# Patient Record
Sex: FEMALE | Race: BLACK OR AFRICAN AMERICAN | NOT HISPANIC OR LATINO | Employment: OTHER | URBAN - METROPOLITAN AREA
[De-identification: names, ages, dates, MRNs, and addresses within clinical notes are randomized per-mention and may not be internally consistent; named-entity substitution may affect disease eponyms.]

---

## 2017-01-16 ENCOUNTER — HOSPITAL ENCOUNTER (OUTPATIENT)
Dept: RADIOLOGY | Facility: HOSPITAL | Age: 62
Discharge: HOME | End: 2017-01-16
Attending: INTERNAL MEDICINE
Payer: COMMERCIAL

## 2017-01-16 DIAGNOSIS — Z12.31 OTHER SCREENING MAMMOGRAM: ICD-10-CM

## 2017-01-16 DIAGNOSIS — Z12.31 VISIT FOR SCREENING MAMMOGRAM: ICD-10-CM

## 2017-01-16 PROCEDURE — 77063 BREAST TOMOSYNTHESIS BI: CPT | Mod: 26,,, | Performed by: RADIOLOGY

## 2017-01-16 PROCEDURE — 77067 SCR MAMMO BI INCL CAD: CPT | Mod: TC

## 2017-01-16 PROCEDURE — 77067 SCR MAMMO BI INCL CAD: CPT | Mod: 26,,, | Performed by: RADIOLOGY

## 2017-01-18 ENCOUNTER — OFFICE VISIT (OUTPATIENT)
Dept: INTERNAL MEDICINE | Facility: CLINIC | Age: 62
End: 2017-01-18
Payer: COMMERCIAL

## 2017-01-18 VITALS
DIASTOLIC BLOOD PRESSURE: 72 MMHG | OXYGEN SATURATION: 96 % | HEART RATE: 87 BPM | SYSTOLIC BLOOD PRESSURE: 132 MMHG | WEIGHT: 193.81 LBS | BODY MASS INDEX: 33.09 KG/M2 | HEIGHT: 64 IN

## 2017-01-18 DIAGNOSIS — J31.0 RHINITIS, UNSPECIFIED TYPE: ICD-10-CM

## 2017-01-18 DIAGNOSIS — H61.20 IMPACTED CERUMEN, UNSPECIFIED LATERALITY: ICD-10-CM

## 2017-01-18 DIAGNOSIS — J44.9 MILD CHRONIC OBSTRUCTIVE PULMONARY DISEASE: ICD-10-CM

## 2017-01-18 DIAGNOSIS — H91.90 HEARING LOSS, UNSPECIFIED HEARING LOSS TYPE, UNSPECIFIED LATERALITY: ICD-10-CM

## 2017-01-18 DIAGNOSIS — E11.9 TYPE 2 DIABETES MELLITUS WITHOUT COMPLICATION, WITHOUT LONG-TERM CURRENT USE OF INSULIN: ICD-10-CM

## 2017-01-18 DIAGNOSIS — M79.673 PAIN OF FOOT, UNSPECIFIED LATERALITY: ICD-10-CM

## 2017-01-18 DIAGNOSIS — Z12.11 COLON CANCER SCREENING: ICD-10-CM

## 2017-01-18 DIAGNOSIS — G47.30 SLEEP APNEA, UNSPECIFIED TYPE: ICD-10-CM

## 2017-01-18 DIAGNOSIS — Z00.00 ANNUAL PHYSICAL EXAM: Primary | ICD-10-CM

## 2017-01-18 DIAGNOSIS — E78.5 HYPERLIPIDEMIA, UNSPECIFIED HYPERLIPIDEMIA TYPE: ICD-10-CM

## 2017-01-18 DIAGNOSIS — J06.9 UPPER RESPIRATORY TRACT INFECTION, UNSPECIFIED TYPE: ICD-10-CM

## 2017-01-18 LAB
FLUAV AG SPEC QL IA: POSITIVE
FLUBV AG SPEC QL IA: NEGATIVE
SPECIMEN SOURCE: ABNORMAL

## 2017-01-18 PROCEDURE — 99999 PR PBB SHADOW E&M-EST. PATIENT-LVL IV: CPT | Mod: PBBFAC,,, | Performed by: INTERNAL MEDICINE

## 2017-01-18 PROCEDURE — 87400 INFLUENZA A/B EACH AG IA: CPT | Mod: 59

## 2017-01-18 PROCEDURE — 99396 PREV VISIT EST AGE 40-64: CPT | Mod: S$GLB,,, | Performed by: INTERNAL MEDICINE

## 2017-01-18 RX ORDER — FLUTICASONE PROPIONATE 50 MCG
1 SPRAY, SUSPENSION (ML) NASAL DAILY
Qty: 16 G | Refills: 6 | Status: SHIPPED | OUTPATIENT
Start: 2017-01-18 | End: 2019-09-11 | Stop reason: SDUPTHER

## 2017-01-18 RX ORDER — ALBUTEROL SULFATE 90 UG/1
2 AEROSOL, METERED RESPIRATORY (INHALATION) EVERY 6 HOURS PRN
Qty: 3 INHALER | Refills: 3 | Status: SHIPPED | OUTPATIENT
Start: 2017-01-18 | End: 2018-05-01 | Stop reason: SDUPTHER

## 2017-01-18 RX ORDER — CODEINE PHOSPHATE AND GUAIFENESIN 10; 100 MG/5ML; MG/5ML
5 SOLUTION ORAL NIGHTLY PRN
Qty: 180 ML | Refills: 0 | Status: SHIPPED | OUTPATIENT
Start: 2017-01-18 | End: 2017-01-28

## 2017-01-18 RX ORDER — ONDANSETRON 4 MG/1
4 TABLET, ORALLY DISINTEGRATING ORAL EVERY 8 HOURS PRN
Qty: 10 TABLET | Refills: 0 | Status: SHIPPED | OUTPATIENT
Start: 2017-01-18 | End: 2018-05-01

## 2017-01-18 RX ORDER — PRAVASTATIN SODIUM 20 MG/1
20 TABLET ORAL DAILY
Qty: 90 TABLET | Refills: 3 | Status: SHIPPED | OUTPATIENT
Start: 2017-01-18 | End: 2018-05-01 | Stop reason: SDUPTHER

## 2017-01-18 RX ORDER — ASPIRIN 81 MG/1
81 TABLET ORAL DAILY
Refills: 0
Start: 2017-01-18 | End: 2022-10-25

## 2017-01-18 RX ORDER — METFORMIN HYDROCHLORIDE 500 MG/1
500 TABLET, EXTENDED RELEASE ORAL
Qty: 90 TABLET | Refills: 3 | Status: SHIPPED | OUTPATIENT
Start: 2017-01-18 | End: 2018-05-01 | Stop reason: SDUPTHER

## 2017-01-18 NOTE — PROGRESS NOTES
"Subjective:       Patient ID: Lizabeth Saha is a 61 y.o. female.    Chief Complaint: Annual Exam   this is a 61-year-old who presents today for checkup.  Patient reports she retired since last visit and had been off her medications because she didn't have insurance and reports that she joined a gym and started to work harder on exercise and dietary measures.  Her sugars have been fairly well controlled overall. She has also been off her cholesterol medications.  Patient reports that she has been doing fairly well but recently she developed GI illness nausea vomiting diarrhea which seems to have resolved she then developed upper respiratory symptoms been going on since the beginning of the week.  No high fevers she has postnasal drip and cough sinus pressure and sneezing she does tend to get ALLERGY seasonally  patient has trouble hearing    HPI  Review of Systems   Constitutional: Positive for fatigue. Negative for fever.   HENT: Positive for congestion, postnasal drip and sinus pressure.    Respiratory: Positive for cough. Negative for shortness of breath and wheezing.    Cardiovascular: Negative for chest pain and palpitations.   Gastrointestinal: Negative for abdominal pain and constipation.        Recent gi illness    Musculoskeletal:        Arthritis knee at times  Foot discomfort on occasion   Would like podiatry appt    Neurological: Negative for dizziness.       Objective:     Blood pressure 132/72, pulse 87, height 5' 4" (1.626 m), weight 87.9 kg (193 lb 12.6 oz), SpO2 96 %.    Physical Exam   Constitutional: No distress.   HENT:   Head: Normocephalic.   Mouth/Throat: Oropharynx is clear and moist.   Cerumen  Rhinitis    Eyes: No scleral icterus.   Neck: Neck supple.   Cardiovascular: Normal rate, regular rhythm and normal heart sounds.  Exam reveals no gallop and no friction rub.    No murmur heard.  Pulmonary/Chest: Effort normal and breath sounds normal. No respiratory distress.   Breast : normal " no masses or tenderness    Abdominal: Soft. Bowel sounds are normal. She exhibits no mass. There is no tenderness.   Musculoskeletal: She exhibits no edema.   Arthritis knee    Feet:   Right Foot:   Protective Sensation: 7 sites tested. 7 sites sensed.   Left Foot:   Protective Sensation: 7 sites tested. 7 sites sensed.   Neurological: She is alert.   Skin: No erythema.   Psychiatric: She has a normal mood and affect.   Vitals reviewed.      Assessment:       1. Annual physical exam    2. Hyperlipidemia, unspecified hyperlipidemia type    3. Type 2 diabetes mellitus without complication, without long-term current use of insulin    4. Pain of foot, unspecified laterality    5. Colon cancer screening    6. Impacted cerumen, unspecified laterality    7. Hearing loss, unspecified hearing loss type, unspecified laterality    8. Mild chronic obstructive pulmonary disease    9. Upper respiratory tract infection, unspecified type    10. Rhinitis, unspecified type    11. Sleep apnea, unspecified type        Plan:       Lizabeth Bingham was seen today for annual exam.    Diagnoses and all orders for this visit:    Annual physical exam    Hyperlipidemia, unspecified hyperlipidemia type  -     pravastatin (PRAVACHOL) 20 MG tablet; Take 1 tablet (20 mg total) by mouth once daily.    Type 2 diabetes mellitus without complication, without long-term current use of insulin  Discussed hemoglobin A1c without metformin will resume metformin  -     Ambulatory consult to Optometry annual eye appointment    -     Ambulatory referral to Podiatry  -     Basic metabolic panel; Future  -     CBC auto differential; Future  -     Hemoglobin A1c; Future  -     Hepatic function panel; Future  -     Lipid panel; Future    Pain of foot, unspecified laterality  -     Ambulatory referral to Podiatry    Colon cancer screening  -     Case request GI: COLONOSCOPY  Pt will consider she will call to schedule     Impacted cerumen, unspecified  laterality  Hearing loss, unspecified hearing loss type, unspecified laterality  -     Ambulatory consult to ENT    Mild chronic obstructive pulmonary disease    Upper respiratory tract infection, unspecified type  -     Influenza antigen Nasal Swab    Rhinitis, unspecified type  -     fluticasone (FLONASE) 50 mcg/actuation nasal spray; 1 spray by Each Nare route once daily.    Sleep apnea, unspecified type  -     Ambulatory consult to Sleep Disorders  For follow up     Resume medications   -     metformin (GLUCOPHAGE-XR) 500 MG 24 hr tablet; Take 1 tablet (500 mg total) by mouth daily with breakfast.  -     beclomethasone (QVAR) 40 mcg/actuation Aero; Inhale 2 puffs into the lungs 2 (two) times daily.  -     albuterol 90 mcg/actuation inhaler; Inhale 2 puffs into the lungs every 6 (six) hours as needed for Wheezing.  -     aspirin (ECOTRIN) 81 MG EC tablet; Take 1 tablet (81 mg total) by mouth once daily.    For nausea and cough rx proviedd   Recent gastroenteritis bland diet fluids increased hydration discussed   -     ondansetron (ZOFRAN-ODT) 4 MG TbDL; Take 1 tablet (4 mg total) by mouth every 8 (eight) hours as needed.  -     guaifenesin-codeine 100-10 mg/5 ml (TUSSI-ORGANIDIN NR)  mg/5 mL syrup; Take 5 mLs by mouth nightly as needed for Cough.    Labs and annual mammogram reviewed    She will call if no impromvent or increased concern     Continue regular exercise       Follow up 4 months back on medications

## 2017-01-18 NOTE — MR AVS SNAPSHOT
[facility]  [address] [facility] [address]  Phone: [phone]  Fax: [phone]                  Lizabeth Saha   2017 2:00 PM   Office Visit    Description:  Female : 1955   Provider:  Orquidea Lancaster MD   Department:  [facility]           Reason for Visit     Annual Exam           Diagnoses this Visit        Comments    Annual physical exam    -  Primary     Hyperlipidemia, unspecified hyperlipidemia type         Type 2 diabetes mellitus without complication, without long-term current use of insulin         Pain of foot, unspecified laterality         Colon cancer screening         Impacted cerumen, unspecified laterality         Hearing loss, unspecified hearing loss type, unspecified laterality         Mild chronic obstructive pulmonary disease         Upper respiratory tract infection, unspecified type         Rhinitis, unspecified type         Sleep apnea, unspecified type                To Do List           Future Appointments        Provider Department Dept Phone    2017 2:30 PM MD [facility] - Internal Medicine [phone]      To Schedule:     Please call the Endoscopy Department at [phone] to schedule your appointment.          Goals (5 Years of Data)     None       These Medications        Disp Refills Start End    metformin (GLUCOPHAGE-XR) 500 MG 24 hr tablet 90 tablet 3 2017    Take 1 tablet (500 mg total) by mouth daily with breakfast. - Oral    Pharmacy: [facility] [address] [place] [address] AT [facility] Ph #: [phone]       pravastatin (PRAVACHOL) 20 MG tablet 90 tablet 3 2017     Take 1 tablet (20 mg total) by mouth once daily. - Oral    Pharmacy: [facility] [address] [place] [address] AT [facility] Ph #: [phone]       beclomethasone (QVAR) 40  mcg/actuation Aero 3 each 4 1/18/2017 1/18/2018    Inhale 2 puffs into the lungs 2 (two) times daily. - Inhalation    Pharmacy: [address] #: [phone]       albuterol 90 mcg/actuation inhaler 3 Inhaler 3 1/18/2017 1/18/2018    Inhale 2 puffs into the lungs every 6 (six) hours as needed for Wheezing. - Inhalation    Pharmacy: [address] #: [phone]       aspirin (ECOTRIN) 81 MG EC tablet  0 1/18/2017     Take 1 tablet (81 mg total) by mouth once daily. - Oral    Pharmacy: [address] Ph #: [phone]       ondansetron (ZOFRAN-ODT) 4 MG TbDL 10 tablet 0 1/18/2017     Take 1 tablet (4 mg total) by mouth every 8 (eight) hours as needed. - Oral    Pharmacy: [address] #: [phone]       fluticasone (FLONASE) 50 mcg/actuation nasal spray 16 g 6 1/18/2017     1 spray by Each Nare route once daily. - Each Nare    Pharmacy: [address] #: [phone]       guaifenesin-codeine 100-10 mg/5 ml (TUSSI-ORGANIDIN NR)  mg/5 mL syrup 180 mL 0 1/18/2017 1/28/2017    Take 5 mLs by mouth nightly as needed for Cough. - Oral    Pharmacy: [address] Ph #: [phone]         [facility]     [facility] Nurse Care [facility] - 24/7 Assistance  Registered nurses in the [facility] provide clinical advisement, health education, appointment booking, and other advisory services.  Call for this free service at [phone].             Medications            Message regarding Medications     Verify the changes and/or additions to your medication regime listed below are the same as discussed with your clinician today.  If any of these changes or additions are incorrect, please notify your healthcare provider.        START taking these NEW medications        Refills    ondansetron (ZOFRAN-ODT) 4 MG TbDL 0    Sig: Take 1 tablet (4 mg total) by mouth every 8 (eight) hours as needed.    Class: Normal    Route: Oral    guaifenesin-codeine 100-10 mg/5 ml (TUSSI-ORGANIDIN NR)  mg/5 mL syrup 0    Sig: Take 5 mLs by mouth nightly as needed for Cough.    Class: Normal    Route: Oral      CHANGE how you are taking these medications     Start Taking Instead of    pravastatin (PRAVACHOL) 20 MG tablet pravastatin (PRAVACHOL) 40 MG tablet    Dosage:  Take 1 tablet (20 mg total) by mouth once daily. Dosage:  Take 1 tablet (40 mg total) by mouth once daily.    Reason for Change:  Reorder       STOP taking these medications     meloxicam (MOBIC) 15 MG tablet Take 1 tablet (15 mg total) by mouth daily as needed for Pain.           Verify that the below list of medications is an accurate representation of the medications you are currently taking.  If none reported, the list may be blank. If incorrect, please contact your healthcare provider. Carry this list with you in case of emergency.           Current Medications     albuterol 90 mcg/actuation inhaler Inhale 2 puffs into the lungs every 6 (six) hours as needed for Wheezing.    aspirin (ECOTRIN) 81 MG EC tablet Take 1 tablet (81 mg total) by mouth once daily.    beclomethasone (QVAR) 40 mcg/actuation Aero Inhale 2 puffs into the lungs 2 (two) times daily.    betamethasone valerate 0.1% (VALISONE) 0.1 % Lotn Apply topically 2 (two) times daily.    cetirizine (ZYRTEC) 10 MG tablet Take 1 tablet (10 mg total) by mouth once daily.    fluticasone (FLONASE) 50 mcg/actuation nasal spray 1 spray by Each Nare route once daily.     "guaifenesin-codeine 100-10 mg/5 ml (TUSSI-ORGANIDIN NR)  mg/5 mL syrup Take 5 mLs by mouth nightly as needed for Cough.    metformin (GLUCOPHAGE-XR) 500 MG 24 hr tablet Take 1 tablet (500 mg total) by mouth daily with breakfast.    ondansetron (ZOFRAN-ODT) 4 MG TbDL Take 1 tablet (4 mg total) by mouth every 8 (eight) hours as needed.    pravastatin (PRAVACHOL) 20 MG tablet Take 1 tablet (20 mg total) by mouth once daily.           Clinical Reference Information           Vital Signs - Last Recorded  Most recent update: 1/18/2017  1:57 PM by Kecia Juan MA    BP Pulse Ht Wt SpO2 BMI    132/72 87 5' 4" (1.626 m) 87.9 kg (193 lb 12.6 oz) 96% 33.26 kg/m2      Blood Pressure          Most Recent Value    BP  132/72      Allergies as of 1/18/2017     Phenergan  [Promethazine]      Immunizations Administered on Date of Encounter - 1/18/2017     None      Orders Placed During Today's Visit      Normal Orders This Visit    Ambulatory consult to ENT     Ambulatory consult to Optometry     Ambulatory consult to Sleep Disorders     Ambulatory referral to Podiatry     Case request GI: COLONOSCOPY     Influenza antigen Nasal Swab     Future Labs/Procedures Expected by Expires    Basic metabolic panel  5/18/2017 (Approximate) 7/17/2017    CBC auto differential  5/18/2017 (Approximate) 7/17/2017    Hemoglobin A1c  5/18/2017 (Approximate) 7/17/2017    Hepatic function panel  5/18/2017 (Approximate) 7/17/2017    Lipid panel  5/18/2017 (Approximate) 7/17/2017      "

## 2017-03-02 ENCOUNTER — INITIAL CONSULT (OUTPATIENT)
Dept: OTOLARYNGOLOGY | Facility: CLINIC | Age: 62
End: 2017-03-02
Payer: COMMERCIAL

## 2017-03-02 ENCOUNTER — CLINICAL SUPPORT (OUTPATIENT)
Dept: AUDIOLOGY | Facility: CLINIC | Age: 62
End: 2017-03-02
Payer: COMMERCIAL

## 2017-03-02 VITALS
DIASTOLIC BLOOD PRESSURE: 71 MMHG | TEMPERATURE: 99 F | WEIGHT: 196.44 LBS | HEIGHT: 64 IN | BODY MASS INDEX: 33.54 KG/M2 | SYSTOLIC BLOOD PRESSURE: 131 MMHG | HEART RATE: 96 BPM

## 2017-03-02 DIAGNOSIS — Z87.898 HISTORY OF VERTIGO: ICD-10-CM

## 2017-03-02 DIAGNOSIS — H61.20 IMPACTED CERUMEN, UNSPECIFIED LATERALITY: ICD-10-CM

## 2017-03-02 DIAGNOSIS — H90.3 HEARING LOSS, SENSORINEURAL, HIGH FREQUENCY, BILATERAL: Primary | ICD-10-CM

## 2017-03-02 DIAGNOSIS — H90.3 SENSORY HEARING LOSS, BILATERAL: Primary | ICD-10-CM

## 2017-03-02 PROCEDURE — 92567 TYMPANOMETRY: CPT | Mod: S$GLB,,, | Performed by: AUDIOLOGIST

## 2017-03-02 PROCEDURE — 99999 PR PBB SHADOW E&M-EST. PATIENT-LVL III: CPT | Mod: PBBFAC,,, | Performed by: OTOLARYNGOLOGY

## 2017-03-02 PROCEDURE — 69210 REMOVE IMPACTED EAR WAX UNI: CPT | Mod: S$GLB,,, | Performed by: OTOLARYNGOLOGY

## 2017-03-02 PROCEDURE — 99213 OFFICE O/P EST LOW 20 MIN: CPT | Mod: 25,S$GLB,, | Performed by: OTOLARYNGOLOGY

## 2017-03-02 PROCEDURE — 92557 COMPREHENSIVE HEARING TEST: CPT | Mod: S$GLB,,, | Performed by: AUDIOLOGIST

## 2017-03-02 PROCEDURE — 1160F RVW MEDS BY RX/DR IN RCRD: CPT | Mod: S$GLB,,, | Performed by: OTOLARYNGOLOGY

## 2017-03-02 NOTE — MR AVS SNAPSHOT
[facility] - Otorhinolaryngology  [address]  [facility] [address]  Phone: [phone]  Fax: [phone]                  Lizabeth Saha   3/2/2017 2:15 PM   Initial consult    Description:  Female : 1955   Provider:  Brijesh Ahuja III, MD   Department:  [facility] - Otorhinolaryngology           Diagnoses this Visit        Comments    Hearing loss, sensorineural, high frequency, bilateral    -  Primary     Impacted cerumen, unspecified laterality         History of vertigo     12 years ago           To Do List           Future Appointments        Provider Department Dept Phone    3/8/2017 9:00 AM HEARING AIDS, [facility] BUR[facility] Hearing Aids [phone]    3/28/2017 11:00 AM Monica Alvares MD [facility] [place] - Sleep Clinic [phone]    2017 8:00 AM LAB, APPOINTMENT [facility] [phone]    2017 2:30 PM Orquidea Lancaster MD [facility] - Internal Medicine [phone]      Goals (5 Years of Data)     None      [facility]     [facility] Nurse Care Line -  Assistance  Registered nurses in the [facility] provide clinical advisement, health education, appointment booking, and other advisory services.  Call for this free service at [phone].             Medications           Message regarding Medications     Verify the changes and/or additions to your medication regime listed below are the same as discussed with your clinician today.  If any of these changes or additions are incorrect, please notify your healthcare provider.             Verify that the below list of medications is an accurate representation of the medications you are currently taking.  If none reported, the list may be blank. If incorrect, please contact your healthcare provider. Carry this list with you in case of emergency.           Current Medications     albuterol 90 mcg/actuation inhaler Inhale 2 puffs into the lungs every 6 (six) hours as  needed for Wheezing.    aspirin (ECOTRIN) 81 MG EC tablet Take 1 tablet (81 mg total) by mouth once daily.    beclomethasone (QVAR) 40 mcg/actuation Aero Inhale 2 puffs into the lungs 2 (two) times daily.    betamethasone valerate 0.1% (VALISONE) 0.1 % Lotn Apply topically 2 (two) times daily.    cetirizine (ZYRTEC) 10 MG tablet Take 1 tablet (10 mg total) by mouth once daily.    fluticasone (FLONASE) 50 mcg/actuation nasal spray 1 spray by Each Nare route once daily.    metformin (GLUCOPHAGE-XR) 500 MG 24 hr tablet Take 1 tablet (500 mg total) by mouth daily with breakfast.    ondansetron (ZOFRAN-ODT) 4 MG TbDL Take 1 tablet (4 mg total) by mouth every 8 (eight) hours as needed.    pravastatin (PRAVACHOL) 20 MG tablet Take 1 tablet (20 mg total) by mouth once daily.           Clinical Reference Information           Your Vitals Were     BP                   131/71 (BP Location: Right arm, Patient Position: Sitting, BP Method: Automatic)           Blood Pressure          Most Recent Value    BP  131/71      Allergies as of 3/2/2017     Phenergan  [Promethazine]      Immunizations Administered on Date of Encounter - 3/2/2017     None      Instructions    Cerumen removed from both eacs with curet ( after audiometry)   Audiometry reviwewed; significant bilateral high frequency SNHL; compered to previous study  Pt. is a candidate for hearing amplification for one or both ears   copy of audiogram/JERMAINE Montes's card/Rx to obtain hearing aid( s) provided  Monitor hearing yearly  Ear cleaning yearly/prn         Language Assistance Services     ATTENTION: Language assistance services are available, free of charge. Please call [phone].      ATENCIÓN: Si habla luis mañol, tiene a de la torre disposición servicios gratuitos de asistencia lingüística. Llame al [phone].     JONNY Ý: N?u b?n nói Ti?ng Vi?t, có các d?ch v? h? tr? ngôn ng? mi?n phí dành cho b?n. G?i s? [phone].         [facility] complies  with applicable Federal civil rights laws and does not discriminate on the basis of race, color, national origin, age, disability, or sex.

## 2017-03-02 NOTE — LETTER
March 5, 2017      Orquidea Lancaster MD  [address]  [facility] [address]           [facility] - Otorhinol[facility]ology  [address]  [facility] [address]  Phone: [phone]  Fax: [phone]          Patient: Lizabeth Saha   MR Number: [id number]   YOB: 1955   Date of Visit: 3/2/2017       Dear Dr. Orquidea Lancaster:    Thank you for referring Lizabeth Saha to me for evaluation. Attached you will find relevant portions of my assessment and plan of care.    If you have questions, please do not hesitate to call me. I look forward to following Lizabeth Saha along with you.    Sincerely,    Brijesh Ahuja III, MD    Enclosure  CC:  No Recipients    If you would like to receive this communication electronically, please contact [email or web address] or [phone] to request more information on [facility] access.    For providers and/or their staff who would like to refer a patient to [facility], please contact us through our one-stop-shop provider referral line, [facility], at [phone].    If you feel you have received this communication in error or would no longer like to receive these types of communications, please e-mail [email or web address]

## 2017-03-02 NOTE — PROGRESS NOTES
Subjective:       Patient ID: Lizabeth Saha is a 61 y.o. female.    Chief Complaint: No chief complaint on file.    HPI      Review of Systems        Objective:      Physical Exam    Assessment:       1. Hearing loss, sensorineural, high frequency, bilateral    2. Impacted cerumen, unspecified laterality    3. History of vertigo        Plan:

## 2017-03-02 NOTE — PATIENT INSTRUCTIONS
Cerumen removed from both eacs with curet ( after audiometry)   Audiometry reviwewed; significant bilateral high frequency SNHL; compered to previous study  Pt. is a candidate for hearing amplification for one or both ears   copy of audiogram/JERMAINE Montes's card/Rx to obtain hearing aid( s) provided  Monitor hearing yearly  Ear cleaning yearly/prn

## 2017-03-03 NOTE — PROGRESS NOTES
Subjective:       Patient ID: Lizabeth Saha is a 61 y.o. female.    Chief Complaint: No chief complaint on file.    HPI; Ms. Saha is a 61 year old AAF who is here for an ear cleaning procedure primarily.    She does indicate a history of hearing loss.    She loses her balance once in a while.  She had a significant episode of vertigo 12 years ago.  She has a brother and a sister who both have significant  hearing loss histories.  She denies ringing in her ears.  She does indicate slight left ear pressure.  Patient completed an annual examination performed by Dr. Orquidea Lancaster 1/18/17 which indicated her histories of impacted cerumen affecting both ear canals as well as type 2 diabetes, foot pain, hyperlipidemia, mild COPD, URI symptoms, rhinitis and sleep apnea among others.  PMH: High cholesterol, diabetes, arthritis, hearing loss  Family history: Hearing loss, high blood pressure, high cholesterol, arthritis, kidney disease due to high blood pressure medication  Occupation: Retired  Review of Systems   Ears: Positive for hearing loss and ear pain.  Family history of hearing loss: not sure.    Nose:  Positive for snoring.    Constitutional: Positive for night sweats.    Respiratory:  Positive for recent cough.    Other:  Positive for arthritis. Negative for rash.        The patient has completed an audiometric study performed by the [facility] audiology service.    The study is duplicated below the results reviewed with the patient.  The patient had completed a previous audiogram in 2015.  That study is also duplicated below.    Objective:               Blood pressure 131/71 pulse 96 temperature 98.5 height 5 feet 4 inches weight 196 pounds  Physical Exam   Constitutional: She is oriented to person, place, and time. She appears well-developed and well-nourished.   HENT:   Head: Normocephalic.   Right Ear: Tympanic membrane and external ear normal. No drainage. No foreign bodies. No mastoid  tenderness. Tympanic membrane is not perforated. No decreased hearing is noted.   Left Ear: Tympanic membrane and external ear normal. No drainage. No foreign bodies. No mastoid tenderness. Tympanic membrane is not perforated. No decreased hearing is noted.   Ears:    Nose: Nose normal. No nasal deformity, septal deviation or nasal septal hematoma. No epistaxis. Right sinus exhibits no maxillary sinus tenderness and no frontal sinus tenderness. Left sinus exhibits no maxillary sinus tenderness and no frontal sinus tenderness.   Mouth/Throat: Uvula is midline, oropharynx is clear and moist and mucous membranes are normal. No oral lesions. No trismus in the jaw. No uvula swelling. No oropharyngeal exudate or tonsillar abscesses.   Neck: Neck supple. No tracheal deviation present. No thyromegaly present.   Pulmonary/Chest: Effort normal. No stridor.   Lymphadenopathy:     She has no cervical adenopathy.   Neurological: She is alert and oriented to person, place, and time.   Skin: No rash noted.       Assessment:       1. Hearing loss, sensorineural, high frequency, bilateral    2. Impacted cerumen, unspecified laterality    3. History of vertigo        Plan:     Cerumen removed from both eacs with curet ( after audiometry)   Audiometry reviwewed; significant bilateral high frequency SNHL; compered to previous study  Pt. is a candidate for hearing amplification for one or both ears   copy of audiogram/JERMAINE Montes's card/Rx to obtain hearing aid( s) provided  Monitor hearing yearly  Ear cleaning yearly/prn

## 2017-03-28 ENCOUNTER — OFFICE VISIT (OUTPATIENT)
Dept: SLEEP MEDICINE | Facility: CLINIC | Age: 62
End: 2017-03-28
Payer: COMMERCIAL

## 2017-03-28 VITALS
DIASTOLIC BLOOD PRESSURE: 96 MMHG | WEIGHT: 195.75 LBS | HEART RATE: 85 BPM | BODY MASS INDEX: 33.6 KG/M2 | SYSTOLIC BLOOD PRESSURE: 147 MMHG

## 2017-03-28 DIAGNOSIS — G47.33 OSA (OBSTRUCTIVE SLEEP APNEA): Primary | ICD-10-CM

## 2017-03-28 PROCEDURE — 1160F RVW MEDS BY RX/DR IN RCRD: CPT | Mod: S$GLB,,, | Performed by: PSYCHIATRY & NEUROLOGY

## 2017-03-28 PROCEDURE — 99214 OFFICE O/P EST MOD 30 MIN: CPT | Mod: S$GLB,,, | Performed by: PSYCHIATRY & NEUROLOGY

## 2017-03-28 PROCEDURE — 99999 PR PBB SHADOW E&M-EST. PATIENT-LVL II: CPT | Mod: PBBFAC,,, | Performed by: PSYCHIATRY & NEUROLOGY

## 2017-03-28 NOTE — PROGRESS NOTES
FOLLOW UP SLEEP CLINIC VISIT NOTE:  Cc: GERARDO management    Dr. Perry 2011: This 55-year-old female patient returns to sleep clinic regarding workup and management of obstructive sleep apnea. She initially presented with symptoms of snoring, disrupted sleep and  daytime sleepiness. She is seen in the context of medical comorbidities of allergic rhinitis and obesity.  Her sleep study was completed 3 years ago. She elected to pursue CPAP, but was unable to follow through with the titration. Hence, she has never been set up on CPAP. She reports that  her symptoms have continued getting worse (falling asleep at work), and she would now like to move forward with definitive treatment for GERARDO.  She also reports a short time in bed.    BT: 10 pm  SOL: short  WT: 4:30 am  Naps on shuttle  At work by 6:30 am        INTERVAL HISTORY:    03/27/2015:  The patient has not presented any new complaints since the previous visit. States that her compliance went down. Break through snoring. ESS 7/24. Denied sleepiness. Reports fatigue.  Has not used CPAP in months. Waking up with coughing with phlegm frequently and dry cough during the day.    03/28/2017: Has not been using CPAP much - retired April 2016. Still snoring. Tired in AM. ESS 3/24. Lost 8 lbs.  Did not get APAP since last visit.   Needs a new machine - eligible now.     SLEEP ROUTINE:    Occupation: analyst for [employer]    Bed partner:   left bedroom  Time to bed:  11 PM  Sleep onset latency:  0        Disruptions or awakenings:    4-6    Wakeup time:      4:30 AM  Perceived sleep quality:  3/5  Daytime naps:      0  Weekend sleep routine:      Till 8          SLEEP STUDIES:  PSG 2008: Obstructive sleep apnea, AHI was 9.2 with an oxygen jason of 89%.  CPAP titration on 6/2011: Effective control of respiratory events was achieved at 9 cm of H2O. Weight 201 lbs.        ASSESMENT: Obstructive sleep apnea with symptoms of snoring, disrupted sleep and daytime sleepiness. I  am also concerned about insufficient sleep.  Medical comorbidities affecting/affected by GERARDO include: allergic rhinitis and obesity.  Not compliant with CPAP and it appears to me that the CPAP  pressure is too low for her current requirements  S He may be eligable for a new CPAP machine    PLAN:    APAP 9-15 cm H2O - Dreamstation.    25 min of 40 min  spent in education on cardiovascular  Effects of untreated GERARDO.

## 2017-03-28 NOTE — MR AVS SNAPSHOT
[facility]  [address]  [facility] [address]  Phone: [phone]                  Lizabeth Saha   3/28/2017 11:00 AM   Office Visit    Description:  Female : 1955   Provider:  Monica Alvares MD   Department:  [facility]           Reason for Visit     Sleep Apnea           Diagnoses this Visit        Comments    GERARDO (obstructive sleep apnea)    -  Primary            To Do List           Future Appointments        Provider Department Dept Phone    2017 8:00 AM LAB, APPOINTMENT [facility] [phone]    2017 2:30 PM Orquidea Lancaster MD [facility] [phone]      Goals (5 Years of Data)     None      [facility]     [facility] On [facility] Nurse Care Line -  Assistance  Registered nurses in the [facility] provide clinical advisement, health education, appointment booking, and other advisory services.  Call for this free service at [phone].             Medications           Message regarding Medications     Verify the changes and/or additions to your medication regime listed below are the same as discussed with your clinician today.  If any of these changes or additions are incorrect, please notify your healthcare provider.             Verify that the below list of medications is an accurate representation of the medications you are currently taking.  If none reported, the list may be blank. If incorrect, please contact your healthcare provider. Carry this list with you in case of emergency.           Current Medications     albuterol 90 mcg/actuation inhaler Inhale 2 puffs into the lungs every 6 (six) hours as needed for Wheezing.    aspirin (ECOTRIN) 81 MG EC tablet Take 1 tablet (81 mg total) by mouth once daily.    beclomethasone (QVAR) 40 mcg/actuation Aero Inhale 2 puffs into the lungs 2 (two) times daily.    fluticasone (FLONASE) 50 mcg/actuation nasal spray 1  spray by Each Nare route once daily.    metformin (GLUCOPHAGE-XR) 500 MG 24 hr tablet Take 1 tablet (500 mg total) by mouth daily with breakfast.    ondansetron (ZOFRAN-ODT) 4 MG TbDL Take 1 tablet (4 mg total) by mouth every 8 (eight) hours as needed.    pravastatin (PRAVACHOL) 20 MG tablet Take 1 tablet (20 mg total) by mouth once daily.    betamethasone valerate 0.1% (VALISONE) 0.1 % Lotn Apply topically 2 (two) times daily.    cetirizine (ZYRTEC) 10 MG tablet Take 1 tablet (10 mg total) by mouth once daily.           Clinical Reference Information           Your Vitals Were     BP Pulse Weight BMI       147/96 (BP Location: Left arm, Patient Position: Sitting, BP Method: Automatic) 85 88.8 kg (195 lb 12.3 oz) 33.6 kg/m2       Blood Pressure          Most Recent Value    BP  (!)  147/96      Allergies as of 3/28/2017     Phenergan  [Promethazine]      Immunizations Administered on Date of Encounter - 3/28/2017     None      Orders Placed During Today's Visit      Normal Orders This Visit    CPAP FOR HOME USE       Instructions    DME [facility]:  [phone] - [facility]:  or [phone] (ext [phone])- [facility]           Language Assistance Services     ATTENTION: Language assistance services are available, free of charge. Please call [phone].      ATENCIÓN: Si habla español, tiene a de la torre disposición servicios gratuitos de asistencia lingüística. Llame al [phone].     CHÚ Ý: N?u b?n nói Ti?ng Vi?t, có các d?ch v? h? tr? ngôn ng? mi?n phí dành cho b?n. G?i s? [phone].         [facility] complies with applicable Federal civil rights laws and does not discriminate on the basis of race, color, national origin, age, disability, or sex.

## 2017-05-01 ENCOUNTER — TELEPHONE (OUTPATIENT)
Dept: INTERNAL MEDICINE | Facility: CLINIC | Age: 62
End: 2017-05-01

## 2017-05-01 NOTE — TELEPHONE ENCOUNTER
----- Message from Pricila Ortez MA sent at 5/1/2017 10:46 AM CDT -----  Contact: Dorothy alford/URP-[phone]  Please advise that Dorothy is requesting a Copy of the Pt's A1-C from 2016. It can be faxed to [phone]. Please call. Thanks!

## 2017-06-28 DIAGNOSIS — Z12.11 COLON CANCER SCREENING: ICD-10-CM

## 2017-09-28 ENCOUNTER — PATIENT OUTREACH (OUTPATIENT)
Dept: ADMINISTRATIVE | Facility: HOSPITAL | Age: 62
End: 2017-09-28

## 2018-03-08 ENCOUNTER — TELEPHONE (OUTPATIENT)
Dept: INTERNAL MEDICINE | Facility: CLINIC | Age: 63
End: 2018-03-08

## 2018-03-08 DIAGNOSIS — Z12.31 ENCOUNTER FOR SCREENING MAMMOGRAM FOR BREAST CANCER: Primary | ICD-10-CM

## 2018-03-08 NOTE — TELEPHONE ENCOUNTER
Lm for pt informing that MD put in the order in for mammogram and she can call [phone] to schedule.

## 2018-03-08 NOTE — TELEPHONE ENCOUNTER
----- Message from Luís Bond sent at 3/7/2018  4:15 PM CST -----  Contact: Patient [phone]  She got a letter reminding her to do a mammograms. She need orders.    Thank you

## 2018-03-09 ENCOUNTER — TELEPHONE (OUTPATIENT)
Dept: INTERNAL MEDICINE | Facility: CLINIC | Age: 63
End: 2018-03-09

## 2018-03-09 DIAGNOSIS — Z12.31 ENCOUNTER FOR SCREENING MAMMOGRAM FOR BREAST CANCER: Primary | ICD-10-CM

## 2018-03-09 NOTE — TELEPHONE ENCOUNTER
----- Message from Shorty Munoz sent at 3/9/2018  3:58 PM CST -----  Contact: N/A  I reached out to pt to schedule annual mammogram but pt prefer 3D instead. Please input updated request in to Epic.    Chan Munoz  Access Navigator   [phone]

## 2018-03-20 ENCOUNTER — CLINICAL SUPPORT (OUTPATIENT)
Dept: AUDIOLOGY | Facility: CLINIC | Age: 63
End: 2018-03-20
Payer: COMMERCIAL

## 2018-03-20 ENCOUNTER — OFFICE VISIT (OUTPATIENT)
Dept: OTOLARYNGOLOGY | Facility: CLINIC | Age: 63
End: 2018-03-20
Payer: COMMERCIAL

## 2018-03-20 VITALS — HEART RATE: 77 BPM | TEMPERATURE: 98 F | DIASTOLIC BLOOD PRESSURE: 91 MMHG | SYSTOLIC BLOOD PRESSURE: 168 MMHG

## 2018-03-20 DIAGNOSIS — Z86.69 HISTORY OF HEARING LOSS: ICD-10-CM

## 2018-03-20 DIAGNOSIS — H61.22 IMPACTED CERUMEN OF LEFT EAR: Primary | ICD-10-CM

## 2018-03-20 DIAGNOSIS — Z82.2 FAMILY HISTORY OF HEARING LOSS: ICD-10-CM

## 2018-03-20 DIAGNOSIS — H91.90 PERCEIVED HEARING LOSS: ICD-10-CM

## 2018-03-20 DIAGNOSIS — H90.3 HEARING LOSS, SENSORINEURAL, HIGH FREQUENCY, BILATERAL: ICD-10-CM

## 2018-03-20 DIAGNOSIS — H90.3 SENSORINEURAL HEARING LOSS, BILATERAL: Primary | ICD-10-CM

## 2018-03-20 PROCEDURE — 99999 PR PBB SHADOW E&M-EST. PATIENT-LVL I: CPT | Mod: PBBFAC,,,

## 2018-03-20 PROCEDURE — 92567 TYMPANOMETRY: CPT | Mod: S$GLB,,, | Performed by: AUDIOLOGIST

## 2018-03-20 PROCEDURE — 99999 PR PBB SHADOW E&M-EST. PATIENT-LVL III: CPT | Mod: PBBFAC,,, | Performed by: OTOLARYNGOLOGY

## 2018-03-20 PROCEDURE — 69210 REMOVE IMPACTED EAR WAX UNI: CPT | Mod: S$GLB,,, | Performed by: OTOLARYNGOLOGY

## 2018-03-20 PROCEDURE — 99213 OFFICE O/P EST LOW 20 MIN: CPT | Mod: 25,S$GLB,, | Performed by: OTOLARYNGOLOGY

## 2018-03-20 PROCEDURE — 92557 COMPREHENSIVE HEARING TEST: CPT | Mod: S$GLB,,, | Performed by: AUDIOLOGIST

## 2018-03-20 NOTE — PROGRESS NOTES
Lizabeth Saha was referred for an audiological evaluation today by Dr. Ahuja.    Audiological testing revealed normal sloping to a mild-moderate SNHL, AU.  A speech reception threshold was obtained at 20 dBHL for the right ear and at 25 dBHL for the left ear.  Speech discrimination testing was 84% at 65 dBHL for the right ear and 88% at 65 dBHL for the left ear.      Tympanometry testing revealed Type A tympanograms, AU.      Recommendations:  1. Annual hearing evaluation  2. Hearing protection when in noise   3. Hearing aid consultation following medical clearance

## 2018-03-20 NOTE — PROGRESS NOTES
CC:AS otalgia; hx hearing loss  HPI: Ms. Saha is a bespectacled 62 year old type II diabetic AAF who has been undergoing dental procedures recently. She c/o left ear pain now.   She also expresses concern about her inability to hear conversations on the telephone; she hates having people repeat themselves.  She indicates her sister and brother's histories of left ear > right hearing loss problems at a  late age. Her brother wears a hearing aid.  She completed an audiogram in March 2017 performed during her consultation with me for an ear cleaning procedure which indicated her bilateral 2-8K SNHL and 20 db SRT scores. She had a significant episode of vertigo 12+ years ago.  She denies any significant hx of noise exposure.   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      Past Medical History:   Diagnosis Date    Diabetes mellitus type II     Hyperlipemia     Irritable bowel syndrome     Obesity     Osteopenia     Rhinitis     Sleep apnea     Vertigo    ALL: phenergan  Current Outpatient Prescriptions on File Prior to Visit   Medication Sig Dispense Refill    aspirin (ECOTRIN) 81 MG EC tablet Take 1 tablet (81 mg total) by mouth once daily.  0    fluticasone (FLONASE) 50 mcg/actuation nasal spray 1 spray by Each Nare route once daily. 16 g 6    ondansetron (ZOFRAN-ODT) 4 MG TbDL Take 1 tablet (4 mg total) by mouth every 8 (eight) hours as needed. 10 tablet 0    pravastatin (PRAVACHOL) 20 MG tablet Take 1 tablet (20 mg total) by mouth once daily. 90 tablet 3    albuterol 90 mcg/actuation inhaler Inhale 2 puffs into the lungs every 6 (six) hours as needed for Wheezing. 3 Inhaler 3    beclomethasone (QVAR) 40 mcg/actuation Aero Inhale 2 puffs into the lungs 2 (two) times daily. 3 each 4    betamethasone valerate 0.1% (VALISONE) 0.1 % Lotn Apply topically 2 (two) times daily. 30 mL 2    cetirizine (ZYRTEC) 10 MG tablet Take 1 tablet (10 mg total) by mouth once daily.  0    metformin  "(GLUCOPHAGE-XR) 500 MG 24 hr tablet Take 1 tablet (500 mg total) by mouth daily with breakfast. 90 tablet 3     No current facility-administered medications on file prior to visit.        PE:/97 P 77 T 98.1 Ht 5'4" Wt 195 lbs  Gen.:Alert and oriented female in no acute distress  Both ears are examined under the microscope in the microprocedure room  A small amount of wax removed the right ear canal with a blunt curette.  The right eardrum is intact and clear as visualized.  A larger viable cerumen was extracted from left ear canal.  Left eardrum is intact and clear as visualized  Nasal exam is unremarkable for purulent discharge of polypoid disease of either passage.  Oropharyngeal exam is unremarkable for inflammation infection or ulceration.    She has consented to another audiometric study today which is performed by the [facility] audiology service.  It is compared to the previous study completed in 2017.    DIAGNOSIS:     ICD-10-CM ICD-9-CM    1. Impacted cerumen of left ear H61.22 380.4    2. History of hearing loss Z86.69 V12.49    3. Perceived hearing loss H90.5 389.8     difficulty understanding people on parvez phone   4. Hearing loss, sensorineural, high frequency, bilateral H91.93 389.8    5. Family history of hearing loss Z82.2 V19.2     in later years ( AS)      PLAN: Cerumen impaction extracted from occcluded AS eac with curet; some wax removed from AD ac  Audiometry reviewed,  compared to 2017 study  Pt. Ii a candidate for amplification for one or both ears   copy of audiogram/DONNELL Henriquez's card/Rx to obtain hearing aid(s) provided  Yearly audiometric monitoring/ear cleaning  encouraged         "

## 2018-03-20 NOTE — PATIENT INSTRUCTIONS
Cerumen impaction extracted from occcluded AS eac with curet; some wax removed from AD ac  Audiometry reviewed,  compared to 2017 study  Pt. Ii a candidate for amplification for one or both ears   copy of audiogram/DONNELL Henriquez's card/Rx to obtain hearing aid(s) provided  Yearly audiometric monitoring/ear cleaning  encouraged

## 2018-04-06 ENCOUNTER — TELEPHONE (OUTPATIENT)
Dept: AUDIOLOGY | Facility: CLINIC | Age: 63
End: 2018-04-06

## 2018-04-24 ENCOUNTER — HOSPITAL ENCOUNTER (OUTPATIENT)
Dept: RADIOLOGY | Facility: HOSPITAL | Age: 63
Discharge: HOME | End: 2018-04-24
Attending: INTERNAL MEDICINE
Payer: COMMERCIAL

## 2018-04-24 VITALS — HEIGHT: 64 IN | WEIGHT: 195 LBS | BODY MASS INDEX: 33.29 KG/M2

## 2018-04-24 DIAGNOSIS — Z12.31 ENCOUNTER FOR SCREENING MAMMOGRAM FOR BREAST CANCER: ICD-10-CM

## 2018-04-24 PROCEDURE — 77067 SCR MAMMO BI INCL CAD: CPT | Mod: TC

## 2018-04-24 PROCEDURE — 77067 SCR MAMMO BI INCL CAD: CPT | Mod: 26,,, | Performed by: RADIOLOGY

## 2018-04-24 PROCEDURE — 77063 BREAST TOMOSYNTHESIS BI: CPT | Mod: 26,,, | Performed by: RADIOLOGY

## 2018-05-01 ENCOUNTER — OFFICE VISIT (OUTPATIENT)
Dept: INTERNAL MEDICINE | Facility: CLINIC | Age: 63
End: 2018-05-01
Payer: COMMERCIAL

## 2018-05-01 VITALS
DIASTOLIC BLOOD PRESSURE: 64 MMHG | SYSTOLIC BLOOD PRESSURE: 104 MMHG | HEIGHT: 65 IN | WEIGHT: 191.38 LBS | HEART RATE: 82 BPM | BODY MASS INDEX: 31.89 KG/M2

## 2018-05-01 DIAGNOSIS — J44.9 MILD CHRONIC OBSTRUCTIVE PULMONARY DISEASE: ICD-10-CM

## 2018-05-01 DIAGNOSIS — H91.90 HEARING LOSS, UNSPECIFIED HEARING LOSS TYPE, UNSPECIFIED LATERALITY: ICD-10-CM

## 2018-05-01 DIAGNOSIS — E78.5 HYPERLIPIDEMIA, UNSPECIFIED HYPERLIPIDEMIA TYPE: ICD-10-CM

## 2018-05-01 DIAGNOSIS — E11.8 TYPE 2 DIABETES MELLITUS WITH COMPLICATION, WITHOUT LONG-TERM CURRENT USE OF INSULIN: ICD-10-CM

## 2018-05-01 DIAGNOSIS — Z00.00 ANNUAL PHYSICAL EXAM: Primary | ICD-10-CM

## 2018-05-01 DIAGNOSIS — Z12.11 COLON CANCER SCREENING: ICD-10-CM

## 2018-05-01 DIAGNOSIS — R41.3 MEMORY LOSS: ICD-10-CM

## 2018-05-01 DIAGNOSIS — G44.89 CHRONIC MIXED HEADACHE SYNDROME: ICD-10-CM

## 2018-05-01 PROCEDURE — 99396 PREV VISIT EST AGE 40-64: CPT | Mod: S$GLB,,, | Performed by: INTERNAL MEDICINE

## 2018-05-01 PROCEDURE — 99999 PR PBB SHADOW E&M-EST. PATIENT-LVL III: CPT | Mod: PBBFAC,,, | Performed by: INTERNAL MEDICINE

## 2018-05-01 PROCEDURE — 3046F HEMOGLOBIN A1C LEVEL >9.0%: CPT | Mod: CPTII,S$GLB,, | Performed by: INTERNAL MEDICINE

## 2018-05-01 RX ORDER — METFORMIN HYDROCHLORIDE 500 MG/1
500 TABLET, EXTENDED RELEASE ORAL 2 TIMES DAILY WITH MEALS
Qty: 180 TABLET | Refills: 3 | Status: SHIPPED | OUTPATIENT
Start: 2018-05-01 | End: 2019-07-01 | Stop reason: SDUPTHER

## 2018-05-01 RX ORDER — PRAVASTATIN SODIUM 20 MG/1
20 TABLET ORAL DAILY
Qty: 90 TABLET | Refills: 3 | Status: SHIPPED | OUTPATIENT
Start: 2018-05-01 | End: 2018-09-11

## 2018-05-01 RX ORDER — ALBUTEROL SULFATE 90 UG/1
2 AEROSOL, METERED RESPIRATORY (INHALATION) EVERY 6 HOURS PRN
Qty: 3 INHALER | Refills: 3 | Status: SHIPPED | OUTPATIENT
Start: 2018-05-01 | End: 2018-06-25 | Stop reason: SDUPTHER

## 2018-05-01 NOTE — PROGRESS NOTES
Subjective:       Patient ID: Lizabeth Saha is a 62 y.o. female.    Chief Complaint: Annual Exam   this is a 62-year-old who presents today for physical.  Patient reports that she has been not taking her medications since last visit she reports she retired and discussed lazy and stopped taking her medications on a regular basis she was going to the gym and doing that sort of thing but has stopped that as well she just joined a gym again and plans to start back exercising and she just started back on her metformin a few weeks ago.  Patient reports she's had trouble with hearing loss following with ENT she has trouble on occasion where she feels her memory isn't as good as it was in headaches on occasion so she was concerned would like to do a CAT scan.  She has stopped her cholesterol medicine but reports that she thought it was maybe contributing to her cough but she hasn't had any problems with cough or wheezing and has not been using her inhalers she would be agreeable to a refill of her albuterol to have on hand when she does get wheezing or tightness.     HPI  Review of Systems   Constitutional: Negative for activity change and unexpected weight change.   HENT: Positive for hearing loss. Negative for rhinorrhea and trouble swallowing.    Eyes: Negative for discharge and visual disturbance.   Respiratory: Positive for chest tightness and wheezing.    Cardiovascular: Positive for chest pain and palpitations.   Gastrointestinal: Negative for blood in stool, constipation, diarrhea and vomiting.        Episode bleeding   Things with strainnig  Hemorrhoid non currently    Endocrine: Negative for polydipsia and polyuria.   Genitourinary: Negative for difficulty urinating, dysuria, hematuria and menstrual problem.   Musculoskeletal: Positive for arthralgias. Negative for joint swelling and neck pain.   Neurological: Positive for headaches. Negative for weakness.   Psychiatric/Behavioral: Positive for confusion.  "Negative for dysphoric mood.       Objective:     Blood pressure 104/64, pulse 82, height 5' 5" (1.651 m), weight 86.8 kg (191 lb 5.8 oz), last menstrual period 01/01/1994.    Physical Exam   Constitutional: No distress.   HENT:   Head: Normocephalic.   Mouth/Throat: Oropharynx is clear and moist.   Eyes: No scleral icterus.   Neck: Neck supple.   Cardiovascular: Normal rate, regular rhythm and normal heart sounds.  Exam reveals no gallop and no friction rub.    No murmur heard.  Pulses:       Dorsalis pedis pulses are 1+ on the right side, and 1+ on the left side.        Posterior tibial pulses are 1+ on the right side, and 1+ on the left side.   Pulmonary/Chest: Effort normal and breath sounds normal. No respiratory distress.   Breast : normal no masses or tenderness    Abdominal: Soft. Bowel sounds are normal. She exhibits no mass. There is no tenderness.   Musculoskeletal: She exhibits no edema.   Feet:   Right Foot:   Protective Sensation: 7 sites tested. 7 sites sensed.   Left Foot:   Protective Sensation: 7 sites tested. 7 sites sensed.   Neurological: She is alert.   Skin: No erythema.   Psychiatric: She has a normal mood and affect.   Vitals reviewed.      Assessment:       1. Annual physical exam    2. Hyperlipidemia, unspecified hyperlipidemia type    3. Type 2 diabetes mellitus with complication, without long-term current use of insulin    4. Mild chronic obstructive pulmonary disease    5. Colon cancer screening    6. Chronic mixed headache syndrome    7. Memory loss    8. Hearing loss, unspecified hearing loss type, unspecified laterality        Plan:       Lizabeth Bingham was seen today for annual exam.    Diagnoses and all orders for this visit:    Annual physical exam    Hyperlipidemia, unspecified hyperlipidemia type  Discussed options and alternative agent she would like to resume her pravastatin but will monitor for concerns she wondered if it was contributing to her cough  -     pravastatin " (PRAVACHOL) 20 MG tablet; Take 1 tablet (20 mg total) by mouth once daily.  -     Basic metabolic panel; Future  -     Hemoglobin A1c; Future  -     Hepatic function panel; Future  -     Lipid panel; Future    Type 2 diabetes mellitus with complication, without long-term current use of insulin  Her hemoglobin A1c showed quite trend up off medication she was encouraged to resume her medications resume her exercise dietary measures she may need additional medications for now I will have her increase her metformin to twice daily she will call if her blood sugars remain elevated we will add additional agents she has just joined back plans to resume her exercise   Risk of uncontrolled diabetes reivewed with pt   -     Basic metabolic panel; Future  -     Hemoglobin A1c; Future  -     Hepatic function panel; Future  -     Lipid panel; Future  -     Microalbumin/creatinine urine ratio; Future    Mild chronic obstructive pulmonary disease  Occasional wheezing currently stable refill of albuterol to have on hand    Colon cancer screening would recommend   She declined colonoscopy but agreeable to  Discussed   -     Fecal Immunochemical Test (iFOBT); Future    headache  Memory loss  -     CT Head Without Contrast; Future    Hearing loss, unspecified hearing loss type, unspecified laterality  She continues to follow with ENT for hearing loss is getting hearing aids as well  -     CT Head Without Contrast; Future    Other orders  -     metFORMIN (GLUCOPHAGE-XR) 500 MG 24 hr tablet; Take 1 tablet (500 mg total) by mouth 2 (two) times daily with meals.  -     albuterol 90 mcg/actuation inhaler; Inhale 2 puffs into the lungs every 6 (six) hours as needed for Wheezing.    Discussed diabetic education she declines  At this time hgaic up to 9.8 but pt off of medications       Follow-up 3 months with blood work back on medication but she will call sooner if elevated readings

## 2018-05-01 NOTE — PROGRESS NOTES
Answers for HPI/ROS submitted by the patient on 4/29/2018   activity change: No  unexpected weight change: No  neck pain: No  hearing loss: Yes  rhinorrhea: No  trouble swallowing: No  eye discharge: No  visual disturbance: No  chest tightness: Yes  wheezing: Yes  chest pain: Yes  palpitations: Yes  blood in stool: No  constipation: No  vomiting: No  diarrhea: No  polydipsia: No  polyuria: No  difficulty urinating: No  hematuria: No  menstrual problem: No  dysuria: No  joint swelling: No  arthralgias: Yes  headaches: Yes  weakness: No  confusion: Yes  dysphoric mood: No

## 2018-05-03 ENCOUNTER — HOSPITAL ENCOUNTER (OUTPATIENT)
Dept: RADIOLOGY | Facility: HOSPITAL | Age: 63
Discharge: HOME | End: 2018-05-03
Attending: INTERNAL MEDICINE
Payer: COMMERCIAL

## 2018-05-03 DIAGNOSIS — H91.90 HEARING LOSS, UNSPECIFIED HEARING LOSS TYPE, UNSPECIFIED LATERALITY: ICD-10-CM

## 2018-05-03 DIAGNOSIS — G44.89 CHRONIC MIXED HEADACHE SYNDROME: ICD-10-CM

## 2018-05-03 DIAGNOSIS — R41.3 MEMORY LOSS: ICD-10-CM

## 2018-05-03 PROCEDURE — 70450 CT HEAD/BRAIN W/O DYE: CPT | Mod: TC

## 2018-05-03 PROCEDURE — 70450 CT HEAD/BRAIN W/O DYE: CPT | Mod: 26,,, | Performed by: RADIOLOGY

## 2018-06-20 ENCOUNTER — TELEPHONE (OUTPATIENT)
Dept: INTERNAL MEDICINE | Facility: CLINIC | Age: 63
End: 2018-06-20

## 2018-06-20 NOTE — TELEPHONE ENCOUNTER
Tried contacting pt on both lines no answer, message left informing pt that her 9:00am appt will be canceled due to book out.

## 2018-06-20 NOTE — TELEPHONE ENCOUNTER
----- Message from Rachele Pierre sent at 6/20/2018  2:24 PM CDT -----  Contact: self/[phone]  Type: Returning a call    Who left a message? Valerie    When did the practice call? Today     Comments: Please advise.      Thanks

## 2018-06-25 ENCOUNTER — HOSPITAL ENCOUNTER (OUTPATIENT)
Dept: RADIOLOGY | Facility: HOSPITAL | Age: 63
Discharge: HOME | End: 2018-06-25
Attending: INTERNAL MEDICINE
Payer: COMMERCIAL

## 2018-06-25 ENCOUNTER — TELEPHONE (OUTPATIENT)
Dept: INTERNAL MEDICINE | Facility: CLINIC | Age: 63
End: 2018-06-25

## 2018-06-25 ENCOUNTER — OFFICE VISIT (OUTPATIENT)
Dept: INTERNAL MEDICINE | Facility: CLINIC | Age: 63
End: 2018-06-25
Payer: COMMERCIAL

## 2018-06-25 VITALS
HEART RATE: 87 BPM | HEIGHT: 64 IN | SYSTOLIC BLOOD PRESSURE: 130 MMHG | WEIGHT: 188.5 LBS | DIASTOLIC BLOOD PRESSURE: 74 MMHG | BODY MASS INDEX: 32.18 KG/M2

## 2018-06-25 DIAGNOSIS — R05.9 COUGH: ICD-10-CM

## 2018-06-25 DIAGNOSIS — E11.9 TYPE 2 DIABETES MELLITUS WITHOUT COMPLICATION, WITHOUT LONG-TERM CURRENT USE OF INSULIN: ICD-10-CM

## 2018-06-25 PROCEDURE — 99999 PR PBB SHADOW E&M-EST. PATIENT-LVL IV: CPT | Mod: PBBFAC,,, | Performed by: INTERNAL MEDICINE

## 2018-06-25 PROCEDURE — 3046F HEMOGLOBIN A1C LEVEL >9.0%: CPT | Mod: CPTII,S$GLB,, | Performed by: INTERNAL MEDICINE

## 2018-06-25 PROCEDURE — 71046 X-RAY EXAM CHEST 2 VIEWS: CPT | Mod: TC

## 2018-06-25 PROCEDURE — 71130 X-RAY STRENOCLAVIC JT 3/>VWS: CPT | Mod: 26,,, | Performed by: RADIOLOGY

## 2018-06-25 PROCEDURE — 3008F BODY MASS INDEX DOCD: CPT | Mod: CPTII,S$GLB,, | Performed by: INTERNAL MEDICINE

## 2018-06-25 PROCEDURE — 99214 OFFICE O/P EST MOD 30 MIN: CPT | Mod: S$GLB,,, | Performed by: INTERNAL MEDICINE

## 2018-06-25 PROCEDURE — 73000 X-RAY EXAM OF COLLAR BONE: CPT | Mod: 26,LT,, | Performed by: RADIOLOGY

## 2018-06-25 PROCEDURE — 71046 X-RAY EXAM CHEST 2 VIEWS: CPT | Mod: 26,,, | Performed by: RADIOLOGY

## 2018-06-25 PROCEDURE — 73000 X-RAY EXAM OF COLLAR BONE: CPT | Mod: TC,LT

## 2018-06-25 PROCEDURE — 71130 X-RAY STRENOCLAVIC JT 3/>VWS: CPT | Mod: TC

## 2018-06-25 RX ORDER — ALBUTEROL SULFATE 90 UG/1
2 AEROSOL, METERED RESPIRATORY (INHALATION) EVERY 6 HOURS PRN
Qty: 3 INHALER | Refills: 3 | Status: SHIPPED | OUTPATIENT
Start: 2018-06-25 | End: 2019-09-11 | Stop reason: SDUPTHER

## 2018-06-25 NOTE — PROGRESS NOTES
"Subjective:       Patient ID: Lizabeth Saha is a 62 y.o. female.    Chief Complaint: Mass   This is a 62-year-old who presents today with lumpiness on the left clavicle area she reports she noticed that she had some increased prominence in the area she does not recall a specific injury but recently was having a cough or cold with upper respiratory infection.  She denies fevers or chills and feels the symptoms have improved.  She is not having much discomfort in that area she has a little pain on under the right arm on occasion but no swelling.  She denies chest pain or dyspnea.  She noticed the prominence after coughing a lot but does not recall fall or injury to it.  Since last visit she had stopped her medications and had been taking care of herself she did resume her diabetes medicines and reports she has been working hard on diet and exercise and her blood sugar this morning was in the 140s    HPI  Review of Systems   Constitutional: Negative for fever.   Respiratory: Positive for cough.         Recent uri improved   Cardiovascular: Negative for chest pain.   Neurological: Negative for dizziness.       Objective:     Blood pressure 130/74, pulse 87, height 5' 4" (1.626 m), weight 85.5 kg (188 lb 7.9 oz), last menstrual period 01/01/1994.    Physical Exam   Constitutional: No distress.   HENT:   Head: Normocephalic.   Mouth/Throat: Oropharynx is clear and moist.   Left sternoclavicular prominance  No tenderness to area  Some crepitus rom    Eyes: No scleral icterus.   Neck: Neck supple.   Cardiovascular: Normal rate, regular rhythm and normal heart sounds.  Exam reveals no gallop and no friction rub.    No murmur heard.  Pulmonary/Chest: Effort normal and breath sounds normal. No respiratory distress.   Abdominal: Soft. Bowel sounds are normal. She exhibits no mass. There is no tenderness.   Musculoskeletal: She exhibits no edema.   Neurological: She is alert.   Skin: No erythema.   Psychiatric: She has a " normal mood and affect.   Vitals reviewed.      Assessment:       1. Lump    2. Cough    3. Type 2 diabetes mellitus without complication, without long-term current use of insulin        Plan:       Lizabeth Bingham was seen today for mass.    Diagnoses and all orders for this visit:    Lump  Discussed with patient conservative measures will schedule x-rays ultrasound and review  -     X-Ray Clavicle Left; Future  -     X-Ray Sternoclavicular Joints Min 3 View; Future  -     US Soft Tissue Head Neck Thyroid; Future    Cough  Discussed mucinex inhaler refilled if needed   Recent episode seems to have improved update   -     X-Ray Chest PA And Lateral; Future    Type 2 diabetes mellitus without complication, without long-term current use of insulin  Patient is back on her medications and compliance reinforced she has her follow-up previously scheduled with blood work prior    Other orders  -     albuterol 90 mcg/actuation inhaler; Inhale 2 puffs into the lungs every 6 (six) hours as needed for Wheezing.  Refill as requested    Follow-up as previously scheduled

## 2018-06-27 ENCOUNTER — HOSPITAL ENCOUNTER (OUTPATIENT)
Dept: RADIOLOGY | Facility: HOSPITAL | Age: 63
Discharge: HOME | End: 2018-06-27
Attending: INTERNAL MEDICINE
Payer: COMMERCIAL

## 2018-06-27 PROCEDURE — 76536 US EXAM OF HEAD AND NECK: CPT | Mod: 26,,, | Performed by: RADIOLOGY

## 2018-06-27 PROCEDURE — 76536 US EXAM OF HEAD AND NECK: CPT | Mod: TC

## 2018-09-07 ENCOUNTER — LAB VISIT (OUTPATIENT)
Dept: LAB | Facility: HOSPITAL | Age: 63
End: 2018-09-07
Attending: INTERNAL MEDICINE
Payer: COMMERCIAL

## 2018-09-07 DIAGNOSIS — E78.5 HYPERLIPIDEMIA, UNSPECIFIED HYPERLIPIDEMIA TYPE: ICD-10-CM

## 2018-09-07 DIAGNOSIS — E11.8 TYPE 2 DIABETES MELLITUS WITH COMPLICATION, WITHOUT LONG-TERM CURRENT USE OF INSULIN: ICD-10-CM

## 2018-09-07 DIAGNOSIS — E11.9 TYPE 2 DIABETES MELLITUS WITHOUT COMPLICATION: ICD-10-CM

## 2018-09-07 LAB
ALBUMIN SERPL BCP-MCNC: 4.2 G/DL
ALP SERPL-CCNC: 84 U/L
ALT SERPL W/O P-5'-P-CCNC: 34 U/L
ANION GAP SERPL CALC-SCNC: 9 MMOL/L
AST SERPL-CCNC: 20 U/L
BILIRUB DIRECT SERPL-MCNC: 0.1 MG/DL
BILIRUB SERPL-MCNC: 0.3 MG/DL
BUN SERPL-MCNC: 9 MG/DL
CALCIUM SERPL-MCNC: 10 MG/DL
CHLORIDE SERPL-SCNC: 104 MMOL/L
CHOLEST SERPL-MCNC: 242 MG/DL
CHOLEST/HDLC SERPL: 5.3 {RATIO}
CO2 SERPL-SCNC: 28 MMOL/L
CREAT SERPL-MCNC: 0.9 MG/DL
EST. GFR  (AFRICAN AMERICAN): >60 ML/MIN/1.73 M^2
EST. GFR  (NON AFRICAN AMERICAN): >60 ML/MIN/1.73 M^2
ESTIMATED AVG GLUCOSE: 194 MG/DL
GLUCOSE SERPL-MCNC: 173 MG/DL
HBA1C MFR BLD HPLC: 8.4 %
HDLC SERPL-MCNC: 46 MG/DL
HDLC SERPL: 19 %
LDLC SERPL CALC-MCNC: 161.2 MG/DL
NONHDLC SERPL-MCNC: 196 MG/DL
POTASSIUM SERPL-SCNC: 4.5 MMOL/L
PROT SERPL-MCNC: 7.5 G/DL
SODIUM SERPL-SCNC: 141 MMOL/L
TRIGL SERPL-MCNC: 174 MG/DL

## 2018-09-07 PROCEDURE — 83036 HEMOGLOBIN GLYCOSYLATED A1C: CPT

## 2018-09-07 PROCEDURE — 36415 COLL VENOUS BLD VENIPUNCTURE: CPT

## 2018-09-07 PROCEDURE — 80061 LIPID PANEL: CPT

## 2018-09-07 PROCEDURE — 80076 HEPATIC FUNCTION PANEL: CPT

## 2018-09-07 PROCEDURE — 80048 BASIC METABOLIC PNL TOTAL CA: CPT

## 2018-09-11 ENCOUNTER — OFFICE VISIT (OUTPATIENT)
Dept: INTERNAL MEDICINE | Facility: CLINIC | Age: 63
End: 2018-09-11
Payer: COMMERCIAL

## 2018-09-11 ENCOUNTER — HOSPITAL ENCOUNTER (OUTPATIENT)
Dept: RADIOLOGY | Facility: HOSPITAL | Age: 63
Discharge: HOME | End: 2018-09-11
Attending: INTERNAL MEDICINE
Payer: COMMERCIAL

## 2018-09-11 VITALS
HEART RATE: 85 BPM | DIASTOLIC BLOOD PRESSURE: 72 MMHG | HEIGHT: 64 IN | SYSTOLIC BLOOD PRESSURE: 122 MMHG | WEIGHT: 193.31 LBS | BODY MASS INDEX: 33 KG/M2

## 2018-09-11 DIAGNOSIS — E78.5 HYPERLIPIDEMIA, UNSPECIFIED HYPERLIPIDEMIA TYPE: ICD-10-CM

## 2018-09-11 DIAGNOSIS — M25.539 PAIN IN WRIST, UNSPECIFIED LATERALITY: ICD-10-CM

## 2018-09-11 DIAGNOSIS — M25.531 RIGHT WRIST PAIN: ICD-10-CM

## 2018-09-11 DIAGNOSIS — Z12.11 COLON CANCER SCREENING: ICD-10-CM

## 2018-09-11 DIAGNOSIS — E11.9 TYPE 2 DIABETES MELLITUS WITHOUT COMPLICATION, WITHOUT LONG-TERM CURRENT USE OF INSULIN: Primary | ICD-10-CM

## 2018-09-11 PROCEDURE — 99214 OFFICE O/P EST MOD 30 MIN: CPT | Mod: S$GLB,,, | Performed by: INTERNAL MEDICINE

## 2018-09-11 PROCEDURE — 99999 PR PBB SHADOW E&M-EST. PATIENT-LVL III: CPT | Mod: PBBFAC,,, | Performed by: INTERNAL MEDICINE

## 2018-09-11 PROCEDURE — 73110 X-RAY EXAM OF WRIST: CPT | Mod: TC,RT

## 2018-09-11 PROCEDURE — 3008F BODY MASS INDEX DOCD: CPT | Mod: CPTII,S$GLB,, | Performed by: INTERNAL MEDICINE

## 2018-09-11 PROCEDURE — 3045F PR MOST RECENT HEMOGLOBIN A1C LEVEL 7.0-9.0%: CPT | Mod: CPTII,S$GLB,, | Performed by: INTERNAL MEDICINE

## 2018-09-11 PROCEDURE — 73110 X-RAY EXAM OF WRIST: CPT | Mod: 26,RT,, | Performed by: RADIOLOGY

## 2018-09-11 RX ORDER — ATORVASTATIN CALCIUM 10 MG/1
10 TABLET, FILM COATED ORAL DAILY
Qty: 90 TABLET | Refills: 1 | Status: SHIPPED | OUTPATIENT
Start: 2018-09-11 | End: 2019-02-01 | Stop reason: ALTCHOICE

## 2018-09-12 NOTE — PROGRESS NOTES
"Subjective:       Patient ID: Lizabeth Saha is a 62 y.o. female.    Chief Complaint: Follow-up   This is a 62-year-old who presents today for follow-up.  Patient reports that she has been who trying to work harder on her diet exercising a bit back to taking her metformin regularly her hemoglobin A1c is come down but still above goal.  She has been doing some traveling and when she travels maybe not eating as well as she should.  She had fall where she coughed most of her body weight on her right wrist and has been bothering her since then she denies any bruising and has been taking anti-inflammatory or Tylenol for her discomfort.  She has had no further upper respiratory symptoms or wheezing.  Patient stopped taking her cholesterol medicine pravastatin thought it was contributing to some headaches but she would be agreeable to trying a different agent.  She has not been taking it.    HPI  Review of Systems   Constitutional: Negative for fever.   Respiratory: Negative for cough, shortness of breath and wheezing.    Cardiovascular: Negative for chest pain.   Gastrointestinal: Negative for abdominal pain and constipation.   Musculoskeletal:        Right wrist disocmfort after fall    Neurological: Negative for dizziness.       Objective:     Blood pressure 122/72, pulse 85, height 5' 4" (1.626 m), weight 87.7 kg (193 lb 5.5 oz), last menstrual period 01/01/1994.    Physical Exam   Constitutional: No distress.   HENT:   Head: Normocephalic.   Mouth/Throat: Oropharynx is clear and moist.   Eyes: No scleral icterus.   Neck: Neck supple.   Cardiovascular: Normal rate, regular rhythm and normal heart sounds. Exam reveals no gallop and no friction rub.   No murmur heard.  Pulmonary/Chest: Effort normal and breath sounds normal. No respiratory distress.   Abdominal: Soft. Bowel sounds are normal. She exhibits no mass. There is no tenderness.   Musculoskeletal: She exhibits no edema.   Right wrist some discomfort rom  "   Neurological: She is alert.   Skin: No erythema.   Psychiatric: She has a normal mood and affect.   Vitals reviewed.      Assessment:       1. Type 2 diabetes mellitus without complication, without long-term current use of insulin    2. Hyperlipidemia, unspecified hyperlipidemia type    3. Pain in wrist, unspecified laterality    4. Right wrist pain    5. Colon cancer screening        Plan:       Lizabeth Bingham was seen today for follow-up.    Diagnoses and all orders for this visit:    Pain in wrist, unspecified laterality  -     Ambulatory consult to Orthopedics    Hyperlipidemia, unspecified hyperlipidemia type  -     Basic metabolic panel; Future  -     Hemoglobin A1c; Future  -     Hepatic function panel; Future  -     Lipid panel; Future    Type 2 diabetes mellitus without complication, without long-term current use of insulin  Discussed with patient continue her current regimen discussed with patient metformin to daily will add in Januvia at low-dose and encouraged her to resume exercise dietary measures discussed diabetic education program she declines at this time but will consider if no improvement  Hx of his  -     Microalbumin/creatinine urine ratio; Future  -     Hemoglobin A1c; Future    Right wrist pain  Continue conservative measures consider wrist splint and x-ray discussed ortho if no improvement her symptoms persist  -     X-Ray Wrist Complete 3 views Right; Future    Colon cancer screening  Pt will call to schedule   -     Case request GI: COLONOSCOPY    Other orders  -     SITagliptin (JANUVIA) 25 MG Tab; Take 1 tablet (25 mg total) by mouth once daily.  -    Discussed with patient she can try an alternate agent she is off pravastatin trial of   atorvastatin (LIPITOR) 10 MG tablet; Take 1 tablet (10 mg total) by mouth once daily.  Risks benefits reviewed    Follow-up 3 months with labs

## 2018-09-18 ENCOUNTER — TELEPHONE (OUTPATIENT)
Dept: INTERNAL MEDICINE | Facility: CLINIC | Age: 63
End: 2018-09-18

## 2018-09-18 NOTE — TELEPHONE ENCOUNTER
----- Message from Francie Hunter sent at 9/18/2018 11:11 AM CDT -----  Contact: pt [phone]  Pt would like a call back regarding getting atorvastatin (LIPITOR) 10 MG tablet switched to something else. Please advise.

## 2018-09-18 NOTE — TELEPHONE ENCOUNTER
Spoke with pt was concerned about insert   Risks    She will give it try  Risk benefits reveiwed  If not tolerating will call  Or stop discussed

## 2018-11-29 DIAGNOSIS — E11.9 TYPE 2 DIABETES MELLITUS WITHOUT COMPLICATION, UNSPECIFIED WHETHER LONG TERM INSULIN USE: ICD-10-CM

## 2019-01-31 ENCOUNTER — LAB VISIT (OUTPATIENT)
Dept: LAB | Facility: HOSPITAL | Age: 64
End: 2019-01-31
Attending: INTERNAL MEDICINE
Payer: COMMERCIAL

## 2019-01-31 DIAGNOSIS — E11.9 TYPE 2 DIABETES MELLITUS WITHOUT COMPLICATION, WITHOUT LONG-TERM CURRENT USE OF INSULIN: ICD-10-CM

## 2019-01-31 DIAGNOSIS — E78.5 HYPERLIPIDEMIA, UNSPECIFIED HYPERLIPIDEMIA TYPE: ICD-10-CM

## 2019-01-31 LAB
ALBUMIN SERPL BCP-MCNC: 4.1 G/DL
ALP SERPL-CCNC: 64 U/L
ALT SERPL W/O P-5'-P-CCNC: 39 U/L
ANION GAP SERPL CALC-SCNC: 9 MMOL/L
AST SERPL-CCNC: 22 U/L
BILIRUB DIRECT SERPL-MCNC: 0.2 MG/DL
BILIRUB SERPL-MCNC: 0.6 MG/DL
BUN SERPL-MCNC: 12 MG/DL
CALCIUM SERPL-MCNC: 9.8 MG/DL
CHLORIDE SERPL-SCNC: 105 MMOL/L
CHOLEST SERPL-MCNC: 248 MG/DL
CHOLEST/HDLC SERPL: 5.8 {RATIO}
CO2 SERPL-SCNC: 28 MMOL/L
CREAT SERPL-MCNC: 1.1 MG/DL
EST. GFR  (AFRICAN AMERICAN): >60 ML/MIN/1.73 M^2
EST. GFR  (NON AFRICAN AMERICAN): 54 ML/MIN/1.73 M^2
ESTIMATED AVG GLUCOSE: 160 MG/DL
GLUCOSE SERPL-MCNC: 139 MG/DL
HBA1C MFR BLD HPLC: 7.2 %
HDLC SERPL-MCNC: 43 MG/DL
HDLC SERPL: 17.3 %
LDLC SERPL CALC-MCNC: 172 MG/DL
NONHDLC SERPL-MCNC: 205 MG/DL
POTASSIUM SERPL-SCNC: 4.1 MMOL/L
PROT SERPL-MCNC: 7.2 G/DL
SODIUM SERPL-SCNC: 142 MMOL/L
TRIGL SERPL-MCNC: 165 MG/DL

## 2019-01-31 PROCEDURE — 80048 BASIC METABOLIC PNL TOTAL CA: CPT

## 2019-01-31 PROCEDURE — 36415 COLL VENOUS BLD VENIPUNCTURE: CPT

## 2019-01-31 PROCEDURE — 80061 LIPID PANEL: CPT

## 2019-01-31 PROCEDURE — 83036 HEMOGLOBIN GLYCOSYLATED A1C: CPT

## 2019-01-31 PROCEDURE — 80076 HEPATIC FUNCTION PANEL: CPT

## 2019-02-01 ENCOUNTER — OFFICE VISIT (OUTPATIENT)
Dept: INTERNAL MEDICINE | Facility: CLINIC | Age: 64
End: 2019-02-01
Payer: COMMERCIAL

## 2019-02-01 VITALS
WEIGHT: 190.25 LBS | DIASTOLIC BLOOD PRESSURE: 76 MMHG | SYSTOLIC BLOOD PRESSURE: 124 MMHG | HEART RATE: 80 BPM | BODY MASS INDEX: 32.48 KG/M2 | HEIGHT: 64 IN

## 2019-02-01 DIAGNOSIS — I10 HYPERTENSION, UNSPECIFIED TYPE: ICD-10-CM

## 2019-02-01 DIAGNOSIS — E11.9 TYPE 2 DIABETES MELLITUS WITHOUT COMPLICATION, WITHOUT LONG-TERM CURRENT USE OF INSULIN: Primary | ICD-10-CM

## 2019-02-01 DIAGNOSIS — R07.9 CHEST PAIN, UNSPECIFIED TYPE: ICD-10-CM

## 2019-02-01 DIAGNOSIS — Z12.11 COLON CANCER SCREENING: ICD-10-CM

## 2019-02-01 DIAGNOSIS — R00.2 PALPITATIONS: ICD-10-CM

## 2019-02-01 DIAGNOSIS — E78.5 HYPERLIPIDEMIA, UNSPECIFIED HYPERLIPIDEMIA TYPE: ICD-10-CM

## 2019-02-01 PROCEDURE — 3008F BODY MASS INDEX DOCD: CPT | Mod: CPTII,S$GLB,, | Performed by: INTERNAL MEDICINE

## 2019-02-01 PROCEDURE — 3045F PR MOST RECENT HEMOGLOBIN A1C LEVEL 7.0-9.0%: CPT | Mod: CPTII,S$GLB,, | Performed by: INTERNAL MEDICINE

## 2019-02-01 PROCEDURE — 3078F DIAST BP <80 MM HG: CPT | Mod: CPTII,S$GLB,, | Performed by: INTERNAL MEDICINE

## 2019-02-01 PROCEDURE — 99999 PR PBB SHADOW E&M-EST. PATIENT-LVL III: ICD-10-PCS | Mod: PBBFAC,,, | Performed by: INTERNAL MEDICINE

## 2019-02-01 PROCEDURE — 3078F PR MOST RECENT DIASTOLIC BLOOD PRESSURE < 80 MM HG: ICD-10-PCS | Mod: CPTII,S$GLB,, | Performed by: INTERNAL MEDICINE

## 2019-02-01 PROCEDURE — 3008F PR BODY MASS INDEX (BMI) DOCUMENTED: ICD-10-PCS | Mod: CPTII,S$GLB,, | Performed by: INTERNAL MEDICINE

## 2019-02-01 PROCEDURE — 3045F PR MOST RECENT HEMOGLOBIN A1C LEVEL 7.0-9.0%: ICD-10-PCS | Mod: CPTII,S$GLB,, | Performed by: INTERNAL MEDICINE

## 2019-02-01 PROCEDURE — 99214 OFFICE O/P EST MOD 30 MIN: CPT | Mod: S$GLB,,, | Performed by: INTERNAL MEDICINE

## 2019-02-01 PROCEDURE — 99214 PR OFFICE/OUTPT VISIT, EST, LEVL IV, 30-39 MIN: ICD-10-PCS | Mod: S$GLB,,, | Performed by: INTERNAL MEDICINE

## 2019-02-01 PROCEDURE — 3074F SYST BP LT 130 MM HG: CPT | Mod: CPTII,S$GLB,, | Performed by: INTERNAL MEDICINE

## 2019-02-01 PROCEDURE — 3074F PR MOST RECENT SYSTOLIC BLOOD PRESSURE < 130 MM HG: ICD-10-PCS | Mod: CPTII,S$GLB,, | Performed by: INTERNAL MEDICINE

## 2019-02-01 PROCEDURE — 99999 PR PBB SHADOW E&M-EST. PATIENT-LVL III: CPT | Mod: PBBFAC,,, | Performed by: INTERNAL MEDICINE

## 2019-02-01 RX ORDER — ROSUVASTATIN CALCIUM 5 MG/1
5 TABLET, COATED ORAL DAILY
Qty: 90 TABLET | Refills: 3 | Status: SHIPPED | OUTPATIENT
Start: 2019-02-01 | End: 2019-09-11 | Stop reason: SDUPTHER

## 2019-02-01 NOTE — PROGRESS NOTES
"Subjective:       Patient ID: Lizabeth Saha is a 63 y.o. female.    Chief Complaint: Follow-up   This is a 63-year-old who presents today for follow-up.  Patient reports that she has been trying to do better she has been visiting her  daughter in [place].  She reports that her daughter has recently opened to do so far and so she spent some time there helping her with her children she reports at that time she was much more active and also eating healthier.  She is tolerating the addition of Januvia and her blood sugars have been much better she is working on her diet and hopes to start back at the gym.  Patient has difficulty tolerating cholesterol medicine she had tried pravastatin most recently atorvastatin she gets some aches and pains and headaches and stop the medicine she would be agreeable to trying one more statin  To see how she tolerates.  She has also had issues with palpitations reports maybe once every few months last episode about 8-12 weeks ago.  She gets palpitations where she feels her heart is beating fast she had something like cough to make it go away she denies associated shortness of breath but she does have occasional episodes of discomfort  Or arm pain  When it occurs.  She has no symptoms today    HPI  Review of Systems   Constitutional: Negative for fever.   Respiratory: Negative for cough, shortness of breath and wheezing.    Cardiovascular: Negative for chest pain and palpitations.   Gastrointestinal: Negative for abdominal pain and constipation.   Neurological: Negative for dizziness.       Objective:     Blood pressure 124/76, pulse 80, height 5' 4" (1.626 m), weight 86.3 kg (190 lb 4.1 oz), last menstrual period 01/01/1994.    Physical Exam   Constitutional: No distress.   HENT:   Head: Normocephalic.   Mouth/Throat: Oropharynx is clear and moist.   Eyes: No scleral icterus.   Neck: Neck supple.   Cardiovascular: Normal rate, regular rhythm and normal heart sounds. Exam reveals no " gallop and no friction rub.   No murmur heard.  Pulmonary/Chest: Effort normal and breath sounds normal. No respiratory distress.   Abdominal: Soft. Bowel sounds are normal. She exhibits no mass. There is no tenderness.   Musculoskeletal: She exhibits no edema.   Neurological: She is alert.   Skin: No erythema.   Vitals reviewed.      Assessment:       1. Type 2 diabetes mellitus without complication, without long-term current use of insulin    2. Colon cancer screening    3. Hyperlipidemia, unspecified hyperlipidemia type    4. Hypertension, unspecified type    5. Palpitations    6. Chest pain, unspecified type        Plan:       Lizabeth Bingham was seen today for follow-up.    Diagnoses and all orders for this visit:    Type 2 diabetes mellitus without complication, without long-term current use of insulin  -     Basic metabolic panel; Future  -     Hemoglobin A1c; Future  -     Hepatic function panel; Future  -     Lipid panel; Future  -     TSH; Future    Colon cancer screening  Patient is not schedule colonoscopy will consider but we also discussed test in the meantime  -     Fecal Immunochemical Test (iFOBT); Future    Hyperlipidemia, unspecified hyperlipidemia type  She will stop atorvastatin and try alternate rosuvastatin 5 mg does encouraged her to use with Co Q10  -     rosuvastatin (CRESTOR) 5 MG tablet; Take 1 tablet (5 mg total) by mouth once daily.      Palpitations  Chest pain   Discussed with patient she remains on aspirin will retry statin discussed Cardiology appointment for further evaluation  -     EKG 12-lead; Future  -     Ambulatory referral to Cardiology    She reports up-to-date on her eye exam and will have a copy sent    Follow-up 3-4 months with labs

## 2019-02-07 NOTE — TELEPHONE ENCOUNTER
----- Message from Elvia Mckeon sent at 2/7/2019  9:21 AM CST -----  Contact: [phone]  Patient stated she will need a new prescription for SITagliptin (JANUVIA) 25 MG Tab.    Please advise, thank you

## 2019-02-08 PROBLEM — E11.3299 NON-PROLIFERATIVE DIABETIC RETINOPATHY: Status: ACTIVE | Noted: 2019-02-08

## 2019-02-13 ENCOUNTER — HOSPITAL ENCOUNTER (OUTPATIENT)
Dept: CARDIOLOGY | Facility: CLINIC | Age: 64
Discharge: HOME | End: 2019-02-13
Payer: COMMERCIAL

## 2019-02-13 DIAGNOSIS — R00.2 PALPITATIONS: ICD-10-CM

## 2019-02-13 PROCEDURE — 93000 ELECTROCARDIOGRAM COMPLETE: CPT | Mod: S$GLB,,, | Performed by: INTERNAL MEDICINE

## 2019-02-13 PROCEDURE — 93000 EKG 12-LEAD: ICD-10-PCS | Mod: S$GLB,,, | Performed by: INTERNAL MEDICINE

## 2019-02-14 ENCOUNTER — TELEPHONE (OUTPATIENT)
Dept: CARDIOLOGY | Facility: CLINIC | Age: 64
End: 2019-02-14

## 2019-02-14 ENCOUNTER — OFFICE VISIT (OUTPATIENT)
Dept: CARDIOLOGY | Facility: CLINIC | Age: 64
End: 2019-02-14
Payer: COMMERCIAL

## 2019-02-14 VITALS
SYSTOLIC BLOOD PRESSURE: 154 MMHG | WEIGHT: 190.06 LBS | HEIGHT: 64 IN | BODY MASS INDEX: 32.45 KG/M2 | HEART RATE: 80 BPM | DIASTOLIC BLOOD PRESSURE: 86 MMHG

## 2019-02-14 DIAGNOSIS — E78.5 HYPERLIPIDEMIA, UNSPECIFIED HYPERLIPIDEMIA TYPE: ICD-10-CM

## 2019-02-14 DIAGNOSIS — E11.9 TYPE 2 DIABETES MELLITUS WITHOUT COMPLICATION, WITHOUT LONG-TERM CURRENT USE OF INSULIN: ICD-10-CM

## 2019-02-14 DIAGNOSIS — Z91.89 AT RISK FOR CARDIOVASCULAR EVENT: ICD-10-CM

## 2019-02-14 DIAGNOSIS — R00.2 PALPITATION: Primary | ICD-10-CM

## 2019-02-14 DIAGNOSIS — R00.2 PALPITATIONS: ICD-10-CM

## 2019-02-14 DIAGNOSIS — R07.9 CHEST PAIN, UNSPECIFIED TYPE: Primary | ICD-10-CM

## 2019-02-14 PROCEDURE — 3077F SYST BP >= 140 MM HG: CPT | Mod: CPTII,S$GLB,, | Performed by: STUDENT IN AN ORGANIZED HEALTH CARE EDUCATION/TRAINING PROGRAM

## 2019-02-14 PROCEDURE — 3008F PR BODY MASS INDEX (BMI) DOCUMENTED: ICD-10-PCS | Mod: CPTII,S$GLB,, | Performed by: STUDENT IN AN ORGANIZED HEALTH CARE EDUCATION/TRAINING PROGRAM

## 2019-02-14 PROCEDURE — 3079F DIAST BP 80-89 MM HG: CPT | Mod: CPTII,S$GLB,, | Performed by: STUDENT IN AN ORGANIZED HEALTH CARE EDUCATION/TRAINING PROGRAM

## 2019-02-14 PROCEDURE — 3008F BODY MASS INDEX DOCD: CPT | Mod: CPTII,S$GLB,, | Performed by: STUDENT IN AN ORGANIZED HEALTH CARE EDUCATION/TRAINING PROGRAM

## 2019-02-14 PROCEDURE — 3045F PR MOST RECENT HEMOGLOBIN A1C LEVEL 7.0-9.0%: CPT | Mod: CPTII,S$GLB,, | Performed by: STUDENT IN AN ORGANIZED HEALTH CARE EDUCATION/TRAINING PROGRAM

## 2019-02-14 PROCEDURE — 99999 PR PBB SHADOW E&M-EST. PATIENT-LVL IV: ICD-10-PCS | Mod: PBBFAC,,, | Performed by: STUDENT IN AN ORGANIZED HEALTH CARE EDUCATION/TRAINING PROGRAM

## 2019-02-14 PROCEDURE — 99204 PR OFFICE/OUTPT VISIT, NEW, LEVL IV, 45-59 MIN: ICD-10-PCS | Mod: S$GLB,,, | Performed by: STUDENT IN AN ORGANIZED HEALTH CARE EDUCATION/TRAINING PROGRAM

## 2019-02-14 PROCEDURE — 99999 PR PBB SHADOW E&M-EST. PATIENT-LVL IV: CPT | Mod: PBBFAC,,, | Performed by: STUDENT IN AN ORGANIZED HEALTH CARE EDUCATION/TRAINING PROGRAM

## 2019-02-14 PROCEDURE — 3045F PR MOST RECENT HEMOGLOBIN A1C LEVEL 7.0-9.0%: ICD-10-PCS | Mod: CPTII,S$GLB,, | Performed by: STUDENT IN AN ORGANIZED HEALTH CARE EDUCATION/TRAINING PROGRAM

## 2019-02-14 PROCEDURE — 3077F PR MOST RECENT SYSTOLIC BLOOD PRESSURE >= 140 MM HG: ICD-10-PCS | Mod: CPTII,S$GLB,, | Performed by: STUDENT IN AN ORGANIZED HEALTH CARE EDUCATION/TRAINING PROGRAM

## 2019-02-14 PROCEDURE — 99204 OFFICE O/P NEW MOD 45 MIN: CPT | Mod: S$GLB,,, | Performed by: STUDENT IN AN ORGANIZED HEALTH CARE EDUCATION/TRAINING PROGRAM

## 2019-02-14 PROCEDURE — 3079F PR MOST RECENT DIASTOLIC BLOOD PRESSURE 80-89 MM HG: ICD-10-PCS | Mod: CPTII,S$GLB,, | Performed by: STUDENT IN AN ORGANIZED HEALTH CARE EDUCATION/TRAINING PROGRAM

## 2019-02-14 NOTE — PATIENT INSTRUCTIONS
Plan:  1) We will schedule you for a stress test. We will call you with the results of the stress test.  2) Start taking CoQ 10 200 mg once daily to help with statin tolerance.

## 2019-02-14 NOTE — PROGRESS NOTES
Cardiology Clinic Note  Reason for Visit: chest pain    HPI:   Ms. Saha is a pleasant 62 yo woman referred for chest pain. She has a hx of NIDDM (A1c 7.2), poorly controlled HLD (poorly tolerates statins).    She has noted over the last 6 months some mild palpitations as well as chest discomfort which come separately. Her chest discomfort is mild, feels like heaviness, occurs about 2x monthly. Has occurred while walking and while at rest. No associated symptoms. Usually lasts several minutes, relieves on its own. When occurring while she was walking, did not cause her to stop or slow down. She has palpitations slighlty more frequently a few times monthly. Feels like a warm feeling in her chest and fast heart beats, has not checked pulse during episodes. Otherwise no associated symptoms. Denies associated dizziness/LH, presyncope/syncope, SOB, fatigue/malaise.     Able to ambulate without issue. Denies chest discomfort with exertion. Denies PND, orthopnea, RUBIO, presynope/syncope. No associated diaphoresis, N/V, shortness of breath.    Reports unable to tolerate pravastatin and atorvastatin in the past. Started on crestor 5. Reports headaches and malaise associated with statins.    ROS:    Constitution: Negative for fever or chills. Negative for weight loss or gain.   HENT: Negative for sore throat or headaches. Negative for rhinorrhea.  Eyes: Negative for blurred or double vision.   Cardiovascular: See above  Pulmonary: Negative for SOB. Negative for cough.   Gastrointestinal: Negative for abdominal pain. Negative for nausea/ vomiting. Negative for diarrhea.   : Negative for dysuria.   Neurological: Negative for focal weakness or sensory changes.  PMH:     Past Medical History:   Diagnosis Date    Diabetes mellitus type II     Hyperlipemia     Irritable bowel syndrome     Obesity     Osteopenia     Rhinitis     Sleep apnea     Vertigo      Past Surgical History:   Procedure Laterality Date     "CHOLECYSTECTOMY      PARTIAL HYSTERECTOMY       Allergies:     Review of patient's allergies indicates:   Allergen Reactions    Phenergan  [promethazine]      Other reaction(s): Hives    Atorvastatin      Myalgia       Medications:     Current Outpatient Medications on File Prior to Visit   Medication Sig Dispense Refill    albuterol 90 mcg/actuation inhaler Inhale 2 puffs into the lungs every 6 (six) hours as needed for Wheezing. 3 Inhaler 3    aspirin (ECOTRIN) 81 MG EC tablet Take 1 tablet (81 mg total) by mouth once daily.  0    fluticasone (FLONASE) 50 mcg/actuation nasal spray 1 spray by Each Nare route once daily. 16 g 6    metFORMIN (GLUCOPHAGE-XR) 500 MG 24 hr tablet Take 1 tablet (500 mg total) by mouth 2 (two) times daily with meals. 180 tablet 3    rosuvastatin (CRESTOR) 5 MG tablet Take 1 tablet (5 mg total) by mouth once daily. 90 tablet 3    SITagliptin (JANUVIA) 25 MG Tab Take 1 tablet (25 mg total) by mouth once daily. 90 tablet 1     No current facility-administered medications on file prior to visit.      Social History:     Social History     Tobacco Use    Smoking status: Former Smoker     Types: Cigarettes     Last attempt to quit: 1989     Years since quittin.1    Smokeless tobacco: Never Used   Substance Use Topics    Alcohol use: Yes     Comment: wine socially     Family History:     Family History   Problem Relation Age of Onset    Diabetes Brother     Hypertension Brother     Kidney disease Brother     Cancer Sister         liver     Diabetes Sister     Hypertension Mother     Heart disease Mother     Kidney disease Mother     Hypertension Father     Diabetes Sister     Cancer Maternal Aunt         breast cancer     Breast cancer Maternal Aunt      Physical Exam:   BP (!) 154/86 (BP Location: Left arm, Patient Position: Sitting, BP Method: Large (Automatic))   Pulse 80   Ht 5' 4" (1.626 m)   Wt 86.2 kg (190 lb 0.6 oz)   LMP 1994 (Approximate) " Comment: 1994  BMI 32.62 kg/m²      Constitutional: No acute distress, conversant  HEENT: Sclera anicteric, extraocular motions intact  Neck: No JVD, no carotid bruits  Cardiovascular: regular rate and rhythm, no murmur, rubs or gallops, normal S1/S2  Pulmonary: Clear to auscultation bilaterally  Abdominal: Abdomen soft, nontender, nondistended, positive bowel sounds  Extremities: No lower extremity edema,   Pulses: 2+ BL Radial, 2+ BL carotid, 2+ BL DP, 2+ BL PT  Skin: No ecchymosis, erythema, or ulcers  Psych: Alert and oriented x 3, appropriate affect  Neuro: CNII-XII intact, no focal deficits    Labs:     Lab Results   Component Value Date     01/31/2019    K 4.1 01/31/2019     01/31/2019    CO2 28 01/31/2019    BUN 12 01/31/2019    CREATININE 1.1 01/31/2019    ANIONGAP 9 01/31/2019     Lab Results   Component Value Date    AST 22 01/31/2019    ALT 39 01/31/2019    ALKPHOS 64 01/31/2019    BILITOT 0.6 01/31/2019    BILIDIR 0.2 01/31/2019    ALBUMIN 4.1 01/31/2019     Lab Results   Component Value Date    CALCIUM 9.8 01/31/2019     No results found for: BNP, BNPTRIAGEBLO Lab Results   Component Value Date    WBC 5.42 04/24/2018    HGB 12.5 04/24/2018    HCT 39.4 04/24/2018     04/24/2018    GRAN 2.9 04/24/2018    GRAN 53.6 04/24/2018     No results found for: PTT, INR  Lab Results   Component Value Date    CHOL 248 (H) 01/31/2019    HDL 43 01/31/2019    LDLCALC 172.0 (H) 01/31/2019    TRIG 165 (H) 01/31/2019     Lab Results   Component Value Date    HGBA1C 7.2 (H) 01/31/2019     Lab Results   Component Value Date    TSH 0.734 01/16/2017          Imaging:   EKG: normal sinus rhythm, possible left atrial abnormality, otherwise normal EKG    Assessment:     1. Chest pain, unspecified type  Stress test with myocardial perfusion ([facility] Only)   2. At risk for cardiovascular event     3. Palpitations     4. Type 2 diabetes mellitus without complication, without long-term current use of insulin     5.  Hyperlipidemia, unspecified hyperlipidemia type       63F with poorly controlled HLD and NIDDM (A1c 7.1) here with atypical chest discomfort and palpitations.  Not anginal by hx, but will obtain stress test given age/female and RFs.  Palpitations benign by hx, no warning signs/sx. If recurrent/more bothersome/frequent, or associated with syncope/presyncope, advised to notify our clinic or present to ED. Otherwise, recommended her to monitor her symptoms at home for now.  Poorly tolerated statins but unusual reported side effects. Strongly encouraged to take her crestor. Start CoQ10 200 daily to help with tolerance. If unable to tolerate, next would prescribe PCSK9 through our specialty pharmacy. Advised to discuss with her PCP or call our clinic if she's unable to tolerate crestor and can further discuss one of these agents.    Plan:   -PET stress       Signed:    Corey Wallace MD  Cardiology Fellow PGY4  Beeper:  [phone]  2/14/2019 11:45 AM    Follow-up:     Future Appointments   Date Time Provider Department Center   5/31/2019  8:30 AM LAB, APPOINTMENT [facility]   6/3/2019  8:30 AM MD [facility] Yu [facility]

## 2019-03-11 ENCOUNTER — CLINICAL SUPPORT (OUTPATIENT)
Dept: CARDIOLOGY | Facility: CLINIC | Age: 64
End: 2019-03-11
Attending: STUDENT IN AN ORGANIZED HEALTH CARE EDUCATION/TRAINING PROGRAM
Payer: COMMERCIAL

## 2019-03-11 VITALS — BODY MASS INDEX: 32.44 KG/M2 | WEIGHT: 190 LBS | HEIGHT: 64 IN

## 2019-03-11 DIAGNOSIS — R07.9 CHEST PAIN, UNSPECIFIED TYPE: ICD-10-CM

## 2019-03-11 LAB
CV STRESS BASE HR: 83 BPM
DIASTOLIC BLOOD PRESSURE: 71 MMHG
OHS CV CPX 85 PERCENT MAX PREDICTED HEART RATE MALE: 128
OHS CV CPX MAX PREDICTED HEART RATE: 151
OHS CV CPX PATIENT IS FEMALE: 1
OHS CV CPX PATIENT IS MALE: 0
OHS CV CPX PEAK DIASTOLIC BLOOD PRESSURE: 58 MMHG
OHS CV CPX PEAK HEAR RATE: 97 BPM
OHS CV CPX PEAK RATE PRESSURE PRODUCT: NORMAL
OHS CV CPX PEAK SYSTOLIC BLOOD PRESSURE: 134 MMHG
OHS CV CPX PERCENT MAX PREDICTED HEART RATE ACHIEVED: 64
OHS CV CPX RATE PRESSURE PRODUCT PRESENTING: NORMAL
SYSTOLIC BLOOD PRESSURE: 145 MMHG

## 2019-03-11 PROCEDURE — 99999 PR PBB SHADOW E&M-EST. PATIENT-LVL I: CPT | Mod: PBBFAC,,,

## 2019-03-11 PROCEDURE — 99999 PR PBB SHADOW E&M-EST. PATIENT-LVL I: ICD-10-PCS | Mod: PBBFAC,,,

## 2019-03-11 PROCEDURE — A9555 RB82 RUBIDIUM: HCPCS | Mod: S$GLB,,, | Performed by: INTERNAL MEDICINE

## 2019-03-11 PROCEDURE — A9555 PR RB82 RUBIDIUM: ICD-10-PCS | Mod: S$GLB,,, | Performed by: INTERNAL MEDICINE

## 2019-03-11 PROCEDURE — 78492 PET STRESS (CUPID ONLY): ICD-10-PCS | Mod: S$GLB,,, | Performed by: INTERNAL MEDICINE

## 2019-03-11 PROCEDURE — 78492 MYOCRD IMG PET MLT RST&STRS: CPT | Mod: S$GLB,,, | Performed by: INTERNAL MEDICINE

## 2019-03-11 RX ORDER — DIPYRIDAMOLE 5 MG/ML
48.36 INJECTION INTRAVENOUS
Status: COMPLETED | OUTPATIENT
Start: 2019-03-11 | End: 2019-03-11

## 2019-03-11 RX ORDER — AMINOPHYLLINE 25 MG/ML
75 INJECTION, SOLUTION INTRAVENOUS
Status: COMPLETED | OUTPATIENT
Start: 2019-03-11 | End: 2019-03-11

## 2019-03-11 RX ADMIN — DIPYRIDAMOLE 48.35 MG: 5 INJECTION INTRAVENOUS at 02:03

## 2019-03-11 RX ADMIN — AMINOPHYLLINE 75 MG: 25 INJECTION, SOLUTION INTRAVENOUS at 02:03

## 2019-04-15 ENCOUNTER — OFFICE VISIT (OUTPATIENT)
Dept: OTOLARYNGOLOGY | Facility: CLINIC | Age: 64
End: 2019-04-15
Payer: COMMERCIAL

## 2019-04-15 ENCOUNTER — CLINICAL SUPPORT (OUTPATIENT)
Dept: AUDIOLOGY | Facility: CLINIC | Age: 64
End: 2019-04-15
Payer: COMMERCIAL

## 2019-04-15 VITALS — HEART RATE: 76 BPM | SYSTOLIC BLOOD PRESSURE: 167 MMHG | DIASTOLIC BLOOD PRESSURE: 85 MMHG

## 2019-04-15 DIAGNOSIS — H90.3 SENSORINEURAL HEARING LOSS, BILATERAL: Primary | ICD-10-CM

## 2019-04-15 DIAGNOSIS — H91.90 PERCEIVED HEARING LOSS: ICD-10-CM

## 2019-04-15 DIAGNOSIS — Z88.9 HISTORY OF SEASONAL ALLERGIES: ICD-10-CM

## 2019-04-15 DIAGNOSIS — R05.9 COUGH: Primary | ICD-10-CM

## 2019-04-15 DIAGNOSIS — H61.21 IMPACTED CERUMEN OF RIGHT EAR: ICD-10-CM

## 2019-04-15 PROCEDURE — 69210 PR REMOVAL IMPACTED CERUMEN REQUIRING INSTRUMENTATION, UNILATERAL: ICD-10-PCS | Mod: S$GLB,,, | Performed by: OTOLARYNGOLOGY

## 2019-04-15 PROCEDURE — 3077F SYST BP >= 140 MM HG: CPT | Mod: CPTII,S$GLB,, | Performed by: OTOLARYNGOLOGY

## 2019-04-15 PROCEDURE — 99213 OFFICE O/P EST LOW 20 MIN: CPT | Mod: 25,S$GLB,, | Performed by: OTOLARYNGOLOGY

## 2019-04-15 PROCEDURE — 3079F DIAST BP 80-89 MM HG: CPT | Mod: CPTII,S$GLB,, | Performed by: OTOLARYNGOLOGY

## 2019-04-15 PROCEDURE — 99999 PR PBB SHADOW E&M-EST. PATIENT-LVL I: ICD-10-PCS | Mod: PBBFAC,,,

## 2019-04-15 PROCEDURE — 92557 PR COMPREHENSIVE HEARING TEST: ICD-10-PCS | Mod: S$GLB,,, | Performed by: AUDIOLOGIST

## 2019-04-15 PROCEDURE — 99999 PR PBB SHADOW E&M-EST. PATIENT-LVL I: CPT | Mod: PBBFAC,,,

## 2019-04-15 PROCEDURE — 99999 PR PBB SHADOW E&M-EST. PATIENT-LVL III: ICD-10-PCS | Mod: PBBFAC,,, | Performed by: OTOLARYNGOLOGY

## 2019-04-15 PROCEDURE — 99999 PR PBB SHADOW E&M-EST. PATIENT-LVL III: CPT | Mod: PBBFAC,,, | Performed by: OTOLARYNGOLOGY

## 2019-04-15 PROCEDURE — 3079F PR MOST RECENT DIASTOLIC BLOOD PRESSURE 80-89 MM HG: ICD-10-PCS | Mod: CPTII,S$GLB,, | Performed by: OTOLARYNGOLOGY

## 2019-04-15 PROCEDURE — 92557 COMPREHENSIVE HEARING TEST: CPT | Mod: S$GLB,,, | Performed by: AUDIOLOGIST

## 2019-04-15 PROCEDURE — 3077F PR MOST RECENT SYSTOLIC BLOOD PRESSURE >= 140 MM HG: ICD-10-PCS | Mod: CPTII,S$GLB,, | Performed by: OTOLARYNGOLOGY

## 2019-04-15 PROCEDURE — 99213 PR OFFICE/OUTPT VISIT, EST, LEVL III, 20-29 MIN: ICD-10-PCS | Mod: 25,S$GLB,, | Performed by: OTOLARYNGOLOGY

## 2019-04-15 PROCEDURE — 69210 REMOVE IMPACTED EAR WAX UNI: CPT | Mod: S$GLB,,, | Performed by: OTOLARYNGOLOGY

## 2019-04-15 NOTE — PROGRESS NOTES
"Subjective:       Patient ID: Lizabeth Saha is a 63 y.o. female.    Chief Complaint: Hearing Loss    HPI: Ms. Saha is a type II diabetic 63-year-old  female with mild COPD who indicates left ear discomfort to the point where she cannot tolerate wearing a left ear hearing aid at this point.    She can wear the right ear hearing aid fit for her by the [employer] as encouraged through  the [employer] insurance system.    However, she is not wearing the right ear hearing aid.  She does not feel it helps her hearing much in general. She is encouraged to use the right ear hearing aid per Bre Pablo' advice (Ms. Lylys who performed her audiometric study today).  Her ear canals needed to be inspected and cleaned prior to testing.  She may try a different design of hearing aid, pending course.  She parenthetically indicates that her sinuses are "acting up".  A CT scan of the head without contrast dated 05/03/2018 indicated clear mastoid air cells and paranasal sinuses.  A left ethmoid sinus opacification was noted per review of the study.  She was last examined by me 03/20/2018.  She complained of left ear otalgia symptoms and hearing loss.  She has been undergoing dental procedures recently then.  She indicated her sister and brother's history is of hearing loss left ear greater than right at a later age.  Her brother wore hearing aid.  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        Past Medical History:   Diagnosis Date    Diabetes mellitus type II     Hyperlipemia     Irritable bowel syndrome     Obesity     Osteopenia     Rhinitis     Sleep apnea     Vertigo      Past Surgical History:   Procedure Laterality Date    CHOLECYSTECTOMY      PARTIAL HYSTERECTOMY       Current Outpatient Medications on File Prior to Visit   Medication Sig Dispense Refill    albuterol 90 mcg/actuation inhaler Inhale 2 puffs into the lungs every 6 (six) hours as needed for Wheezing. " 3 Inhaler 3    aspirin (ECOTRIN) 81 MG EC tablet Take 1 tablet (81 mg total) by mouth once daily.  0    fluticasone (FLONASE) 50 mcg/actuation nasal spray 1 spray by Each Nare route once daily. 16 g 6    metFORMIN (GLUCOPHAGE-XR) 500 MG 24 hr tablet Take 1 tablet (500 mg total) by mouth 2 (two) times daily with meals. 180 tablet 3    rosuvastatin (CRESTOR) 5 MG tablet Take 1 tablet (5 mg total) by mouth once daily. 90 tablet 3    SITagliptin (JANUVIA) 25 MG Tab Take 1 tablet (25 mg total) by mouth once daily. 90 tablet 1     No current facility-administered medications on file prior to visit.            Review of Systems     Other:  Negative for rash.         The patient completed an audiometric study performed by the [facility] audiology service.  Study is duplicated below the results are reviewed with her in detail.  The study is compared to a previous study.  Objective:             Blood pressure 167/85 pulse 76 height 5 ft 4 in weight 190 lb  General:  Alert and oriented lady in no acute distress  Both ears were examined under the microscope in the micro procedure room  Procedure:  A rim of cerumen is removed from the outer 1/3 of the narrow right ear canal.  She coughs frequently during the ear cleaning procedure.  Not much cerumen is removed from left ear canal.    Physical Exam   Constitutional: She is oriented to person, place, and time. She appears well-developed and well-nourished.   HENT:   Head: Normocephalic.   Right Ear: Tympanic membrane and external ear normal. No drainage. No foreign bodies. No mastoid tenderness. Tympanic membrane is not perforated. No decreased hearing is noted.   Left Ear: Tympanic membrane and external ear normal. No drainage. No foreign bodies. No mastoid tenderness. Tympanic membrane is not perforated. No decreased hearing is noted.   Ears:    Nose: Nose normal. No nasal deformity, septal deviation or nasal septal hematoma. No epistaxis. Right sinus exhibits no maxillary sinus  tenderness and no frontal sinus tenderness. Left sinus exhibits no maxillary sinus tenderness and no frontal sinus tenderness.   Mouth/Throat: Uvula is midline, oropharynx is clear and moist and mucous membranes are normal. No oral lesions. No trismus in the jaw. No uvula swelling. No oropharyngeal exudate or tonsillar abscesses.   Neck: Neck supple. No tracheal deviation present. No thyromegaly present.   Pulmonary/Chest: Effort normal. No stridor.   Lymphadenopathy:     She has no cervical adenopathy.   Neurological: She is alert and oriented to person, place, and time.   Skin: No rash noted.       Assessment:       1. Cough    2. Impacted cerumen of right ear    3. Perceived hearing loss    4. History of seasonal allergies     5.    AS discomfort with hearing aid use   6.     Family hx hearing loss  Plan:     Some cerumen removed from AD eac  Audiometry reviewed, compared to 2018 study  Copy of audiogram/DONNELL Henriquez's card provided  Pt. is a candidate for amplification for one or both ears as indicated before   ( pt. tried [employer] hearing aids wothout benefit/ with AS otalgia)    Pt. encouraged to use AD hearing aid for now  Pt. may instill a drop or two of baby oil or mineral oil or white vinegar into ear canals prn; dropper provided  May use Claritin daily for spring allergies +/- NSS ( Flonase)

## 2019-04-15 NOTE — PATIENT INSTRUCTIONS
Some cerumen removed from AD eac  Audiometry reviewed, compared to 2018 study  Copy of audiogram/DONNELL Henriquez's card provided  Pt. is a candidate for amplification for one or both ears as indicated before   ( pt. tried [employer] hearing aids wothout benefit/ with AS otalgia)    Pt. encouraged to use AD hearing aid for now  Pt. may instill a  drop or two of of baby oil or mineral oil or white vinegar into ear canals prn; dropper provided  May use Claritin daily for spring allergies +/- NSS ( Flonase)

## 2019-04-26 ENCOUNTER — TELEPHONE (OUTPATIENT)
Dept: AUDIOLOGY | Facility: CLINIC | Age: 64
End: 2019-04-26

## 2019-04-29 ENCOUNTER — CLINICAL SUPPORT (OUTPATIENT)
Dept: AUDIOLOGY | Facility: CLINIC | Age: 64
End: 2019-04-29

## 2019-04-29 DIAGNOSIS — H90.3 SENSORINEURAL HEARING LOSS, BILATERAL: Primary | ICD-10-CM

## 2019-04-29 PROCEDURE — 99499 UNLISTED E&M SERVICE: CPT | Mod: S$GLB,,, | Performed by: OTOLARYNGOLOGY

## 2019-04-29 PROCEDURE — 99499 NO LOS: ICD-10-PCS | Mod: S$GLB,,, | Performed by: OTOLARYNGOLOGY

## 2019-04-29 NOTE — PROGRESS NOTES
Mrs. Saha was seen for a hearing aid consultation.  We discussed technology and styles in hearing aids.  She may be interested in Resound Quattro rechargeable hearing aids.      She is going to contact her insurance company before placing an order.  She will call if she decides to order hearing aids from [facility].

## 2019-05-28 ENCOUNTER — TELEPHONE (OUTPATIENT)
Dept: PRIMARY CARE CLINIC | Facility: CLINIC | Age: 64
End: 2019-05-28

## 2019-05-28 DIAGNOSIS — Z12.31 ENCOUNTER FOR SCREENING MAMMOGRAM FOR BREAST CANCER: Primary | ICD-10-CM

## 2019-05-28 NOTE — TELEPHONE ENCOUNTER
----- Message from Samantha Turk sent at 5/28/2019 12:59 PM CDT -----  Contact: self/ [phone]  Caller is requesting to schedule their annual screening mammogram appointment. Order is not listed in Epic.  Please enter order and contact patient to schedule.  Where would they like the mammogram performed?:  [facility]  Would the patient rather receive a phone call back or a response via [facility]?:     Additional information:

## 2019-05-31 ENCOUNTER — TELEPHONE (OUTPATIENT)
Dept: INTERNAL MEDICINE | Facility: CLINIC | Age: 64
End: 2019-05-31

## 2019-05-31 NOTE — TELEPHONE ENCOUNTER
----- Message from Tatiana Mary sent at 5/31/2019  7:08 AM CDT -----  Contact: seelf  Type:  Need New order for labwork    Caller is requesting to reschedule labwork for 6/28/209 for 9am   Need new order put in.       Name of Caller:   Patient   When is the first available appointment?    Best Call Back Number:[phone]  Additional Information:  Unable to reschedule labwork

## 2019-06-28 ENCOUNTER — HOSPITAL ENCOUNTER (OUTPATIENT)
Dept: RADIOLOGY | Facility: HOSPITAL | Age: 64
Discharge: HOME | End: 2019-06-28
Attending: INTERNAL MEDICINE
Payer: COMMERCIAL

## 2019-06-28 DIAGNOSIS — Z12.31 ENCOUNTER FOR SCREENING MAMMOGRAM FOR BREAST CANCER: ICD-10-CM

## 2019-06-28 PROCEDURE — 77063 MAMMO DIGITAL SCREENING BILAT WITH TOMOSYNTHESIS_CAD: ICD-10-PCS | Mod: 26,,, | Performed by: RADIOLOGY

## 2019-06-28 PROCEDURE — 77063 BREAST TOMOSYNTHESIS BI: CPT | Mod: 26,,, | Performed by: RADIOLOGY

## 2019-06-28 PROCEDURE — 77067 MAMMO DIGITAL SCREENING BILAT WITH TOMOSYNTHESIS_CAD: ICD-10-PCS | Mod: 26,,, | Performed by: RADIOLOGY

## 2019-06-28 PROCEDURE — 77067 SCR MAMMO BI INCL CAD: CPT | Mod: 26,,, | Performed by: RADIOLOGY

## 2019-06-28 PROCEDURE — 77067 SCR MAMMO BI INCL CAD: CPT | Mod: TC

## 2019-07-01 ENCOUNTER — OFFICE VISIT (OUTPATIENT)
Dept: INTERNAL MEDICINE | Facility: CLINIC | Age: 64
End: 2019-07-01
Payer: COMMERCIAL

## 2019-07-01 VITALS
HEART RATE: 92 BPM | WEIGHT: 188.25 LBS | SYSTOLIC BLOOD PRESSURE: 112 MMHG | DIASTOLIC BLOOD PRESSURE: 62 MMHG | BODY MASS INDEX: 32.14 KG/M2 | HEIGHT: 64 IN

## 2019-07-01 DIAGNOSIS — S43.202S: ICD-10-CM

## 2019-07-01 DIAGNOSIS — Z12.11 COLON CANCER SCREENING: ICD-10-CM

## 2019-07-01 DIAGNOSIS — E11.8 TYPE 2 DIABETES MELLITUS WITH COMPLICATION, WITHOUT LONG-TERM CURRENT USE OF INSULIN: Primary | ICD-10-CM

## 2019-07-01 DIAGNOSIS — E78.5 HYPERLIPIDEMIA, UNSPECIFIED HYPERLIPIDEMIA TYPE: ICD-10-CM

## 2019-07-01 PROCEDURE — 3008F PR BODY MASS INDEX (BMI) DOCUMENTED: ICD-10-PCS | Mod: CPTII,S$GLB,, | Performed by: INTERNAL MEDICINE

## 2019-07-01 PROCEDURE — 99214 OFFICE O/P EST MOD 30 MIN: CPT | Mod: S$GLB,,, | Performed by: INTERNAL MEDICINE

## 2019-07-01 PROCEDURE — 3045F PR MOST RECENT HEMOGLOBIN A1C LEVEL 7.0-9.0%: CPT | Mod: CPTII,S$GLB,, | Performed by: INTERNAL MEDICINE

## 2019-07-01 PROCEDURE — 99999 PR PBB SHADOW E&M-EST. PATIENT-LVL III: ICD-10-PCS | Mod: PBBFAC,,, | Performed by: INTERNAL MEDICINE

## 2019-07-01 PROCEDURE — 99999 PR PBB SHADOW E&M-EST. PATIENT-LVL III: CPT | Mod: PBBFAC,,, | Performed by: INTERNAL MEDICINE

## 2019-07-01 PROCEDURE — 3078F DIAST BP <80 MM HG: CPT | Mod: CPTII,S$GLB,, | Performed by: INTERNAL MEDICINE

## 2019-07-01 PROCEDURE — 99214 PR OFFICE/OUTPT VISIT, EST, LEVL IV, 30-39 MIN: ICD-10-PCS | Mod: S$GLB,,, | Performed by: INTERNAL MEDICINE

## 2019-07-01 PROCEDURE — 3074F PR MOST RECENT SYSTOLIC BLOOD PRESSURE < 130 MM HG: ICD-10-PCS | Mod: CPTII,S$GLB,, | Performed by: INTERNAL MEDICINE

## 2019-07-01 PROCEDURE — 3008F BODY MASS INDEX DOCD: CPT | Mod: CPTII,S$GLB,, | Performed by: INTERNAL MEDICINE

## 2019-07-01 PROCEDURE — 3078F PR MOST RECENT DIASTOLIC BLOOD PRESSURE < 80 MM HG: ICD-10-PCS | Mod: CPTII,S$GLB,, | Performed by: INTERNAL MEDICINE

## 2019-07-01 PROCEDURE — 3074F SYST BP LT 130 MM HG: CPT | Mod: CPTII,S$GLB,, | Performed by: INTERNAL MEDICINE

## 2019-07-01 PROCEDURE — 3045F PR MOST RECENT HEMOGLOBIN A1C LEVEL 7.0-9.0%: ICD-10-PCS | Mod: CPTII,S$GLB,, | Performed by: INTERNAL MEDICINE

## 2019-07-01 RX ORDER — METFORMIN HYDROCHLORIDE 500 MG/1
500 TABLET, EXTENDED RELEASE ORAL 2 TIMES DAILY WITH MEALS
Qty: 180 TABLET | Refills: 3 | Status: SHIPPED | OUTPATIENT
Start: 2019-07-01 | End: 2019-09-11 | Stop reason: SDUPTHER

## 2019-07-01 NOTE — PROGRESS NOTES
"Subjective:       Patient ID: Lizabeth Saha is a 63 y.o. female.    Chief Complaint: Follow-up   This is a 63-year-old who presents today for follow-up.  She has been doing well with her diabetes taking medicines regularly and she is now tolerating cholesterol medicine she did see cardiologist as well.  She is tolerating low-dose Crestor she has been taking her Januvia 25 mg and metformin regularly and reports blood sugars have been good usually in the morning but she was surprised that her A1c was a bit higher .  She still has issues with her left acromial clavicular area some prominence not much pain but it is increasing in size on and off.     HPI  Review of Systems   Constitutional: Negative for fever.   Respiratory: Negative for cough, shortness of breath and wheezing.    Cardiovascular: Negative for chest pain and palpitations.   Gastrointestinal: Negative for abdominal pain and constipation.   Neurological: Negative for dizziness.       Objective:     Blood pressure 112/62, pulse 92, height 5' 4" (1.626 m), weight 85.4 kg (188 lb 4.4 oz), last menstrual period 01/01/1994.    Physical Exam   Constitutional: No distress.   HENT:   Head: Normocephalic.   Mouth/Throat: Oropharynx is clear and moist.   Left prominance claviular no tenderness  Normal rom shoulder    Eyes: No scleral icterus.   Neck: Neck supple.   Cardiovascular: Normal rate, regular rhythm and normal heart sounds. Exam reveals no gallop and no friction rub.   No murmur heard.  Pulmonary/Chest: Effort normal and breath sounds normal. No respiratory distress.   Breast : normal no masses or tenderness   Fatty tissue under arms    Abdominal: Soft. Bowel sounds are normal. She exhibits no mass. There is no tenderness.   Musculoskeletal: She exhibits no edema.   Feet:   Right Foot:   Protective Sensation: 7 sites tested. 7 sites sensed.   Left Foot:   Protective Sensation: 7 sites tested. 7 sites sensed.   Neurological: She is alert.   Skin: No " erythema.   Psychiatric: She has a normal mood and affect.   Vitals reviewed.      Assessment:       1. Type 2 diabetes mellitus with complication, without long-term current use of insulin    2. Colon cancer screening    3. Hyperlipidemia, unspecified hyperlipidemia type    4. Subluxation of left sternoclavicular joint, sequela        Plan:       Lizabeth Bingham was seen today for follow-up.    Diagnoses and all orders for this visit:    Type 2 diabetes mellitus with complication, without long-term current use of insulin  History of she is following is her diet more closely will increase her Januvia to 50 mg continue metformin  -     Basic metabolic panel; Future  -     Hemoglobin A1c; Future  -     Hepatic function panel; Future    Colon cancer screening  She will schedule colonoscopy as planned  -     Case request GI: COLONOSCOPY    Hyperlipidemia, unspecified hyperlipidemia type  She is tolerating low-dose Crestor and will continue    Subluxation of left sternoclavicular joint, sequela  Discussed with patient proceed with CT for further evaluation  -     CT Chest With Contrast; Future    Other orders  -     metFORMIN (GLUCOPHAGE-XR) 500 MG 24 hr tablet; Take 1 tablet (500 mg total) by mouth 2 (two) times daily with meals.  -     SITagliptin (JANUVIA) 50 MG Tab; Take 1 tablet (50 mg total) by mouth once daily.    Labs reviewed     Follow up 4 months

## 2019-08-13 ENCOUNTER — HOSPITAL ENCOUNTER (OUTPATIENT)
Dept: RADIOLOGY | Facility: HOSPITAL | Age: 64
Discharge: HOME | End: 2019-08-13
Attending: INTERNAL MEDICINE
Payer: COMMERCIAL

## 2019-08-13 DIAGNOSIS — S43.202S: ICD-10-CM

## 2019-08-13 PROCEDURE — 71250 CT THORAX DX C-: CPT | Mod: 26,,, | Performed by: RADIOLOGY

## 2019-08-13 PROCEDURE — 71250 CT CHEST WITHOUT CONTRAST: ICD-10-PCS | Mod: 26,,, | Performed by: RADIOLOGY

## 2019-08-13 PROCEDURE — 71250 CT THORAX DX C-: CPT | Mod: TC

## 2019-08-14 PROBLEM — R91.1 PULMONARY NODULE: Status: ACTIVE | Noted: 2019-08-14

## 2019-09-11 ENCOUNTER — TELEPHONE (OUTPATIENT)
Dept: INTERNAL MEDICINE | Facility: CLINIC | Age: 64
End: 2019-09-11

## 2019-09-11 DIAGNOSIS — E78.5 HYPERLIPIDEMIA, UNSPECIFIED HYPERLIPIDEMIA TYPE: ICD-10-CM

## 2019-09-11 DIAGNOSIS — J31.0 RHINITIS, UNSPECIFIED TYPE: ICD-10-CM

## 2019-09-11 RX ORDER — FLUTICASONE PROPIONATE 50 MCG
1 SPRAY, SUSPENSION (ML) NASAL DAILY
Qty: 3 BOTTLE | Refills: 4 | Status: SHIPPED | OUTPATIENT
Start: 2019-09-11 | End: 2024-03-12

## 2019-09-11 RX ORDER — ALBUTEROL SULFATE 90 UG/1
2 AEROSOL, METERED RESPIRATORY (INHALATION) EVERY 6 HOURS PRN
Qty: 3 INHALER | Refills: 4 | Status: SHIPPED | OUTPATIENT
Start: 2019-09-11 | End: 2022-10-25

## 2019-09-11 RX ORDER — ROSUVASTATIN CALCIUM 5 MG/1
5 TABLET, COATED ORAL DAILY
Qty: 90 TABLET | Refills: 4 | Status: SHIPPED | OUTPATIENT
Start: 2019-09-11 | End: 2020-08-10 | Stop reason: SDUPTHER

## 2019-09-11 RX ORDER — METFORMIN HYDROCHLORIDE 500 MG/1
500 TABLET, EXTENDED RELEASE ORAL 2 TIMES DAILY WITH MEALS
Qty: 180 TABLET | Refills: 4 | Status: SHIPPED | OUTPATIENT
Start: 2019-09-11 | End: 2020-08-10 | Stop reason: SDUPTHER

## 2019-09-11 NOTE — TELEPHONE ENCOUNTER
----- Message from Saimaalesha Noonan sent at 9/11/2019  8:34 AM CDT -----  Contact: pt [phone]  Patient is calling for an RX refill or new RX.  Is this a refill or new RX:  New   RX name and strength: albuterol 90 mcg/actuation inhaler  Directions (copy/paste from chart):   Inhale 2 puffs into the lungs every 6 (six) hours as needed for Wheezing.   Is this a 30 day or 90 day RX:90    Local pharmacy or mail order pharmacy:  Mail order  Pharmacy name and phone # (copy/paste from chart):   [facility] [phone] fax [phone]  Comments:      Patient is calling for an RX refill or new RX.  Is this a refill or new RX:  New   RX name and strength: fluticasone (FLONASE) 50 mcg/actuation nasal spray  Directions (copy/paste from chart):  spray by Each Nare route once daily.  Is this a 30 day or 90 day RX:  90  Local pharmacy or mail order pharmacy:  Mail order   Pharmacy name and phone # (copy/paste from chart):  [facility] [phone] fax [phone]  Comments:      Patient is calling for an RX refill or new RX.  Is this a refill or new RX:  New   RX name and strength: metFORMIN (GLUCOPHAGE-XR) 500 MG 24 hr tablet  Directions (copy/paste from chart):  Take 1 tablet (500 mg total) by mouth 2 (two) times daily with meals  Is this a 30 day or 90 day RX:  90  Local pharmacy or mail order pharmacy:  Mail order   Pharmacy name and phone # (copy/paste from chart):  [facility] [phone] fax [phone]  Comments:        Patient is calling for an RX refill or new RX.  Is this a refill or new RX:  New   RX name and strength: rosuvastatin (CRESTOR) 5 MG tablet  Directions (copy/paste from chart):  Take 1 tablet (5 mg total) by mouth once daily  Is this a 30 day or 90 day RX:  90  Local pharmacy or mail order pharmacy:  Mail order  Pharmacy name and phone # (copy/paste from chart):   [facility] [phone] fax [phone]  Comments:      Patient is calling for an RX refill or new RX.  Is this a refill or  new RX:  New   RX name and strength: SITagliptin (JANUVIA) 50 MG Tab  Directions (copy/paste from chart):  Take 1 tablet (50 mg total) by mouth once daily  Is this a 30 day or 90 day RX:  90  Local pharmacy or mail order pharmacy:  Mail order  Pharmacy name and phone # (copy/paste from chart):   [facility] [phone] fax [phone]  Comments:

## 2019-10-14 ENCOUNTER — PATIENT OUTREACH (OUTPATIENT)
Dept: ADMINISTRATIVE | Facility: HOSPITAL | Age: 64
End: 2019-10-14

## 2019-10-14 NOTE — PROGRESS NOTES
VM left to collect stool  FIT KIT specimen , and need of recent Eye Exam report. She was also instructed that she'd need a Urine Specimen the day of her PCP visit.

## 2019-11-03 ENCOUNTER — PATIENT OUTREACH (OUTPATIENT)
Dept: ADMINISTRATIVE | Facility: OTHER | Age: 64
End: 2019-11-03

## 2019-11-05 ENCOUNTER — OFFICE VISIT (OUTPATIENT)
Dept: CARDIOLOGY | Facility: CLINIC | Age: 64
End: 2019-11-05
Payer: COMMERCIAL

## 2019-11-05 VITALS
HEART RATE: 83 BPM | BODY MASS INDEX: 32.03 KG/M2 | SYSTOLIC BLOOD PRESSURE: 164 MMHG | WEIGHT: 192.25 LBS | DIASTOLIC BLOOD PRESSURE: 88 MMHG | HEIGHT: 65 IN

## 2019-11-05 DIAGNOSIS — Z78.9 STATIN INTOLERANCE: ICD-10-CM

## 2019-11-05 DIAGNOSIS — I10 ESSENTIAL HYPERTENSION: ICD-10-CM

## 2019-11-05 DIAGNOSIS — E78.5 HYPERLIPIDEMIA, UNSPECIFIED HYPERLIPIDEMIA TYPE: Primary | ICD-10-CM

## 2019-11-05 DIAGNOSIS — E66.9 OBESITY, CLASS I, BMI 30-34.9: ICD-10-CM

## 2019-11-05 DIAGNOSIS — G47.30 SLEEP APNEA, UNSPECIFIED TYPE: ICD-10-CM

## 2019-11-05 DIAGNOSIS — E11.9 TYPE 2 DIABETES MELLITUS WITHOUT COMPLICATION, WITHOUT LONG-TERM CURRENT USE OF INSULIN: ICD-10-CM

## 2019-11-05 PROBLEM — E66.811 OBESITY, CLASS I, BMI 30-34.9: Status: ACTIVE | Noted: 2019-11-05

## 2019-11-05 PROCEDURE — 99999 PR PBB SHADOW E&M-EST. PATIENT-LVL III: ICD-10-PCS | Mod: PBBFAC,,, | Performed by: INTERNAL MEDICINE

## 2019-11-05 PROCEDURE — 3079F PR MOST RECENT DIASTOLIC BLOOD PRESSURE 80-89 MM HG: ICD-10-PCS | Mod: CPTII,S$GLB,, | Performed by: INTERNAL MEDICINE

## 2019-11-05 PROCEDURE — 3008F PR BODY MASS INDEX (BMI) DOCUMENTED: ICD-10-PCS | Mod: CPTII,S$GLB,, | Performed by: INTERNAL MEDICINE

## 2019-11-05 PROCEDURE — 3008F BODY MASS INDEX DOCD: CPT | Mod: CPTII,S$GLB,, | Performed by: INTERNAL MEDICINE

## 2019-11-05 PROCEDURE — 3077F SYST BP >= 140 MM HG: CPT | Mod: CPTII,S$GLB,, | Performed by: INTERNAL MEDICINE

## 2019-11-05 PROCEDURE — 3077F PR MOST RECENT SYSTOLIC BLOOD PRESSURE >= 140 MM HG: ICD-10-PCS | Mod: CPTII,S$GLB,, | Performed by: INTERNAL MEDICINE

## 2019-11-05 PROCEDURE — 99999 PR PBB SHADOW E&M-EST. PATIENT-LVL III: CPT | Mod: PBBFAC,,, | Performed by: INTERNAL MEDICINE

## 2019-11-05 PROCEDURE — 99214 PR OFFICE/OUTPT VISIT, EST, LEVL IV, 30-39 MIN: ICD-10-PCS | Mod: S$GLB,,, | Performed by: INTERNAL MEDICINE

## 2019-11-05 PROCEDURE — 3079F DIAST BP 80-89 MM HG: CPT | Mod: CPTII,S$GLB,, | Performed by: INTERNAL MEDICINE

## 2019-11-05 PROCEDURE — 99214 OFFICE O/P EST MOD 30 MIN: CPT | Mod: S$GLB,,, | Performed by: INTERNAL MEDICINE

## 2019-11-05 RX ORDER — VALSARTAN 80 MG/1
80 TABLET ORAL DAILY
Qty: 90 TABLET | Refills: 3 | Status: SHIPPED | OUTPATIENT
Start: 2019-11-05 | End: 2020-08-07 | Stop reason: SDUPTHER

## 2019-11-05 NOTE — PROGRESS NOTES
Subjective:   Patient ID:  Lizabeth Saha is a 64 y.o. female who presents for follow up of Chest pain, unspecified type (9 month f/u )      HPI: New patient to me, formerly saw Dr. Wallace.  She is doing well with no new symptoms or cardiovascular complaints and no change in exercise capacity.  He denies chest discomfort, PERRY, palpitations, PND/orthopnea, lightheadedness and syncope.    She had a PET in March that didn't show any significant obstructive disease.    She's tolerating Crestor 5 just fine and is willing to try doubling it.    Dr. Wallace's Feb 2019 HPI:  Ms. Saha is a pleasant 62 yo woman referred for chest pain. She has a hx of NIDDM (A1c 7.2), poorly controlled HLD (poorly tolerates statins).     She has noted over the last 6 months some mild palpitations as well as chest discomfort which come separately. Her chest discomfort is mild, feels like heaviness, occurs about 2x monthly. Has occurred while walking and while at rest. No associated symptoms. Usually lasts several minutes, relieves on its own. When occurring while she was walking, did not cause her to stop or slow down. She has palpitations slighlty more frequently a few times monthly. Feels like a warm feeling in her chest and fast heart beats, has not checked pulse during episodes. Otherwise no associated symptoms. Denies associated dizziness/LH, presyncope/syncope, SOB, fatigue/malaise.      Able to ambulate without issue. Denies chest discomfort with exertion. Denies PND, orthopnea, RUBIO, presynope/syncope. No associated diaphoresis, N/V, shortness of breath.     Reports unable to tolerate pravastatin and atorvastatin in the past. Started on crestor 5. Reports headaches and malaise associated with statins.    Patient Active Problem List   Diagnosis    Sleep apnea    Diabetes mellitus, type 2    Hyperlipemia    Mild chronic obstructive pulmonary disease    Osteoporosis    Non-proliferative diabetic retinopathy    Pulmonary  nodule    Obesity, Class I, BMI 30-34.9    Statin intolerance       Current Outpatient Medications   Medication Sig    albuterol (PROVENTIL/VENTOLIN HFA) 90 mcg/actuation inhaler Inhale 2 puffs into the lungs every 6 (six) hours as needed for Wheezing.    aspirin (ECOTRIN) 81 MG EC tablet Take 1 tablet (81 mg total) by mouth once daily.    fluticasone propionate (FLONASE) 50 mcg/actuation nasal spray 1 spray (50 mcg total) by Each Nostril route once daily.    metFORMIN (GLUCOPHAGE-XR) 500 MG 24 hr tablet Take 1 tablet (500 mg total) by mouth 2 (two) times daily with meals.    rosuvastatin (CRESTOR) 5 MG tablet Take 1 tablet (5 mg total) by mouth once daily.    SITagliptin (JANUVIA) 50 MG Tab Take 1 tablet (50 mg total) by mouth once daily.     No current facility-administered medications for this visit.        Review of Systems   Constitution: Negative.   HENT: Negative.    Eyes: Negative.    Cardiovascular: Negative.  Negative for chest pain, dyspnea on exertion, near-syncope, orthopnea and palpitations.   Respiratory: Negative.  Negative for cough and shortness of breath.    Endocrine: Negative.    Hematologic/Lymphatic: Negative.    Skin: Negative.    Musculoskeletal: Negative.    Gastrointestinal: Negative.    Genitourinary: Negative.    Neurological: Negative.    Psychiatric/Behavioral: Negative.      Objective:   Physical Exam   Constitutional: She is oriented to person, place, and time. She appears well-developed and well-nourished.   HENT:   Head: Normocephalic and atraumatic.   Mouth/Throat: Oropharynx is clear and moist.   Eyes: Conjunctivae and EOM are normal. No scleral icterus.   Neck: Normal range of motion. Neck supple. No JVD present.   Cardiovascular: Normal rate, regular rhythm, normal heart sounds and intact distal pulses. Exam reveals no gallop and no friction rub.   No murmur heard.  Pulmonary/Chest: Effort normal and breath sounds normal. She has no wheezes. She has no rales.    Abdominal: Soft. Bowel sounds are normal. She exhibits no distension. There is no tenderness.   Musculoskeletal: Normal range of motion. She exhibits no edema.   Neurological: She is alert and oriented to person, place, and time.   Skin: Skin is warm and dry. No rash noted. No erythema.   Psychiatric: She has a normal mood and affect. Her behavior is normal. Judgment and thought content normal.   Vitals reviewed.      Lab Results   Component Value Date    WBC 5.42 04/24/2018    HGB 12.5 04/24/2018    HCT 39.4 04/24/2018    MCV 83 04/24/2018     04/24/2018         Chemistry        Component Value Date/Time     06/28/2019 0906    K 4.5 06/28/2019 0906     06/28/2019 0906    CO2 26 06/28/2019 0906    BUN 12 06/28/2019 0906    CREATININE 1.0 06/28/2019 0906     (H) 06/28/2019 0906        Component Value Date/Time    CALCIUM 10.0 06/28/2019 0906    ALKPHOS 65 06/28/2019 0906    AST 20 06/28/2019 0906    ALT 29 06/28/2019 0906    BILITOT 0.5 06/28/2019 0906    ESTGFRAFRICA >60 06/28/2019 0906    EGFRNONAA >60 06/28/2019 0906            Lab Results   Component Value Date    CHOL 183 06/28/2019    CHOL 248 (H) 01/31/2019    CHOL 242 (H) 09/07/2018     Lab Results   Component Value Date    HDL 51 06/28/2019    HDL 43 01/31/2019    HDL 46 09/07/2018     Lab Results   Component Value Date    LDLCALC 95.8 06/28/2019    LDLCALC 172.0 (H) 01/31/2019    LDLCALC 161.2 (H) 09/07/2018     Lab Results   Component Value Date    TRIG 181 (H) 06/28/2019    TRIG 165 (H) 01/31/2019    TRIG 174 (H) 09/07/2018     Lab Results   Component Value Date    CHOLHDL 27.9 06/28/2019    CHOLHDL 17.3 (L) 01/31/2019    CHOLHDL 19.0 (L) 09/07/2018       Lab Results   Component Value Date    TSH 1.170 06/28/2019       Lab Results   Component Value Date    HGBA1C 7.4 (H) 06/28/2019       Assessment:     1. Hyperlipidemia, unspecified hyperlipidemia type    2. Type 2 diabetes mellitus without complication, without long-term  current use of insulin    3. Sleep apnea, unspecified type    4. Obesity, Class I, BMI 30-34.9    5. Statin intolerance        Plan:     Continue current medicines.  Trial increasing Crestor to 10.    Start valsartan 80.  Digital HTN enrollment.    Diet/exercise goals reinforced.    F/U 6 months

## 2019-11-12 ENCOUNTER — PATIENT MESSAGE (OUTPATIENT)
Dept: ADMINISTRATIVE | Facility: OTHER | Age: 64
End: 2019-11-12

## 2019-11-22 ENCOUNTER — TELEPHONE (OUTPATIENT)
Dept: INTERNAL MEDICINE | Facility: CLINIC | Age: 64
End: 2019-11-22

## 2019-11-22 PROBLEM — E11.3299 MILD NONPROLIFERATIVE DIABETIC RETINOPATHY ASSOCIATED WITH TYPE 2 DIABETES MELLITUS: Status: ACTIVE | Noted: 2019-11-22

## 2019-11-22 NOTE — TELEPHONE ENCOUNTER
[facility] eye  Reports 10/29/2019  Dr. mirna murry  Some mild proliferative retinopathy  kali Leyva   1 year follow pu advised

## 2019-12-02 ENCOUNTER — PATIENT MESSAGE (OUTPATIENT)
Dept: ADMINISTRATIVE | Facility: OTHER | Age: 64
End: 2019-12-02

## 2019-12-02 ENCOUNTER — LAB VISIT (OUTPATIENT)
Dept: LAB | Facility: HOSPITAL | Age: 64
End: 2019-12-02
Attending: INTERNAL MEDICINE
Payer: COMMERCIAL

## 2019-12-02 DIAGNOSIS — Z12.11 COLON CANCER SCREENING: ICD-10-CM

## 2019-12-02 PROCEDURE — 82274 ASSAY TEST FOR BLOOD FECAL: CPT

## 2019-12-10 ENCOUNTER — PATIENT OUTREACH (OUTPATIENT)
Dept: ADMINISTRATIVE | Facility: HOSPITAL | Age: 64
End: 2019-12-10

## 2019-12-10 LAB — HEMOCCULT STL QL IA: NEGATIVE

## 2020-02-05 ENCOUNTER — PATIENT OUTREACH (OUTPATIENT)
Dept: ADMINISTRATIVE | Facility: HOSPITAL | Age: 65
End: 2020-02-05

## 2020-02-05 ENCOUNTER — PATIENT OUTREACH (OUTPATIENT)
Dept: OTHER | Facility: OTHER | Age: 65
End: 2020-02-05

## 2020-02-05 NOTE — LETTER
March 12, 2020     Lizabeth Saha  [address] [address] [facility] [address]       Dear Lizabeth,    Thank you for your interest in [facility], a clinically-proven program designed to help you manage your chronic condition more conveniently and effectively. [facility] gives you:   Technology that lets you monitor your health at home and send readings directly to your care team at [facility]   Medications managed by a dedicated pharmacist or clinician    A  who calls and helps you take manageable steps towards a healthier lifestyle       We have tried to reach you via phone and [email or web address] Message to complete your enrollment in the Digital Medicine program. Unfortunately, weve been unable to reach you.     Please call us to complete your enrollment. Our number is [phone]. If we dont hear from you by 4/2/2020, we will be unable to complete your enrollment at this time.     We look forward to hearing from you soon.    Sincerely,     The Digital Medicine Team

## 2020-02-05 NOTE — LETTER
March 12, 2020     Lizabeth Saha  [address] [facility] [address]       Dear Lizabeth,    Thank you for your interest in [facility], a clinically-proven program designed to help you manage your chronic condition more conveniently and effectively. [facility] gives you:   Technology that lets you monitor your health at home and send readings directly to your care team at [facility]   Medications managed by a dedicated pharmacist or clinician    A  who calls and helps you take manageable steps towards a healthier lifestyle       We have tried to reach you via phone and [employer] Message to complete your enrollment in the Digital Medicine program. Unfortunately, weve been unable to reach you.     Please call us to complete your enrollment. Our number is [phone]. If we dont hear from you by 4/2/2020, we will be unable to complete your enrollment at this time.     We look forward to hearing from you soon.    Sincerely,     The Digital Medicine Team

## 2020-02-05 NOTE — LETTER
April 27, 2020     Lizabeth Saha  [address] [place] [address]       Dear Lizabeth,    Welcome to [facility]! Our goal is to make care effective, proactive and convenient by using data you send us from home to better treat your chronic conditions.              My name is Rhonda Anderson, and I am your dedicated Digital Medicine clinician. As an expert in medication management, I will help ensure that the medications you are taking continue to provide the intended benefits and help you reach your goals. You can reach me directly at [phone] or by sending me a message directly through your [facility] account.      I am Amber Sofia and I will be your health . My job is to help you identify lifestyle changes to improve your disease control. We will talk about nutrition, exercise, and other ways you may be able to adjust your current habits to better your health. Additionally, we will help ensure you are completing the tests and screenings that are necessary to help manage your conditions. You can reach me directly at [phone] or by sending me a message directly through your [facility] account.    Most importantly, YOU are at the center of this team. Together, we will work to improve your overall health and encourage you to meet your goals for a healthier lifestyle.     What we expect from YOU:  · Please take frequent home blood pressure measurements. We ask that you take at least 1 blood pressure reading per week, but more information will better help us get you know you. Be sure you rest for a few minutes before taking the reading in a quiet, comfortable place.     Be available to receive phone calls or [employer] messages, when appropriate, from your care team. Please let us know if there are any specific days or times that work best for us to reach you via phone.     Complete routine tests and screenings. Dont worry, we will help keep you on track!            What you should expect from your Digital Medicine Care Team:   We will work with you to create a personalized plan of care and provide you with encouragement and education, including regarding lifestyle changes, that could help you manage your disease states.     We will adjust your current medications, if needed, and continue to monitor your long-term progress.     We will provide you and your physician with monthly progress reports after you have been in the program for more than 30 days.     We will send you reminders through [employer] and text messages to help ensure you do not miss any testing deadlines to help manage your disease states.    You will be able to reach us by phone or through your [employer] account by clicking our names under Care Team on the right side of the home screen.    I look forward to working with you to achieve your blood pressure goals!    We look forward to working with you to help manage your health,    Sincerely,    Your Digital Medicine Team    Please visit our websites to learn more:   · Hypertension: [email or web address]      Remember, we are not available for emergencies. If you have an emergency, please contact your doctors office directly or call [facility] ([phone] or [phone]) or [phone].

## 2020-02-14 ENCOUNTER — LAB VISIT (OUTPATIENT)
Dept: LAB | Facility: HOSPITAL | Age: 65
End: 2020-02-14
Attending: INTERNAL MEDICINE
Payer: COMMERCIAL

## 2020-02-14 DIAGNOSIS — E11.8 TYPE 2 DIABETES MELLITUS WITH COMPLICATION, WITHOUT LONG-TERM CURRENT USE OF INSULIN: ICD-10-CM

## 2020-02-14 LAB
ALBUMIN SERPL BCP-MCNC: 4.2 G/DL (ref 3.5–5.2)
ALP SERPL-CCNC: 73 U/L (ref 55–135)
ALT SERPL W/O P-5'-P-CCNC: 29 U/L (ref 10–44)
ANION GAP SERPL CALC-SCNC: 7 MMOL/L (ref 8–16)
AST SERPL-CCNC: 24 U/L (ref 10–40)
BILIRUB DIRECT SERPL-MCNC: 0.2 MG/DL (ref 0.1–0.3)
BILIRUB SERPL-MCNC: 0.3 MG/DL (ref 0.1–1)
BUN SERPL-MCNC: 10 MG/DL (ref 8–23)
CALCIUM SERPL-MCNC: 9.8 MG/DL (ref 8.7–10.5)
CHLORIDE SERPL-SCNC: 103 MMOL/L (ref 95–110)
CO2 SERPL-SCNC: 27 MMOL/L (ref 23–29)
CREAT SERPL-MCNC: 1 MG/DL (ref 0.5–1.4)
EST. GFR  (AFRICAN AMERICAN): >60 ML/MIN/1.73 M^2
EST. GFR  (NON AFRICAN AMERICAN): 59.7 ML/MIN/1.73 M^2
ESTIMATED AVG GLUCOSE: 189 MG/DL (ref 68–131)
GLUCOSE SERPL-MCNC: 136 MG/DL (ref 70–110)
HBA1C MFR BLD HPLC: 8.2 % (ref 4–5.6)
POTASSIUM SERPL-SCNC: 4.7 MMOL/L (ref 3.5–5.1)
PROT SERPL-MCNC: 7.6 G/DL (ref 6–8.4)
SODIUM SERPL-SCNC: 137 MMOL/L (ref 136–145)

## 2020-02-14 PROCEDURE — 36415 COLL VENOUS BLD VENIPUNCTURE: CPT

## 2020-02-14 PROCEDURE — 80048 BASIC METABOLIC PNL TOTAL CA: CPT

## 2020-02-14 PROCEDURE — 80076 HEPATIC FUNCTION PANEL: CPT

## 2020-02-14 PROCEDURE — 83036 HEMOGLOBIN GLYCOSYLATED A1C: CPT

## 2020-02-19 ENCOUNTER — OFFICE VISIT (OUTPATIENT)
Dept: INTERNAL MEDICINE | Facility: CLINIC | Age: 65
End: 2020-02-19
Payer: COMMERCIAL

## 2020-02-19 ENCOUNTER — LAB VISIT (OUTPATIENT)
Dept: LAB | Facility: HOSPITAL | Age: 65
End: 2020-02-19
Attending: INTERNAL MEDICINE
Payer: COMMERCIAL

## 2020-02-19 VITALS
DIASTOLIC BLOOD PRESSURE: 75 MMHG | SYSTOLIC BLOOD PRESSURE: 132 MMHG | WEIGHT: 192.88 LBS | HEART RATE: 83 BPM | BODY MASS INDEX: 32.14 KG/M2 | HEIGHT: 65 IN | OXYGEN SATURATION: 99 %

## 2020-02-19 DIAGNOSIS — M54.31 SCIATICA OF RIGHT SIDE: ICD-10-CM

## 2020-02-19 DIAGNOSIS — Z00.00 ANNUAL PHYSICAL EXAM: Primary | ICD-10-CM

## 2020-02-19 DIAGNOSIS — E11.8 TYPE 2 DIABETES MELLITUS WITH COMPLICATION, WITHOUT LONG-TERM CURRENT USE OF INSULIN: ICD-10-CM

## 2020-02-19 DIAGNOSIS — E11.8 TYPE 2 DIABETES MELLITUS WITH COMPLICATION, WITHOUT LONG-TERM CURRENT USE OF INSULIN: Primary | ICD-10-CM

## 2020-02-19 DIAGNOSIS — E78.5 HYPERLIPIDEMIA, UNSPECIFIED HYPERLIPIDEMIA TYPE: ICD-10-CM

## 2020-02-19 DIAGNOSIS — I10 ESSENTIAL HYPERTENSION: ICD-10-CM

## 2020-02-19 PROBLEM — Z78.9 STATIN INTOLERANCE: Status: RESOLVED | Noted: 2019-11-05 | Resolved: 2020-02-19

## 2020-02-19 LAB
ALBUMIN/CREAT UR: 6 UG/MG (ref 0–30)
CREAT UR-MCNC: 100 MG/DL (ref 15–325)
MICROALBUMIN UR DL<=1MG/L-MCNC: 6 UG/ML

## 2020-02-19 PROCEDURE — 99999 PR PBB SHADOW E&M-EST. PATIENT-LVL IV: CPT | Mod: PBBFAC,,, | Performed by: INTERNAL MEDICINE

## 2020-02-19 PROCEDURE — 99999 PR PBB SHADOW E&M-EST. PATIENT-LVL IV: ICD-10-PCS | Mod: PBBFAC,,, | Performed by: INTERNAL MEDICINE

## 2020-02-19 PROCEDURE — 99214 OFFICE O/P EST MOD 30 MIN: CPT | Mod: S$GLB,,, | Performed by: INTERNAL MEDICINE

## 2020-02-19 PROCEDURE — 99214 PR OFFICE/OUTPT VISIT, EST, LEVL IV, 30-39 MIN: ICD-10-PCS | Mod: S$GLB,,, | Performed by: INTERNAL MEDICINE

## 2020-02-19 PROCEDURE — 82043 UR ALBUMIN QUANTITATIVE: CPT

## 2020-02-19 PROCEDURE — 3052F HG A1C>EQUAL 8.0%<EQUAL 9.0%: CPT | Mod: CPTII,S$GLB,, | Performed by: INTERNAL MEDICINE

## 2020-02-19 PROCEDURE — 3008F PR BODY MASS INDEX (BMI) DOCUMENTED: ICD-10-PCS | Mod: CPTII,S$GLB,, | Performed by: INTERNAL MEDICINE

## 2020-02-19 PROCEDURE — 3078F DIAST BP <80 MM HG: CPT | Mod: CPTII,S$GLB,, | Performed by: INTERNAL MEDICINE

## 2020-02-19 PROCEDURE — 3052F PR MOST RECENT HEMOGLOBIN A1C LEVEL 8.0 - < 9.0%: ICD-10-PCS | Mod: CPTII,S$GLB,, | Performed by: INTERNAL MEDICINE

## 2020-02-19 PROCEDURE — 3078F PR MOST RECENT DIASTOLIC BLOOD PRESSURE < 80 MM HG: ICD-10-PCS | Mod: CPTII,S$GLB,, | Performed by: INTERNAL MEDICINE

## 2020-02-19 PROCEDURE — 3075F SYST BP GE 130 - 139MM HG: CPT | Mod: CPTII,S$GLB,, | Performed by: INTERNAL MEDICINE

## 2020-02-19 PROCEDURE — 3075F PR MOST RECENT SYSTOLIC BLOOD PRESS GE 130-139MM HG: ICD-10-PCS | Mod: CPTII,S$GLB,, | Performed by: INTERNAL MEDICINE

## 2020-02-19 PROCEDURE — 3008F BODY MASS INDEX DOCD: CPT | Mod: CPTII,S$GLB,, | Performed by: INTERNAL MEDICINE

## 2020-02-19 NOTE — PROGRESS NOTES
"Subjective:       Patient ID: Lizabeth Saha is a 64 y.o. female.    Chief Complaint: Follow-up and Leg Pain   This is a 64-year-old who presents today for follow-up.  She reports that she has been having issues with her back at times sciatic into the right leg she reports that she does not recall doing anything specific and it comes and goes depending on what she is doing has been of little better recently she denies any swelling and has not really been taking any regular medicines.  Patient reports she has been back and forth between here in [place] where she reports that her daughter has open to do so far and she has been helping her at times she is doing well.  She has been getting her blood pressure medications and now is in the digital program she is tolerating her medicines without difficulty.  She is also taking her diabetes medicines although her A1c is trended up she thinks she has been eating more candy than usual maybe not exercising as much as she had been she hopes to get back to the gym    HPI  Review of Systems   Constitutional: Negative for fever.   Cardiovascular: Negative for chest pain.   Gastrointestinal: Negative for abdominal pain.   Musculoskeletal:        Backpain sciatic at times right    Neurological: Negative for dizziness.       Objective:     Blood pressure 132/75, pulse 83, height 5' 5" (1.651 m), weight 87.5 kg (192 lb 14.4 oz), last menstrual period 01/01/1994, SpO2 99 %.    Physical Exam   Constitutional: No distress.   HENT:   Head: Normocephalic.   Mouth/Throat: Oropharynx is clear and moist.   Eyes: No scleral icterus.   Neck: Neck supple.   Cardiovascular: Normal rate, regular rhythm and normal heart sounds. Exam reveals no gallop and no friction rub.   No murmur heard.  Pulmonary/Chest: Effort normal and breath sounds normal. No respiratory distress.   Abdominal: Soft. Bowel sounds are normal. She exhibits no mass. There is no tenderness.   Musculoskeletal: She exhibits no " edema.   Some discomfort with positional changes    Neurological: She is alert.   Skin: No erythema.   Vitals reviewed.      Assessment:       1. Type 2 diabetes mellitus with complication, without long-term current use of insulin    2. Essential hypertension    3. Hyperlipidemia, unspecified hyperlipidemia type    4. Sciatica of right side        Plan:       Lizabeth was seen today for follow-up and leg pain.    Diagnoses and all orders for this visit:    Type 2 diabetes mellitus with complication, without long-term current use of insulin  Hemoglobin A1c trending up we discussed increased Januvia continue metformin reinforced dietary measures  -     Microalbumin/creatinine urine ratio; Future    Essential hypertension  Continue current regimen she has followed with the digital program and has been taking Diovan more recently which she is tolerating without difficulty    Hyperlipidemia, unspecified hyperlipidemia type  Is she remains on Crestor tolerating    Sciatica of right side  We discussed conservative measures back precautions and consider physical therapy she declines at this time but plans to go back to the gym and do some exercises if symptoms persist or do not resolve as she will call for physical therapy     Other orders  -     SITagliptin (JANUVIA) 100 MG Tab; Take 1 tablet (100 mg total) by mouth once daily.    Follow up 4 months

## 2020-03-23 ENCOUNTER — TELEPHONE (OUTPATIENT)
Dept: INTERNAL MEDICINE | Facility: CLINIC | Age: 65
End: 2020-03-23

## 2020-03-23 NOTE — TELEPHONE ENCOUNTER
----- Message from Marianna Nicole sent at 3/23/2020 10:36 AM CDT -----  Contact: self   Would like to get medical advice.  Symptoms (please be specific):  Patient states when she looks up side ways or down her eyes hurt  How long has patient had these symptoms:  4 days  Pharmacy name and phone #:    Any drug allergies (copy from chart):      Would the patient rather a call back or a response via [facility]?:  Call back   Comments:

## 2020-03-23 NOTE — TELEPHONE ENCOUNTER
Spoke with pt  Discussed home isolation  And precautions discussed   She had increased her cresstor to 10 may consider  Back to 5 discussed  Tylenol for headache and   Fever/er precuaitons reviewed

## 2020-03-23 NOTE — TELEPHONE ENCOUNTER
Spoke with pt, she is having diarrhea, temp 99  Her legs, thighs and hips are hurting bad, she is rubbing peppermint oil for pain.   This morning, her eyes hurt, when she looks to the side or down, eyes are not red or crusty, just hurts when she looks around.    Denies cough and sob.    Please advise

## 2020-03-25 ENCOUNTER — CLINICAL SUPPORT (OUTPATIENT)
Dept: INTERNAL MEDICINE | Facility: CLINIC | Age: 65
End: 2020-03-25
Payer: COMMERCIAL

## 2020-03-25 ENCOUNTER — TELEPHONE (OUTPATIENT)
Dept: INTERNAL MEDICINE | Facility: CLINIC | Age: 65
End: 2020-03-25

## 2020-03-25 DIAGNOSIS — R50.9 FEVER, UNSPECIFIED FEVER CAUSE: ICD-10-CM

## 2020-03-25 DIAGNOSIS — R11.0 NAUSEA: ICD-10-CM

## 2020-03-25 DIAGNOSIS — R11.0 NAUSEA: Primary | ICD-10-CM

## 2020-03-25 PROCEDURE — U0002 COVID-19 LAB TEST NON-CDC: HCPCS

## 2020-03-25 RX ORDER — ONDANSETRON 4 MG/1
4 TABLET, FILM COATED ORAL EVERY 6 HOURS PRN
Qty: 20 TABLET | Refills: 1 | Status: SHIPPED | OUTPATIENT
Start: 2020-03-25 | End: 2021-06-15

## 2020-03-25 NOTE — TELEPHONE ENCOUNTER
Called to speak with the pt about her symptoms;    Fever in the last 24 hours? 100.5 as of today   Been out of the country in the last 30 days? no  What countries have you been to?  What dates were you there?  When did you return, which day?  How long have you had symptoms for? 4 days   Been on a cruise ship or an airplane in the last 30 days? no  Has come in to contact with anyone that has any of the above, that he/she is aware of? no     Had any of the following symptoms;  Cough? Only when nauseated   Muscle Pain? no  S.O.B? no  Diarrhea? Yes   Rash? no  Emesis? No   Abdominal Pain? no  Red Eye? No just sore when she moves her eyes to look around  Weakness? Yes   Bruising or Bleeding? No   Joint Pain? No   Severe Headache? Slight   Loss of taste/smell? No     She stated she spoke to you on Monday and feels like she is not getting better. She is not eating because shes nausea.

## 2020-03-25 NOTE — TELEPHONE ENCOUNTER
Called and spoke with pt  persitant   Fever and gi symptoms nausesa associated  Loss of appetite   Along with uri symptoms  No sob discussed plan   covid testing and covid monitoring program  Placed

## 2020-03-25 NOTE — TELEPHONE ENCOUNTER
----- Message from Elvia Mckeon sent at 3/25/2020 10:25 AM CDT -----  Contact: [phone]  Patient is requesting a call from the doctor regarding her fever of 100.9Patient stated she is nauseated and her eye are still hurting.    Please advise, thank you

## 2020-03-27 ENCOUNTER — TELEPHONE (OUTPATIENT)
Dept: INTERNAL MEDICINE | Facility: CLINIC | Age: 65
End: 2020-03-27

## 2020-03-27 LAB — SARS-COV-2 RNA RESP QL NAA+PROBE: DETECTED

## 2020-03-27 NOTE — TELEPHONE ENCOUNTER
Called and reviewed with pt   postive covid discussed with her  She is feeling better today  No fever loss appetite but no sob rest symptoms impromving aas well  Continue to monitor reveiwed with her   Info sent on portal

## 2020-03-27 NOTE — TELEPHONE ENCOUNTER
----- Message from Luanne Kearns sent at 3/27/2020  4:23 PM CDT -----  Contact: Self   Patient is returning a phone call.  Who left a message for the patient: Orquidea Lancaster MD  Does patient know what this is regarding:    Comments:

## 2020-03-28 ENCOUNTER — NURSE TRIAGE (OUTPATIENT)
Dept: ADMINISTRATIVE | Facility: CLINIC | Age: 65
End: 2020-03-28

## 2020-03-28 NOTE — TELEPHONE ENCOUNTER
Patient reports she is feeling about the same today.  She states her fever was 100.2 last night and 99 this am, she is taking Tylenol.  She also reports no appetite and nausea for which is is taking zofran.  No other symptoms.  Reassurance given, advised report to ED for severe symptoms and/or difficulty breathing.      Reason for Disposition   [1] Fever AND [2] no signs of serious infection or localizing symptoms (all other triage questions negative)   Unexplained nausea    Additional Information   Negative: Severe difficulty breathing (e.g., struggling for each breath, speak in single words, bluish lips)   Negative: Sounds like a life-threatening emergency to the triager   Negative: [1] Headache AND [2] stiff neck (can't touch chin to chest)   Negative: [1] Drinking very little AND [2] dehydration suspected (e.g., no urine > 12 hours, very dry mouth, very lightheaded)   Negative: Other symptom is present, see that guideline.  (e.g., chest pain, headache, dizziness, abdominal pain, colds, sore throat, etc.).    Protocols used: FEVER-A-AH, NAUSEA-A-AH, CORONAVIRUS (COVID-19) EXPOSURE-A-AH

## 2020-04-06 ENCOUNTER — NURSE TRIAGE (OUTPATIENT)
Dept: ADMINISTRATIVE | Facility: CLINIC | Age: 65
End: 2020-04-06

## 2020-04-06 NOTE — TELEPHONE ENCOUNTER
"Lo stated " I am doing well."   I feel fine.   I live with my .  [facility] finished the 14 day isolation.  GAve her information to remain at home unless necessary to go out wear a mask.   "

## 2020-04-27 NOTE — PROGRESS NOTES
Digital Medicine: Health  Introduction    Introduced Lizabeth Saha to Digital Medicine. Discussed health  role and recommended lifestyle modifications.    Pt called me back and was wanting to be enrolled. Thought she could handle everything on her own without the program and then caught COVID and decided she needed some help. Daughter is a PharmD.     Pt takes BP medication in the morning. Has been taking readings before her medication. Also wasn't practicing proper technique.     The history is provided by the patient.     HYPERTENSION  Our goal is to get BP to consistently below 130/80mmHg and make the process convenient so patient can avoid extra trips to the office. Getting your blood pressure below 130/80mmHg (definition of control) will reduce your risk for heart attack, kidney failure, stroke and death (as well as kidney failure, eye disease, & dementia)      Reviewed that the Digital Medicine care team - consisting of a clinician and a health  - will follow the most current evidence-based national guidelines for treating your condition.  The health  will focus on lifestyle modifications and motivation while the clinician will focus on medication therapy.  The care team will review all data on a regular basis and reach out as needed.      Explained that one of the key parts of the program is communication with the care team.  Asked patient to respond to outreach attempts and complete questionnaires.  Stressed importance of medication adherence.    Reviewed non-pharmacologic therapies and impact on BP.      Explained that we expect patient to obtain several blood pressures per week at random times of day.  Instructed patient not to allow anyone else to use phone and monitoring device.  Confirmed appropriate BP monitoring technique.      Explained to patient that the digital medicine team is not available for emergencies.  Patient will call [facility] on-call ([phone] or [phone]) or  [phone] if needed.      Patient's BP goal is 130/80.Patient's BP average is 150/92 mmHg, which is above goal, per 2017 ACC/AHA Hypertension Guidelines.          Last 5 Patient Entered Readings                                      Current 30 Day Average: 150/92     Recent Readings 4/26/2020 4/26/2020 4/24/2020 4/24/2020 4/23/2020    SBP (mmHg) 150 155 159 157 142    DBP (mmHg) 87 96 94 105 87    Pulse 84 93 90 89 80            INTERVENTION(S)  recommended diet modifications, recommend physical activity, reviewed monitoring technique, encouragement/support and goal setting    PLAN  patient verbalizes understanding and continue monitoring      There are no preventive care reminders to display for this patient.    Reviewed the importance of self-monitoring, medication adherence, and that the health  can be used as a resource for lifestyle modifications to help reduce or maintain a healthy lifestyle.    Sent link to [facility] webpages and my contact information via "[employer] for future questions. Follow up scheduled.             Diet Screening   Breakfast is typically between. Scrambled egg white with vegetables mixed or avocado toast with vegetables with vinagerette, every now and then hot sausage  .  Lunch is typically between. Before COVID, would eat out a lot. Seafood (boiled, fried, etc).    Dinner is typically between. Crab cakes, spaghetti and meatballs, not always healthy.    Patient reports eating or drinking the following: juice, fruit, water, fresh vegetables, deli meat and restaurant foodShe has the following dietary restrictions: noneShe does not skip meals regularly.  She has no standard fasting periods.      Patient did not have times when they could not afford food or ran out.      The patient states that she typically eats a non-home cooked meal (ex: dining out, take out, frozen meals) 3-4 times per week.  She cooks for self.    Patient does the shopping for groceries.  She gets groceries  from the grocery store.      She does not find cooking difficult.      Barriers to a Healthy Diet: no barriers to healthy eating    Drinks a lot of water, does drink OJ and milk for breakfast. Cooks a lot, but not always the healthiest. Depends on what she feels like. Some fruit, lots of melon and some berries. Eating out has gone from 4x/week before all of this but has cut back on that since being at home. Had stopped caffeine, maybe once a week at most. Will have a soda maybe once a day, but goal is to get rid of them.      Intervention(s): limiting drinks that contain sugar, reducing sodium intake, sodium reduction while dining out and reducing dining out    Assigning the following patient goals: maintain low sodium diet and decrease soda or juice intake    Physical Activity Screening   When asked if exercising, patient responded: no    She identified the following barriers to physical activity: no barriers to being active    Most walking she did was walking the mall while shopping. Interested in starting to walking in the evenings for a couple miles with .     Assigning the following patient goal(s): increase physical activity, 150 minutes of exercise per week and participate in exercise weekly    Medication Adherence Screening   She did not miss a dose this month.    Patient identified the following reasons for non-compliance: None    Only missed her doses while she was battling COVID.     Tobacco and Alcohol Screening       No tobacco use    May have a glass of something depending on what's going on. If company comes over or at an event. Glass of wine 2-3x/week.       SDOH

## 2020-04-29 ENCOUNTER — PATIENT OUTREACH (OUTPATIENT)
Dept: OTHER | Facility: OTHER | Age: 65
End: 2020-04-29

## 2020-04-29 NOTE — PROGRESS NOTES
04/29/2020 10:57 AM Called patient and left voicemail with call back number. Will follow up with patient at next encounter.

## 2020-05-11 ENCOUNTER — PATIENT OUTREACH (OUTPATIENT)
Dept: OTHER | Facility: OTHER | Age: 65
End: 2020-05-11

## 2020-05-11 DIAGNOSIS — E78.5 HYPERLIPIDEMIA, UNSPECIFIED HYPERLIPIDEMIA TYPE: ICD-10-CM

## 2020-05-11 DIAGNOSIS — I10 ESSENTIAL HYPERTENSION: Primary | ICD-10-CM

## 2020-05-11 NOTE — PROGRESS NOTES
Digital Medicine: Clinician Introduction    Lizabeth Saha is a 64 y.o. female who is newly enrolled in the Digital Medicine Clinic.    The following information was reviewed and updated:  Preferred pharmacy   [facility] - [place], [place] - [address] [place], [place] [address] [facility] [address]  Phone: [phone] Fax: [phone]      Patient prefers a 90 days supply.     Review of patient's allergies indicates:   Allergen Reactions    Phenergan  [promethazine]      Other reaction(s): Hives    Atorvastatin      Myalgia         Patient has had issues with her blood pressure for last 8-10 months. Her mother and brother have hypertension. Her BP readings thusfar have been submitted pre-medications.     The history is provided by the patient.             INTERVENTION(S)  reviewed appropriate dose schedule, reviewed monitoring technique and encouragement/support    PLAN  patient verbalizes understanding, demonstrates understanding via teach back and additional monitoring needed    -->Patient to take BP readings at least 2 hours after her BP medications, and will do some also later in the day to assess her BP during different points of the day  -->Patient stopped using CPAP about 1 year ago, which coincides with when she started having HTN issues. She thinks she is due for a sleep study but does not feel comfortable going to [facility]. I told her I would explore what options exist for her but explained at length the importance of treating GERARDO and the consequences of leaving it untreated. She verbalized understanding and is interested in moving forward with repeat sleep study/titration  -->Will f/u in 1 week with her to better assess if her BP is controlled or not.       There are no preventive care reminders to display for this patient.    Current Medication Regimen:  Hypertension Medications             valsartan (DIOVAN) 80 MG tablet Take 1 tablet (80 mg total) by mouth once daily.             Reviewed the importance of self-monitoring, medication adherence, and that the health  can be used as a resource for lifestyle modifications to help reduce or maintain a healthy lifestyle.    Sent link to [facility] webpages and my contact information via [employer] for future questions. Follow up scheduled.             Sleep Apnea Screening  Patient previously diagnosed with GERARDO and is interested in a referral at this time.  She reports she is not currently using CPAP.     Medication Affordability Screening  Patient is currently not having problems affording medications    Medication Adherence Screening   She missed a dose once this month.  Patient knows purpose of medications.      Adherence tools used: None    Keeps medications by her nightstand and that works for her.    Intervention(s): Provided patient education

## 2020-05-25 ENCOUNTER — PATIENT OUTREACH (OUTPATIENT)
Dept: OTHER | Facility: OTHER | Age: 65
End: 2020-05-25

## 2020-06-08 NOTE — PROGRESS NOTES
Digital Medicine: Health  Follow-Up    Routine follow-up with patient. Introduced myself as new HC, filling in the role of Cristina GARCIA, her previous health . Patient reports doing well and focusing on small goals. BP slightly above goal <134/77.     Patient reports focusing on 2 things: Diet and increasing exercise.      The history is provided by the patient.   Follow Up  Follow-up reason(s): reading review and routine education      Readings are trending down due to lifestyle change and medication adherence.    Routine Education Topics: eating patterns and physical activity        INTERVENTION(S)  encouragement/support, denied resources and denied questions    PLAN  patient verbalizes understanding, patient amenable to changes and continue monitoring    Patient saved number in contact incase any questions or concerns in future.  Patient is making progress. Will continue to monitor and follow-up in 4-6 weeks.  Denied resources today.      There are no preventive care reminders to display for this patient.    Last 5 Patient Entered Readings                                      Current 30 Day Average: 134/77     Recent Readings 6/8/2020 6/4/2020 6/2/2020 6/2/2020 5/29/2020    SBP (mmHg) 127 145 125 144 107    DBP (mmHg) 78 79 73 76 66    Pulse 80 78 89 83 87                      Diet Screening   Patient reports eating or drinking the following: water, fresh vegetables and packaged/processed foodsShe has the following dietary restrictions: low sodium dietShe cooks for self.    Patient does the shopping for groceries.  She gets groceries from the grocery store.      Eating style: confused about food/nutrition and relies on convenience items    Barriers to a Healthy Diet: lack of knowledge and time/convenience    She states that she has now been more mindful of her sodium intake.  She has reduced soft drinks from everyday to now 1 or 2 a week. Commended patient for her progress.         Intervention(s): portion  control and reducing sodium intake    Assigning the following patient goals: maintain low sodium diet    Physical Activity Screening   When asked if exercising, patient responded: yes    She exercises for 30 minutes per day 3 day(s) a week.  Her level of intensity when exercising is moderate.    Patient participates in the following activities: biking and walking    She identified the following barriers to physical activity: weather    Patient states that she is trying to walk 30 minutes every other day.  She would also like to start riding her bike when the weather permits.     Encouraged patient to continue to focus on these small goals and add new goals once these are achieved. Explained the benefits of increasing her exercise along with improving her systolic blood pressure.        Medication Adherence Screening       Patient reports making sure she is taking all of her medication appropriately.       SDOH

## 2020-06-17 ENCOUNTER — TELEPHONE (OUTPATIENT)
Dept: INTERNAL MEDICINE | Facility: CLINIC | Age: 65
End: 2020-06-17

## 2020-06-17 DIAGNOSIS — Z20.822 EXPOSURE TO COVID-19 VIRUS: Primary | ICD-10-CM

## 2020-06-17 NOTE — TELEPHONE ENCOUNTER
----- Message from Carol Godwin MA sent at 6/17/2020  9:48 AM CDT -----  Regarding: FW: advice  Contact: Patient [phone]    ----- Message -----  From: Tevin Smith  Sent: 6/17/2020   9:29 AM CDT  To: Tonny GARCIA (Int[facility].) Staff  Subject: advice                                           Patient would like to get medical advice.  Symptoms (please be specific):    How long has patient had these symptoms:    Pharmacy name and phone #:    Any drug allergies:    Comments: Patient calling would to know if orders can be placed for the Antibody testing to the appt that is already scheduled. Call to inform. 6/19/20 date.    Please call an advise  Thank you

## 2020-06-19 ENCOUNTER — LAB VISIT (OUTPATIENT)
Dept: LAB | Facility: HOSPITAL | Age: 65
End: 2020-06-19
Attending: INTERNAL MEDICINE
Payer: COMMERCIAL

## 2020-06-19 DIAGNOSIS — Z20.822 EXPOSURE TO COVID-19 VIRUS: ICD-10-CM

## 2020-06-19 DIAGNOSIS — Z00.00 ANNUAL PHYSICAL EXAM: ICD-10-CM

## 2020-06-19 LAB
ALBUMIN SERPL BCP-MCNC: 4.1 G/DL (ref 3.5–5.2)
ALP SERPL-CCNC: 63 U/L (ref 55–135)
ALT SERPL W/O P-5'-P-CCNC: 30 U/L (ref 10–44)
ANION GAP SERPL CALC-SCNC: 9 MMOL/L (ref 8–16)
AST SERPL-CCNC: 23 U/L (ref 10–40)
BASOPHILS # BLD AUTO: 0.02 K/UL (ref 0–0.2)
BASOPHILS NFR BLD: 0.3 % (ref 0–1.9)
BILIRUB DIRECT SERPL-MCNC: 0.2 MG/DL (ref 0.1–0.3)
BILIRUB SERPL-MCNC: 0.5 MG/DL (ref 0.1–1)
BUN SERPL-MCNC: 12 MG/DL (ref 8–23)
CALCIUM SERPL-MCNC: 9.8 MG/DL (ref 8.7–10.5)
CHLORIDE SERPL-SCNC: 105 MMOL/L (ref 95–110)
CHOLEST SERPL-MCNC: 182 MG/DL (ref 120–199)
CHOLEST/HDLC SERPL: 3.4 {RATIO} (ref 2–5)
CO2 SERPL-SCNC: 27 MMOL/L (ref 23–29)
CREAT SERPL-MCNC: 1 MG/DL (ref 0.5–1.4)
DIFFERENTIAL METHOD: ABNORMAL
EOSINOPHIL # BLD AUTO: 0 K/UL (ref 0–0.5)
EOSINOPHIL NFR BLD: 0.5 % (ref 0–8)
ERYTHROCYTE [DISTWIDTH] IN BLOOD BY AUTOMATED COUNT: 13.7 % (ref 11.5–14.5)
EST. GFR  (AFRICAN AMERICAN): >60 ML/MIN/1.73 M^2
EST. GFR  (NON AFRICAN AMERICAN): 59.7 ML/MIN/1.73 M^2
ESTIMATED AVG GLUCOSE: 166 MG/DL (ref 68–131)
GLUCOSE SERPL-MCNC: 153 MG/DL (ref 70–110)
HBA1C MFR BLD HPLC: 7.4 % (ref 4–5.6)
HCT VFR BLD AUTO: 41.9 % (ref 37–48.5)
HDLC SERPL-MCNC: 54 MG/DL (ref 40–75)
HDLC SERPL: 29.7 % (ref 20–50)
HGB BLD-MCNC: 12.7 G/DL (ref 12–16)
IMM GRANULOCYTES # BLD AUTO: 0.01 K/UL (ref 0–0.04)
IMM GRANULOCYTES NFR BLD AUTO: 0.2 % (ref 0–0.5)
LDLC SERPL CALC-MCNC: 108 MG/DL (ref 63–159)
LYMPHOCYTES # BLD AUTO: 1.8 K/UL (ref 1–4.8)
LYMPHOCYTES NFR BLD: 29.7 % (ref 18–48)
MCH RBC QN AUTO: 26.3 PG (ref 27–31)
MCHC RBC AUTO-ENTMCNC: 30.3 G/DL (ref 32–36)
MCV RBC AUTO: 87 FL (ref 82–98)
MONOCYTES # BLD AUTO: 0.4 K/UL (ref 0.3–1)
MONOCYTES NFR BLD: 6.9 % (ref 4–15)
NEUTROPHILS # BLD AUTO: 3.7 K/UL (ref 1.8–7.7)
NEUTROPHILS NFR BLD: 62.4 % (ref 38–73)
NONHDLC SERPL-MCNC: 128 MG/DL
NRBC BLD-RTO: 0 /100 WBC
PLATELET # BLD AUTO: 210 K/UL (ref 150–350)
PMV BLD AUTO: 11.2 FL (ref 9.2–12.9)
POTASSIUM SERPL-SCNC: 4.4 MMOL/L (ref 3.5–5.1)
PROT SERPL-MCNC: 7.3 G/DL (ref 6–8.4)
RBC # BLD AUTO: 4.83 M/UL (ref 4–5.4)
SARS-COV-2 IGG SERPLBLD QL IA.RAPID: POSITIVE
SODIUM SERPL-SCNC: 141 MMOL/L (ref 136–145)
TRIGL SERPL-MCNC: 100 MG/DL (ref 30–150)
TSH SERPL DL<=0.005 MIU/L-ACNC: 0.52 UIU/ML (ref 0.4–4)
WBC # BLD AUTO: 5.95 K/UL (ref 3.9–12.7)

## 2020-06-19 PROCEDURE — 84443 ASSAY THYROID STIM HORMONE: CPT

## 2020-06-19 PROCEDURE — 83036 HEMOGLOBIN GLYCOSYLATED A1C: CPT

## 2020-06-19 PROCEDURE — 80061 LIPID PANEL: CPT

## 2020-06-19 PROCEDURE — 80076 HEPATIC FUNCTION PANEL: CPT

## 2020-06-19 PROCEDURE — 86769 SARS-COV-2 COVID-19 ANTIBODY: CPT

## 2020-06-19 PROCEDURE — 80048 BASIC METABOLIC PNL TOTAL CA: CPT

## 2020-06-19 PROCEDURE — 85025 COMPLETE CBC W/AUTO DIFF WBC: CPT

## 2020-06-19 PROCEDURE — 36415 COLL VENOUS BLD VENIPUNCTURE: CPT

## 2020-06-26 ENCOUNTER — OFFICE VISIT (OUTPATIENT)
Dept: INTERNAL MEDICINE | Facility: CLINIC | Age: 65
End: 2020-06-26
Payer: COMMERCIAL

## 2020-06-26 VITALS
HEART RATE: 83 BPM | HEIGHT: 64 IN | BODY MASS INDEX: 31.84 KG/M2 | DIASTOLIC BLOOD PRESSURE: 76 MMHG | SYSTOLIC BLOOD PRESSURE: 126 MMHG | OXYGEN SATURATION: 100 % | WEIGHT: 186.5 LBS

## 2020-06-26 DIAGNOSIS — H90.5 SENSORINEURAL HEARING LOSS (SNHL) OF LEFT EAR, UNSPECIFIED HEARING STATUS ON CONTRALATERAL SIDE: ICD-10-CM

## 2020-06-26 DIAGNOSIS — E78.5 HYPERLIPIDEMIA, UNSPECIFIED HYPERLIPIDEMIA TYPE: ICD-10-CM

## 2020-06-26 DIAGNOSIS — E11.8 TYPE 2 DIABETES MELLITUS WITH COMPLICATION, WITHOUT LONG-TERM CURRENT USE OF INSULIN: ICD-10-CM

## 2020-06-26 DIAGNOSIS — Z12.31 ENCOUNTER FOR SCREENING MAMMOGRAM FOR BREAST CANCER: ICD-10-CM

## 2020-06-26 DIAGNOSIS — I10 ESSENTIAL HYPERTENSION: ICD-10-CM

## 2020-06-26 DIAGNOSIS — Z00.00 ANNUAL PHYSICAL EXAM: Primary | ICD-10-CM

## 2020-06-26 PROCEDURE — 3051F HG A1C>EQUAL 7.0%<8.0%: CPT | Mod: CPTII,S$GLB,, | Performed by: INTERNAL MEDICINE

## 2020-06-26 PROCEDURE — 3074F PR MOST RECENT SYSTOLIC BLOOD PRESSURE < 130 MM HG: ICD-10-PCS | Mod: CPTII,S$GLB,, | Performed by: INTERNAL MEDICINE

## 2020-06-26 PROCEDURE — 3078F DIAST BP <80 MM HG: CPT | Mod: CPTII,S$GLB,, | Performed by: INTERNAL MEDICINE

## 2020-06-26 PROCEDURE — 3074F SYST BP LT 130 MM HG: CPT | Mod: CPTII,S$GLB,, | Performed by: INTERNAL MEDICINE

## 2020-06-26 PROCEDURE — 3051F PR MOST RECENT HEMOGLOBIN A1C LEVEL 7.0 - < 8.0%: ICD-10-PCS | Mod: CPTII,S$GLB,, | Performed by: INTERNAL MEDICINE

## 2020-06-26 PROCEDURE — 3078F PR MOST RECENT DIASTOLIC BLOOD PRESSURE < 80 MM HG: ICD-10-PCS | Mod: CPTII,S$GLB,, | Performed by: INTERNAL MEDICINE

## 2020-06-26 PROCEDURE — 99999 PR PBB SHADOW E&M-EST. PATIENT-LVL IV: CPT | Mod: PBBFAC,,, | Performed by: INTERNAL MEDICINE

## 2020-06-26 PROCEDURE — 99999 PR PBB SHADOW E&M-EST. PATIENT-LVL IV: ICD-10-PCS | Mod: PBBFAC,,, | Performed by: INTERNAL MEDICINE

## 2020-06-26 PROCEDURE — 99396 PREV VISIT EST AGE 40-64: CPT | Mod: S$GLB,,, | Performed by: INTERNAL MEDICINE

## 2020-06-26 PROCEDURE — 99396 PR PREVENTIVE VISIT,EST,40-64: ICD-10-PCS | Mod: S$GLB,,, | Performed by: INTERNAL MEDICINE

## 2020-06-26 NOTE — PROGRESS NOTES
"Answers for HPI/ROS submitted by the patient on 6/24/2020   activity change: No  hearing loss: Yes  trouble swallowing: No  eye discharge: No  chest tightness: No  wheezing: No  chest pain: No  palpitations: No  blood in stool: No  constipation: No  vomiting: No  diarrhea: No  difficulty urinating: No  hematuria: No  menstrual problem: No  dysuria: No  joint swelling: No  headaches: No  dysphoric mood: No  Subjective:       Patient ID: Lizabeth Saha is a 64 y.o. female.    Chief Complaint: Annual Exam  64 year old presents for follow up. She has had issues with her hearing her outlying ear doctor wanted her to get an mri due to more sudden change in her left  Ear. She wears hearing aids. She has had good bp and bs up and down depending on her diet some impromvent  Over times. She has been home more recently travelling less. She had covid earlier this spring but not severe case she has recovered  And feeling well currnetly     HPI  Review of Systems   Constitutional: Negative for activity change.   HENT: Positive for hearing loss. Negative for trouble swallowing.    Eyes: Negative for discharge.   Respiratory: Negative for chest tightness and wheezing.    Cardiovascular: Negative for chest pain and palpitations.   Gastrointestinal: Negative for blood in stool, constipation, diarrhea and vomiting.   Genitourinary: Negative for difficulty urinating, dysuria, hematuria and menstrual problem.   Musculoskeletal: Negative for joint swelling.   Neurological: Negative for headaches.   Psychiatric/Behavioral: Negative for dysphoric mood.       Objective:     Blood pressure 126/76, pulse 83, height 5' 4" (1.626 m), weight 84.6 kg (186 lb 8.2 oz), last menstrual period 01/01/1994, SpO2 100 %.    Physical Exam  Constitutional:       General: She is not in acute distress.  HENT:      Head: Normocephalic.      Comments: Hearing aids   Eyes:      General: No scleral icterus.  Neck:      Musculoskeletal: Neck supple. "   Cardiovascular:      Rate and Rhythm: Normal rate and regular rhythm.      Pulses:           Dorsalis pedis pulses are 1+ on the right side and 1+ on the left side.      Heart sounds: Normal heart sounds. No murmur. No friction rub. No gallop.       Comments: Breast : normal no masses or tenderness   Pulmonary:      Effort: Pulmonary effort is normal. No respiratory distress.      Breath sounds: Normal breath sounds.   Abdominal:      General: Bowel sounds are normal.      Palpations: Abdomen is soft. There is no mass.      Tenderness: There is no abdominal tenderness.   Feet:      Right foot:      Protective Sensation: 6 sites tested. 6 sites sensed.      Left foot:      Protective Sensation: 6 sites tested. 6 sites sensed.   Skin:     Findings: No erythema.   Neurological:      Mental Status: She is alert.         Assessment:       1. Annual physical exam    2. Sensorineural hearing loss (SNHL) of left ear, unspecified hearing status on contralateral side    3. Encounter for screening mammogram for breast cancer    4. Type 2 diabetes mellitus with complication, without long-term current use of insulin    5. Essential hypertension    6. Hyperlipidemia, unspecified hyperlipidemia type        Plan:       Lizabeth was seen today for annual exam.    Diagnoses and all orders for this visit:    Annual physical exam    Sensorineural hearing loss (SNHL) of left ear, unspecified hearing status on contralateral side  Pt repors her outlying ear specialist requested  She will clarify and call if she is in need of ent appt as well  Order placed as requested   -     MRI IAC/Temporal Bones Without Contrast; Future    Encounter for screening mammogram for breast cancer  leander when due   -     Mammo Digital Screening Bilat w/ Martir; Future    Type 2 diabetes mellitus with complication, without long-term current use of insulin  Some improvement continue current medications down to 7.4   -     Basic metabolic panel; Future  -      Hemoglobin A1C; Future  -     Hepatic function panel; Future    Essential hypertension  Hyperlipidemia, unspecified hyperlipidemia type  cotnrolled on medicatons    Hx covid she also has antibodies discussed not indicate immunity  She has recovered from her covid infection without issues     Follow up 4 months

## 2020-07-02 ENCOUNTER — HOSPITAL ENCOUNTER (OUTPATIENT)
Dept: RADIOLOGY | Facility: HOSPITAL | Age: 65
Discharge: HOME | End: 2020-07-02
Attending: INTERNAL MEDICINE
Payer: COMMERCIAL

## 2020-07-02 DIAGNOSIS — Z12.31 ENCOUNTER FOR SCREENING MAMMOGRAM FOR BREAST CANCER: ICD-10-CM

## 2020-07-02 DIAGNOSIS — H90.5 SENSORINEURAL HEARING LOSS (SNHL) OF LEFT EAR, UNSPECIFIED HEARING STATUS ON CONTRALATERAL SIDE: ICD-10-CM

## 2020-07-02 PROCEDURE — 77063 BREAST TOMOSYNTHESIS BI: CPT | Mod: 26,,, | Performed by: RADIOLOGY

## 2020-07-02 PROCEDURE — 77067 SCR MAMMO BI INCL CAD: CPT | Mod: TC

## 2020-07-02 PROCEDURE — 70551 MRI BRAIN STEM W/O DYE: CPT | Mod: TC

## 2020-07-02 PROCEDURE — 25500020 PHARM REV CODE 255: Performed by: INTERNAL MEDICINE

## 2020-07-02 PROCEDURE — 77067 MAMMO DIGITAL SCREENING BILAT WITH TOMOSYNTHESIS_CAD: ICD-10-PCS | Mod: 26,,, | Performed by: RADIOLOGY

## 2020-07-02 PROCEDURE — 70551 MRI IAC/TEMPORAL BONES WITHOUT CONTRAST: ICD-10-PCS | Mod: 26,,, | Performed by: RADIOLOGY

## 2020-07-02 PROCEDURE — 77067 SCR MAMMO BI INCL CAD: CPT | Mod: 26,,, | Performed by: RADIOLOGY

## 2020-07-02 PROCEDURE — 70551 MRI BRAIN STEM W/O DYE: CPT | Mod: 26,,, | Performed by: RADIOLOGY

## 2020-07-02 PROCEDURE — A9585 GADOBUTROL INJECTION: HCPCS | Performed by: INTERNAL MEDICINE

## 2020-07-02 PROCEDURE — 77063 MAMMO DIGITAL SCREENING BILAT WITH TOMOSYNTHESIS_CAD: ICD-10-PCS | Mod: 26,,, | Performed by: RADIOLOGY

## 2020-07-02 RX ORDER — GADOBUTROL 604.72 MG/ML
5 INJECTION INTRAVENOUS
Status: COMPLETED | OUTPATIENT
Start: 2020-07-02 | End: 2020-07-02

## 2020-07-02 RX ADMIN — GADOBUTROL 5 ML: 604.72 INJECTION INTRAVENOUS at 10:07

## 2020-07-06 ENCOUNTER — TELEPHONE (OUTPATIENT)
Dept: INTERNAL MEDICINE | Facility: CLINIC | Age: 65
End: 2020-07-06

## 2020-07-06 ENCOUNTER — PATIENT OUTREACH (OUTPATIENT)
Dept: OTHER | Facility: OTHER | Age: 65
End: 2020-07-06

## 2020-07-06 NOTE — PROGRESS NOTES
"Digital Medicine: Health  Follow-Up    Avg BP as of July 6, 2020 is 123/74.    Had her physical recently and everything came back fine. Has lost weight and all her tests look good.     Started walking and watching what she's eating.     Stated that the first reading from today was a "mistake" she needed to empty her bladder and she ran down the stairs. Waited a few minutes and retook reading. Also "cheated" for the 4th with spicy foods and some drinks so said her readings may be a little higher from that as well.     The history is provided by the patient.   Follow Up  Follow-up reason(s): reading review and routine education      Routine Education Topics: eating patterns and physical activity        INTERVENTION(S)  encouragement/support, denied resources and denied questions    PLAN  patient verbalizes understanding and continue monitoring      There are no preventive care reminders to display for this patient.    Last 5 Patient Entered Readings                                      Current 30 Day Average: 123/74     Recent Readings 7/6/2020 7/6/2020 7/5/2020 7/5/2020 7/1/2020    SBP (mmHg) 131 154 136 137 101    DBP (mmHg) 79 80 77 82 64    Pulse 70 76 73 75 88                      Diet Screening       Barriers to a Healthy Diet: no barriers to healthy eating    Watching what she's eating and has lost some weight.     Assigning the following patient goals: maintain low sodium diet    Physical Activity Screening   When asked if exercising, patient responded: yesHer level of intensity when exercising is moderate.    Patient participates in the following activities: walking    Mrs Barone has been walking more.     Assigning the following patient goal(s): increase physical activity, 150 minutes of exercise per week and participate in exercise weekly      SDOH  "

## 2020-07-07 ENCOUNTER — TELEPHONE (OUTPATIENT)
Dept: INTERNAL MEDICINE | Facility: CLINIC | Age: 65
End: 2020-07-07

## 2020-07-07 ENCOUNTER — PATIENT OUTREACH (OUTPATIENT)
Dept: OTHER | Facility: OTHER | Age: 65
End: 2020-07-07

## 2020-07-07 DIAGNOSIS — G47.30 SLEEP APNEA, UNSPECIFIED TYPE: Primary | ICD-10-CM

## 2020-07-07 NOTE — PROGRESS NOTES
Digital Medicine: Clinician Follow-Up    Discussed GERARDO with patient, she knows she is aware she is due for another sleep study. Ms. Barone request to complete a sleep study at [facility] and not [facility] [place]. Will message PCP for referral assitance.      Using CPAP nightly - cleans it, but thinks its been long enough that she likely needs to be reassessed.     Takes BP every other day - before medication then 2-4 hour after medication.   Advised patient we would like to see post medication readings - anytime at least 1 hour after taking valsartan is appropriate.     Denies hypotensive or hypertensive symptoms.     Readings trending up due to elevated reading that was repeated back to baseline.     The history is provided by the patient.   Follow Up  Follow-up reason(s): reading review and routine education      Readings are trending up       INTERVENTION(S)  reviewed appropriate dose schedule, reviewed monitoring technique, encouragement/support and denied questions    PLAN  patient verbalizes understanding, Health  follow up and continue monitoring    BP remains controlled to goal.   Patient to work on properly timed resting BP readings.     Will message PCP about patient's request for repeated sleep study at [facility] and not at [facility] [place].     Will continue to monitor.      There are no preventive care reminders to display for this patient.    Last 5 Patient Entered Readings                                      Current 30 Day Average: 123/74     Recent Readings 7/6/2020 7/6/2020 7/5/2020 7/5/2020 7/1/2020    SBP (mmHg) 131 154 136 137 101    DBP (mmHg) 79 80 77 82 64    Pulse 70 76 73 75 88           Hypertension Medications             valsartan (DIOVAN) 80 MG tablet Take 1 tablet (80 mg total) by mouth once daily.                   Sleep Apnea Screening  Patient previously diagnosed with GERARDO     She reports she is currently using CPAP

## 2020-07-07 NOTE — TELEPHONE ENCOUNTER
----- Message from Vero Moran PharmD sent at 7/7/2020 11:53 AM CDT -----  Regarding: sleep study referral  Dr. Tonny Rubio and staff.   Can you assist with getting this patient a sleep study referral at [facility] instead of [facility]. She tells me she is not comfortable going to [facility] at this time, but will go [facility]. She believes she is due for a repeat study and updates to her CPAP.     Thanks-  Vero

## 2020-08-10 DIAGNOSIS — E78.5 HYPERLIPIDEMIA, UNSPECIFIED HYPERLIPIDEMIA TYPE: ICD-10-CM

## 2020-08-10 RX ORDER — ROSUVASTATIN CALCIUM 5 MG/1
5 TABLET, COATED ORAL DAILY
Qty: 90 TABLET | Refills: 4 | Status: SHIPPED | OUTPATIENT
Start: 2020-08-10 | End: 2021-09-29

## 2020-08-10 RX ORDER — METFORMIN HYDROCHLORIDE 500 MG/1
500 TABLET, EXTENDED RELEASE ORAL 2 TIMES DAILY WITH MEALS
Qty: 180 TABLET | Refills: 4 | Status: SHIPPED | OUTPATIENT
Start: 2020-08-10 | End: 2021-06-15 | Stop reason: SDUPTHER

## 2020-08-13 ENCOUNTER — TELEPHONE (OUTPATIENT)
Dept: INTERNAL MEDICINE | Facility: CLINIC | Age: 65
End: 2020-08-13

## 2020-08-13 NOTE — TELEPHONE ENCOUNTER
----- Message from Falguni Chaves sent at 8/13/2020  3:47 PM CDT -----  Regarding: refill  Contact: Parul--[phone] (Phone)  Rx Refill/Request     Is this a Refill or New Rx:  refill     Rx Name and Strength:      [employer]ana True Air Meter   Humana True test Strips  True Plus Ultra Thin 30g Lancets  BD Single Swab    Preferred Pharmacy with phone number:     [facility] - [address]   [phone] (Phone)  [phone](Fax)        Additional Information:   Please faxed request over to the pharmacy

## 2020-08-14 RX ORDER — DEXTROSE 4 G
TABLET,CHEWABLE ORAL
Qty: 1 EACH | Refills: 0 | Status: SHIPPED | OUTPATIENT
Start: 2020-08-14 | End: 2023-02-22

## 2020-08-14 RX ORDER — ISOPROPYL ALCOHOL 70 ML/100ML
1 SWAB TOPICAL DAILY PRN
Qty: 100 EACH | Refills: 4 | COMMUNITY
Start: 2020-08-14

## 2020-08-14 RX ORDER — LANCETS
EACH MISCELLANEOUS
Qty: 100 EACH | Refills: 3 | Status: SHIPPED | OUTPATIENT
Start: 2020-08-14 | End: 2020-08-21 | Stop reason: SDUPTHER

## 2020-08-17 ENCOUNTER — PATIENT OUTREACH (OUTPATIENT)
Dept: OTHER | Facility: OTHER | Age: 65
End: 2020-08-17

## 2020-08-17 NOTE — PROGRESS NOTES
Digital Medicine: Health  Follow-Up    The history is provided by the patient.             Reason for review: Blood pressure at goal        Topics Covered on Call: physical activity and Diet    Additional Follow-up details: Avg BP as of Aug 17, 2020 is 123/73.    Still watching what she eats and try to walk a few days a week. Feels fine. Has been having higher sugar lately because she's snacking more since she's still stuck inside.     Says she gets in a routine with medication and forgets to take readings as often. Wants to get better about that.     Asked if we had heard back about Sleep study yet. I told her it looks like [facility] put in the request for it. Will ask [facility] for follow up.         Diet-no change to diet    No change to diet.  Patient reports eating or drinking the following: Trying to watch what she eats. Has been snacking on more carbs lately.       Physical Activity-no change to routine  No change to exercise routine.       Additional physical activity details: Still walking a few days per week.       Medication Adherence-Medication adherence was assessed.        Substance, Sleep, Stress-No change  stress-  Details:  Intervention(s):    Sleep-assessed  Details:asking about sleep study  Intervention(s):    Alcohol -  Details:  Intervention(s):    Tobacco-  Details:  Intervention(s):          Continue current diet/physical activity routine.       Patient verbalizes understanding.      Explained the importance of self-monitoring and medication adherence. Encouraged the patient to communicate with their health  for lifestyle modifications to help improve or maintain a healthy lifestyle.            There are no preventive care reminders to display for this patient.    Last 5 Patient Entered Readings                                      Current 30 Day Average: 123/73     Recent Readings 8/16/2020 8/11/2020 8/3/2020 8/1/2020 7/29/2020    SBP (mmHg) 118 117 108 127 102    DBP (mmHg) 70 73 65 83 66     Pulse 94 92 82 89 90

## 2020-08-21 RX ORDER — LANCETS
EACH MISCELLANEOUS
Qty: 100 EACH | Refills: 3 | Status: SHIPPED | OUTPATIENT
Start: 2020-08-21 | End: 2023-02-22

## 2020-08-21 NOTE — TELEPHONE ENCOUNTER
----- Message from Tevin Smith sent at 8/21/2020  3:30 PM CDT -----  Regarding: Rx refill  Contact: Patient [phone]  RX request - refill or new RX.  Is this a refill or new RX:  Refill   RX name and strength: lancets Misc João plus ultra 30 plus  Directions:   Is this a 30 day or 90 day RX:    Pharmacy name and phone # [facility] - [place], [place] - [address] [phone] (Phone) [phone] (Fax)    Comments:  2 Rx's requesting, patient stating the pharmacy still had not received the abocve Rx, call to inform has both been sent.    RX request - refill or new RX.  Is this a refill or new RX:  New   RX name and strength: Meter machine   Directions:   Is this a 30 day or 90 day RX:    Pharmacy name and phone # [facility] - [place], [place] - [address] [phone] (Phone) [phone] (Fax)    Please call an advise  Thank you

## 2020-09-14 ENCOUNTER — PATIENT OUTREACH (OUTPATIENT)
Dept: OTHER | Facility: OTHER | Age: 65
End: 2020-09-14

## 2020-10-19 ENCOUNTER — PATIENT OUTREACH (OUTPATIENT)
Dept: OTHER | Facility: OTHER | Age: 65
End: 2020-10-19

## 2020-10-19 NOTE — PROGRESS NOTES
Digital Medicine: Health  Follow-Up    The history is provided by the patient.             Reason for review: Blood pressure not at goal        Topics Covered on Call: physical activity and Diet    Additional Follow-up details: Avg BP as of Oct 19, 2020 is 144/83.     She went on vacation to visit her grandchildren and had a good time and got off her diet and exercise.     Stated that she's getting back on track this week with diet and exercise.             Diet-Change    Patient reports eating or drinking the following: Went on vacation and enjoyed herself, so wasn't watching what she was eating and drinking.       Physical Activity-Change      Additional physical activity details: Didn't do any exercising while on vacation.       Medication Adherence-Medication Adherence not addressed.        Substance, Sleep, Stress-change  stress-assessed  Details:feeling much better since seeing her grandchildren  Intervention(s):    Sleep-  Details:  Intervention(s):    Alcohol -  Details:  Intervention(s):    Tobacco-  Details:  Intervention(s):          Continue current diet/physical activity routine.       Addressed patient questions and patient has my contact information if needed prior to next outreach. Patient verbalizes understanding.      Explained the importance of self-monitoring and medication adherence. Encouraged the patient to communicate with their health  for lifestyle modifications to help improve or maintain a healthy lifestyle.                   Topic    Eye Exam          Last 5 Patient Entered Readings                                      Current 30 Day Average: 144/83     Recent Readings 10/16/2020 10/13/2020 10/10/2020 10/7/2020 10/7/2020    SBP (mmHg) 143 148 142 154 155    DBP (mmHg) 78 84 85 94 87    Pulse 97 87 79 76 77

## 2020-10-23 DIAGNOSIS — E11.8 TYPE 2 DIABETES MELLITUS WITH COMPLICATION, WITHOUT LONG-TERM CURRENT USE OF INSULIN: ICD-10-CM

## 2020-10-26 ENCOUNTER — TELEPHONE (OUTPATIENT)
Dept: INTERNAL MEDICINE | Facility: CLINIC | Age: 65
End: 2020-10-26

## 2020-10-26 DIAGNOSIS — E11.8 TYPE 2 DIABETES MELLITUS WITH COMPLICATION, WITHOUT LONG-TERM CURRENT USE OF INSULIN: Primary | ICD-10-CM

## 2020-10-26 NOTE — TELEPHONE ENCOUNTER
----- Message from Zainabmarilou Hansen sent at 10/26/2020  9:26 AM CDT -----  Contact: Pt [phone]  Patient wants to know if she needs to do blood work before her appt tomorrow.    Please call and advise.    Thank You

## 2020-10-27 ENCOUNTER — OFFICE VISIT (OUTPATIENT)
Dept: INTERNAL MEDICINE | Facility: CLINIC | Age: 65
End: 2020-10-27
Payer: MEDICARE

## 2020-10-27 ENCOUNTER — LAB VISIT (OUTPATIENT)
Dept: LAB | Facility: HOSPITAL | Age: 65
End: 2020-10-27
Attending: INTERNAL MEDICINE
Payer: MEDICARE

## 2020-10-27 ENCOUNTER — PATIENT MESSAGE (OUTPATIENT)
Dept: PHARMACY | Facility: CLINIC | Age: 65
End: 2020-10-27

## 2020-10-27 ENCOUNTER — IMMUNIZATION (OUTPATIENT)
Dept: PHARMACY | Facility: CLINIC | Age: 65
End: 2020-10-27
Payer: MEDICARE

## 2020-10-27 VITALS
BODY MASS INDEX: 31.91 KG/M2 | HEART RATE: 83 BPM | WEIGHT: 186.94 LBS | OXYGEN SATURATION: 100 % | SYSTOLIC BLOOD PRESSURE: 136 MMHG | HEIGHT: 64 IN | DIASTOLIC BLOOD PRESSURE: 80 MMHG

## 2020-10-27 DIAGNOSIS — I10 ESSENTIAL HYPERTENSION: Primary | ICD-10-CM

## 2020-10-27 DIAGNOSIS — M81.0 OSTEOPOROSIS, UNSPECIFIED OSTEOPOROSIS TYPE, UNSPECIFIED PATHOLOGICAL FRACTURE PRESENCE: ICD-10-CM

## 2020-10-27 DIAGNOSIS — M79.673 PAIN OF FOOT, UNSPECIFIED LATERALITY: ICD-10-CM

## 2020-10-27 DIAGNOSIS — Z86.16 HISTORY OF 2019 NOVEL CORONAVIRUS DISEASE (COVID-19): ICD-10-CM

## 2020-10-27 DIAGNOSIS — J44.9 MILD CHRONIC OBSTRUCTIVE PULMONARY DISEASE: ICD-10-CM

## 2020-10-27 DIAGNOSIS — E11.9 TYPE 2 DIABETES MELLITUS WITHOUT COMPLICATION, WITHOUT LONG-TERM CURRENT USE OF INSULIN: ICD-10-CM

## 2020-10-27 DIAGNOSIS — R91.1 SOLITARY PULMONARY NODULE: ICD-10-CM

## 2020-10-27 DIAGNOSIS — G47.30 SLEEP APNEA, UNSPECIFIED TYPE: ICD-10-CM

## 2020-10-27 DIAGNOSIS — L98.9 SKIN LESION: ICD-10-CM

## 2020-10-27 DIAGNOSIS — I10 ESSENTIAL HYPERTENSION: ICD-10-CM

## 2020-10-27 LAB
ALBUMIN SERPL BCP-MCNC: 4.2 G/DL (ref 3.5–5.2)
ALP SERPL-CCNC: 63 U/L (ref 55–135)
ALT SERPL W/O P-5'-P-CCNC: 33 U/L (ref 10–44)
ANION GAP SERPL CALC-SCNC: 9 MMOL/L (ref 8–16)
AST SERPL-CCNC: 27 U/L (ref 10–40)
BILIRUB DIRECT SERPL-MCNC: 0.2 MG/DL (ref 0.1–0.3)
BILIRUB SERPL-MCNC: 0.5 MG/DL (ref 0.1–1)
BUN SERPL-MCNC: 11 MG/DL (ref 8–23)
CALCIUM SERPL-MCNC: 9.7 MG/DL (ref 8.7–10.5)
CHLORIDE SERPL-SCNC: 103 MMOL/L (ref 95–110)
CO2 SERPL-SCNC: 28 MMOL/L (ref 23–29)
CREAT SERPL-MCNC: 1 MG/DL (ref 0.5–1.4)
EST. GFR  (AFRICAN AMERICAN): >60 ML/MIN/1.73 M^2
EST. GFR  (NON AFRICAN AMERICAN): 59.3 ML/MIN/1.73 M^2
ESTIMATED AVG GLUCOSE: 177 MG/DL (ref 68–131)
GLUCOSE SERPL-MCNC: 160 MG/DL (ref 70–110)
HBA1C MFR BLD HPLC: 7.8 % (ref 4–5.6)
POTASSIUM SERPL-SCNC: 4.3 MMOL/L (ref 3.5–5.1)
PROT SERPL-MCNC: 7.4 G/DL (ref 6–8.4)
SARS-COV-2 IGG SERPLBLD QL IA.RAPID: POSITIVE
SODIUM SERPL-SCNC: 140 MMOL/L (ref 136–145)

## 2020-10-27 PROCEDURE — 99214 PR OFFICE/OUTPT VISIT, EST, LEVL IV, 30-39 MIN: ICD-10-PCS | Mod: HCNC,S$GLB,, | Performed by: INTERNAL MEDICINE

## 2020-10-27 PROCEDURE — 3051F HG A1C>EQUAL 7.0%<8.0%: CPT | Mod: HCNC,CPTII,S$GLB, | Performed by: INTERNAL MEDICINE

## 2020-10-27 PROCEDURE — 80076 HEPATIC FUNCTION PANEL: CPT | Mod: HCNC

## 2020-10-27 PROCEDURE — 3079F PR MOST RECENT DIASTOLIC BLOOD PRESSURE 80-89 MM HG: ICD-10-PCS | Mod: HCNC,CPTII,S$GLB, | Performed by: INTERNAL MEDICINE

## 2020-10-27 PROCEDURE — 3008F BODY MASS INDEX DOCD: CPT | Mod: HCNC,CPTII,S$GLB, | Performed by: INTERNAL MEDICINE

## 2020-10-27 PROCEDURE — 3075F SYST BP GE 130 - 139MM HG: CPT | Mod: HCNC,CPTII,S$GLB, | Performed by: INTERNAL MEDICINE

## 2020-10-27 PROCEDURE — 99999 PR PBB SHADOW E&M-EST. PATIENT-LVL V: CPT | Mod: PBBFAC,HCNC,, | Performed by: INTERNAL MEDICINE

## 2020-10-27 PROCEDURE — 1101F PR PT FALLS ASSESS DOC 0-1 FALLS W/OUT INJ PAST YR: ICD-10-PCS | Mod: HCNC,CPTII,S$GLB, | Performed by: INTERNAL MEDICINE

## 2020-10-27 PROCEDURE — 99214 OFFICE O/P EST MOD 30 MIN: CPT | Mod: HCNC,S$GLB,, | Performed by: INTERNAL MEDICINE

## 2020-10-27 PROCEDURE — 99499 UNLISTED E&M SERVICE: CPT | Mod: S$GLB,,, | Performed by: INTERNAL MEDICINE

## 2020-10-27 PROCEDURE — 86769 SARS-COV-2 COVID-19 ANTIBODY: CPT | Mod: HCNC

## 2020-10-27 PROCEDURE — 3075F PR MOST RECENT SYSTOLIC BLOOD PRESS GE 130-139MM HG: ICD-10-PCS | Mod: HCNC,CPTII,S$GLB, | Performed by: INTERNAL MEDICINE

## 2020-10-27 PROCEDURE — 3079F DIAST BP 80-89 MM HG: CPT | Mod: HCNC,CPTII,S$GLB, | Performed by: INTERNAL MEDICINE

## 2020-10-27 PROCEDURE — 80048 BASIC METABOLIC PNL TOTAL CA: CPT | Mod: HCNC

## 2020-10-27 PROCEDURE — 3008F PR BODY MASS INDEX (BMI) DOCUMENTED: ICD-10-PCS | Mod: HCNC,CPTII,S$GLB, | Performed by: INTERNAL MEDICINE

## 2020-10-27 PROCEDURE — 1101F PT FALLS ASSESS-DOCD LE1/YR: CPT | Mod: HCNC,CPTII,S$GLB, | Performed by: INTERNAL MEDICINE

## 2020-10-27 PROCEDURE — 83036 HEMOGLOBIN GLYCOSYLATED A1C: CPT | Mod: HCNC

## 2020-10-27 PROCEDURE — 3051F PR MOST RECENT HEMOGLOBIN A1C LEVEL 7.0 - < 8.0%: ICD-10-PCS | Mod: HCNC,CPTII,S$GLB, | Performed by: INTERNAL MEDICINE

## 2020-10-27 PROCEDURE — 99999 PR PBB SHADOW E&M-EST. PATIENT-LVL V: ICD-10-PCS | Mod: PBBFAC,HCNC,, | Performed by: INTERNAL MEDICINE

## 2020-10-27 PROCEDURE — 99499 RISK ADDL DX/OHS AUDIT: ICD-10-PCS | Mod: S$GLB,,, | Performed by: INTERNAL MEDICINE

## 2020-10-27 NOTE — PROGRESS NOTES
"Subjective:       Patient ID: Lizabeth Saha is a 65 y.o. female.    Chief Complaint: Follow-up   This is a 65-year-old who presents today for follow-up.  She reports that she has gone up and down with her diabetes depending on her diet and she recently returned from vacation and had been eating quite as well but has tried to do better overall she is following with the digital hypertension program and reports her pressure has some fluctuations but mostly better since previous COVID diagnosis she does feel she gets more tired denies chest pain or shortness of breath.  She has been issues with arthritis or giving out of the knee on rare occasion no symptoms today.  She has a growth on her right earlobe she would like to see somebody about treatment options.  She also has some discomfort in her feet at times and would like to make a follow-up with podiatry.  She would like to have her antibodies retested as well    HPI  Review of Systems   Constitutional: Positive for fatigue. Negative for fever.   Respiratory: Negative for cough, shortness of breath and wheezing.    Cardiovascular: Negative for chest pain and palpitations.   Gastrointestinal: Negative for abdominal pain and constipation.   Musculoskeletal:        Knee bothers her at times gives way on occasion    Skin:        Skin growth    Neurological: Negative for dizziness.       Objective:     Blood pressure 136/80, pulse 83, height 5' 4" (1.626 m), weight 84.8 kg (186 lb 15.2 oz), last menstrual period 01/01/1994, SpO2 100 %.    Physical Exam  Constitutional:       General: She is not in acute distress.  HENT:      Head: Normocephalic.      Comments: Ear growth righ ear vs keloid /cyst    Elvira aids   Eyes:      General: No scleral icterus.  Neck:      Musculoskeletal: Neck supple.   Cardiovascular:      Rate and Rhythm: Normal rate and regular rhythm.      Heart sounds: Normal heart sounds. No murmur. No friction rub. No gallop.    Pulmonary:      Effort: " Pulmonary effort is normal. No respiratory distress.      Breath sounds: Normal breath sounds.   Abdominal:      General: Bowel sounds are normal.      Palpations: Abdomen is soft. There is no mass.      Tenderness: There is no abdominal tenderness.   Skin:     Findings: No erythema.   Neurological:      Mental Status: She is alert.         Assessment:       1. Essential hypertension    2. Type 2 diabetes mellitus without complication, without long-term current use of insulin    3. History of 2019 novel coronavirus disease (COVID-19)    4. Solitary pulmonary nodule    5. Osteoporosis, unspecified osteoporosis type, unspecified pathological fracture presence    6. Pain of foot, unspecified laterality    7. Skin lesion    8. Mild chronic obstructive pulmonary disease    9. Sleep apnea, unspecified type        Plan:       Lizabeth was seen today for follow-up.    Diagnoses and all orders for this visit:    Essential hypertension  Patient is following the digital hypertension program  -     Basic Metabolic Panel; Future  -     Hepatic Function Panel; Future  -     Hemoglobin A1C; Future    Type 2 diabetes mellitus without complication, without long-term current use of insulin  Discussed diet and exercise and continue medicines will update blood work and review  -     Basic Metabolic Panel; Future  -     Hepatic Function Panel; Future  -     Hemoglobin A1C; Future  -     Ambulatory referral/consult to Podiatry; Future    History of 2019 novel coronavirus disease (COVID-19)  Patient would like to retest  -     COVID-19 (SARS CoV-2) IgG Antibody; Future    For knee concerns fall precuations and consider orho if concerns persist  Strengthening exercises discusse     Solitary pulmonary nodule  History of proceed with updated  Mild copd stable   -     CT Chest Without Contrast; Future    Osteoporosis, unspecified osteoporosis type, unspecified pathological fracture presence  History of she is continuing some dental work and we  discussed consider treatment when she is complete update bone density referral placed calcium vitamin-D discussed in the meantime  -     DXA Bone Density Spine And Hip; Future    Pain of foot, unspecified laterality  -     Ambulatory referral/consult to Podiatry; Future    Skin lesion  -     Ambulatory referral/consult to Dermatology; Future    Sleep apnea, unspecified type  Discussed cpap use and sleep clinic follow up   -     Ambulatory referral/consult to Sleep Disorders; Future    prenar 13 recommended  Flu shot recommended is she plans to do another day is    Follow up 4 months is

## 2020-10-28 ENCOUNTER — TELEPHONE (OUTPATIENT)
Dept: INTERNAL MEDICINE | Facility: CLINIC | Age: 65
End: 2020-10-28

## 2020-10-28 DIAGNOSIS — E11.8 TYPE 2 DIABETES MELLITUS WITH UNSPECIFIED COMPLICATIONS: Primary | ICD-10-CM

## 2020-10-28 NOTE — TELEPHONE ENCOUNTER
----- Message from Birgit Tee LPN sent at 10/28/2020 10:02 AM CDT -----  Regarding: FW: Advice  Contact: Patient [phone]    ----- Message -----  From: Tevin Smith  Sent: 10/28/2020   8:36 AM CDT  To: Tonny GARCIA ([facility] Staff  Subject: Advice                                           Patient is returning a phone call.  Who left a message for the patient:  office   Does patient know what this is regarding:    Comments:     Please call an advise  Thank you

## 2020-11-08 ENCOUNTER — PATIENT OUTREACH (OUTPATIENT)
Dept: ADMINISTRATIVE | Facility: OTHER | Age: 65
End: 2020-11-08

## 2020-11-08 NOTE — LETTER
November 8, 2020        Bobby Lancaster, OD  [address] [place] [address]             [facility]  [address] [address]  Phone: [phone]   Patient: Lizabeth Saha   MR Number: [id number]   YOB: 1955           Dear Dr. Lancaster:    Lizabeth Saha is a patient of Dr. Orquidea Lancaster (PCP) at [facility]. While reviewing her chart, it has come to our attention that there is an outstanding procedure. Please help keep our Health Maintenance records as accurate and as up to date as possible by supplying the following:     Eye exam                                                                             Please fax to [facility] @ [phone].    Thank you for your assistance in our patient's healthcare.     Sincerely,     Adriane Valdez, CMA [facility] Care Coordinator  [facility]

## 2020-11-08 NOTE — PROGRESS NOTES
Requested updates within Care Everywhere.  Patient's chart was reviewed for overdue RUPAL topics.  Media reviewed for outside eye exam.  Eye exam requested from Dr. Fernando Lancaster  Immunizations reconciled.

## 2020-11-17 ENCOUNTER — HOSPITAL ENCOUNTER (OUTPATIENT)
Dept: RADIOLOGY | Facility: CLINIC | Age: 65
Discharge: HOME | End: 2020-11-17
Attending: INTERNAL MEDICINE
Payer: MEDICARE

## 2020-11-17 ENCOUNTER — HOSPITAL ENCOUNTER (OUTPATIENT)
Dept: RADIOLOGY | Facility: HOSPITAL | Age: 65
Discharge: HOME | End: 2020-11-17
Attending: INTERNAL MEDICINE
Payer: MEDICARE

## 2020-11-17 DIAGNOSIS — R91.1 SOLITARY PULMONARY NODULE: ICD-10-CM

## 2020-11-17 DIAGNOSIS — M81.0 OSTEOPOROSIS, UNSPECIFIED OSTEOPOROSIS TYPE, UNSPECIFIED PATHOLOGICAL FRACTURE PRESENCE: ICD-10-CM

## 2020-11-17 PROCEDURE — 77080 DXA BONE DENSITY AXIAL: CPT | Mod: TC,HCNC

## 2020-11-17 PROCEDURE — 71250 CT CHEST WITHOUT CONTRAST: ICD-10-PCS | Mod: 26,HCNC,, | Performed by: RADIOLOGY

## 2020-11-17 PROCEDURE — 77080 DXA BONE DENSITY AXIAL: CPT | Mod: 26,HCNC,, | Performed by: INTERNAL MEDICINE

## 2020-11-17 PROCEDURE — 71250 CT THORAX DX C-: CPT | Mod: 26,HCNC,, | Performed by: RADIOLOGY

## 2020-11-17 PROCEDURE — 71250 CT THORAX DX C-: CPT | Mod: TC,HCNC

## 2020-11-17 PROCEDURE — 77080 DEXA BONE DENSITY SPINE HIP: ICD-10-PCS | Mod: 26,HCNC,, | Performed by: INTERNAL MEDICINE

## 2020-11-20 PROBLEM — M85.80 OSTEOPENIA: Status: ACTIVE | Noted: 2020-11-20

## 2020-11-23 ENCOUNTER — PATIENT OUTREACH (OUTPATIENT)
Dept: OTHER | Facility: OTHER | Age: 65
End: 2020-11-23

## 2020-11-23 NOTE — PROGRESS NOTES
Digital Medicine: Health  Follow-Up    The history is provided by the patient.             Reason for review: Blood pressure at goal        Topics Covered on Call: physical activity and Diet    Additional Follow-up details: Avg BP as of Nov 23, 2020 is 125/74. Trending down.     Has been trying to walk more and has been back on her diet.     She recently went and visited her grandchildren and had gotten off track with her eating and walking but is doing better now that she's back home.                 Diet-no change to diet    No change to diet.  Patient reports eating or drinking the following: Back on track with her diet.       Physical Activity-no change to routine  No change to exercise routine.       Additional physical activity details: Still walking.       Medication Adherence-Medication adherence was assessed.        Substance, Sleep, Stress-No change  stress-  Details:  Intervention(s):    Sleep-  Details:  Intervention(s):    Alcohol -  Details:  Intervention(s):    Tobacco-  Details:  Intervention(s):          Continue current diet/physical activity routine.       Addressed patient questions and patient has my contact information if needed prior to next outreach. Patient verbalizes understanding.      Explained the importance of self-monitoring and medication adherence. Encouraged the patient to communicate with their health  for lifestyle modifications to help improve or maintain a healthy lifestyle.                   Topic    Eye Exam          Last 5 Patient Entered Readings                                      Current 30 Day Average: 125/74     Recent Readings 11/21/2020 11/13/2020 11/5/2020 10/31/2020 10/21/2020    SBP (mmHg) 118 133 115 134 138    DBP (mmHg) 75 77 75 70 84    Pulse 84 89 88 90 87

## 2020-12-02 ENCOUNTER — PATIENT MESSAGE (OUTPATIENT)
Dept: ADMINISTRATIVE | Facility: HOSPITAL | Age: 65
End: 2020-12-02

## 2020-12-15 ENCOUNTER — PATIENT MESSAGE (OUTPATIENT)
Dept: OTHER | Facility: OTHER | Age: 65
End: 2020-12-15

## 2021-04-26 ENCOUNTER — PATIENT MESSAGE (OUTPATIENT)
Dept: RESEARCH | Facility: HOSPITAL | Age: 66
End: 2021-04-26

## 2021-05-25 ENCOUNTER — PATIENT OUTREACH (OUTPATIENT)
Dept: ADMINISTRATIVE | Facility: HOSPITAL | Age: 66
End: 2021-05-25

## 2021-05-25 ENCOUNTER — PATIENT MESSAGE (OUTPATIENT)
Dept: ADMINISTRATIVE | Facility: HOSPITAL | Age: 66
End: 2021-05-25

## 2021-05-25 DIAGNOSIS — Z12.39 ENCOUNTER FOR SCREENING FOR MALIGNANT NEOPLASM OF BREAST, UNSPECIFIED SCREENING MODALITY: Primary | ICD-10-CM

## 2021-05-25 DIAGNOSIS — Z12.31 ENCOUNTER FOR SCREENING MAMMOGRAM FOR MALIGNANT NEOPLASM OF BREAST: ICD-10-CM

## 2021-05-25 DIAGNOSIS — Z12.11 COLON CANCER SCREENING: ICD-10-CM

## 2021-06-15 ENCOUNTER — OFFICE VISIT (OUTPATIENT)
Dept: INTERNAL MEDICINE | Facility: CLINIC | Age: 66
End: 2021-06-15
Payer: MEDICARE

## 2021-06-15 ENCOUNTER — LAB VISIT (OUTPATIENT)
Dept: LAB | Facility: HOSPITAL | Age: 66
End: 2021-06-15
Attending: INTERNAL MEDICINE
Payer: MEDICARE

## 2021-06-15 ENCOUNTER — OFFICE VISIT (OUTPATIENT)
Dept: OTOLARYNGOLOGY | Facility: CLINIC | Age: 66
End: 2021-06-15
Payer: MEDICARE

## 2021-06-15 VITALS
OXYGEN SATURATION: 98 % | SYSTOLIC BLOOD PRESSURE: 122 MMHG | RESPIRATION RATE: 16 BRPM | DIASTOLIC BLOOD PRESSURE: 78 MMHG | BODY MASS INDEX: 29.41 KG/M2 | HEART RATE: 77 BPM | HEIGHT: 66 IN | WEIGHT: 183 LBS | TEMPERATURE: 98 F

## 2021-06-15 VITALS
HEART RATE: 83 BPM | SYSTOLIC BLOOD PRESSURE: 145 MMHG | BODY MASS INDEX: 29.53 KG/M2 | WEIGHT: 183 LBS | DIASTOLIC BLOOD PRESSURE: 95 MMHG

## 2021-06-15 DIAGNOSIS — Z12.11 COLON CANCER SCREENING: ICD-10-CM

## 2021-06-15 DIAGNOSIS — G47.9 SLEEP DISTURBANCE: ICD-10-CM

## 2021-06-15 DIAGNOSIS — N64.4 BREAST PAIN: ICD-10-CM

## 2021-06-15 DIAGNOSIS — E78.5 HYPERLIPIDEMIA, UNSPECIFIED HYPERLIPIDEMIA TYPE: ICD-10-CM

## 2021-06-15 DIAGNOSIS — M79.629 PAIN IN AXILLA, UNSPECIFIED LATERALITY: ICD-10-CM

## 2021-06-15 DIAGNOSIS — H61.20 IMPACTED CERUMEN, UNSPECIFIED LATERALITY: ICD-10-CM

## 2021-06-15 DIAGNOSIS — M79.673 PAIN OF FOOT, UNSPECIFIED LATERALITY: ICD-10-CM

## 2021-06-15 DIAGNOSIS — E11.8 TYPE 2 DIABETES MELLITUS WITH UNSPECIFIED COMPLICATIONS: Primary | ICD-10-CM

## 2021-06-15 DIAGNOSIS — I10 ESSENTIAL HYPERTENSION: ICD-10-CM

## 2021-06-15 DIAGNOSIS — E11.8 TYPE 2 DIABETES MELLITUS WITH UNSPECIFIED COMPLICATIONS: ICD-10-CM

## 2021-06-15 LAB
ALBUMIN SERPL BCP-MCNC: 4.2 G/DL (ref 3.5–5.2)
ALP SERPL-CCNC: 65 U/L (ref 55–135)
ALT SERPL W/O P-5'-P-CCNC: 41 U/L (ref 10–44)
ANION GAP SERPL CALC-SCNC: 8 MMOL/L (ref 8–16)
AST SERPL-CCNC: 28 U/L (ref 10–40)
BASOPHILS # BLD AUTO: 0.02 K/UL (ref 0–0.2)
BASOPHILS NFR BLD: 0.3 % (ref 0–1.9)
BILIRUB DIRECT SERPL-MCNC: 0.2 MG/DL (ref 0.1–0.3)
BILIRUB SERPL-MCNC: 0.3 MG/DL (ref 0.1–1)
BUN SERPL-MCNC: 10 MG/DL (ref 8–23)
CALCIUM SERPL-MCNC: 10 MG/DL (ref 8.7–10.5)
CHLORIDE SERPL-SCNC: 104 MMOL/L (ref 95–110)
CHOLEST SERPL-MCNC: 151 MG/DL (ref 120–199)
CHOLEST/HDLC SERPL: 3.4 {RATIO} (ref 2–5)
CO2 SERPL-SCNC: 25 MMOL/L (ref 23–29)
CREAT SERPL-MCNC: 1.1 MG/DL (ref 0.5–1.4)
DIFFERENTIAL METHOD: ABNORMAL
EOSINOPHIL # BLD AUTO: 0.1 K/UL (ref 0–0.5)
EOSINOPHIL NFR BLD: 1.6 % (ref 0–8)
ERYTHROCYTE [DISTWIDTH] IN BLOOD BY AUTOMATED COUNT: 13.6 % (ref 11.5–14.5)
EST. GFR  (AFRICAN AMERICAN): >60 ML/MIN/1.73 M^2
EST. GFR  (NON AFRICAN AMERICAN): 52.8 ML/MIN/1.73 M^2
ESTIMATED AVG GLUCOSE: 289 MG/DL (ref 68–131)
GLUCOSE SERPL-MCNC: 204 MG/DL (ref 70–110)
HBA1C MFR BLD: 11.7 % (ref 4–5.6)
HCT VFR BLD AUTO: 41.1 % (ref 37–48.5)
HDLC SERPL-MCNC: 45 MG/DL (ref 40–75)
HDLC SERPL: 29.8 % (ref 20–50)
HGB BLD-MCNC: 12.5 G/DL (ref 12–16)
IMM GRANULOCYTES # BLD AUTO: 0.01 K/UL (ref 0–0.04)
IMM GRANULOCYTES NFR BLD AUTO: 0.2 % (ref 0–0.5)
LDLC SERPL CALC-MCNC: 84.2 MG/DL (ref 63–159)
LYMPHOCYTES # BLD AUTO: 2.2 K/UL (ref 1–4.8)
LYMPHOCYTES NFR BLD: 34.6 % (ref 18–48)
MCH RBC QN AUTO: 25.9 PG (ref 27–31)
MCHC RBC AUTO-ENTMCNC: 30.4 G/DL (ref 32–36)
MCV RBC AUTO: 85 FL (ref 82–98)
MONOCYTES # BLD AUTO: 0.5 K/UL (ref 0.3–1)
MONOCYTES NFR BLD: 7.6 % (ref 4–15)
NEUTROPHILS # BLD AUTO: 3.6 K/UL (ref 1.8–7.7)
NEUTROPHILS NFR BLD: 55.7 % (ref 38–73)
NONHDLC SERPL-MCNC: 106 MG/DL
NRBC BLD-RTO: 0 /100 WBC
PLATELET # BLD AUTO: 240 K/UL (ref 150–450)
PMV BLD AUTO: 11.3 FL (ref 9.2–12.9)
POTASSIUM SERPL-SCNC: 4.6 MMOL/L (ref 3.5–5.1)
PROT SERPL-MCNC: 7.3 G/DL (ref 6–8.4)
RBC # BLD AUTO: 4.83 M/UL (ref 4–5.4)
SODIUM SERPL-SCNC: 137 MMOL/L (ref 136–145)
TRIGL SERPL-MCNC: 109 MG/DL (ref 30–150)
TSH SERPL DL<=0.005 MIU/L-ACNC: 0.68 UIU/ML (ref 0.4–4)
WBC # BLD AUTO: 6.45 K/UL (ref 3.9–12.7)

## 2021-06-15 PROCEDURE — 69210 PR REMOVAL IMPACTED CERUMEN REQUIRING INSTRUMENTATION, UNILATERAL: ICD-10-PCS | Mod: S$GLB,,, | Performed by: OTOLARYNGOLOGY

## 2021-06-15 PROCEDURE — 3008F PR BODY MASS INDEX (BMI) DOCUMENTED: ICD-10-PCS | Mod: CPTII,S$GLB,, | Performed by: INTERNAL MEDICINE

## 2021-06-15 PROCEDURE — 3046F HEMOGLOBIN A1C LEVEL >9.0%: CPT | Mod: CPTII,S$GLB,, | Performed by: INTERNAL MEDICINE

## 2021-06-15 PROCEDURE — 1125F PR PAIN SEVERITY QUANTIFIED, PAIN PRESENT: ICD-10-PCS | Mod: S$GLB,,, | Performed by: OTOLARYNGOLOGY

## 2021-06-15 PROCEDURE — 69210 REMOVE IMPACTED EAR WAX UNI: CPT | Mod: S$GLB,,, | Performed by: OTOLARYNGOLOGY

## 2021-06-15 PROCEDURE — 99999 PR PBB SHADOW E&M-EST. PATIENT-LVL V: ICD-10-PCS | Mod: PBBFAC,,, | Performed by: INTERNAL MEDICINE

## 2021-06-15 PROCEDURE — 3288F PR FALLS RISK ASSESSMENT DOCUMENTED: ICD-10-PCS | Mod: CPTII,S$GLB,, | Performed by: INTERNAL MEDICINE

## 2021-06-15 PROCEDURE — 3008F BODY MASS INDEX DOCD: CPT | Mod: CPTII,S$GLB,, | Performed by: OTOLARYNGOLOGY

## 2021-06-15 PROCEDURE — 83036 HEMOGLOBIN GLYCOSYLATED A1C: CPT | Performed by: INTERNAL MEDICINE

## 2021-06-15 PROCEDURE — 85025 COMPLETE CBC W/AUTO DIFF WBC: CPT | Performed by: INTERNAL MEDICINE

## 2021-06-15 PROCEDURE — 99213 PR OFFICE/OUTPT VISIT, EST, LEVL III, 20-29 MIN: ICD-10-PCS | Mod: 25,S$GLB,, | Performed by: OTOLARYNGOLOGY

## 2021-06-15 PROCEDURE — 1101F PT FALLS ASSESS-DOCD LE1/YR: CPT | Mod: CPTII,S$GLB,, | Performed by: INTERNAL MEDICINE

## 2021-06-15 PROCEDURE — 3008F BODY MASS INDEX DOCD: CPT | Mod: CPTII,S$GLB,, | Performed by: INTERNAL MEDICINE

## 2021-06-15 PROCEDURE — 84443 ASSAY THYROID STIM HORMONE: CPT | Performed by: INTERNAL MEDICINE

## 2021-06-15 PROCEDURE — 99999 PR PBB SHADOW E&M-EST. PATIENT-LVL III: CPT | Mod: PBBFAC,,, | Performed by: OTOLARYNGOLOGY

## 2021-06-15 PROCEDURE — 1101F PR PT FALLS ASSESS DOC 0-1 FALLS W/OUT INJ PAST YR: ICD-10-PCS | Mod: CPTII,S$GLB,, | Performed by: OTOLARYNGOLOGY

## 2021-06-15 PROCEDURE — 1101F PR PT FALLS ASSESS DOC 0-1 FALLS W/OUT INJ PAST YR: ICD-10-PCS | Mod: CPTII,S$GLB,, | Performed by: INTERNAL MEDICINE

## 2021-06-15 PROCEDURE — 1101F PT FALLS ASSESS-DOCD LE1/YR: CPT | Mod: CPTII,S$GLB,, | Performed by: OTOLARYNGOLOGY

## 2021-06-15 PROCEDURE — 1125F AMNT PAIN NOTED PAIN PRSNT: CPT | Mod: S$GLB,,, | Performed by: INTERNAL MEDICINE

## 2021-06-15 PROCEDURE — 3288F PR FALLS RISK ASSESSMENT DOCUMENTED: ICD-10-PCS | Mod: CPTII,S$GLB,, | Performed by: OTOLARYNGOLOGY

## 2021-06-15 PROCEDURE — 3046F PR MOST RECENT HEMOGLOBIN A1C LEVEL > 9.0%: ICD-10-PCS | Mod: CPTII,S$GLB,, | Performed by: INTERNAL MEDICINE

## 2021-06-15 PROCEDURE — 99499 RISK ADDL DX/OHS AUDIT: ICD-10-PCS | Mod: HCNC,S$GLB,, | Performed by: INTERNAL MEDICINE

## 2021-06-15 PROCEDURE — 36415 COLL VENOUS BLD VENIPUNCTURE: CPT | Performed by: INTERNAL MEDICINE

## 2021-06-15 PROCEDURE — 80076 HEPATIC FUNCTION PANEL: CPT | Performed by: INTERNAL MEDICINE

## 2021-06-15 PROCEDURE — 99999 PR PBB SHADOW E&M-EST. PATIENT-LVL V: CPT | Mod: PBBFAC,,, | Performed by: INTERNAL MEDICINE

## 2021-06-15 PROCEDURE — 80048 BASIC METABOLIC PNL TOTAL CA: CPT | Performed by: INTERNAL MEDICINE

## 2021-06-15 PROCEDURE — 3288F FALL RISK ASSESSMENT DOCD: CPT | Mod: CPTII,S$GLB,, | Performed by: INTERNAL MEDICINE

## 2021-06-15 PROCEDURE — 99499 UNLISTED E&M SERVICE: CPT | Mod: HCNC,S$GLB,, | Performed by: INTERNAL MEDICINE

## 2021-06-15 PROCEDURE — 3008F PR BODY MASS INDEX (BMI) DOCUMENTED: ICD-10-PCS | Mod: CPTII,S$GLB,, | Performed by: OTOLARYNGOLOGY

## 2021-06-15 PROCEDURE — 1125F PR PAIN SEVERITY QUANTIFIED, PAIN PRESENT: ICD-10-PCS | Mod: S$GLB,,, | Performed by: INTERNAL MEDICINE

## 2021-06-15 PROCEDURE — 3288F FALL RISK ASSESSMENT DOCD: CPT | Mod: CPTII,S$GLB,, | Performed by: OTOLARYNGOLOGY

## 2021-06-15 PROCEDURE — 80061 LIPID PANEL: CPT | Performed by: INTERNAL MEDICINE

## 2021-06-15 PROCEDURE — 99999 PR PBB SHADOW E&M-EST. PATIENT-LVL III: ICD-10-PCS | Mod: PBBFAC,,, | Performed by: OTOLARYNGOLOGY

## 2021-06-15 PROCEDURE — 99214 PR OFFICE/OUTPT VISIT, EST, LEVL IV, 30-39 MIN: ICD-10-PCS | Mod: S$GLB,,, | Performed by: INTERNAL MEDICINE

## 2021-06-15 PROCEDURE — 99214 OFFICE O/P EST MOD 30 MIN: CPT | Mod: S$GLB,,, | Performed by: INTERNAL MEDICINE

## 2021-06-15 PROCEDURE — 99213 OFFICE O/P EST LOW 20 MIN: CPT | Mod: 25,S$GLB,, | Performed by: OTOLARYNGOLOGY

## 2021-06-15 PROCEDURE — 1125F AMNT PAIN NOTED PAIN PRSNT: CPT | Mod: S$GLB,,, | Performed by: OTOLARYNGOLOGY

## 2021-06-15 RX ORDER — METFORMIN HYDROCHLORIDE 500 MG/1
TABLET, EXTENDED RELEASE ORAL
Qty: 270 TABLET | Refills: 4 | Status: SHIPPED | OUTPATIENT
Start: 2021-06-15 | End: 2022-03-31

## 2021-06-16 ENCOUNTER — TELEPHONE (OUTPATIENT)
Dept: INTERNAL MEDICINE | Facility: CLINIC | Age: 66
End: 2021-06-16

## 2021-06-16 ENCOUNTER — HOSPITAL ENCOUNTER (OUTPATIENT)
Dept: RADIOLOGY | Facility: HOSPITAL | Age: 66
Discharge: HOME | End: 2021-06-16
Attending: INTERNAL MEDICINE
Payer: MEDICARE

## 2021-06-16 ENCOUNTER — CLINICAL SUPPORT (OUTPATIENT)
Dept: AUDIOLOGY | Facility: CLINIC | Age: 66
End: 2021-06-16
Payer: MEDICARE

## 2021-06-16 VITALS — BODY MASS INDEX: 30.49 KG/M2 | HEIGHT: 65 IN | WEIGHT: 183 LBS

## 2021-06-16 DIAGNOSIS — M79.629 PAIN IN AXILLA, UNSPECIFIED LATERALITY: ICD-10-CM

## 2021-06-16 DIAGNOSIS — N64.4 BREAST PAIN: ICD-10-CM

## 2021-06-16 DIAGNOSIS — H90.3 BILATERAL SENSORINEURAL HEARING LOSS: ICD-10-CM

## 2021-06-16 PROCEDURE — 77066 MAMMO DIGITAL DIAGNOSTIC BILAT WITH TOMO: ICD-10-PCS | Mod: 26,,, | Performed by: RADIOLOGY

## 2021-06-16 PROCEDURE — 77062 MAMMO DIGITAL DIAGNOSTIC BILAT WITH TOMO: ICD-10-PCS | Mod: 26,,, | Performed by: RADIOLOGY

## 2021-06-16 PROCEDURE — 77062 BREAST TOMOSYNTHESIS BI: CPT | Mod: 26,,, | Performed by: RADIOLOGY

## 2021-06-16 PROCEDURE — 92567 PR TYMPA2METRY: ICD-10-PCS | Mod: S$GLB,,, | Performed by: AUDIOLOGIST

## 2021-06-16 PROCEDURE — 77066 DX MAMMO INCL CAD BI: CPT | Mod: 26,,, | Performed by: RADIOLOGY

## 2021-06-16 PROCEDURE — 77066 DX MAMMO INCL CAD BI: CPT | Mod: TC

## 2021-06-16 PROCEDURE — 92567 TYMPANOMETRY: CPT | Mod: S$GLB,,, | Performed by: AUDIOLOGIST

## 2021-06-16 PROCEDURE — 92557 COMPREHENSIVE HEARING TEST: CPT | Mod: S$GLB,,, | Performed by: AUDIOLOGIST

## 2021-06-16 PROCEDURE — 92557 PR COMPREHENSIVE HEARING TEST: ICD-10-PCS | Mod: S$GLB,,, | Performed by: AUDIOLOGIST

## 2021-06-21 ENCOUNTER — OFFICE VISIT (OUTPATIENT)
Dept: INTERNAL MEDICINE | Facility: CLINIC | Age: 66
End: 2021-06-21
Payer: MEDICARE

## 2021-06-21 VITALS
HEART RATE: 83 BPM | HEIGHT: 65 IN | BODY MASS INDEX: 29.97 KG/M2 | OXYGEN SATURATION: 99 % | DIASTOLIC BLOOD PRESSURE: 84 MMHG | WEIGHT: 179.88 LBS | SYSTOLIC BLOOD PRESSURE: 130 MMHG

## 2021-06-21 DIAGNOSIS — I10 ESSENTIAL HYPERTENSION: ICD-10-CM

## 2021-06-21 DIAGNOSIS — E11.65 TYPE 2 DIABETES MELLITUS WITH HYPERGLYCEMIA, WITHOUT LONG-TERM CURRENT USE OF INSULIN: Primary | ICD-10-CM

## 2021-06-21 DIAGNOSIS — E78.5 HYPERLIPIDEMIA, UNSPECIFIED HYPERLIPIDEMIA TYPE: ICD-10-CM

## 2021-06-21 DIAGNOSIS — E66.9 OBESITY, CLASS I, BMI 30-34.9: ICD-10-CM

## 2021-06-21 DIAGNOSIS — E11.3299 NON-PROLIFERATIVE DIABETIC RETINOPATHY: ICD-10-CM

## 2021-06-21 DIAGNOSIS — G47.30 SLEEP APNEA, UNSPECIFIED TYPE: ICD-10-CM

## 2021-06-21 PROCEDURE — 1126F AMNT PAIN NOTED NONE PRSNT: CPT | Mod: S$GLB,,, | Performed by: NURSE PRACTITIONER

## 2021-06-21 PROCEDURE — 99214 PR OFFICE/OUTPT VISIT, EST, LEVL IV, 30-39 MIN: ICD-10-PCS | Mod: S$GLB,,, | Performed by: NURSE PRACTITIONER

## 2021-06-21 PROCEDURE — 3288F PR FALLS RISK ASSESSMENT DOCUMENTED: ICD-10-PCS | Mod: CPTII,S$GLB,, | Performed by: NURSE PRACTITIONER

## 2021-06-21 PROCEDURE — 3008F BODY MASS INDEX DOCD: CPT | Mod: CPTII,S$GLB,, | Performed by: NURSE PRACTITIONER

## 2021-06-21 PROCEDURE — 99999 PR PBB SHADOW E&M-EST. PATIENT-LVL IV: CPT | Mod: PBBFAC,,, | Performed by: NURSE PRACTITIONER

## 2021-06-21 PROCEDURE — 1126F PR PAIN SEVERITY QUANTIFIED, NO PAIN PRESENT: ICD-10-PCS | Mod: S$GLB,,, | Performed by: NURSE PRACTITIONER

## 2021-06-21 PROCEDURE — 3288F FALL RISK ASSESSMENT DOCD: CPT | Mod: CPTII,S$GLB,, | Performed by: NURSE PRACTITIONER

## 2021-06-21 PROCEDURE — 3046F HEMOGLOBIN A1C LEVEL >9.0%: CPT | Mod: CPTII,S$GLB,, | Performed by: NURSE PRACTITIONER

## 2021-06-21 PROCEDURE — 3008F PR BODY MASS INDEX (BMI) DOCUMENTED: ICD-10-PCS | Mod: CPTII,S$GLB,, | Performed by: NURSE PRACTITIONER

## 2021-06-21 PROCEDURE — 99214 OFFICE O/P EST MOD 30 MIN: CPT | Mod: S$GLB,,, | Performed by: NURSE PRACTITIONER

## 2021-06-21 PROCEDURE — 1101F PR PT FALLS ASSESS DOC 0-1 FALLS W/OUT INJ PAST YR: ICD-10-PCS | Mod: CPTII,S$GLB,, | Performed by: NURSE PRACTITIONER

## 2021-06-21 PROCEDURE — 1101F PT FALLS ASSESS-DOCD LE1/YR: CPT | Mod: CPTII,S$GLB,, | Performed by: NURSE PRACTITIONER

## 2021-06-21 PROCEDURE — 3046F PR MOST RECENT HEMOGLOBIN A1C LEVEL > 9.0%: ICD-10-PCS | Mod: CPTII,S$GLB,, | Performed by: NURSE PRACTITIONER

## 2021-06-21 PROCEDURE — 99999 PR PBB SHADOW E&M-EST. PATIENT-LVL IV: ICD-10-PCS | Mod: PBBFAC,,, | Performed by: NURSE PRACTITIONER

## 2021-06-21 RX ORDER — DULAGLUTIDE 0.75 MG/.5ML
0.75 INJECTION, SOLUTION SUBCUTANEOUS
Qty: 12 PEN | Refills: 1 | Status: SHIPPED | OUTPATIENT
Start: 2021-06-21 | End: 2022-03-31

## 2021-06-22 ENCOUNTER — PATIENT OUTREACH (OUTPATIENT)
Dept: ADMINISTRATIVE | Facility: OTHER | Age: 66
End: 2021-06-22

## 2021-06-24 ENCOUNTER — OFFICE VISIT (OUTPATIENT)
Dept: OTOLARYNGOLOGY | Facility: CLINIC | Age: 66
End: 2021-06-24
Payer: MEDICARE

## 2021-06-24 VITALS
WEIGHT: 187.81 LBS | DIASTOLIC BLOOD PRESSURE: 88 MMHG | SYSTOLIC BLOOD PRESSURE: 159 MMHG | BODY MASS INDEX: 31.26 KG/M2

## 2021-06-24 DIAGNOSIS — H93.8X3 EAR FULLNESS, BILATERAL: Primary | ICD-10-CM

## 2021-06-24 PROCEDURE — 3008F BODY MASS INDEX DOCD: CPT | Mod: CPTII,S$GLB,, | Performed by: OTOLARYNGOLOGY

## 2021-06-24 PROCEDURE — 99213 PR OFFICE/OUTPT VISIT, EST, LEVL III, 20-29 MIN: ICD-10-PCS | Mod: S$GLB,,, | Performed by: OTOLARYNGOLOGY

## 2021-06-24 PROCEDURE — 99213 OFFICE O/P EST LOW 20 MIN: CPT | Mod: S$GLB,,, | Performed by: OTOLARYNGOLOGY

## 2021-06-24 PROCEDURE — 1126F AMNT PAIN NOTED NONE PRSNT: CPT | Mod: S$GLB,,, | Performed by: OTOLARYNGOLOGY

## 2021-06-24 PROCEDURE — 3008F PR BODY MASS INDEX (BMI) DOCUMENTED: ICD-10-PCS | Mod: CPTII,S$GLB,, | Performed by: OTOLARYNGOLOGY

## 2021-06-24 PROCEDURE — 1126F PR PAIN SEVERITY QUANTIFIED, NO PAIN PRESENT: ICD-10-PCS | Mod: S$GLB,,, | Performed by: OTOLARYNGOLOGY

## 2021-06-24 PROCEDURE — 99999 PR PBB SHADOW E&M-EST. PATIENT-LVL III: CPT | Mod: PBBFAC,,, | Performed by: OTOLARYNGOLOGY

## 2021-06-24 PROCEDURE — 99999 PR PBB SHADOW E&M-EST. PATIENT-LVL III: ICD-10-PCS | Mod: PBBFAC,,, | Performed by: OTOLARYNGOLOGY

## 2021-07-14 ENCOUNTER — OFFICE VISIT (OUTPATIENT)
Dept: DERMATOLOGY | Facility: CLINIC | Age: 66
End: 2021-07-14
Payer: MEDICARE

## 2021-07-14 DIAGNOSIS — D23.9 DERMATOFIBROMA: ICD-10-CM

## 2021-07-14 DIAGNOSIS — L91.0 KELOID: Primary | ICD-10-CM

## 2021-07-14 DIAGNOSIS — L98.9 SKIN LESION: ICD-10-CM

## 2021-07-14 DIAGNOSIS — D22.9 BENIGN MOLE: ICD-10-CM

## 2021-07-14 PROCEDURE — 1126F AMNT PAIN NOTED NONE PRSNT: CPT | Mod: S$GLB,,, | Performed by: DERMATOLOGY

## 2021-07-14 PROCEDURE — 11901 INJECT SKIN LESIONS >7: CPT | Mod: S$GLB,,, | Performed by: DERMATOLOGY

## 2021-07-14 PROCEDURE — 3288F FALL RISK ASSESSMENT DOCD: CPT | Mod: CPTII,S$GLB,, | Performed by: DERMATOLOGY

## 2021-07-14 PROCEDURE — 99999 PR PBB SHADOW E&M-EST. PATIENT-LVL III: CPT | Mod: PBBFAC,,, | Performed by: DERMATOLOGY

## 2021-07-14 PROCEDURE — 3046F PR MOST RECENT HEMOGLOBIN A1C LEVEL > 9.0%: ICD-10-PCS | Mod: CPTII,S$GLB,, | Performed by: DERMATOLOGY

## 2021-07-14 PROCEDURE — 3046F HEMOGLOBIN A1C LEVEL >9.0%: CPT | Mod: CPTII,S$GLB,, | Performed by: DERMATOLOGY

## 2021-07-14 PROCEDURE — 1126F PR PAIN SEVERITY QUANTIFIED, NO PAIN PRESENT: ICD-10-PCS | Mod: S$GLB,,, | Performed by: DERMATOLOGY

## 2021-07-14 PROCEDURE — 99999 PR PBB SHADOW E&M-EST. PATIENT-LVL III: ICD-10-PCS | Mod: PBBFAC,,, | Performed by: DERMATOLOGY

## 2021-07-14 PROCEDURE — 1101F PR PT FALLS ASSESS DOC 0-1 FALLS W/OUT INJ PAST YR: ICD-10-PCS | Mod: CPTII,S$GLB,, | Performed by: DERMATOLOGY

## 2021-07-14 PROCEDURE — 99202 PR OFFICE/OUTPT VISIT, NEW, LEVL II, 15-29 MIN: ICD-10-PCS | Mod: 25,S$GLB,, | Performed by: DERMATOLOGY

## 2021-07-14 PROCEDURE — 3288F PR FALLS RISK ASSESSMENT DOCUMENTED: ICD-10-PCS | Mod: CPTII,S$GLB,, | Performed by: DERMATOLOGY

## 2021-07-14 PROCEDURE — 99202 OFFICE O/P NEW SF 15 MIN: CPT | Mod: 25,S$GLB,, | Performed by: DERMATOLOGY

## 2021-07-14 PROCEDURE — 1101F PT FALLS ASSESS-DOCD LE1/YR: CPT | Mod: CPTII,S$GLB,, | Performed by: DERMATOLOGY

## 2021-07-14 PROCEDURE — 11901 PR INJECTION, SKIN LESIONS, 8 OR MORE: ICD-10-PCS | Mod: S$GLB,,, | Performed by: DERMATOLOGY

## 2021-07-24 ENCOUNTER — PATIENT OUTREACH (OUTPATIENT)
Dept: ADMINISTRATIVE | Facility: OTHER | Age: 66
End: 2021-07-24

## 2021-08-17 ENCOUNTER — OFFICE VISIT (OUTPATIENT)
Dept: PODIATRY | Facility: CLINIC | Age: 66
End: 2021-08-17
Payer: MEDICARE

## 2021-08-17 VITALS
WEIGHT: 187.81 LBS | SYSTOLIC BLOOD PRESSURE: 144 MMHG | HEART RATE: 83 BPM | DIASTOLIC BLOOD PRESSURE: 76 MMHG | BODY MASS INDEX: 31.26 KG/M2

## 2021-08-17 DIAGNOSIS — G58.9 NERVE ENTRAPMENT SYNDROME: ICD-10-CM

## 2021-08-17 DIAGNOSIS — M79.673 PAIN OF FOOT, UNSPECIFIED LATERALITY: ICD-10-CM

## 2021-08-17 DIAGNOSIS — E11.65 TYPE 2 DIABETES MELLITUS WITH HYPERGLYCEMIA, WITH LONG-TERM CURRENT USE OF INSULIN: ICD-10-CM

## 2021-08-17 DIAGNOSIS — Z79.4 TYPE 2 DIABETES MELLITUS WITH HYPERGLYCEMIA, WITH LONG-TERM CURRENT USE OF INSULIN: ICD-10-CM

## 2021-08-17 DIAGNOSIS — M17.0 PRIMARY OSTEOARTHRITIS OF BOTH KNEES: Primary | ICD-10-CM

## 2021-08-17 PROCEDURE — 99203 PR OFFICE/OUTPT VISIT, NEW, LEVL III, 30-44 MIN: ICD-10-PCS | Mod: S$GLB,,, | Performed by: PODIATRIST

## 2021-08-17 PROCEDURE — 3046F PR MOST RECENT HEMOGLOBIN A1C LEVEL > 9.0%: ICD-10-PCS | Mod: CPTII,S$GLB,, | Performed by: PODIATRIST

## 2021-08-17 PROCEDURE — 99203 OFFICE O/P NEW LOW 30 MIN: CPT | Mod: S$GLB,,, | Performed by: PODIATRIST

## 2021-08-17 PROCEDURE — 99999 PR PBB SHADOW E&M-EST. PATIENT-LVL IV: CPT | Mod: PBBFAC,,, | Performed by: PODIATRIST

## 2021-08-17 PROCEDURE — 99999 PR PBB SHADOW E&M-EST. PATIENT-LVL IV: ICD-10-PCS | Mod: PBBFAC,,, | Performed by: PODIATRIST

## 2021-08-17 PROCEDURE — 1160F PR REVIEW ALL MEDS BY PRESCRIBER/CLIN PHARMACIST DOCUMENTED: ICD-10-PCS | Mod: CPTII,S$GLB,, | Performed by: PODIATRIST

## 2021-08-17 PROCEDURE — 3078F PR MOST RECENT DIASTOLIC BLOOD PRESSURE < 80 MM HG: ICD-10-PCS | Mod: CPTII,S$GLB,, | Performed by: PODIATRIST

## 2021-08-17 PROCEDURE — 1159F PR MEDICATION LIST DOCUMENTED IN MEDICAL RECORD: ICD-10-PCS | Mod: CPTII,S$GLB,, | Performed by: PODIATRIST

## 2021-08-17 PROCEDURE — 3077F SYST BP >= 140 MM HG: CPT | Mod: CPTII,S$GLB,, | Performed by: PODIATRIST

## 2021-08-17 PROCEDURE — 1125F PR PAIN SEVERITY QUANTIFIED, PAIN PRESENT: ICD-10-PCS | Mod: CPTII,S$GLB,, | Performed by: PODIATRIST

## 2021-08-17 PROCEDURE — 3046F HEMOGLOBIN A1C LEVEL >9.0%: CPT | Mod: CPTII,S$GLB,, | Performed by: PODIATRIST

## 2021-08-17 PROCEDURE — 1159F MED LIST DOCD IN RCRD: CPT | Mod: CPTII,S$GLB,, | Performed by: PODIATRIST

## 2021-08-17 PROCEDURE — 1160F RVW MEDS BY RX/DR IN RCRD: CPT | Mod: CPTII,S$GLB,, | Performed by: PODIATRIST

## 2021-08-17 PROCEDURE — 1125F AMNT PAIN NOTED PAIN PRSNT: CPT | Mod: CPTII,S$GLB,, | Performed by: PODIATRIST

## 2021-08-17 PROCEDURE — 3078F DIAST BP <80 MM HG: CPT | Mod: CPTII,S$GLB,, | Performed by: PODIATRIST

## 2021-08-17 PROCEDURE — 3008F PR BODY MASS INDEX (BMI) DOCUMENTED: ICD-10-PCS | Mod: CPTII,S$GLB,, | Performed by: PODIATRIST

## 2021-08-17 PROCEDURE — 3077F PR MOST RECENT SYSTOLIC BLOOD PRESSURE >= 140 MM HG: ICD-10-PCS | Mod: CPTII,S$GLB,, | Performed by: PODIATRIST

## 2021-08-17 PROCEDURE — 3008F BODY MASS INDEX DOCD: CPT | Mod: CPTII,S$GLB,, | Performed by: PODIATRIST

## 2021-08-17 RX ORDER — LIDOCAINE HYDROCHLORIDE 20 MG/ML
JELLY TOPICAL
Qty: 30 ML | Refills: 2 | Status: SHIPPED | OUTPATIENT
Start: 2021-08-17 | End: 2022-10-25

## 2021-08-19 ENCOUNTER — PATIENT MESSAGE (OUTPATIENT)
Dept: DERMATOLOGY | Facility: CLINIC | Age: 66
End: 2021-08-19

## 2021-09-08 ENCOUNTER — PATIENT MESSAGE (OUTPATIENT)
Dept: SPORTS MEDICINE | Facility: CLINIC | Age: 66
End: 2021-09-08

## 2021-09-08 DIAGNOSIS — M25.561 BILATERAL KNEE PAIN: Primary | ICD-10-CM

## 2021-09-08 DIAGNOSIS — M25.562 BILATERAL KNEE PAIN: Primary | ICD-10-CM

## 2021-09-13 ENCOUNTER — TELEPHONE (OUTPATIENT)
Dept: SPORTS MEDICINE | Facility: CLINIC | Age: 66
End: 2021-09-13

## 2021-09-14 ENCOUNTER — APPOINTMENT (OUTPATIENT)
Dept: RADIOLOGY | Facility: OTHER | Age: 66
End: 2021-09-14
Attending: STUDENT IN AN ORGANIZED HEALTH CARE EDUCATION/TRAINING PROGRAM
Payer: MEDICARE

## 2021-09-14 ENCOUNTER — OFFICE VISIT (OUTPATIENT)
Dept: SPORTS MEDICINE | Facility: CLINIC | Age: 66
End: 2021-09-14
Payer: MEDICARE

## 2021-09-14 VITALS
WEIGHT: 187.81 LBS | DIASTOLIC BLOOD PRESSURE: 88 MMHG | HEIGHT: 65 IN | BODY MASS INDEX: 31.29 KG/M2 | SYSTOLIC BLOOD PRESSURE: 138 MMHG

## 2021-09-14 DIAGNOSIS — G89.29 CHRONIC PAIN OF LEFT KNEE: Primary | ICD-10-CM

## 2021-09-14 DIAGNOSIS — M25.562 BILATERAL KNEE PAIN: ICD-10-CM

## 2021-09-14 DIAGNOSIS — M25.561 BILATERAL KNEE PAIN: ICD-10-CM

## 2021-09-14 DIAGNOSIS — M22.2X2 PATELLOFEMORAL PAIN SYNDROME OF BOTH KNEES: ICD-10-CM

## 2021-09-14 DIAGNOSIS — M25.562 BILATERAL CHRONIC KNEE PAIN: ICD-10-CM

## 2021-09-14 DIAGNOSIS — M17.0 PRIMARY OSTEOARTHRITIS OF BOTH KNEES: ICD-10-CM

## 2021-09-14 DIAGNOSIS — M22.2X1 PATELLOFEMORAL PAIN SYNDROME OF BOTH KNEES: ICD-10-CM

## 2021-09-14 DIAGNOSIS — G89.29 BILATERAL CHRONIC KNEE PAIN: ICD-10-CM

## 2021-09-14 DIAGNOSIS — M25.561 BILATERAL CHRONIC KNEE PAIN: ICD-10-CM

## 2021-09-14 DIAGNOSIS — M25.562 CHRONIC PAIN OF LEFT KNEE: Primary | ICD-10-CM

## 2021-09-14 PROCEDURE — 3061F NEG MICROALBUMINURIA REV: CPT | Mod: CPTII,S$GLB,, | Performed by: STUDENT IN AN ORGANIZED HEALTH CARE EDUCATION/TRAINING PROGRAM

## 2021-09-14 PROCEDURE — 20611 LARGE JOINT ASPIRATION/INJECTION: L KNEE: ICD-10-PCS | Mod: LT,S$GLB,, | Performed by: STUDENT IN AN ORGANIZED HEALTH CARE EDUCATION/TRAINING PROGRAM

## 2021-09-14 PROCEDURE — 99204 OFFICE O/P NEW MOD 45 MIN: CPT | Mod: 25,S$GLB,, | Performed by: STUDENT IN AN ORGANIZED HEALTH CARE EDUCATION/TRAINING PROGRAM

## 2021-09-14 PROCEDURE — 1159F PR MEDICATION LIST DOCUMENTED IN MEDICAL RECORD: ICD-10-PCS | Mod: CPTII,S$GLB,, | Performed by: STUDENT IN AN ORGANIZED HEALTH CARE EDUCATION/TRAINING PROGRAM

## 2021-09-14 PROCEDURE — 3046F HEMOGLOBIN A1C LEVEL >9.0%: CPT | Mod: CPTII,S$GLB,, | Performed by: STUDENT IN AN ORGANIZED HEALTH CARE EDUCATION/TRAINING PROGRAM

## 2021-09-14 PROCEDURE — 99999 PR PBB SHADOW E&M-EST. PATIENT-LVL III: CPT | Mod: PBBFAC,,, | Performed by: STUDENT IN AN ORGANIZED HEALTH CARE EDUCATION/TRAINING PROGRAM

## 2021-09-14 PROCEDURE — 20611 DRAIN/INJ JOINT/BURSA W/US: CPT | Mod: LT,S$GLB,, | Performed by: STUDENT IN AN ORGANIZED HEALTH CARE EDUCATION/TRAINING PROGRAM

## 2021-09-14 PROCEDURE — 73564 X-RAY EXAM KNEE 4 OR MORE: CPT | Mod: TC,50

## 2021-09-14 PROCEDURE — 3008F BODY MASS INDEX DOCD: CPT | Mod: CPTII,S$GLB,, | Performed by: STUDENT IN AN ORGANIZED HEALTH CARE EDUCATION/TRAINING PROGRAM

## 2021-09-14 PROCEDURE — 3066F PR DOCUMENTATION OF TREATMENT FOR NEPHROPATHY: ICD-10-PCS | Mod: CPTII,S$GLB,, | Performed by: STUDENT IN AN ORGANIZED HEALTH CARE EDUCATION/TRAINING PROGRAM

## 2021-09-14 PROCEDURE — 3075F PR MOST RECENT SYSTOLIC BLOOD PRESS GE 130-139MM HG: ICD-10-PCS | Mod: CPTII,S$GLB,, | Performed by: STUDENT IN AN ORGANIZED HEALTH CARE EDUCATION/TRAINING PROGRAM

## 2021-09-14 PROCEDURE — 97110 THERAPEUTIC EXERCISES: CPT | Mod: GP,S$GLB,, | Performed by: STUDENT IN AN ORGANIZED HEALTH CARE EDUCATION/TRAINING PROGRAM

## 2021-09-14 PROCEDURE — 3008F PR BODY MASS INDEX (BMI) DOCUMENTED: ICD-10-PCS | Mod: CPTII,S$GLB,, | Performed by: STUDENT IN AN ORGANIZED HEALTH CARE EDUCATION/TRAINING PROGRAM

## 2021-09-14 PROCEDURE — 3079F PR MOST RECENT DIASTOLIC BLOOD PRESSURE 80-89 MM HG: ICD-10-PCS | Mod: CPTII,S$GLB,, | Performed by: STUDENT IN AN ORGANIZED HEALTH CARE EDUCATION/TRAINING PROGRAM

## 2021-09-14 PROCEDURE — 1160F RVW MEDS BY RX/DR IN RCRD: CPT | Mod: CPTII,S$GLB,, | Performed by: STUDENT IN AN ORGANIZED HEALTH CARE EDUCATION/TRAINING PROGRAM

## 2021-09-14 PROCEDURE — 99999 PR PBB SHADOW E&M-EST. PATIENT-LVL III: ICD-10-PCS | Mod: PBBFAC,,, | Performed by: STUDENT IN AN ORGANIZED HEALTH CARE EDUCATION/TRAINING PROGRAM

## 2021-09-14 PROCEDURE — 3079F DIAST BP 80-89 MM HG: CPT | Mod: CPTII,S$GLB,, | Performed by: STUDENT IN AN ORGANIZED HEALTH CARE EDUCATION/TRAINING PROGRAM

## 2021-09-14 PROCEDURE — 3075F SYST BP GE 130 - 139MM HG: CPT | Mod: CPTII,S$GLB,, | Performed by: STUDENT IN AN ORGANIZED HEALTH CARE EDUCATION/TRAINING PROGRAM

## 2021-09-14 PROCEDURE — 3066F NEPHROPATHY DOC TX: CPT | Mod: CPTII,S$GLB,, | Performed by: STUDENT IN AN ORGANIZED HEALTH CARE EDUCATION/TRAINING PROGRAM

## 2021-09-14 PROCEDURE — 3046F PR MOST RECENT HEMOGLOBIN A1C LEVEL > 9.0%: ICD-10-PCS | Mod: CPTII,S$GLB,, | Performed by: STUDENT IN AN ORGANIZED HEALTH CARE EDUCATION/TRAINING PROGRAM

## 2021-09-14 PROCEDURE — 4010F PR ACE/ARB THEARPY RXD/TAKEN: ICD-10-PCS | Mod: CPTII,S$GLB,, | Performed by: STUDENT IN AN ORGANIZED HEALTH CARE EDUCATION/TRAINING PROGRAM

## 2021-09-14 PROCEDURE — 4010F ACE/ARB THERAPY RXD/TAKEN: CPT | Mod: CPTII,S$GLB,, | Performed by: STUDENT IN AN ORGANIZED HEALTH CARE EDUCATION/TRAINING PROGRAM

## 2021-09-14 PROCEDURE — 97110 PR THERAPEUTIC EXERCISES: ICD-10-PCS | Mod: GP,S$GLB,, | Performed by: STUDENT IN AN ORGANIZED HEALTH CARE EDUCATION/TRAINING PROGRAM

## 2021-09-14 PROCEDURE — 1159F MED LIST DOCD IN RCRD: CPT | Mod: CPTII,S$GLB,, | Performed by: STUDENT IN AN ORGANIZED HEALTH CARE EDUCATION/TRAINING PROGRAM

## 2021-09-14 PROCEDURE — 1160F PR REVIEW ALL MEDS BY PRESCRIBER/CLIN PHARMACIST DOCUMENTED: ICD-10-PCS | Mod: CPTII,S$GLB,, | Performed by: STUDENT IN AN ORGANIZED HEALTH CARE EDUCATION/TRAINING PROGRAM

## 2021-09-14 PROCEDURE — 73564 X-RAY EXAM KNEE 4 OR MORE: CPT | Mod: 26,50,, | Performed by: RADIOLOGY

## 2021-09-14 PROCEDURE — 99204 PR OFFICE/OUTPT VISIT, NEW, LEVL IV, 45-59 MIN: ICD-10-PCS | Mod: 25,S$GLB,, | Performed by: STUDENT IN AN ORGANIZED HEALTH CARE EDUCATION/TRAINING PROGRAM

## 2021-09-14 PROCEDURE — 3061F PR NEG MICROALBUMINURIA RESULT DOCUMENTED/REVIEW: ICD-10-PCS | Mod: CPTII,S$GLB,, | Performed by: STUDENT IN AN ORGANIZED HEALTH CARE EDUCATION/TRAINING PROGRAM

## 2021-09-14 PROCEDURE — 73564 XR KNEE ORTHO BILAT WITH FLEXION: ICD-10-PCS | Mod: 26,50,, | Performed by: RADIOLOGY

## 2021-09-14 RX ORDER — TRIAMCINOLONE ACETONIDE 40 MG/ML
40 INJECTION, SUSPENSION INTRA-ARTICULAR; INTRAMUSCULAR
Status: DISCONTINUED | OUTPATIENT
Start: 2021-09-14 | End: 2021-09-14 | Stop reason: HOSPADM

## 2021-09-14 RX ADMIN — TRIAMCINOLONE ACETONIDE 40 MG: 40 INJECTION, SUSPENSION INTRA-ARTICULAR; INTRAMUSCULAR at 10:09

## 2021-09-15 ENCOUNTER — PATIENT MESSAGE (OUTPATIENT)
Dept: DERMATOLOGY | Facility: CLINIC | Age: 66
End: 2021-09-15

## 2021-09-23 RX ORDER — VALSARTAN 80 MG/1
80 TABLET ORAL DAILY
Qty: 90 TABLET | Refills: 2 | Status: SHIPPED | OUTPATIENT
Start: 2021-09-23 | End: 2022-06-07 | Stop reason: SDUPTHER

## 2021-09-27 ENCOUNTER — OFFICE VISIT (OUTPATIENT)
Dept: DERMATOLOGY | Facility: CLINIC | Age: 66
End: 2021-09-27
Payer: MEDICARE

## 2021-09-27 DIAGNOSIS — L91.0 KELOID: Primary | ICD-10-CM

## 2021-09-27 DIAGNOSIS — Z76.89 ENCOUNTER FOR SKIN CARE: ICD-10-CM

## 2021-09-27 DIAGNOSIS — L81.9 HYPERPIGMENTATION: ICD-10-CM

## 2021-09-27 PROCEDURE — 11900 PR INJECTION INTO SKIN LESIONS, UP TO 7: ICD-10-PCS | Mod: S$GLB,,, | Performed by: DERMATOLOGY

## 2021-09-27 PROCEDURE — 1159F MED LIST DOCD IN RCRD: CPT | Mod: CPTII,S$GLB,, | Performed by: DERMATOLOGY

## 2021-09-27 PROCEDURE — 3061F PR NEG MICROALBUMINURIA RESULT DOCUMENTED/REVIEW: ICD-10-PCS | Mod: CPTII,S$GLB,, | Performed by: DERMATOLOGY

## 2021-09-27 PROCEDURE — 3061F NEG MICROALBUMINURIA REV: CPT | Mod: CPTII,S$GLB,, | Performed by: DERMATOLOGY

## 2021-09-27 PROCEDURE — 99212 OFFICE O/P EST SF 10 MIN: CPT | Mod: 25,S$GLB,, | Performed by: DERMATOLOGY

## 2021-09-27 PROCEDURE — 1160F RVW MEDS BY RX/DR IN RCRD: CPT | Mod: CPTII,S$GLB,, | Performed by: DERMATOLOGY

## 2021-09-27 PROCEDURE — 99999 PR PBB SHADOW E&M-EST. PATIENT-LVL III: CPT | Mod: PBBFAC,,, | Performed by: DERMATOLOGY

## 2021-09-27 PROCEDURE — 1101F PT FALLS ASSESS-DOCD LE1/YR: CPT | Mod: CPTII,S$GLB,, | Performed by: DERMATOLOGY

## 2021-09-27 PROCEDURE — 1126F PR PAIN SEVERITY QUANTIFIED, NO PAIN PRESENT: ICD-10-PCS | Mod: CPTII,S$GLB,, | Performed by: DERMATOLOGY

## 2021-09-27 PROCEDURE — 3288F FALL RISK ASSESSMENT DOCD: CPT | Mod: CPTII,S$GLB,, | Performed by: DERMATOLOGY

## 2021-09-27 PROCEDURE — 3066F NEPHROPATHY DOC TX: CPT | Mod: CPTII,S$GLB,, | Performed by: DERMATOLOGY

## 2021-09-27 PROCEDURE — 1101F PR PT FALLS ASSESS DOC 0-1 FALLS W/OUT INJ PAST YR: ICD-10-PCS | Mod: CPTII,S$GLB,, | Performed by: DERMATOLOGY

## 2021-09-27 PROCEDURE — 1126F AMNT PAIN NOTED NONE PRSNT: CPT | Mod: CPTII,S$GLB,, | Performed by: DERMATOLOGY

## 2021-09-27 PROCEDURE — 1160F PR REVIEW ALL MEDS BY PRESCRIBER/CLIN PHARMACIST DOCUMENTED: ICD-10-PCS | Mod: CPTII,S$GLB,, | Performed by: DERMATOLOGY

## 2021-09-27 PROCEDURE — 99212 PR OFFICE/OUTPT VISIT, EST, LEVL II, 10-19 MIN: ICD-10-PCS | Mod: 25,S$GLB,, | Performed by: DERMATOLOGY

## 2021-09-27 PROCEDURE — 3046F PR MOST RECENT HEMOGLOBIN A1C LEVEL > 9.0%: ICD-10-PCS | Mod: CPTII,S$GLB,, | Performed by: DERMATOLOGY

## 2021-09-27 PROCEDURE — 4010F ACE/ARB THERAPY RXD/TAKEN: CPT | Mod: CPTII,S$GLB,, | Performed by: DERMATOLOGY

## 2021-09-27 PROCEDURE — 3046F HEMOGLOBIN A1C LEVEL >9.0%: CPT | Mod: CPTII,S$GLB,, | Performed by: DERMATOLOGY

## 2021-09-27 PROCEDURE — 4010F PR ACE/ARB THEARPY RXD/TAKEN: ICD-10-PCS | Mod: CPTII,S$GLB,, | Performed by: DERMATOLOGY

## 2021-09-27 PROCEDURE — 3066F PR DOCUMENTATION OF TREATMENT FOR NEPHROPATHY: ICD-10-PCS | Mod: CPTII,S$GLB,, | Performed by: DERMATOLOGY

## 2021-09-27 PROCEDURE — 1159F PR MEDICATION LIST DOCUMENTED IN MEDICAL RECORD: ICD-10-PCS | Mod: CPTII,S$GLB,, | Performed by: DERMATOLOGY

## 2021-09-27 PROCEDURE — 3288F PR FALLS RISK ASSESSMENT DOCUMENTED: ICD-10-PCS | Mod: CPTII,S$GLB,, | Performed by: DERMATOLOGY

## 2021-09-27 PROCEDURE — 11900 INJECT SKIN LESIONS </W 7: CPT | Mod: S$GLB,,, | Performed by: DERMATOLOGY

## 2021-09-27 PROCEDURE — 99999 PR PBB SHADOW E&M-EST. PATIENT-LVL III: ICD-10-PCS | Mod: PBBFAC,,, | Performed by: DERMATOLOGY

## 2021-10-04 ENCOUNTER — PATIENT MESSAGE (OUTPATIENT)
Dept: ADMINISTRATIVE | Facility: HOSPITAL | Age: 66
End: 2021-10-04

## 2021-10-15 ENCOUNTER — LAB VISIT (OUTPATIENT)
Dept: LAB | Facility: HOSPITAL | Age: 66
End: 2021-10-15
Attending: INTERNAL MEDICINE
Payer: MEDICARE

## 2021-10-15 ENCOUNTER — PATIENT MESSAGE (OUTPATIENT)
Dept: INTERNAL MEDICINE | Facility: CLINIC | Age: 66
End: 2021-10-15
Payer: MEDICARE

## 2021-10-15 DIAGNOSIS — E11.65 TYPE 2 DIABETES MELLITUS WITH HYPERGLYCEMIA, WITHOUT LONG-TERM CURRENT USE OF INSULIN: ICD-10-CM

## 2021-10-15 LAB
ANION GAP SERPL CALC-SCNC: 12 MMOL/L (ref 8–16)
BUN SERPL-MCNC: 12 MG/DL (ref 8–23)
CALCIUM SERPL-MCNC: 10 MG/DL (ref 8.7–10.5)
CHLORIDE SERPL-SCNC: 103 MMOL/L (ref 95–110)
CO2 SERPL-SCNC: 23 MMOL/L (ref 23–29)
CREAT SERPL-MCNC: 1 MG/DL (ref 0.5–1.4)
EST. GFR  (AFRICAN AMERICAN): >60 ML/MIN/1.73 M^2
EST. GFR  (NON AFRICAN AMERICAN): 58.8 ML/MIN/1.73 M^2
ESTIMATED AVG GLUCOSE: 212 MG/DL (ref 68–131)
GLUCOSE SERPL-MCNC: 176 MG/DL (ref 70–110)
HBA1C MFR BLD: 9 % (ref 4–5.6)
POTASSIUM SERPL-SCNC: 4.8 MMOL/L (ref 3.5–5.1)
SODIUM SERPL-SCNC: 138 MMOL/L (ref 136–145)

## 2021-10-15 PROCEDURE — 80048 BASIC METABOLIC PNL TOTAL CA: CPT | Mod: HCNC | Performed by: NURSE PRACTITIONER

## 2021-10-15 PROCEDURE — 83036 HEMOGLOBIN GLYCOSYLATED A1C: CPT | Mod: HCNC | Performed by: NURSE PRACTITIONER

## 2021-10-15 PROCEDURE — 36415 COLL VENOUS BLD VENIPUNCTURE: CPT | Mod: HCNC | Performed by: NURSE PRACTITIONER

## 2021-10-22 ENCOUNTER — TELEPHONE (OUTPATIENT)
Dept: INTERNAL MEDICINE | Facility: CLINIC | Age: 66
End: 2021-10-22

## 2021-10-26 ENCOUNTER — OFFICE VISIT (OUTPATIENT)
Dept: SPORTS MEDICINE | Facility: CLINIC | Age: 66
End: 2021-10-26
Payer: MEDICARE

## 2021-10-26 VITALS
HEIGHT: 65 IN | DIASTOLIC BLOOD PRESSURE: 83 MMHG | BODY MASS INDEX: 31.29 KG/M2 | SYSTOLIC BLOOD PRESSURE: 155 MMHG | WEIGHT: 187.81 LBS

## 2021-10-26 DIAGNOSIS — G89.29 CHRONIC PAIN OF LEFT KNEE: Primary | ICD-10-CM

## 2021-10-26 DIAGNOSIS — R03.0 ELEVATED BLOOD PRESSURE READING: ICD-10-CM

## 2021-10-26 DIAGNOSIS — M25.562 CHRONIC PAIN OF LEFT KNEE: Primary | ICD-10-CM

## 2021-10-26 PROCEDURE — 3008F BODY MASS INDEX DOCD: CPT | Mod: HCNC,CPTII,S$GLB, | Performed by: STUDENT IN AN ORGANIZED HEALTH CARE EDUCATION/TRAINING PROGRAM

## 2021-10-26 PROCEDURE — 3066F NEPHROPATHY DOC TX: CPT | Mod: HCNC,CPTII,S$GLB, | Performed by: STUDENT IN AN ORGANIZED HEALTH CARE EDUCATION/TRAINING PROGRAM

## 2021-10-26 PROCEDURE — 3079F DIAST BP 80-89 MM HG: CPT | Mod: HCNC,CPTII,S$GLB, | Performed by: STUDENT IN AN ORGANIZED HEALTH CARE EDUCATION/TRAINING PROGRAM

## 2021-10-26 PROCEDURE — 99999 PR PBB SHADOW E&M-EST. PATIENT-LVL III: CPT | Mod: PBBFAC,HCNC,, | Performed by: STUDENT IN AN ORGANIZED HEALTH CARE EDUCATION/TRAINING PROGRAM

## 2021-10-26 PROCEDURE — 4010F PR ACE/ARB THEARPY RXD/TAKEN: ICD-10-PCS | Mod: HCNC,CPTII,S$GLB, | Performed by: STUDENT IN AN ORGANIZED HEALTH CARE EDUCATION/TRAINING PROGRAM

## 2021-10-26 PROCEDURE — 3066F PR DOCUMENTATION OF TREATMENT FOR NEPHROPATHY: ICD-10-PCS | Mod: HCNC,CPTII,S$GLB, | Performed by: STUDENT IN AN ORGANIZED HEALTH CARE EDUCATION/TRAINING PROGRAM

## 2021-10-26 PROCEDURE — 3052F PR MOST RECENT HEMOGLOBIN A1C LEVEL 8.0 - < 9.0%: ICD-10-PCS | Mod: HCNC,CPTII,S$GLB, | Performed by: STUDENT IN AN ORGANIZED HEALTH CARE EDUCATION/TRAINING PROGRAM

## 2021-10-26 PROCEDURE — 1160F RVW MEDS BY RX/DR IN RCRD: CPT | Mod: HCNC,CPTII,S$GLB, | Performed by: STUDENT IN AN ORGANIZED HEALTH CARE EDUCATION/TRAINING PROGRAM

## 2021-10-26 PROCEDURE — 1159F MED LIST DOCD IN RCRD: CPT | Mod: HCNC,CPTII,S$GLB, | Performed by: STUDENT IN AN ORGANIZED HEALTH CARE EDUCATION/TRAINING PROGRAM

## 2021-10-26 PROCEDURE — 1160F PR REVIEW ALL MEDS BY PRESCRIBER/CLIN PHARMACIST DOCUMENTED: ICD-10-PCS | Mod: HCNC,CPTII,S$GLB, | Performed by: STUDENT IN AN ORGANIZED HEALTH CARE EDUCATION/TRAINING PROGRAM

## 2021-10-26 PROCEDURE — 3052F HG A1C>EQUAL 8.0%<EQUAL 9.0%: CPT | Mod: HCNC,CPTII,S$GLB, | Performed by: STUDENT IN AN ORGANIZED HEALTH CARE EDUCATION/TRAINING PROGRAM

## 2021-10-26 PROCEDURE — 3079F PR MOST RECENT DIASTOLIC BLOOD PRESSURE 80-89 MM HG: ICD-10-PCS | Mod: HCNC,CPTII,S$GLB, | Performed by: STUDENT IN AN ORGANIZED HEALTH CARE EDUCATION/TRAINING PROGRAM

## 2021-10-26 PROCEDURE — 3061F NEG MICROALBUMINURIA REV: CPT | Mod: HCNC,CPTII,S$GLB, | Performed by: STUDENT IN AN ORGANIZED HEALTH CARE EDUCATION/TRAINING PROGRAM

## 2021-10-26 PROCEDURE — 3061F PR NEG MICROALBUMINURIA RESULT DOCUMENTED/REVIEW: ICD-10-PCS | Mod: HCNC,CPTII,S$GLB, | Performed by: STUDENT IN AN ORGANIZED HEALTH CARE EDUCATION/TRAINING PROGRAM

## 2021-10-26 PROCEDURE — 3077F SYST BP >= 140 MM HG: CPT | Mod: HCNC,CPTII,S$GLB, | Performed by: STUDENT IN AN ORGANIZED HEALTH CARE EDUCATION/TRAINING PROGRAM

## 2021-10-26 PROCEDURE — 3077F PR MOST RECENT SYSTOLIC BLOOD PRESSURE >= 140 MM HG: ICD-10-PCS | Mod: HCNC,CPTII,S$GLB, | Performed by: STUDENT IN AN ORGANIZED HEALTH CARE EDUCATION/TRAINING PROGRAM

## 2021-10-26 PROCEDURE — 1125F PR PAIN SEVERITY QUANTIFIED, PAIN PRESENT: ICD-10-PCS | Mod: HCNC,CPTII,S$GLB, | Performed by: STUDENT IN AN ORGANIZED HEALTH CARE EDUCATION/TRAINING PROGRAM

## 2021-10-26 PROCEDURE — 4010F ACE/ARB THERAPY RXD/TAKEN: CPT | Mod: HCNC,CPTII,S$GLB, | Performed by: STUDENT IN AN ORGANIZED HEALTH CARE EDUCATION/TRAINING PROGRAM

## 2021-10-26 PROCEDURE — 99213 PR OFFICE/OUTPT VISIT, EST, LEVL III, 20-29 MIN: ICD-10-PCS | Mod: HCNC,S$GLB,, | Performed by: STUDENT IN AN ORGANIZED HEALTH CARE EDUCATION/TRAINING PROGRAM

## 2021-10-26 PROCEDURE — 3008F PR BODY MASS INDEX (BMI) DOCUMENTED: ICD-10-PCS | Mod: HCNC,CPTII,S$GLB, | Performed by: STUDENT IN AN ORGANIZED HEALTH CARE EDUCATION/TRAINING PROGRAM

## 2021-10-26 PROCEDURE — 1125F AMNT PAIN NOTED PAIN PRSNT: CPT | Mod: HCNC,CPTII,S$GLB, | Performed by: STUDENT IN AN ORGANIZED HEALTH CARE EDUCATION/TRAINING PROGRAM

## 2021-10-26 PROCEDURE — 99213 OFFICE O/P EST LOW 20 MIN: CPT | Mod: HCNC,S$GLB,, | Performed by: STUDENT IN AN ORGANIZED HEALTH CARE EDUCATION/TRAINING PROGRAM

## 2021-10-26 PROCEDURE — 1159F PR MEDICATION LIST DOCUMENTED IN MEDICAL RECORD: ICD-10-PCS | Mod: HCNC,CPTII,S$GLB, | Performed by: STUDENT IN AN ORGANIZED HEALTH CARE EDUCATION/TRAINING PROGRAM

## 2021-10-26 PROCEDURE — 99999 PR PBB SHADOW E&M-EST. PATIENT-LVL III: ICD-10-PCS | Mod: PBBFAC,HCNC,, | Performed by: STUDENT IN AN ORGANIZED HEALTH CARE EDUCATION/TRAINING PROGRAM

## 2021-11-11 ENCOUNTER — OFFICE VISIT (OUTPATIENT)
Dept: INTERNAL MEDICINE | Facility: CLINIC | Age: 66
End: 2021-11-11
Payer: MEDICARE

## 2021-11-11 VITALS
HEIGHT: 65 IN | OXYGEN SATURATION: 99 % | SYSTOLIC BLOOD PRESSURE: 138 MMHG | DIASTOLIC BLOOD PRESSURE: 82 MMHG | WEIGHT: 181.69 LBS | HEART RATE: 91 BPM | BODY MASS INDEX: 30.27 KG/M2

## 2021-11-11 DIAGNOSIS — E78.5 HYPERLIPIDEMIA, UNSPECIFIED HYPERLIPIDEMIA TYPE: ICD-10-CM

## 2021-11-11 DIAGNOSIS — Z79.4 TYPE 2 DIABETES MELLITUS WITH HYPERGLYCEMIA, WITH LONG-TERM CURRENT USE OF INSULIN: Primary | ICD-10-CM

## 2021-11-11 DIAGNOSIS — I10 ESSENTIAL HYPERTENSION: ICD-10-CM

## 2021-11-11 DIAGNOSIS — E66.9 OBESITY, CLASS I, BMI 30-34.9: ICD-10-CM

## 2021-11-11 DIAGNOSIS — E11.65 TYPE 2 DIABETES MELLITUS WITH HYPERGLYCEMIA, WITH LONG-TERM CURRENT USE OF INSULIN: Primary | ICD-10-CM

## 2021-11-11 DIAGNOSIS — E11.3299 NON-PROLIFERATIVE DIABETIC RETINOPATHY: ICD-10-CM

## 2021-11-11 PROCEDURE — 1159F PR MEDICATION LIST DOCUMENTED IN MEDICAL RECORD: ICD-10-PCS | Mod: HCNC,CPTII,S$GLB, | Performed by: NURSE PRACTITIONER

## 2021-11-11 PROCEDURE — 3077F PR MOST RECENT SYSTOLIC BLOOD PRESSURE >= 140 MM HG: ICD-10-PCS | Mod: HCNC,CPTII,S$GLB, | Performed by: NURSE PRACTITIONER

## 2021-11-11 PROCEDURE — 99214 PR OFFICE/OUTPT VISIT, EST, LEVL IV, 30-39 MIN: ICD-10-PCS | Mod: HCNC,S$GLB,, | Performed by: NURSE PRACTITIONER

## 2021-11-11 PROCEDURE — 3052F HG A1C>EQUAL 8.0%<EQUAL 9.0%: CPT | Mod: HCNC,CPTII,S$GLB, | Performed by: NURSE PRACTITIONER

## 2021-11-11 PROCEDURE — 1160F PR REVIEW ALL MEDS BY PRESCRIBER/CLIN PHARMACIST DOCUMENTED: ICD-10-PCS | Mod: HCNC,CPTII,S$GLB, | Performed by: NURSE PRACTITIONER

## 2021-11-11 PROCEDURE — 4010F PR ACE/ARB THEARPY RXD/TAKEN: ICD-10-PCS | Mod: HCNC,CPTII,S$GLB, | Performed by: NURSE PRACTITIONER

## 2021-11-11 PROCEDURE — 99214 OFFICE O/P EST MOD 30 MIN: CPT | Mod: HCNC,S$GLB,, | Performed by: NURSE PRACTITIONER

## 2021-11-11 PROCEDURE — 3061F NEG MICROALBUMINURIA REV: CPT | Mod: HCNC,CPTII,S$GLB, | Performed by: NURSE PRACTITIONER

## 2021-11-11 PROCEDURE — 99999 PR PBB SHADOW E&M-EST. PATIENT-LVL IV: CPT | Mod: PBBFAC,HCNC,, | Performed by: NURSE PRACTITIONER

## 2021-11-11 PROCEDURE — 1160F RVW MEDS BY RX/DR IN RCRD: CPT | Mod: HCNC,CPTII,S$GLB, | Performed by: NURSE PRACTITIONER

## 2021-11-11 PROCEDURE — 3061F PR NEG MICROALBUMINURIA RESULT DOCUMENTED/REVIEW: ICD-10-PCS | Mod: HCNC,CPTII,S$GLB, | Performed by: NURSE PRACTITIONER

## 2021-11-11 PROCEDURE — 3066F PR DOCUMENTATION OF TREATMENT FOR NEPHROPATHY: ICD-10-PCS | Mod: HCNC,CPTII,S$GLB, | Performed by: NURSE PRACTITIONER

## 2021-11-11 PROCEDURE — 3077F SYST BP >= 140 MM HG: CPT | Mod: HCNC,CPTII,S$GLB, | Performed by: NURSE PRACTITIONER

## 2021-11-11 PROCEDURE — 1126F AMNT PAIN NOTED NONE PRSNT: CPT | Mod: HCNC,CPTII,S$GLB, | Performed by: NURSE PRACTITIONER

## 2021-11-11 PROCEDURE — 3008F PR BODY MASS INDEX (BMI) DOCUMENTED: ICD-10-PCS | Mod: HCNC,CPTII,S$GLB, | Performed by: NURSE PRACTITIONER

## 2021-11-11 PROCEDURE — 3052F PR MOST RECENT HEMOGLOBIN A1C LEVEL 8.0 - < 9.0%: ICD-10-PCS | Mod: HCNC,CPTII,S$GLB, | Performed by: NURSE PRACTITIONER

## 2021-11-11 PROCEDURE — 3079F DIAST BP 80-89 MM HG: CPT | Mod: HCNC,CPTII,S$GLB, | Performed by: NURSE PRACTITIONER

## 2021-11-11 PROCEDURE — 3008F BODY MASS INDEX DOCD: CPT | Mod: HCNC,CPTII,S$GLB, | Performed by: NURSE PRACTITIONER

## 2021-11-11 PROCEDURE — 4010F ACE/ARB THERAPY RXD/TAKEN: CPT | Mod: HCNC,CPTII,S$GLB, | Performed by: NURSE PRACTITIONER

## 2021-11-11 PROCEDURE — 1101F PT FALLS ASSESS-DOCD LE1/YR: CPT | Mod: HCNC,CPTII,S$GLB, | Performed by: NURSE PRACTITIONER

## 2021-11-11 PROCEDURE — 1126F PR PAIN SEVERITY QUANTIFIED, NO PAIN PRESENT: ICD-10-PCS | Mod: HCNC,CPTII,S$GLB, | Performed by: NURSE PRACTITIONER

## 2021-11-11 PROCEDURE — 3079F PR MOST RECENT DIASTOLIC BLOOD PRESSURE 80-89 MM HG: ICD-10-PCS | Mod: HCNC,CPTII,S$GLB, | Performed by: NURSE PRACTITIONER

## 2021-11-11 PROCEDURE — 99999 PR PBB SHADOW E&M-EST. PATIENT-LVL IV: ICD-10-PCS | Mod: PBBFAC,HCNC,, | Performed by: NURSE PRACTITIONER

## 2021-11-11 PROCEDURE — 3066F NEPHROPATHY DOC TX: CPT | Mod: HCNC,CPTII,S$GLB, | Performed by: NURSE PRACTITIONER

## 2021-11-11 PROCEDURE — 3288F FALL RISK ASSESSMENT DOCD: CPT | Mod: HCNC,CPTII,S$GLB, | Performed by: NURSE PRACTITIONER

## 2021-11-11 PROCEDURE — 1101F PR PT FALLS ASSESS DOC 0-1 FALLS W/OUT INJ PAST YR: ICD-10-PCS | Mod: HCNC,CPTII,S$GLB, | Performed by: NURSE PRACTITIONER

## 2021-11-11 PROCEDURE — 3288F PR FALLS RISK ASSESSMENT DOCUMENTED: ICD-10-PCS | Mod: HCNC,CPTII,S$GLB, | Performed by: NURSE PRACTITIONER

## 2021-11-11 PROCEDURE — 1159F MED LIST DOCD IN RCRD: CPT | Mod: HCNC,CPTII,S$GLB, | Performed by: NURSE PRACTITIONER

## 2021-11-11 RX ORDER — EMPAGLIFLOZIN 10 MG/1
10 TABLET, FILM COATED ORAL DAILY
Qty: 90 TABLET | Refills: 2 | Status: SHIPPED | OUTPATIENT
Start: 2021-11-11 | End: 2022-03-31

## 2021-11-12 ENCOUNTER — PATIENT OUTREACH (OUTPATIENT)
Dept: ADMINISTRATIVE | Facility: HOSPITAL | Age: 66
End: 2021-11-12
Payer: MEDICARE

## 2021-12-06 ENCOUNTER — PES CALL (OUTPATIENT)
Dept: ADMINISTRATIVE | Facility: CLINIC | Age: 66
End: 2021-12-06
Payer: MEDICARE

## 2021-12-13 ENCOUNTER — TELEPHONE (OUTPATIENT)
Dept: ADMINISTRATIVE | Facility: CLINIC | Age: 66
End: 2021-12-13
Payer: MEDICARE

## 2022-02-21 ENCOUNTER — PATIENT MESSAGE (OUTPATIENT)
Dept: RESEARCH | Facility: HOSPITAL | Age: 67
End: 2022-02-21
Payer: MEDICARE

## 2022-03-07 ENCOUNTER — PROCEDURE VISIT (OUTPATIENT)
Dept: DERMATOLOGY | Facility: CLINIC | Age: 67
End: 2022-03-07
Payer: MEDICARE

## 2022-03-07 DIAGNOSIS — L91.0 KELOID: Primary | ICD-10-CM

## 2022-03-07 PROCEDURE — 99499 NO LOS: ICD-10-PCS | Mod: HCNC,S$GLB,, | Performed by: DERMATOLOGY

## 2022-03-07 PROCEDURE — 99499 UNLISTED E&M SERVICE: CPT | Mod: HCNC,S$GLB,, | Performed by: DERMATOLOGY

## 2022-03-07 PROCEDURE — 11311 PR SHAV SKIN LES 0.6-1.0 CM FACE,FACIAL: ICD-10-PCS | Mod: HCNC,S$GLB,, | Performed by: DERMATOLOGY

## 2022-03-07 PROCEDURE — 88305 TISSUE EXAM BY PATHOLOGIST: CPT | Mod: HCNC | Performed by: PATHOLOGY

## 2022-03-07 PROCEDURE — 88305 TISSUE EXAM BY PATHOLOGIST: CPT | Mod: 26,HCNC,, | Performed by: PATHOLOGY

## 2022-03-07 PROCEDURE — 11311 SHAVE SKIN LESION 0.6-1.0 CM: CPT | Mod: HCNC,S$GLB,, | Performed by: DERMATOLOGY

## 2022-03-07 PROCEDURE — 88305 TISSUE EXAM BY PATHOLOGIST: ICD-10-PCS | Mod: 26,HCNC,, | Performed by: PATHOLOGY

## 2022-03-07 RX ORDER — CLOBETASOL PROPIONATE 0.5 MG/G
OINTMENT TOPICAL 2 TIMES DAILY
Qty: 30 G | Refills: 0 | Status: SHIPPED | OUTPATIENT
Start: 2022-03-07 | End: 2023-02-22

## 2022-03-07 NOTE — PATIENT INSTRUCTIONS
Shave Biopsy Wound Care    Your doctor has performed a shave biopsy today.  A band aid and vaseline ointment has been placed over the site.  This should remain in place for NO LONGER THAN 48 hours.  It is fine to remove the bandaid after 24 hours, if the area is no longer bleeding. It is recommended that you keep the area dry (do not wet)) for the first 24 hours.  After 24 hours, wash the area with warm soap and water and apply Vaseline jelly.  Many patients prefer to use Neosporin or Bacitracin ointment.  This is acceptable; however, know that you can develop an allergy to this medication even if you have used it safely for years.  It is important to keep the area moist.  Letting it dry out and get air slows healing time, and will worsen the scar.        If you notice increasing redness, tenderness, pain, or yellow drainage at the biopsy site, please notify your doctor.  These are signs of an infection.    If your biopsy site is bleeding, apply firm pressure for 15 minutes straight.  Repeat for another 15 minutes, if it is still bleeding.   If the surgical site continues to bleed, then please contact your doctor.      For [facility] users:   You will receive your biopsy results in [facility] as soon as they are available. Please be assured that your physician/provider will review your results and will then determine what further treatment, evaluation, or planning is required. You should be contacted by your physician's/provider's office within 5 business days of receiving your results; If not, please reach out to directly. This is one more way [facility] putting you first.     [facility] [place] [address]/ [phone] FAX/ [email or web address]

## 2022-03-07 NOTE — PROGRESS NOTES
Shave removal procedure note:    Lesion size 0.7 cm    Shave performed after verbal consent including risk of infection, scar, recurrence, need for additional treatment of site. Area prepped with alcohol, anesthetized with approximately 1.0cc of 1% lidocaine with epinephrine. Lesional tissue shaved with razor blade. Hemostasis achieved with application of aluminum chloride followed by hyfrecation as needed. No complications. Dressing applied. Wound care explained.      Intralesional Kenalog 10mg/cc (0.06 cc total) injected into 1 lesions on the r ear today after obtaining verbal consent including risk of surrounding hypopigmentation. Patient tolerated procedure well.    Units: 1  NDC for Kenalog 10mg/cc:  7013-7453-17      Start clob oint bid focally post healing.  Focally to keloid scar of the r ear.  Start after surgery site healed.  Stop if flat or no regrowth after a month.

## 2022-03-15 LAB
FINAL PATHOLOGIC DIAGNOSIS: NORMAL
GROSS: NORMAL
Lab: NORMAL
MICROSCOPIC EXAM: NORMAL

## 2022-03-28 ENCOUNTER — LAB VISIT (OUTPATIENT)
Dept: LAB | Facility: HOSPITAL | Age: 67
End: 2022-03-28
Attending: INTERNAL MEDICINE
Payer: MEDICARE

## 2022-03-28 DIAGNOSIS — Z12.11 COLON CANCER SCREENING: ICD-10-CM

## 2022-03-28 PROCEDURE — 82274 ASSAY TEST FOR BLOOD FECAL: CPT | Performed by: INTERNAL MEDICINE

## 2022-03-29 ENCOUNTER — LAB VISIT (OUTPATIENT)
Dept: LAB | Facility: HOSPITAL | Age: 67
End: 2022-03-29
Payer: MEDICARE

## 2022-03-29 DIAGNOSIS — Z79.4 TYPE 2 DIABETES MELLITUS WITH HYPERGLYCEMIA, WITH LONG-TERM CURRENT USE OF INSULIN: ICD-10-CM

## 2022-03-29 DIAGNOSIS — E11.65 TYPE 2 DIABETES MELLITUS WITH HYPERGLYCEMIA, WITH LONG-TERM CURRENT USE OF INSULIN: ICD-10-CM

## 2022-03-29 DIAGNOSIS — E78.5 HYPERLIPIDEMIA, UNSPECIFIED HYPERLIPIDEMIA TYPE: ICD-10-CM

## 2022-03-29 LAB
ALBUMIN SERPL BCP-MCNC: 4 G/DL (ref 3.5–5.2)
ANION GAP SERPL CALC-SCNC: 11 MMOL/L (ref 8–16)
BUN SERPL-MCNC: 13 MG/DL (ref 8–23)
CALCIUM SERPL-MCNC: 10.3 MG/DL (ref 8.7–10.5)
CHLORIDE SERPL-SCNC: 106 MMOL/L (ref 95–110)
CHOLEST SERPL-MCNC: 162 MG/DL (ref 120–199)
CHOLEST/HDLC SERPL: 3.4 {RATIO} (ref 2–5)
CO2 SERPL-SCNC: 22 MMOL/L (ref 23–29)
CREAT SERPL-MCNC: 0.9 MG/DL (ref 0.5–1.4)
EST. GFR  (AFRICAN AMERICAN): >60 ML/MIN/1.73 M^2
EST. GFR  (NON AFRICAN AMERICAN): >60 ML/MIN/1.73 M^2
ESTIMATED AVG GLUCOSE: 183 MG/DL (ref 68–131)
GLUCOSE SERPL-MCNC: 147 MG/DL (ref 70–110)
HBA1C MFR BLD: 8 % (ref 4–5.6)
HDLC SERPL-MCNC: 48 MG/DL (ref 40–75)
HDLC SERPL: 29.6 % (ref 20–50)
LDLC SERPL CALC-MCNC: 92.4 MG/DL (ref 63–159)
NONHDLC SERPL-MCNC: 114 MG/DL
PHOSPHATE SERPL-MCNC: 4.1 MG/DL (ref 2.7–4.5)
POTASSIUM SERPL-SCNC: 4.6 MMOL/L (ref 3.5–5.1)
SODIUM SERPL-SCNC: 139 MMOL/L (ref 136–145)
TRIGL SERPL-MCNC: 108 MG/DL (ref 30–150)

## 2022-03-29 PROCEDURE — 80061 LIPID PANEL: CPT | Performed by: NURSE PRACTITIONER

## 2022-03-29 PROCEDURE — 83036 HEMOGLOBIN GLYCOSYLATED A1C: CPT | Performed by: NURSE PRACTITIONER

## 2022-03-29 PROCEDURE — 36415 COLL VENOUS BLD VENIPUNCTURE: CPT | Performed by: NURSE PRACTITIONER

## 2022-03-29 PROCEDURE — 80069 RENAL FUNCTION PANEL: CPT | Performed by: NURSE PRACTITIONER

## 2022-03-30 ENCOUNTER — PATIENT MESSAGE (OUTPATIENT)
Dept: INTERNAL MEDICINE | Facility: CLINIC | Age: 67
End: 2022-03-30
Payer: MEDICARE

## 2022-03-30 NOTE — PROGRESS NOTES
CHIEF COMPLAINT: Type 2 Diabetes     HPI: Mrs. Lizabeth Saha is a 66 y.o. female who was diagnosed with Type 2 DM x 11 years ago.   Social smoker- younger ages, worried about copd dx, h/o sleep apnea    Retired. 3 daughters.  Mother- , on HD    Last seen by me in 2021.  Doing better a1c reduction 9% to 8%   [facility]   Last visit added jardiance, stopped trulicity r/t price    Cutting back carbs  Staying active    Lab Results   Component Value Date    HGBA1C 8.0 (H) 2022   No h/o pancreatitis or medullary thyroid ca    PREVIOUS DIABETES MEDICATIONS TRIED  Metformin xr   Metformin   januvia 100 mg daily   trulicity- too costly     CURRENT DIABETIC MEDS: metformin 500 xr mg bid, jardiance 10 mg daily   Testing 3 x a day  Patient is willing and able to use the device  Demonstrated an understanding of the technology and is motivated to use CGM  Patient expected to adhere to a comprehensive diabetes treatment plan and patient has adequate medical supervision  Has multiple impaired awareness of hypoglycemia (hypoglycemia unawareness)  Mainly under 150 in am  Not tracking in the evening    Diabetes Management Status    Statin: Taking  ACE/ARB: Taking    Screening or Prevention Patient's value Goal Complete/Controlled?   HgA1C Testing and Control   Lab Results   Component Value Date    HGBA1C 8.0 (H) 2022      Annually/Less than 8% No   Lipid profile : 2022 Annually Yes   LDL control Lab Results   Component Value Date    LDLCALC 92.4 2022    Annually/Less than 100 mg/dl  Yes   Nephropathy screening Lab Results   Component Value Date    LABMICR 14.0 06/15/2021     Lab Results   Component Value Date    PROTEINUA NEG 2008    Annually Yes   Blood pressure BP Readings from Last 1 Encounters:   21 138/82    Less than 140/90 No   Dilated retinal exam : 2020 Annually Yes   Foot exam   : 2021 Annually Yes     REVIEW OF SYSTEMS  General: no weakness, fatigue, +  "weight changes 8# loss from 11/2021  Eyes: no double or blurred vision, eye pain, or redness  Cardiovascular: no chest pain, palpitations, edema, or murmurs.   Respiratory: no cough or dyspnea.   GI: no heartburn, nausea, or changes in bowel patterns; good appetite.   Skin: no rashes, dryness, itching, or reactions at insulin injection sites.  Neuro: no numbness, tingling, tremors, or vertigo.   Endocrine: no polyuria, polydipsia, polyphagia, heat or cold intolerance.     Vital Signs  /78 (BP Location: Left arm, Patient Position: Sitting, BP Method: Medium (Manual))   Pulse 81   Ht 5' 5" (1.651 m)   Wt 81.4 kg (179 lb 7.3 oz)   LMP 01/01/1994 (Approximate) Comment: 1994  SpO2 97%   BMI 29.86 kg/m²     Hemoglobin A1C   Date Value Ref Range Status   03/29/2022 8.0 (H) 4.0 - 5.6 % Final     Comment:     ADA Screening Guidelines:  5.7-6.4%  Consistent with prediabetes  >or=6.5%  Consistent with diabetes    High levels of fetal hemoglobin interfere with the HbA1C  assay. Heterozygous hemoglobin variants (HbS, HgC, etc)do  not significantly interfere with this assay.   However, presence of multiple variants may affect accuracy.     10/15/2021 9.0 (H) 4.0 - 5.6 % Final     Comment:     ADA Screening Guidelines:  5.7-6.4%  Consistent with prediabetes  >or=6.5%  Consistent with diabetes    High levels of fetal hemoglobin interfere with the HbA1C  assay. Heterozygous hemoglobin variants (HbS, HgC, etc)do  not significantly interfere with this assay.   However, presence of multiple variants may affect accuracy.     06/15/2021 11.7 (H) 4.0 - 5.6 % Final     Comment:     ADA Screening Guidelines:  5.7-6.4%  Consistent with prediabetes  >or=6.5%  Consistent with diabetes    High levels of fetal hemoglobin interfere with the HbA1C  assay. Heterozygous hemoglobin variants (HbS, HgC, etc)do  not significantly interfere with this assay.   However, presence of multiple variants may affect accuracy.          Chemistry      "   Component Value Date/Time     03/29/2022 1044    K 4.6 03/29/2022 1044     03/29/2022 1044    CO2 22 (L) 03/29/2022 1044    BUN 13 03/29/2022 1044    CREATININE 0.9 03/29/2022 1044     (H) 03/29/2022 1044        Component Value Date/Time    CALCIUM 10.3 03/29/2022 1044    ALKPHOS 65 06/15/2021 0918    AST 28 06/15/2021 0918    ALT 41 06/15/2021 0918    BILITOT 0.3 06/15/2021 0918    ESTGFRAFRICA >60.0 03/29/2022 1044    EGFRNONAA >60.0 03/29/2022 1044           Lab Results   Component Value Date    TSH 0.676 06/15/2021      Lab Results   Component Value Date    CHOL 162 03/29/2022    CHOL 151 06/15/2021    CHOL 182 06/19/2020     Lab Results   Component Value Date    HDL 48 03/29/2022    HDL 45 06/15/2021    HDL 54 06/19/2020     Lab Results   Component Value Date    LDLCALC 92.4 03/29/2022    LDLCALC 84.2 06/15/2021    LDLCALC 108.0 06/19/2020     Lab Results   Component Value Date    TRIG 108 03/29/2022    TRIG 109 06/15/2021    TRIG 100 06/19/2020     Lab Results   Component Value Date    CHOLHDL 29.6 03/29/2022    CHOLHDL 29.8 06/15/2021    CHOLHDL 29.7 06/19/2020         PHYSICAL EXAMINATION  Constitutional: Appears well, no distress Reviewed vitals above.  Eyes: conjunctivae & lids intact; PERRLA, EOMs intact; optic discs   Neck: Supple, trachea midline.   Respiratory: No wheezes, even and unlabored   Cardiovascular: RRR; no edema or varicosities  Lymph: deferred   Skin: warm and dry; no injection site reactions, no acanthosis nigracans observed.  Neuro:patient alert and cooperative, normal affect; steady gait.  Psychiatric: judgement & insight intact, orientation of time, place & person intact, memory; mood & affect wnl     Diabetes Foot Exam:   deferred     Assessment/Plan  1. Type 2 diabetes mellitus with hyperglycemia, with long-term current use of insulin  Hemoglobin A1C    Basic Metabolic Panel   2. Non-proliferative diabetic retinopathy     3. Essential hypertension     4. Obesity,  Class I, BMI 30-34.9     5. Sleep apnea, unspecified type  Ambulatory referral/consult to Sleep Disorders    TSH     1. F/u in 4 mos w/me   A1c, tsh, bmp prior   a1c improving   Increasing jardiance to 25 mg daily   Double jardiance 10 mg daily now  [facility]   Continue metformin xr 500 mg twice a day   Goal to wean metformin   2. F/u with ophthalmology -external   3. Continue med(s)  Controlled  On arb  4. Body mass index is 29.86 kg/m².  Losing weigh  5. F/u with sleep clinic  Prefers [place] location    FOLLOW UP  No follow-ups on file.

## 2022-03-31 ENCOUNTER — PATIENT MESSAGE (OUTPATIENT)
Dept: ADMINISTRATIVE | Facility: OTHER | Age: 67
End: 2022-03-31
Payer: MEDICARE

## 2022-03-31 ENCOUNTER — OFFICE VISIT (OUTPATIENT)
Dept: INTERNAL MEDICINE | Facility: CLINIC | Age: 67
End: 2022-03-31
Payer: MEDICARE

## 2022-03-31 VITALS
SYSTOLIC BLOOD PRESSURE: 131 MMHG | WEIGHT: 179.44 LBS | OXYGEN SATURATION: 97 % | DIASTOLIC BLOOD PRESSURE: 78 MMHG | HEART RATE: 81 BPM | BODY MASS INDEX: 29.9 KG/M2 | HEIGHT: 65 IN

## 2022-03-31 DIAGNOSIS — E11.3299 NON-PROLIFERATIVE DIABETIC RETINOPATHY: ICD-10-CM

## 2022-03-31 DIAGNOSIS — E66.9 OBESITY, CLASS I, BMI 30-34.9: ICD-10-CM

## 2022-03-31 DIAGNOSIS — E11.65 TYPE 2 DIABETES MELLITUS WITH HYPERGLYCEMIA, WITH LONG-TERM CURRENT USE OF INSULIN: Primary | ICD-10-CM

## 2022-03-31 DIAGNOSIS — Z79.4 TYPE 2 DIABETES MELLITUS WITH HYPERGLYCEMIA, WITH LONG-TERM CURRENT USE OF INSULIN: Primary | ICD-10-CM

## 2022-03-31 DIAGNOSIS — I10 ESSENTIAL HYPERTENSION: ICD-10-CM

## 2022-03-31 DIAGNOSIS — G47.30 SLEEP APNEA, UNSPECIFIED TYPE: ICD-10-CM

## 2022-03-31 LAB — HEMOCCULT STL QL IA: POSITIVE

## 2022-03-31 PROCEDURE — 99999 PR PBB SHADOW E&M-EST. PATIENT-LVL IV: ICD-10-PCS | Mod: PBBFAC,,, | Performed by: NURSE PRACTITIONER

## 2022-03-31 PROCEDURE — 1125F AMNT PAIN NOTED PAIN PRSNT: CPT | Mod: CPTII,S$GLB,, | Performed by: NURSE PRACTITIONER

## 2022-03-31 PROCEDURE — 99999 PR PBB SHADOW E&M-EST. PATIENT-LVL IV: CPT | Mod: PBBFAC,,, | Performed by: NURSE PRACTITIONER

## 2022-03-31 PROCEDURE — 1159F MED LIST DOCD IN RCRD: CPT | Mod: CPTII,S$GLB,, | Performed by: NURSE PRACTITIONER

## 2022-03-31 PROCEDURE — 3052F PR MOST RECENT HEMOGLOBIN A1C LEVEL 8.0 - < 9.0%: ICD-10-PCS | Mod: CPTII,S$GLB,, | Performed by: NURSE PRACTITIONER

## 2022-03-31 PROCEDURE — 4010F ACE/ARB THERAPY RXD/TAKEN: CPT | Mod: CPTII,S$GLB,, | Performed by: NURSE PRACTITIONER

## 2022-03-31 PROCEDURE — 3008F BODY MASS INDEX DOCD: CPT | Mod: CPTII,S$GLB,, | Performed by: NURSE PRACTITIONER

## 2022-03-31 PROCEDURE — 3075F PR MOST RECENT SYSTOLIC BLOOD PRESS GE 130-139MM HG: ICD-10-PCS | Mod: CPTII,S$GLB,, | Performed by: NURSE PRACTITIONER

## 2022-03-31 PROCEDURE — 99214 OFFICE O/P EST MOD 30 MIN: CPT | Mod: S$GLB,,, | Performed by: NURSE PRACTITIONER

## 2022-03-31 PROCEDURE — 99499 UNLISTED E&M SERVICE: CPT | Mod: HCNC,S$GLB,, | Performed by: NURSE PRACTITIONER

## 2022-03-31 PROCEDURE — 1101F PR PT FALLS ASSESS DOC 0-1 FALLS W/OUT INJ PAST YR: ICD-10-PCS | Mod: CPTII,S$GLB,, | Performed by: NURSE PRACTITIONER

## 2022-03-31 PROCEDURE — 3288F FALL RISK ASSESSMENT DOCD: CPT | Mod: CPTII,S$GLB,, | Performed by: NURSE PRACTITIONER

## 2022-03-31 PROCEDURE — 3078F DIAST BP <80 MM HG: CPT | Mod: CPTII,S$GLB,, | Performed by: NURSE PRACTITIONER

## 2022-03-31 PROCEDURE — 3052F HG A1C>EQUAL 8.0%<EQUAL 9.0%: CPT | Mod: CPTII,S$GLB,, | Performed by: NURSE PRACTITIONER

## 2022-03-31 PROCEDURE — 3075F SYST BP GE 130 - 139MM HG: CPT | Mod: CPTII,S$GLB,, | Performed by: NURSE PRACTITIONER

## 2022-03-31 PROCEDURE — 3288F PR FALLS RISK ASSESSMENT DOCUMENTED: ICD-10-PCS | Mod: CPTII,S$GLB,, | Performed by: NURSE PRACTITIONER

## 2022-03-31 PROCEDURE — 1159F PR MEDICATION LIST DOCUMENTED IN MEDICAL RECORD: ICD-10-PCS | Mod: CPTII,S$GLB,, | Performed by: NURSE PRACTITIONER

## 2022-03-31 PROCEDURE — 4010F PR ACE/ARB THEARPY RXD/TAKEN: ICD-10-PCS | Mod: CPTII,S$GLB,, | Performed by: NURSE PRACTITIONER

## 2022-03-31 PROCEDURE — 1101F PT FALLS ASSESS-DOCD LE1/YR: CPT | Mod: CPTII,S$GLB,, | Performed by: NURSE PRACTITIONER

## 2022-03-31 PROCEDURE — 1125F PR PAIN SEVERITY QUANTIFIED, PAIN PRESENT: ICD-10-PCS | Mod: CPTII,S$GLB,, | Performed by: NURSE PRACTITIONER

## 2022-03-31 PROCEDURE — 3078F PR MOST RECENT DIASTOLIC BLOOD PRESSURE < 80 MM HG: ICD-10-PCS | Mod: CPTII,S$GLB,, | Performed by: NURSE PRACTITIONER

## 2022-03-31 PROCEDURE — 99214 PR OFFICE/OUTPT VISIT, EST, LEVL IV, 30-39 MIN: ICD-10-PCS | Mod: S$GLB,,, | Performed by: NURSE PRACTITIONER

## 2022-03-31 PROCEDURE — 3008F PR BODY MASS INDEX (BMI) DOCUMENTED: ICD-10-PCS | Mod: CPTII,S$GLB,, | Performed by: NURSE PRACTITIONER

## 2022-03-31 PROCEDURE — 1160F PR REVIEW ALL MEDS BY PRESCRIBER/CLIN PHARMACIST DOCUMENTED: ICD-10-PCS | Mod: CPTII,S$GLB,, | Performed by: NURSE PRACTITIONER

## 2022-03-31 PROCEDURE — 1160F RVW MEDS BY RX/DR IN RCRD: CPT | Mod: CPTII,S$GLB,, | Performed by: NURSE PRACTITIONER

## 2022-03-31 PROCEDURE — 99499 RISK ADDL DX/OHS AUDIT: ICD-10-PCS | Mod: HCNC,S$GLB,, | Performed by: NURSE PRACTITIONER

## 2022-03-31 RX ORDER — METFORMIN HYDROCHLORIDE 500 MG/1
TABLET, EXTENDED RELEASE ORAL
Qty: 180 TABLET | Refills: 3
Start: 2022-03-31 | End: 2022-05-24 | Stop reason: SDUPTHER

## 2022-03-31 RX ORDER — EMPAGLIFLOZIN 25 MG/1
25 TABLET, FILM COATED ORAL DAILY
Qty: 90 TABLET | Refills: 2 | Status: SHIPPED | OUTPATIENT
Start: 2022-05-09 | End: 2022-08-22 | Stop reason: SDUPTHER

## 2022-04-01 ENCOUNTER — TELEPHONE (OUTPATIENT)
Dept: INTERNAL MEDICINE | Facility: CLINIC | Age: 67
End: 2022-04-01
Payer: MEDICARE

## 2022-04-01 ENCOUNTER — PATIENT MESSAGE (OUTPATIENT)
Dept: INTERNAL MEDICINE | Facility: CLINIC | Age: 67
End: 2022-04-01
Payer: MEDICARE

## 2022-04-01 ENCOUNTER — TELEPHONE (OUTPATIENT)
Dept: ENDOSCOPY | Facility: HOSPITAL | Age: 67
End: 2022-04-01
Payer: MEDICARE

## 2022-04-01 DIAGNOSIS — Z12.11 COLON CANCER SCREENING: Primary | ICD-10-CM

## 2022-04-01 DIAGNOSIS — R19.5 POSITIVE FIT (FECAL IMMUNOCHEMICAL TEST): ICD-10-CM

## 2022-04-01 RX ORDER — POLYETHYLENE GLYCOL 3350, SODIUM SULFATE ANHYDROUS, SODIUM BICARBONATE, SODIUM CHLORIDE, POTASSIUM CHLORIDE 236; 22.74; 6.74; 5.86; 2.97 G/4L; G/4L; G/4L; G/4L; G/4L
4 POWDER, FOR SOLUTION ORAL ONCE
Qty: 4000 ML | Refills: 0 | Status: SHIPPED | OUTPATIENT
Start: 2022-04-01 | End: 2022-04-01

## 2022-04-01 NOTE — TELEPHONE ENCOUNTER
Called and reviewed fit kit with pt  Colonoscopy recommended   Discussed fit kit colon cancer screen possible polyps  Or cancer and recommend proceding pt understands    Order placed pt provided phone number for scheduling

## 2022-04-03 ENCOUNTER — PATIENT OUTREACH (OUTPATIENT)
Dept: ADMINISTRATIVE | Facility: OTHER | Age: 67
End: 2022-04-03
Payer: MEDICARE

## 2022-04-03 NOTE — LETTER
AUTHORIZATION FOR RELEASE OF   CONFIDENTIAL INFORMATION    Dear Bobby Lancasetr OD,    We are seeing Lizabeth Saha, date of birth 1955, in the clinic at [facility]. Orquidea Lancaster MD is the patient's PCP. Lizabeth Saha has an outstanding lab/procedure at the time we reviewed her chart. In order to help keep her health information updated, she has authorized us to request the following medical record(s):        (  )  MAMMOGRAM                                      (  )  COLONOSCOPY      (  )  PAP SMEAR                                          (  )  OUTSIDE LAB RESULTS     (  )  DEXA SCAN                                          ( x )  DIABETIC EYE EXAM [id number]            (  )  FOOT EXAM                                          (  )  ENTIRE RECORD     (  )  OUTSIDE IMMUNIZATIONS                 (  )  _______________         Please fax records to [facility], Kristin S. Johnson, MD, [phone]     If you have any questions, please contact Kacie Melisa at [phone].           Patient Name: Lizabeth Saha  : 1955  Patient Phone #: [phone]

## 2022-04-04 NOTE — PROGRESS NOTES
Care Everywhere:   Immunization: updated  Health Maintenance: updated  Media Review: review for outside eye exam report   Legacy Review:   DIS:  Order placed:   Upcoming appts:  EFAX: sent to Dr. Lancaster office to possibly obtain updated eye exam report  Task Tickets:  Referrals:

## 2022-04-05 ENCOUNTER — OFFICE VISIT (OUTPATIENT)
Dept: SLEEP MEDICINE | Facility: CLINIC | Age: 67
End: 2022-04-05
Payer: MEDICARE

## 2022-04-05 VITALS
WEIGHT: 179 LBS | SYSTOLIC BLOOD PRESSURE: 137 MMHG | DIASTOLIC BLOOD PRESSURE: 80 MMHG | BODY MASS INDEX: 29.82 KG/M2 | HEART RATE: 97 BPM | HEIGHT: 65 IN

## 2022-04-05 DIAGNOSIS — G47.30 SLEEP APNEA, UNSPECIFIED TYPE: ICD-10-CM

## 2022-04-05 DIAGNOSIS — R35.1 NOCTURIA: ICD-10-CM

## 2022-04-05 DIAGNOSIS — G47.33 OSA (OBSTRUCTIVE SLEEP APNEA): Primary | ICD-10-CM

## 2022-04-05 PROCEDURE — 4010F PR ACE/ARB THEARPY RXD/TAKEN: ICD-10-PCS | Mod: CPTII,S$GLB,, | Performed by: INTERNAL MEDICINE

## 2022-04-05 PROCEDURE — 99999 PR PBB SHADOW E&M-EST. PATIENT-LVL III: ICD-10-PCS | Mod: PBBFAC,,, | Performed by: INTERNAL MEDICINE

## 2022-04-05 PROCEDURE — 3075F PR MOST RECENT SYSTOLIC BLOOD PRESS GE 130-139MM HG: ICD-10-PCS | Mod: CPTII,S$GLB,, | Performed by: INTERNAL MEDICINE

## 2022-04-05 PROCEDURE — 3052F PR MOST RECENT HEMOGLOBIN A1C LEVEL 8.0 - < 9.0%: ICD-10-PCS | Mod: CPTII,S$GLB,, | Performed by: INTERNAL MEDICINE

## 2022-04-05 PROCEDURE — 3075F SYST BP GE 130 - 139MM HG: CPT | Mod: CPTII,S$GLB,, | Performed by: INTERNAL MEDICINE

## 2022-04-05 PROCEDURE — 1101F PR PT FALLS ASSESS DOC 0-1 FALLS W/OUT INJ PAST YR: ICD-10-PCS | Mod: CPTII,S$GLB,, | Performed by: INTERNAL MEDICINE

## 2022-04-05 PROCEDURE — 3008F PR BODY MASS INDEX (BMI) DOCUMENTED: ICD-10-PCS | Mod: CPTII,S$GLB,, | Performed by: INTERNAL MEDICINE

## 2022-04-05 PROCEDURE — 1126F PR PAIN SEVERITY QUANTIFIED, NO PAIN PRESENT: ICD-10-PCS | Mod: CPTII,S$GLB,, | Performed by: INTERNAL MEDICINE

## 2022-04-05 PROCEDURE — 1101F PT FALLS ASSESS-DOCD LE1/YR: CPT | Mod: CPTII,S$GLB,, | Performed by: INTERNAL MEDICINE

## 2022-04-05 PROCEDURE — 3079F PR MOST RECENT DIASTOLIC BLOOD PRESSURE 80-89 MM HG: ICD-10-PCS | Mod: CPTII,S$GLB,, | Performed by: INTERNAL MEDICINE

## 2022-04-05 PROCEDURE — 1159F PR MEDICATION LIST DOCUMENTED IN MEDICAL RECORD: ICD-10-PCS | Mod: CPTII,S$GLB,, | Performed by: INTERNAL MEDICINE

## 2022-04-05 PROCEDURE — 3052F HG A1C>EQUAL 8.0%<EQUAL 9.0%: CPT | Mod: CPTII,S$GLB,, | Performed by: INTERNAL MEDICINE

## 2022-04-05 PROCEDURE — 3079F DIAST BP 80-89 MM HG: CPT | Mod: CPTII,S$GLB,, | Performed by: INTERNAL MEDICINE

## 2022-04-05 PROCEDURE — 3288F PR FALLS RISK ASSESSMENT DOCUMENTED: ICD-10-PCS | Mod: CPTII,S$GLB,, | Performed by: INTERNAL MEDICINE

## 2022-04-05 PROCEDURE — 1159F MED LIST DOCD IN RCRD: CPT | Mod: CPTII,S$GLB,, | Performed by: INTERNAL MEDICINE

## 2022-04-05 PROCEDURE — 99203 OFFICE O/P NEW LOW 30 MIN: CPT | Mod: S$GLB,,, | Performed by: INTERNAL MEDICINE

## 2022-04-05 PROCEDURE — 3288F FALL RISK ASSESSMENT DOCD: CPT | Mod: CPTII,S$GLB,, | Performed by: INTERNAL MEDICINE

## 2022-04-05 PROCEDURE — 99999 PR PBB SHADOW E&M-EST. PATIENT-LVL III: CPT | Mod: PBBFAC,,, | Performed by: INTERNAL MEDICINE

## 2022-04-05 PROCEDURE — 99203 PR OFFICE/OUTPT VISIT, NEW, LEVL III, 30-44 MIN: ICD-10-PCS | Mod: S$GLB,,, | Performed by: INTERNAL MEDICINE

## 2022-04-05 PROCEDURE — 1126F AMNT PAIN NOTED NONE PRSNT: CPT | Mod: CPTII,S$GLB,, | Performed by: INTERNAL MEDICINE

## 2022-04-05 PROCEDURE — 4010F ACE/ARB THERAPY RXD/TAKEN: CPT | Mod: CPTII,S$GLB,, | Performed by: INTERNAL MEDICINE

## 2022-04-05 PROCEDURE — 3008F BODY MASS INDEX DOCD: CPT | Mod: CPTII,S$GLB,, | Performed by: INTERNAL MEDICINE

## 2022-04-05 NOTE — PROGRESS NOTES
"  Referred by Ke Maier, APRN*     NEW PATIENT VISIT LOV 3/28/2017 DR. Dia Saha  is a pleasant 66 y.o. female  with PMH significant for DM, HTN, GERARDO dx 2011, CPAP started 2015 (cough) who presents today for evaluation.    She has received a DS2    PAP history   Problems    Mask FFM (has not tried nasal)   Pressure Was tolerating   Benefit Slept better   DME HME   Machine age Several years ago (has DS2 replacment)   Download 4/5/2022: as of 3/24/2021: 27/30 x 6h6m, 9-15 (9.4/9.8/10.3), lk 2m27s AHI 2.8       SLEEP SCHEDULE   Bed Time MN   Sleep Latency Not long   Arousals once   Nocturia variable   Back to sleep    Wake time 9-10 AM   Naps none   Work        Vitals:    04/05/22 1315   BP: 137/80   BP Location: Left arm   Patient Position: Sitting   BP Method: Medium (Automatic)   Pulse: 97   Weight: 81.2 kg (179 lb)   Height: 5' 5" (1.651 m)     Physical Exam:    GEN:   Well-appearing  Psych:  Appropriate affect, demonstrates insight  SKIN:  No rash on the face or bridge of the nose  HEENT: MP1, + oropharynx shallow in A-P dimension    LABS:   Lab Results   Component Value Date    CO2 22 (L) 03/29/2022       RECORDS REVIEWED PREVIOUSLY:    PSG 2008: AHI 9.2, jason 89%,   Titration 6/2011: controlled at 9cwp    ASSESSMENT      PROBLEM DESCRIPTION/ Sx on Presentation  STATUS   GERARDO   Mild, did well on CPAP  Has replacement machine    New   Daytime Sx    sleepiness when inactive   denies sleepiness when driving   ESS 3/24 on intake  New   Nocturia   Once, attributes to drinking water  New   Other issues:     PLAN     -trial of nasal mask   -will set her auto-CPAP to 7-12, ramp 4cwp x auto  -the patient is using and benefiting from PAP therapy  -driving precautions were discussed with the patient    RTC          The patient was given open opportunity to ask questions and/or express concerns about treatment plan.   All questions/concerns were discussed.     Two patient identifiers used prior " to evaluation.

## 2022-04-06 DIAGNOSIS — G47.33 OSA (OBSTRUCTIVE SLEEP APNEA): Primary | ICD-10-CM

## 2022-04-08 ENCOUNTER — PATIENT OUTREACH (OUTPATIENT)
Dept: ADMINISTRATIVE | Facility: OTHER | Age: 67
End: 2022-04-08
Payer: MEDICARE

## 2022-04-18 ENCOUNTER — PES CALL (OUTPATIENT)
Dept: ADMINISTRATIVE | Facility: CLINIC | Age: 67
End: 2022-04-18
Payer: MEDICARE

## 2022-05-24 ENCOUNTER — CLINICAL SUPPORT (OUTPATIENT)
Dept: DIABETES | Facility: CLINIC | Age: 67
End: 2022-05-24
Payer: MEDICARE

## 2022-05-24 ENCOUNTER — TELEPHONE (OUTPATIENT)
Dept: DIABETES | Facility: CLINIC | Age: 67
End: 2022-05-24

## 2022-05-24 DIAGNOSIS — Z79.4 TYPE 2 DIABETES MELLITUS WITH HYPERGLYCEMIA, WITH LONG-TERM CURRENT USE OF INSULIN: ICD-10-CM

## 2022-05-24 DIAGNOSIS — E11.65 TYPE 2 DIABETES MELLITUS WITH HYPERGLYCEMIA, WITH LONG-TERM CURRENT USE OF INSULIN: ICD-10-CM

## 2022-05-24 PROCEDURE — G0108 PR DIAB MANAGE TRN  PER INDIV: ICD-10-PCS | Mod: S$GLB,,, | Performed by: INTERNAL MEDICINE

## 2022-05-24 PROCEDURE — 99999 PR PBB SHADOW E&M-EST. PATIENT-LVL II: ICD-10-PCS | Mod: PBBFAC,,,

## 2022-05-24 PROCEDURE — G0108 DIAB MANAGE TRN  PER INDIV: HCPCS | Mod: S$GLB,,, | Performed by: INTERNAL MEDICINE

## 2022-05-24 PROCEDURE — 99999 PR PBB SHADOW E&M-EST. PATIENT-LVL II: CPT | Mod: PBBFAC,,,

## 2022-05-24 RX ORDER — METFORMIN HYDROCHLORIDE 500 MG/1
TABLET, EXTENDED RELEASE ORAL
Qty: 90 TABLET | Refills: 3
Start: 2022-05-24 | End: 2022-08-02

## 2022-05-24 NOTE — TELEPHONE ENCOUNTER
----- Message from MARYBEL Lopez, JASON sent at 5/24/2022 11:32 AM CDT -----  Ramiro Ricks- metformin 500 mg in am is fine-I'll correct PANTERA Dempsey   ----- Message -----  From: Millicent Giron RN  Sent: 5/24/2022  11:29 AM CDT  To: MARYBEL Lopez, JASON Dempsey,    I saw the patient today for DM education. Patient is taking metformin 500 mg once in AM- not taking BID as listed in chart. Pt says you told her to cut back to just once per day. She is also taking Jardiance 25 mg QD..    Just want to confirm correct timing on metformin.    Thank you,    Millicent Giron RN, [facility]  Diabetic Educator  [facility]

## 2022-05-24 NOTE — PROGRESS NOTES
Diabetes Care Specialist Progress Note  Author: Millicent Giron RN  Date: 5/24/2022    Program Intake  Reason for Diabetes Program Visit:: Initial Diabetes Assessment  Current diabetes risk level:: moderate  In the last 12 months, have you:: none  Permission to speak with others about care:: no    Lab Results   Component Value Date    HGBA1C 8.0 (H) 03/29/2022       Clinical    Patient Health Rating  Compared to other people your age, how would you rate your health?: Good    Problem Review  Reviewed Problem List with Patient: yes  Active comorbidities affecting diabetes self-care.: yes  Comorbidities: Hypertension  Reviewed health maintenance: yes    Clinical Assessment  Current Diabetes Treatment: Oral Medication  Have you ever experienced hypoglycemia (low blood sugar)?: no  Have you ever experienced hyperglycemia (high blood sugar)?: no    Medication Information  How do you obtain your medications?: Pharmacy delivery  How many days a week do you miss your medications?: Never  Do you sometimes have difficulty refilling your medications?: No  Medication adherence impacting ability to self-manage diabetes?: No    Labs  Do you have regular lab work to monitor your medications?: Yes  Type of Regular Lab Work: A1c, CBC, BMP  Where do you get your labs drawn?: [facility]  Lab Compliance Barriers: No    Nutritional Status  Diet: Regular, Diabetic diet  Meal Plan 24 Hour Recall: Breakfast, Lunch, Dinner, Snack  Meal Plan 24 Hour Recall - Breakfast: Skips normally  Meal Plan 24 Hour Recall - Lunch: Chargrileld oyster, fried crab claws, [place]ter soup with rice, fried oyster, [place]  Meal Plan 24 Hour Recall - Dinner: Half cup of cup of strawberry daquari  Meal Plan 24 Hour Recall - Snack: Chips, Popcorn, avocado  Change in appetite?: No  Recent Changes in Weight: No Recent Weight Change  Current nutritional status an area of need that is impacting patient's ability to self-manage diabetes?: Yes    Additional Social  History    Support  Does anyone support you with your diabetes care?: yes  Who supports you?: spouse  Who takes you to your medical appointments?: self  Does the current support meet the patient's needs?: Yes  Is Support an area impacting ability to self-manage diabetes?: No    Access to Mass Media & Technology  Does the patient have access to any of the following devices or technologies?: Smart phone  Media or technology needs impacting ability to self-manage diabetes?: No    Cognitive/Behavioral Health  Alert and Oriented: Yes  Difficulty Thinking: No  Requires Prompting: No  Requires assistance for routine expression?: No  Cognitive or behavioral barriers impacting ability to self-manage diabetes?: No    Culture/Hinduism  Culture or Church beliefs that may impact ability to access healthcare: No    Communication  Language preference: English  Hearing Problems: Yes  Hearing Assistance: Hearing aid  Vision Problems: Yes  Vision problem type:: Decreased Vision  Vision Assistance: Glasses  Communication needs impacting ability to self-manage diabetes?: No    Health Literacy  Preferred Learning Method: Face to Face, Web Based, Reading Materials  How often do you need to have someone help you read instructions, pamphlets, or written material from your doctor or pharmacy?: Never  Health literacy needs impacting ability to self-manage diabetes?: No      Diabetes Self-Management Skills Assessment    Diabetes Disease Process/Treatment Options  Patient/caregiver able to state what happens when someone has diabetes.: somewhat  Patient/caregiver knows what type of diabetes they have.: yes  Diabetes Type : Type II  Patient/caregiver able to identify at least three signs and symptoms of diabetes.: yes  Identified signs and symptoms:: frequent urination, increased thirst  Patient able to identify at least three risk factors for diabetes.: no  Diabetes Disease Process/Treatment Options: Skills Assessment Completed:  Yes  Assessment indicates:: Knowledge deficit, Instruction Needed, Other (comment) ( Instructed/Discussed/Reviewed with patient at this appointment.)  Area of need?: No   -- Provided Diabetes management guide and provided instruction regarding SS and consume increased amount of carbohydrate foods and risk factors of diabetes.Instructed patient on what is Diabetes and the progression of uncontrolled diabetes. Discussed qualifying parameters of diabetes diagnosis. Reviewed/Instructed on disease process. Discussed current treatment options, especially dietary and lifestyle changes as well as possible medication additions and/or changes. Patient verbalizes understanding of received information/education.      Nutrition/Healthy Eating  Challenges to healthy eating:: lack of will power  Method of carbohydrate measurement:: measuring cups/spoons, eyeballing/guessing, portion plate  Patient can identify foods that impact blood sugar.: yes  Patient-identified foods:: starches (bread, pasta, rice, cereal) (Pasta, bread, rice, potaote)  Nutrition/Healthy Eating Skills Assessment Completed:: Yes  Assessment indicates:: Knowledge deficit, Instruction Needed  Area of need?: Yes    Physical Activity/Exercise  Patient's daily activity level:: lightly active  Patient formally exercises outside of work.: yes  Exercise Type: walking (Walks dog)  Intensity: Low  Frequency: twice a week  Duration: 15 min  Patient can identify forms of physical activity.: yes  Stated forms of physical activity:: any movement performed by muscles that uses energy  Patient can identify reasons why exercise/physical activity is important in diabetes management.: no  Physical Activity/Exercise Skills Assessment Completed: : Yes  Assessment indicates:: Knowledge deficit, Instruction Needed  Area of need?: Yes    Medications  Patient is able to describe current diabetes management routine.: yes  Diabetes management routine:: oral medications  Patient is able  to identify current diabetes medications, dosages, and appropriate timing of medications.: yes (Pt taking Metformin 500 mg once per day (pt states Imelda Np told her to decrease metformin), jardiance 25 mg once per day.)  Patient understands the purpose of the medications taken for diabetes.: yes  Patient reports problems or concerns with current medication regimen.: no  Medication Skills Assessment Completed:: Yes  Assessment indicates:: Adequate understanding (Message sent to Imelda NP asking if she did want pt to take Metformin 500mg once per day)  Area of need?: No    Home Blood Glucose Monitoring  Patient states that blood sugar is checked at home daily.: yes  Monitoring Method:: home glucometer  Home glucometer meter type:: Unsure  How often do you check your blood sugar?: Twice a day  When do you check your blood sugar?: Before breakfast, Before bedtime  When you check what is your typical blood sugar range? : AM: 108-145 Bedtime: 137-145-152  Blood glucose logs:: no, encouraged to keep logs, encouraged to bring logs to provider visits  Blood glucose logs reviewed today?: no  Home Blood Glucose Monitoring Skills Assessment Completed: : Yes  Assessment indicates:: Knowledge deficit, Instruction Needed, Other (comment) ( Instructed/Discussed/Reviewed with patient at this appointment.)  Area of need?: No   ---- Provided patient with blood glucose logs, reviewed appropriate timing and frequency to SMBG, education on parameters on when to notify provider and advised patient to bring logs to all appts with PCP/Endocrinologist/Diabetes Care Specialist.Reviewed the importance of self-monitoring blood glucose and keeping logs.Instructed on how to self-monitor blood glucose using a home glucometer, how to properly dispose of used strips and lancets after use, and how to appropriately store meter and supplies.      Acute Complications  Patient is able to identify types of acute complications: No  Acute Complications  Skills Assessment Completed: : Yes  Assessment indicates:: Knowledge deficit, Instruction Needed, Other (comment) ( Instructed/Discussed/Reviewed with patient at this appointment.)  Area of need?: No   -- Discussed hypoglycemia vs hyperglycemia symptoms and discussed appropriate treatments for each. Reviewed that pt is at low risk of hypo with current medication regimen. Discussed general vs severe hyperglycemia and risk of DKA.      Chronic Complications  Patient can identify major chronic complications of diabetes.: no  Patient can identify ways to prevent or delay diabetes complications.: no  Patient is aware that having diabetes increases risk of heart disease?: No  Patient is aware that heart disease is the leading cause of death and disability in people with diabetes?: No  Patient able to state risk factors for heart disease?: No  Chronic Complications Skills Assessment Completed: : Yes  Assessment indicates:: Knowledge deficit, Instruction Needed, Other (comment) ( Instructed/Discussed/Reviewed with patient at this appointment.)  Area of need?: No  -- Provided Diabetes management guide and provided instruction regarding the disease progression if diabetes is uncontrolled. Reviewed ways to decrease risk of chronic complications by healthy eating and having regular activity. Maintaining optimal blood glucose control. Following up with Diabetic team which includes PCP, ENDO MD (if applicable), yearly eye exam, yearly foot exam and Diabetes care specialist .Patient verbalizes understanding of received information/education.     Psychosocial/Coping  Patient can identify ways of coping with chronic disease.: yes  Patient-stated ways of coping with chronic disease:: support from loved ones  Psychosocial/Coping Skills Assessment Completed: : Yes  Assessment indicates:: Adequate understanding  Area of need?: No      Diabetes Self Support Plan         Assessment Summary and Plan    Based on today's diabetes care  assessment, the following areas of need were identified:      Social 5/24/2022   Support No   Access to Mass Media/Tech No   Cognitive/Behavioral Health No   Culture/Yarsanism No   Communication No   Health Literacy No        Clinical 5/24/2022   Medication Adherence No   Lab Compliance No   Nutritional Status Yes        Diabetes Self-Management Skills 5/24/2022   Diabetes Disease Process/Treatment Options No   Nutrition/Healthy Eating Yes - Please see care plan.     Physical Activity/Exercise Yes - Please see care plan.     Medication No   Home Blood Glucose Monitoring No   Acute Complications No   Chronic Complications No   Psychosocial/Coping No          Today's interventions were provided through individual discussion, instruction, and written materials were provided.      Patient verbalized understanding of instruction and written materials.  Pt was able to return back demonstration of instructions today. Patient understood key points, needs reinforcement and further instruction.     Diabetes Self-Management Care Plan:    Today's Diabetes Self-Management Care Plan was developed with Lizabeth's input. Lizabeth has agreed to work toward the following goal(s) to improve his/her overall diabetes control.      Care Plan: Diabetes Management   Updates made since 4/24/2022 12:00 AM      Problem: Healthy Eating       Goal:  Will utilize plate method when arranging meal to stay within 30-45 gm of carbohydrates per meal. Will abstain from drinking Sugar Beverages and utilize Diet and/or artificial sweetener.    Start Date: 5/24/2022   Expected End Date: 7/24/2022   Priority: High   Barriers: Knowledge deficit      Task: Reviewed the sources and role of Carbohydrate, Protein, and Fat and how each nutrient impacts blood sugar. Completed 5/24/2022      Task: Provided visual examples using dry measuring cups, food models, and other familiar objects such as computer mouse, deck or cards, tennis ball etc. to help with visualization  of portions. Completed 5/24/2022      Task: Explained how to count carbohydrates using the food label and the use of dry measuring cups for accurate carb counting. Completed 5/24/2022      Task: Discussed strategies for choosing healthier menu options when dining out. Completed 5/24/2022      Task: Recommended replacing beverages containing high sugar content with noncaloric/sugar free options and/or water. Completed 5/24/2022      Task: Review the importance of balancing carbohydrates with each meal using portion control techniques to count servings of carbohydrate and label reading to identify serving size and amount of total carbs per serving. Completed 5/24/2022      Task: Provided Sample plate method and reviewed the use of the plate to estimate amounts of carbohydrate per meal. Completed 5/24/2022      Problem: Physical Activity and Exercise       Goal: Patient agrees to increase physical activity to a goal of 5 times per week for 30 minutes.    Start Date: 5/24/2022   Expected End Date: 7/24/2022   Priority: Medium   Barriers: Knowledge deficit      Task: Discussed role of physical activity on reducing insulin resistance and improvement in overall glycemic control. Completed 5/24/2022      Task: Discussed role of physical activity as it relates to weight loss Completed 5/24/2022      Task: Offered suggestions on how patient could increase their regular physical activity Completed 5/24/2022      Task: Reviewed blood glucose monitoring before, during and after exercise/activity Completed 5/24/2022          Follow Up Plan     Follow-up Appt with this RN on 7-21-22, to re-assess DM diet and physical activity. .     Reinforce DM Diet:  Recommendation of 30gms-45gms carbs per meal and encourage balance meals (carb + protein source).  Reinforce physical activity:  Reviewed goals and benefits of regular Physical Activity. Reviewed difference between active lifestyle and structured physical activity. Encouraged  Physical Activity with increased heart rate for sustained duration as tolerated. Reviewed Physical Activity goals of 4-5 times per week at 20-30 minutes weekly.     Appt with Imelda NP on 8-2-22. Next HgbA1C 7-29-22     Today's care plan and follow up schedule was discussed with patient.  Lizabeth verbalized understanding of the care plan, goals, and agrees to follow up plan.        The patient was encouraged to communicate with his/her health care provider/physician and care team regarding his/her condition(s) and treatment.  I provided the patient with my contact information today and encouraged to contact me via phone or [facility] Patient Portal as needed.     Length of Visit   Total Time: 60 Minutes

## 2022-05-26 RX ORDER — CALCIUM CITRATE/VITAMIN D3 200MG-6.25
TABLET ORAL
Qty: 100 STRIP | Refills: 3 | Status: SHIPPED | OUTPATIENT
Start: 2022-05-26 | End: 2023-02-22

## 2022-05-26 NOTE — TELEPHONE ENCOUNTER
Care Due:                  Date            Visit Type   Department     Provider  --------------------------------------------------------------------------------                                MYCHART                              FOLLOWUP/OF  [facility] INTERNAL  Orquidea Callahan  Last Visit: 06-      FICE VISIT   [facility]       Tonny  Next Visit: None Scheduled  None         None Found                                                            Last  Test          Frequency    Reason                     Performed    Due Date  --------------------------------------------------------------------------------    Office Visit  12 months..  rosuvastatin.............  06-   06-    CMP.........  12 months..  rosuvastatin.............  Not Found    Overdue    [employer] Embedded Care Gaps. Reference number: [id number]. 5/26/2022   2:48:47 PM CDT

## 2022-05-26 NOTE — TELEPHONE ENCOUNTER
Refill Authorization Note   Lizabeth Saha  is requesting a refill authorization.  Brief Assessment and Rationale for Refill:  Approve    -Medication-Related Problems Identified:   Requires labs  Requires appointment  Medication Therapy Plan:  FOVS; Labs Scheduled; Matches previous order false failure: Verified sig/testing frequency    Medication Reconciliation Completed: No   Comments:     No Care Gaps recommended.     Note composed:4:27 PM 05/26/2022

## 2022-05-31 ENCOUNTER — HOSPITAL ENCOUNTER (OUTPATIENT)
Facility: HOSPITAL | Age: 67
Discharge: HOME | End: 2022-05-31
Attending: COLON & RECTAL SURGERY | Admitting: COLON & RECTAL SURGERY
Payer: MEDICARE

## 2022-05-31 ENCOUNTER — ANESTHESIA EVENT (OUTPATIENT)
Dept: ENDOSCOPY | Facility: HOSPITAL | Age: 67
End: 2022-05-31
Payer: MEDICARE

## 2022-05-31 ENCOUNTER — ANESTHESIA (OUTPATIENT)
Dept: ENDOSCOPY | Facility: HOSPITAL | Age: 67
End: 2022-05-31
Payer: MEDICARE

## 2022-05-31 VITALS
RESPIRATION RATE: 18 BRPM | TEMPERATURE: 98 F | HEIGHT: 65 IN | BODY MASS INDEX: 29.66 KG/M2 | HEART RATE: 83 BPM | SYSTOLIC BLOOD PRESSURE: 127 MMHG | OXYGEN SATURATION: 97 % | WEIGHT: 178 LBS | DIASTOLIC BLOOD PRESSURE: 70 MMHG

## 2022-05-31 DIAGNOSIS — R19.5 POSITIVE FIT (FECAL IMMUNOCHEMICAL TEST): Primary | ICD-10-CM

## 2022-05-31 LAB — POCT GLUCOSE: 153 MG/DL (ref 70–110)

## 2022-05-31 PROCEDURE — 27200997: Performed by: COLON & RECTAL SURGERY

## 2022-05-31 PROCEDURE — 45385 COLONOSCOPY W/LESION REMOVAL: CPT | Performed by: COLON & RECTAL SURGERY

## 2022-05-31 PROCEDURE — 88305 TISSUE EXAM BY PATHOLOGIST: ICD-10-PCS | Mod: 26,,, | Performed by: PATHOLOGY

## 2022-05-31 PROCEDURE — E9220 PRA ENDO ANESTHESIA: HCPCS | Mod: ,,, | Performed by: STUDENT IN AN ORGANIZED HEALTH CARE EDUCATION/TRAINING PROGRAM

## 2022-05-31 PROCEDURE — 25000003 PHARM REV CODE 250: Performed by: STUDENT IN AN ORGANIZED HEALTH CARE EDUCATION/TRAINING PROGRAM

## 2022-05-31 PROCEDURE — 45381 PR COLONOSCPY,FLEX,W/DIR SUBMUC INJECT: ICD-10-PCS | Mod: 51,,, | Performed by: COLON & RECTAL SURGERY

## 2022-05-31 PROCEDURE — 88305 TISSUE EXAM BY PATHOLOGIST: CPT | Mod: 26,,, | Performed by: PATHOLOGY

## 2022-05-31 PROCEDURE — 45381 COLONOSCOPY SUBMUCOUS NJX: CPT | Mod: 51,,, | Performed by: COLON & RECTAL SURGERY

## 2022-05-31 PROCEDURE — 45381 COLONOSCOPY SUBMUCOUS NJX: CPT | Mod: 59 | Performed by: COLON & RECTAL SURGERY

## 2022-05-31 PROCEDURE — 25000003 PHARM REV CODE 250: Performed by: COLON & RECTAL SURGERY

## 2022-05-31 PROCEDURE — 37000009 HC ANESTHESIA EA ADD 15 MINS: Performed by: COLON & RECTAL SURGERY

## 2022-05-31 PROCEDURE — 63600175 PHARM REV CODE 636 W HCPCS: Performed by: STUDENT IN AN ORGANIZED HEALTH CARE EDUCATION/TRAINING PROGRAM

## 2022-05-31 PROCEDURE — 27201089 HC SNARE, DISP (ANY): Performed by: COLON & RECTAL SURGERY

## 2022-05-31 PROCEDURE — 37000008 HC ANESTHESIA 1ST 15 MINUTES: Performed by: COLON & RECTAL SURGERY

## 2022-05-31 PROCEDURE — 88305 TISSUE EXAM BY PATHOLOGIST: CPT | Mod: 59 | Performed by: PATHOLOGY

## 2022-05-31 PROCEDURE — 45385 COLONOSCOPY W/LESION REMOVAL: CPT | Mod: ,,, | Performed by: COLON & RECTAL SURGERY

## 2022-05-31 PROCEDURE — E9220 PRA ENDO ANESTHESIA: ICD-10-PCS | Mod: ,,, | Performed by: STUDENT IN AN ORGANIZED HEALTH CARE EDUCATION/TRAINING PROGRAM

## 2022-05-31 PROCEDURE — 45385 PR COLONOSCOPY,REMV LESN,SNARE: ICD-10-PCS | Mod: ,,, | Performed by: COLON & RECTAL SURGERY

## 2022-05-31 RX ORDER — PROPOFOL 10 MG/ML
VIAL (ML) INTRAVENOUS CONTINUOUS PRN
Status: DISCONTINUED | OUTPATIENT
Start: 2022-05-31 | End: 2022-05-31

## 2022-05-31 RX ORDER — SODIUM CHLORIDE 9 MG/ML
INJECTION, SOLUTION INTRAVENOUS CONTINUOUS
Status: DISCONTINUED | OUTPATIENT
Start: 2022-05-31 | End: 2022-05-31 | Stop reason: HOSPADM

## 2022-05-31 RX ORDER — LIDOCAINE HYDROCHLORIDE 20 MG/ML
INJECTION INTRAVENOUS
Status: DISCONTINUED | OUTPATIENT
Start: 2022-05-31 | End: 2022-05-31

## 2022-05-31 RX ORDER — PROPOFOL 10 MG/ML
VIAL (ML) INTRAVENOUS
Status: DISCONTINUED | OUTPATIENT
Start: 2022-05-31 | End: 2022-05-31

## 2022-05-31 RX ORDER — PHENYLEPHRINE HYDROCHLORIDE 10 MG/ML
INJECTION INTRAVENOUS
Status: DISCONTINUED | OUTPATIENT
Start: 2022-05-31 | End: 2022-05-31

## 2022-05-31 RX ADMIN — LIDOCAINE HYDROCHLORIDE 100 MG: 20 INJECTION, SOLUTION INTRAVENOUS at 09:05

## 2022-05-31 RX ADMIN — PROPOFOL 80 MG: 10 INJECTION, EMULSION INTRAVENOUS at 09:05

## 2022-05-31 RX ADMIN — SODIUM CHLORIDE: 0.9 INJECTION, SOLUTION INTRAVENOUS at 09:05

## 2022-05-31 RX ADMIN — Medication 150 MCG/KG/MIN: at 09:05

## 2022-05-31 RX ADMIN — PHENYLEPHRINE HYDROCHLORIDE 100 MCG: 10 INJECTION INTRAVENOUS at 10:05

## 2022-05-31 NOTE — ANESTHESIA POSTPROCEDURE EVALUATION
Anesthesia Post Evaluation    Patient: Lizabeth Saha    Procedure(s) Performed: Procedure(s) (LRB):  COLONOSCOPY (N/A)    Final Anesthesia Type: general      Patient location during evaluation: PACU  Patient participation: No - Unable to Participate, Intubation  Level of consciousness: awake and alert and oriented  Post-procedure vital signs: reviewed and stable  Pain management: adequate  Airway patency: patent    PONV status at discharge: No PONV  Anesthetic complications: no      Cardiovascular status: hemodynamically stable  Respiratory status: unassisted  Hydration status: euvolemic  Follow-up not needed.          Vitals Value Taken Time   /70 05/31/22 1055   Temp 36.4 °C (97.5 °F) 05/31/22 1033   Pulse 83 05/31/22 1055   Resp 18 05/31/22 1055   SpO2 97 % 05/31/22 1055         Event Time   Out of Recovery 11:08:45         Pain/Alvaro Score: Alvaro Score: 10 (5/31/2022 10:34 AM)

## 2022-05-31 NOTE — TRANSFER OF CARE
"Anesthesia Transfer of Care Note    Patient: Lizabeth Saha    Procedure(s) Performed: Procedure(s) (LRB):  COLONOSCOPY (N/A)    Patient location: GI    Anesthesia Type: general    Transport from OR: Transported from OR on room air with adequate spontaneous ventilation    Post pain: adequate analgesia    Post assessment: no apparent anesthetic complications and tolerated procedure well    Post vital signs: stable    Level of consciousness: awake    Nausea/Vomiting: no nausea/vomiting    Complications: none    Transfer of care protocol was followed      Last vitals:   Visit Vitals  BP (!) 163/77   Pulse 92   Temp 36.6 °C (97.9 °F)   Resp 15   Ht 5' 5" (1.651 m)   Wt 80.7 kg (178 lb)   LMP 01/01/1994 (Approximate)   SpO2 97%   Breastfeeding No   BMI 29.62 kg/m²     "

## 2022-05-31 NOTE — PROVATION PATIENT INSTRUCTIONS
Discharge Summary/Instructions after an Endoscopic Procedure  Patient Name: Lizabeth Saha  Patient MRN: [id number]  Patient YOB: 1955  Tuesday, May 31, 2022  Todd Suárez MD  Dear patient,  As a result of recent federal legislation (The Federal Cures Act), you may   receive lab or pathology results from your procedure in your [facility]   account before your physician is able to contact you. Your physician or   their representative will relay the results to you with their   recommendations at their soonest availability.  Thank you,  RESTRICTIONS:  During your procedure today, you received medications for sedation.  These   medications may affect your judgment, balance and coordination.  Therefore,   for 24 hours, you have the following restrictions:   - DO NOT drive a car, operate machinery, make legal/financial decisions,   sign important papers or drink alcohol.    ACTIVITY:  Today: no heavy lifting, straining or running due to procedural   sedation/anesthesia.  The following day: return to full activity including work.  DIET:  Eat and drink normally unless instructed otherwise.     TREATMENT FOR COMMON SIDE EFFECTS:  - Mild abdominal pain, nausea, belching, bloating or excessive gas:  rest,   eat lightly and use a heating pad.  - Sore Throat: treat with throat lozenges and/or gargle with warm salt   water.  - Because air was used during the procedure, expelling large amounts of air   from your rectum or belching is normal.  - If a bowel prep was taken, you may not have a bowel movement for 1-3 days.    This is normal.  SYMPTOMS TO WATCH FOR AND REPORT TO YOUR PHYSICIAN:  1. Abdominal pain or bloating, other than gas cramps.  2. Chest pain.  3. Back pain.  4. Signs of infection such as: chills or fever occurring within 24 hours   after the procedure.  5. Rectal bleeding, which would show as bright red, maroon, or black stools.   (A tablespoon of blood from the rectum is not serious, especially if    hemorrhoids are present.)  6. Vomiting.  7. Weakness or dizziness.  GO DIRECTLY TO THE NEAREST EMERGENCY ROOM IF YOU HAVE ANY OF THE FOLLOWING:      Difficulty breathing              Chills and/or fever over 101 F   Persistent vomiting and/or vomiting blood   Severe abdominal pain   Severe chest pain   Black, tarry stools   Bleeding- more than one tablespoon   Any other symptom or condition that you feel may need urgent attention  Your doctor recommends these additional instructions:  If any biopsies were taken, your doctors clinic will contact you in 1 to 2   weeks with any results.  - Repeat colonoscopy date to be determined after pending pathology results   are reviewed for surveillance based on pathology results.   - Discharge patient to home.   - High fiber diet.   - Continue present medications.   - Await pathology results.   - Patient has a contact number available for emergencies.  The signs and   symptoms of potential delayed complications were discussed with the   patient.  Return to normal activities tomorrow.  Written discharge   instructions were provided to the patient.  For questions, problems or results please call your physician - Todd Suárez MD at Work:  [phone].  [facility], EMERGENCY ROOM PHONE NUMBER: [phone]  IF A COMPLICATION OR EMERGENCY SITUATION ARISES AND YOU ARE UNABLE TO REACH   YOUR PHYSICIAN - GO DIRECTLY TO THE EMERGENCY ROOM.  Todd Suárez MD  5/31/2022 10:33:16 AM  This report has been verified and signed electronically.  Dear patient,  As a result of recent federal legislation (The Federal Cures Act), you may   receive lab or pathology results from your procedure in your [facility]   account before your physician is able to contact you. Your physician or   their representative will relay the results to you with their   recommendations at their soonest availability.  Thank you,  PROVATION

## 2022-05-31 NOTE — ANESTHESIA PREPROCEDURE EVALUATION
05/31/2022  Lizabeth Saha is a 66 y.o., female.    Past Medical History:   Diagnosis Date    Diabetes mellitus type II     Hyperlipemia     Irritable bowel syndrome     Obesity     Osteopenia     Rhinitis     Sleep apnea     Vertigo        Past Surgical History:   Procedure Laterality Date    CHOLECYSTECTOMY      PARTIAL HYSTERECTOMY             Pre-op Assessment    I have reviewed the Patient Summary Reports.     I have reviewed the Nursing Notes. I have reviewed the NPO Status.   I have reviewed the Medications.     Review of Systems  Anesthesia Hx:  No problems with previous Anesthesia   Denies Personal Hx of Anesthesia complications.   Social:  Non-Smoker, No Alcohol Use    Cardiovascular:  Cardiovascular Normal Exercise tolerance: good     Pulmonary:   Sleep Apnea    Endocrine:   Diabetes        Physical Exam  General: Well nourished    Airway:  Mallampati: II   Mouth Opening: Normal  TM Distance: Normal  Tongue: Normal  Neck ROM: Normal ROM    Dental:  Intact    Chest/Lungs:  Clear to auscultation    Heart:  Rate: Normal  Rhythm: Regular Rhythm  Sounds: Normal  Difficult IV access-small, fragile veins  Abdomen:  Normal        Anesthesia Plan  Type of Anesthesia, risks & benefits discussed:    Anesthesia Type: Gen Natural Airway  Intra-op Monitoring Plan: Standard ASA Monitors  Induction:  IV  Informed Consent: Informed consent signed with the Patient and all parties understand the risks and agree with anesthesia plan.  All questions answered.   ASA Score: 3    Ready For Surgery From Anesthesia Perspective.     .       Pt c/o L chest pain today, worsening throughout the day. Pt further states he feels like is hard to take a deep breath. Denies fever, chills, cough. Not covid vaccinated.

## 2022-05-31 NOTE — H&P
Procedure : Colonoscopy    Indication(s):  +FIT    Review of patient's allergies indicates:   Allergen Reactions    Phenergan  [promethazine]      Other reaction(s): Hives    Atorvastatin      Myalgia         Past Medical History:   Diagnosis Date    Diabetes mellitus type II     Hyperlipemia     Irritable bowel syndrome     Obesity     Osteopenia     Rhinitis     Sleep apnea     Vertigo        Prior to Admission medications    Medication Sig Start Date End Date Taking? Authorizing Provider   alcohol swabs (BD ALCOHOL SWABS) PadM Apply 1 each topically daily as needed (testing). 8/14/20  Yes Orquidea Lancaster MD   aspirin (ECOTRIN) 81 MG EC tablet Take 1 tablet (81 mg total) by mouth once daily. 1/18/17  Yes Orquidea Lancaster MD   blood-glucose meter Misc Use for blood sugar testing as instructed once daily - true air 8/14/20  Yes Orquidea Lancaster MD   clobetasol 0.05% (TEMOVATE) 0.05 % Oint Apply topically 2 (two) times daily. Focally to keloid scar of the r ear.  Start after surgery site healed.  Stop if flat or no regrowth after a month. 3/7/22  Yes Jude Madden MD   empagliflozin (JARDIANCE) 25 mg tablet Take 1 tablet (25 mg total) by mouth once daily. 5/9/22  Yes MARYBEL Lopez, JASON   lancets Misc Use for blood sugar testing once daily - true  plus ultra thin 30 g 8/21/20  Yes Orquidea Lancaster MD   LIDOcaine HCL 2% (XYLOCAINE) 2 % jelly Apply topically as needed. Apply topically once nightly to affected part of foot/feet. 8/17/21  Yes Donte Bush DPM   metFORMIN (GLUCOPHAGE-XR) 500 MG ER 24hr tablet Take 1 tablet in am 5/24/22  Yes MARYBEL Lopez, FNP   rosuvastatin (CRESTOR) 5 MG tablet TAKE 1 TABLET EVERY DAY 9/29/21  Yes Orquidea Lancaster MD   TRUE METRIX GLUCOSE TEST STRIP Strp USE FOR BLOOD SUGAR TESTING ONE TIME DAILY 5/26/22  Yes Orquidea Lancaster MD   TRUEPLUS LANCETS 30 gauge Misc CHECK BLOOD SUGAR ONE TIME DAILY 9/29/21   Yes Orquidea Lancaster MD   valsartan (DIOVAN) 80 MG tablet Take 1 tablet (80 mg total) by mouth once daily. 21 Yes MARYBEL Lopez, JASON   albuterol (PROVENTIL/VENTOLIN HFA) 90 mcg/actuation inhaler Inhale 2 puffs into the lungs every 6 (six) hours as needed for Wheezing. 9/11/19 9/10/20  Orquidea Lancaster MD   fluticasone propionate (FLONASE) 50 mcg/actuation nasal spray 1 spray (50 mcg total) by Each Nostril route once daily. 19   Orquidea Lancaster MD       Sedation Problems: NO    Family History   Problem Relation Age of Onset    Diabetes Brother     Hypertension Brother     Kidney disease Brother     Cancer Sister         liver     Diabetes Sister     Hypertension Mother     Heart disease Mother     Kidney disease Mother     Hypertension Father     Diabetes Sister     Cancer Maternal Aunt         breast cancer     Breast cancer Maternal Aunt        Fam Hx of Sedation Problems: NO    Social History     Socioeconomic History    Marital status:    Tobacco Use    Smoking status: Former Smoker     Types: Cigarettes     Quit date: 1989     Years since quittin.4    Smokeless tobacco: Never Used   Substance and Sexual Activity    Alcohol use: Yes     Comment: wine socially    Drug use: No       Review of Systems -     Respiratory ROS: no cough, shortness of breath, or wheezing  Cardiovascular ROS: no chest pain or dyspnea on exertion  Gastrointestinal ROS: no abdominal pain, change in bowel habits, or black or bloody stools  Musculoskeletal ROS: negative  Neurological ROS: no TIA or stroke symptoms        Physical Exam:  General: no distress  Head: normocephalic  Airway:  normal oropharynx, airway normal  Neck: supple, symmetrical, trachea midline  Lungs:  normal respiratory effort  Heart: regular rate and rhythm  Abdomen: soft, non-tender non-distented; bowel sounds normal; no masses,  no organomegaly  Extremities: no cyanosis or edema,  or clubbing       Deep Sedation: Mallampati Score per anesthesia     SedationPlan :Moderate     ASA : III    Patient is medically cleared for anesthesia.    Anesthesia/Surgery risks, benefits and alternative options discussed and understood by patient/family.

## 2022-06-06 LAB
FINAL PATHOLOGIC DIAGNOSIS: NORMAL
Lab: NORMAL

## 2022-06-09 ENCOUNTER — PATIENT MESSAGE (OUTPATIENT)
Dept: INTERNAL MEDICINE | Facility: CLINIC | Age: 67
End: 2022-06-09
Payer: MEDICARE

## 2022-06-10 ENCOUNTER — PATIENT MESSAGE (OUTPATIENT)
Dept: SURGERY | Facility: CLINIC | Age: 67
End: 2022-06-10
Payer: MEDICARE

## 2022-06-10 NOTE — TELEPHONE ENCOUNTER
Called and spoke with pt  Polyps adenomatous and tublovillous  She will clairfy with colon rectal follw up   3 years or sooner discussed

## 2022-06-15 ENCOUNTER — TELEPHONE (OUTPATIENT)
Dept: SURGERY | Facility: CLINIC | Age: 67
End: 2022-06-15
Payer: MEDICARE

## 2022-06-15 NOTE — TELEPHONE ENCOUNTER
Spoke with patient. Reviewed fiber food list that was emailed to patient, aim for 25g of fiber per day.

## 2022-06-28 ENCOUNTER — HOSPITAL ENCOUNTER (OUTPATIENT)
Dept: RADIOLOGY | Facility: HOSPITAL | Age: 67
Discharge: HOME | End: 2022-06-28
Attending: INTERNAL MEDICINE
Payer: MEDICARE

## 2022-06-28 DIAGNOSIS — Z12.31 ENCOUNTER FOR SCREENING MAMMOGRAM FOR MALIGNANT NEOPLASM OF BREAST: ICD-10-CM

## 2022-06-28 DIAGNOSIS — Z12.39 ENCOUNTER FOR SCREENING FOR MALIGNANT NEOPLASM OF BREAST, UNSPECIFIED SCREENING MODALITY: ICD-10-CM

## 2022-06-28 PROCEDURE — 77063 MAMMO DIGITAL SCREENING BILAT WITH TOMO: ICD-10-PCS | Mod: 26,,, | Performed by: RADIOLOGY

## 2022-06-28 PROCEDURE — 77063 BREAST TOMOSYNTHESIS BI: CPT | Mod: TC

## 2022-06-28 PROCEDURE — 77067 MAMMO DIGITAL SCREENING BILAT WITH TOMO: ICD-10-PCS | Mod: 26,,, | Performed by: RADIOLOGY

## 2022-06-28 PROCEDURE — 77067 SCR MAMMO BI INCL CAD: CPT | Mod: 26,,, | Performed by: RADIOLOGY

## 2022-06-28 PROCEDURE — 77063 BREAST TOMOSYNTHESIS BI: CPT | Mod: 26,,, | Performed by: RADIOLOGY

## 2022-06-28 PROCEDURE — 77067 SCR MAMMO BI INCL CAD: CPT | Mod: TC

## 2022-07-08 ENCOUNTER — TELEPHONE (OUTPATIENT)
Dept: SURGERY | Facility: CLINIC | Age: 67
End: 2022-07-08
Payer: MEDICARE

## 2022-07-12 RX ORDER — METFORMIN HYDROCHLORIDE 500 MG/1
TABLET, EXTENDED RELEASE ORAL
Qty: 270 TABLET | OUTPATIENT
Start: 2022-07-12

## 2022-07-12 NOTE — TELEPHONE ENCOUNTER
No new care gaps identified.  [facility] Embedded Care Gaps. Reference number: [id number]. 7/12/2022   6:08:08 AM [email or web address]

## 2022-07-13 NOTE — TELEPHONE ENCOUNTER
Refill Decision Note   Lizabeth Yifan  is requesting a refill authorization.  Brief Assessment and Rationale for Refill:  Quick Discontinue    -Medication-Related Problems Identified: Dose adjustment  Medication Therapy Plan:  Patient is taking 1 tab daily in the am    Medication Reconciliation Completed: No   Comments:     No Care Gaps recommended.     Note composed:10:24 PM 07/12/2022

## 2022-07-19 ENCOUNTER — TELEPHONE (OUTPATIENT)
Dept: INTERNAL MEDICINE | Facility: CLINIC | Age: 67
End: 2022-07-19
Payer: MEDICARE

## 2022-07-19 NOTE — TELEPHONE ENCOUNTER
PA for Jardiance 25MG tablets initiated  Received copied message below - No authorization needed for Jardiance 25MG tablets      Lizabeth Saha Li: [id number]   Outcome  Available without authorization.  Drug  Jardiance 25MG tablets  Form  [employer] PA Form

## 2022-07-29 ENCOUNTER — LAB VISIT (OUTPATIENT)
Dept: LAB | Facility: HOSPITAL | Age: 67
End: 2022-07-29
Payer: MEDICARE

## 2022-07-29 DIAGNOSIS — Z79.4 TYPE 2 DIABETES MELLITUS WITH HYPERGLYCEMIA, WITH LONG-TERM CURRENT USE OF INSULIN: ICD-10-CM

## 2022-07-29 DIAGNOSIS — G47.30 SLEEP APNEA, UNSPECIFIED TYPE: ICD-10-CM

## 2022-07-29 DIAGNOSIS — E11.65 TYPE 2 DIABETES MELLITUS WITH HYPERGLYCEMIA, WITH LONG-TERM CURRENT USE OF INSULIN: ICD-10-CM

## 2022-07-29 LAB
ANION GAP SERPL CALC-SCNC: 14 MMOL/L (ref 8–16)
BUN SERPL-MCNC: 11 MG/DL (ref 8–23)
CALCIUM SERPL-MCNC: 9.7 MG/DL (ref 8.7–10.5)
CHLORIDE SERPL-SCNC: 106 MMOL/L (ref 95–110)
CO2 SERPL-SCNC: 20 MMOL/L (ref 23–29)
CREAT SERPL-MCNC: 1 MG/DL (ref 0.5–1.4)
EST. GFR  (AFRICAN AMERICAN): >60 ML/MIN/1.73 M^2
EST. GFR  (NON AFRICAN AMERICAN): 58.8 ML/MIN/1.73 M^2
ESTIMATED AVG GLUCOSE: 186 MG/DL (ref 68–131)
GLUCOSE SERPL-MCNC: 153 MG/DL (ref 70–110)
HBA1C MFR BLD: 8.1 % (ref 4–5.6)
POTASSIUM SERPL-SCNC: 4.5 MMOL/L (ref 3.5–5.1)
SODIUM SERPL-SCNC: 140 MMOL/L (ref 136–145)
TSH SERPL DL<=0.005 MIU/L-ACNC: 1.12 UIU/ML (ref 0.4–4)

## 2022-07-29 PROCEDURE — 80048 BASIC METABOLIC PNL TOTAL CA: CPT | Performed by: NURSE PRACTITIONER

## 2022-07-29 PROCEDURE — 84443 ASSAY THYROID STIM HORMONE: CPT | Performed by: NURSE PRACTITIONER

## 2022-07-29 PROCEDURE — 36415 COLL VENOUS BLD VENIPUNCTURE: CPT | Performed by: NURSE PRACTITIONER

## 2022-07-29 PROCEDURE — 83036 HEMOGLOBIN GLYCOSYLATED A1C: CPT | Performed by: NURSE PRACTITIONER

## 2022-07-29 NOTE — PROGRESS NOTES
CHIEF COMPLAINT: Type 2 Diabetes     HPI: Mrs. Lizabeth Saha is a 66 y.o. female who was diagnosed with Type 2 DM x 11 years ago.   Social smoker- younger ages, worried about copd dx, h/o sleep apnea    Retired. 3 daughters.  Mother- , on HD  - radiation, ca dx    Last seen by me in spring 2022.  a1c trends 9% to 8% to 8.1%  Wt loss 4#    Lab Results   Component Value Date    HGBA1C 8.1 (H) 2022     [facility]   Stopped trulicity - costly   Stopped jardiance- costly     Cutting back carbs  Staying active  No h/o pancreatitis or medullary thyroid ca    PREVIOUS DIABETES MEDICATIONS TRIED  Metformin xr   Metformin   januvia 100 mg daily   trulicity- too costly   jardiance 10, 25 mg daily     CURRENT DIABETIC MEDS: metformin 500 xr mg in am, jardiance off x 1 week  Testing 3 x a day  Patient is willing and able to use the device  Demonstrated an understanding of the technology and is motivated to use CGM  Patient expected to adhere to a comprehensive diabetes treatment plan and patient has adequate medical supervision  Has multiple impaired awareness of hypoglycemia (hypoglycemia unawareness)    Diabetes Management Status    Statin: Taking  ACE/ARB: Taking    Screening or Prevention Patient's value Goal Complete/Controlled?   HgA1C Testing and Control   Lab Results   Component Value Date    HGBA1C 8.1 (H) 2022      Annually/Less than 8% No   Lipid profile : 2022 Annually Yes   LDL control Lab Results   Component Value Date    LDLCALC 92.4 2022    Annually/Less than 100 mg/dl  Yes   Nephropathy screening Lab Results   Component Value Date    LABMICR 14.0 06/15/2021     Lab Results   Component Value Date    PROTEINUA NEG 2008    Annually Yes   Blood pressure BP Readings from Last 1 Encounters:   22 127/70    Less than 140/90 No   Dilated retinal exam : 2020 Annually Yes   Foot exam   : 2021 Annually Yes     REVIEW OF SYSTEMS  General: no weakness,  "fatigue, + weight changes (loss) 4#   Eyes: no double or blurred vision, eye pain, or redness  Cardiovascular: no chest pain, palpitations, edema, or murmurs.   Respiratory: no cough or dyspnea.   GI: no heartburn, nausea, or changes in bowel patterns; good appetite.   Skin: no rashes, dryness, itching, or reactions at insulin injection sites.  Neuro: no numbness, tingling, tremors, or vertigo.   Endocrine: no polyuria, polydipsia, polyphagia, heat or cold intolerance.     Vital Signs  /78 (BP Location: Left arm, Patient Position: Sitting, BP Method: Medium (Manual))   Pulse 80   Ht 5' 5" (1.651 m)   Wt 79.7 kg (175 lb 11.3 oz)   LMP 01/01/1994 (Approximate) Comment: 1994  SpO2 97%   BMI 29.24 kg/m²     Hemoglobin A1C   Date Value Ref Range Status   07/29/2022 8.1 (H) 4.0 - 5.6 % Final     Comment:     ADA Screening Guidelines:  5.7-6.4%  Consistent with prediabetes  >or=6.5%  Consistent with diabetes    High levels of fetal hemoglobin interfere with the HbA1C  assay. Heterozygous hemoglobin variants (HbS, HgC, etc)do  not significantly interfere with this assay.   However, presence of multiple variants may affect accuracy.     03/29/2022 8.0 (H) 4.0 - 5.6 % Final     Comment:     ADA Screening Guidelines:  5.7-6.4%  Consistent with prediabetes  >or=6.5%  Consistent with diabetes    High levels of fetal hemoglobin interfere with the HbA1C  assay. Heterozygous hemoglobin variants (HbS, HgC, etc)do  not significantly interfere with this assay.   However, presence of multiple variants may affect accuracy.     10/15/2021 9.0 (H) 4.0 - 5.6 % Final     Comment:     ADA Screening Guidelines:  5.7-6.4%  Consistent with prediabetes  >or=6.5%  Consistent with diabetes    High levels of fetal hemoglobin interfere with the HbA1C  assay. Heterozygous hemoglobin variants (HbS, HgC, etc)do  not significantly interfere with this assay.   However, presence of multiple variants may affect accuracy.          Chemistry      "   Component Value Date/Time     07/29/2022 1008    K 4.5 07/29/2022 1008     07/29/2022 1008    CO2 20 (L) 07/29/2022 1008    BUN 11 07/29/2022 1008    CREATININE 1.0 07/29/2022 1008     (H) 07/29/2022 1008        Component Value Date/Time    CALCIUM 9.7 07/29/2022 1008    ALKPHOS 65 06/15/2021 0918    AST 28 06/15/2021 0918    ALT 41 06/15/2021 0918    BILITOT 0.3 06/15/2021 0918    ESTGFRAFRICA >60.0 07/29/2022 1008    EGFRNONAA 58.8 (A) 07/29/2022 1008           Lab Results   Component Value Date    TSH 1.125 07/29/2022      Lab Results   Component Value Date    CHOL 162 03/29/2022    CHOL 151 06/15/2021    CHOL 182 06/19/2020     Lab Results   Component Value Date    HDL 48 03/29/2022    HDL 45 06/15/2021    HDL 54 06/19/2020     Lab Results   Component Value Date    LDLCALC 92.4 03/29/2022    LDLCALC 84.2 06/15/2021    LDLCALC 108.0 06/19/2020     Lab Results   Component Value Date    TRIG 108 03/29/2022    TRIG 109 06/15/2021    TRIG 100 06/19/2020     Lab Results   Component Value Date    CHOLHDL 29.6 03/29/2022    CHOLHDL 29.8 06/15/2021    CHOLHDL 29.7 06/19/2020         PHYSICAL EXAMINATION  Constitutional: Appears well, no distress Reviewed vitals above.  Eyes: conjunctivae & lids intact; PERRLA, EOMs intact; optic discs   Neck: Supple, trachea midline.   Respiratory: No wheezes, even and unlabored   Cardiovascular: RRR; no edema or varicosities  Lymph: deferred   Skin: warm and dry; no injection site reactions, no acanthosis nigracans observed.  Neuro:patient alert and cooperative, normal affect; steady gait.  Psychiatric: judgement & insight intact, orientation of time, place & person intact, memory; mood & affect wnl     Diabetes Foot Exam:   deferred     Assessment/Plan  1. Type 2 diabetes mellitus with hyperglycemia, without long-term current use of insulin  Hemoglobin A1C    Hemoglobin A1C    Microalbumin/Creatinine Ratio, Urine    Comprehensive Metabolic Panel    Ambulatory  referral/consult to Podiatry   2. Non-proliferative diabetic retinopathy     3. Hyperlipidemia, unspecified hyperlipidemia type     4. Essential hypertension  Microalbumin/Creatinine Ratio, Urine   5. Obesity, Class I, BMI 30-34.9     6. Sleep apnea, unspecified type     7. Type 2 diabetes mellitus with hyperglycemia, without long-term current use of insulin       1. F/u in 6 months w/ me   jardiance 10 mg daily --patient assistance program- reach out  Add janumet 50/500 mg bid   Stop metformin xr  a1c goal less than 7.5%   Discussed dietary habits, strsesors   rx sent to [facility] for pricing   2. F/u with retinal specialist  3.   Lab Results   Component Value Date    LDLCALC 92.4 03/29/2022     At goal   4. Continue med(s)  Controlled  5. Body mass index is 29.24 kg/m².  May increase insulin resistance  Losing weight   6. stable  Uses here and there- cpap     FOLLOW UP  In 6 months      Addendum: metformin 500 mg bid   Cost issue with janumet  Awaiting response from pt assistance for jardiance

## 2022-08-02 ENCOUNTER — OFFICE VISIT (OUTPATIENT)
Dept: INTERNAL MEDICINE | Facility: CLINIC | Age: 67
End: 2022-08-02
Payer: MEDICARE

## 2022-08-02 VITALS
OXYGEN SATURATION: 97 % | HEART RATE: 80 BPM | DIASTOLIC BLOOD PRESSURE: 78 MMHG | BODY MASS INDEX: 29.27 KG/M2 | WEIGHT: 175.69 LBS | SYSTOLIC BLOOD PRESSURE: 132 MMHG | HEIGHT: 65 IN

## 2022-08-02 DIAGNOSIS — G47.30 SLEEP APNEA, UNSPECIFIED TYPE: ICD-10-CM

## 2022-08-02 DIAGNOSIS — Z79.4 TYPE 2 DIABETES MELLITUS WITH HYPERGLYCEMIA, WITH LONG-TERM CURRENT USE OF INSULIN: ICD-10-CM

## 2022-08-02 DIAGNOSIS — E78.5 HYPERLIPIDEMIA, UNSPECIFIED HYPERLIPIDEMIA TYPE: ICD-10-CM

## 2022-08-02 DIAGNOSIS — I10 ESSENTIAL HYPERTENSION: ICD-10-CM

## 2022-08-02 DIAGNOSIS — E11.65 TYPE 2 DIABETES MELLITUS WITH HYPERGLYCEMIA, WITH LONG-TERM CURRENT USE OF INSULIN: ICD-10-CM

## 2022-08-02 DIAGNOSIS — E11.65 TYPE 2 DIABETES MELLITUS WITH HYPERGLYCEMIA, WITHOUT LONG-TERM CURRENT USE OF INSULIN: Primary | ICD-10-CM

## 2022-08-02 DIAGNOSIS — E11.3299 NON-PROLIFERATIVE DIABETIC RETINOPATHY: ICD-10-CM

## 2022-08-02 DIAGNOSIS — E66.9 OBESITY, CLASS I, BMI 30-34.9: ICD-10-CM

## 2022-08-02 PROCEDURE — 1160F RVW MEDS BY RX/DR IN RCRD: CPT | Mod: CPTII,S$GLB,, | Performed by: NURSE PRACTITIONER

## 2022-08-02 PROCEDURE — 3288F FALL RISK ASSESSMENT DOCD: CPT | Mod: CPTII,S$GLB,, | Performed by: NURSE PRACTITIONER

## 2022-08-02 PROCEDURE — 3075F PR MOST RECENT SYSTOLIC BLOOD PRESS GE 130-139MM HG: ICD-10-PCS | Mod: CPTII,S$GLB,, | Performed by: NURSE PRACTITIONER

## 2022-08-02 PROCEDURE — 1159F PR MEDICATION LIST DOCUMENTED IN MEDICAL RECORD: ICD-10-PCS | Mod: CPTII,S$GLB,, | Performed by: NURSE PRACTITIONER

## 2022-08-02 PROCEDURE — 99999 PR PBB SHADOW E&M-EST. PATIENT-LVL IV: ICD-10-PCS | Mod: PBBFAC,,, | Performed by: NURSE PRACTITIONER

## 2022-08-02 PROCEDURE — 3078F DIAST BP <80 MM HG: CPT | Mod: CPTII,S$GLB,, | Performed by: NURSE PRACTITIONER

## 2022-08-02 PROCEDURE — 99214 PR OFFICE/OUTPT VISIT, EST, LEVL IV, 30-39 MIN: ICD-10-PCS | Mod: S$GLB,,, | Performed by: NURSE PRACTITIONER

## 2022-08-02 PROCEDURE — 1101F PR PT FALLS ASSESS DOC 0-1 FALLS W/OUT INJ PAST YR: ICD-10-PCS | Mod: CPTII,S$GLB,, | Performed by: NURSE PRACTITIONER

## 2022-08-02 PROCEDURE — 1126F AMNT PAIN NOTED NONE PRSNT: CPT | Mod: CPTII,S$GLB,, | Performed by: NURSE PRACTITIONER

## 2022-08-02 PROCEDURE — 99214 OFFICE O/P EST MOD 30 MIN: CPT | Mod: S$GLB,,, | Performed by: NURSE PRACTITIONER

## 2022-08-02 PROCEDURE — 3008F PR BODY MASS INDEX (BMI) DOCUMENTED: ICD-10-PCS | Mod: CPTII,S$GLB,, | Performed by: NURSE PRACTITIONER

## 2022-08-02 PROCEDURE — 99999 PR PBB SHADOW E&M-EST. PATIENT-LVL IV: CPT | Mod: PBBFAC,,, | Performed by: NURSE PRACTITIONER

## 2022-08-02 PROCEDURE — 1101F PT FALLS ASSESS-DOCD LE1/YR: CPT | Mod: CPTII,S$GLB,, | Performed by: NURSE PRACTITIONER

## 2022-08-02 PROCEDURE — 3052F PR MOST RECENT HEMOGLOBIN A1C LEVEL 8.0 - < 9.0%: ICD-10-PCS | Mod: CPTII,S$GLB,, | Performed by: NURSE PRACTITIONER

## 2022-08-02 PROCEDURE — 3288F PR FALLS RISK ASSESSMENT DOCUMENTED: ICD-10-PCS | Mod: CPTII,S$GLB,, | Performed by: NURSE PRACTITIONER

## 2022-08-02 PROCEDURE — 3052F HG A1C>EQUAL 8.0%<EQUAL 9.0%: CPT | Mod: CPTII,S$GLB,, | Performed by: NURSE PRACTITIONER

## 2022-08-02 PROCEDURE — 1126F PR PAIN SEVERITY QUANTIFIED, NO PAIN PRESENT: ICD-10-PCS | Mod: CPTII,S$GLB,, | Performed by: NURSE PRACTITIONER

## 2022-08-02 PROCEDURE — 1159F MED LIST DOCD IN RCRD: CPT | Mod: CPTII,S$GLB,, | Performed by: NURSE PRACTITIONER

## 2022-08-02 PROCEDURE — 1160F PR REVIEW ALL MEDS BY PRESCRIBER/CLIN PHARMACIST DOCUMENTED: ICD-10-PCS | Mod: CPTII,S$GLB,, | Performed by: NURSE PRACTITIONER

## 2022-08-02 PROCEDURE — 4010F PR ACE/ARB THEARPY RXD/TAKEN: ICD-10-PCS | Mod: CPTII,S$GLB,, | Performed by: NURSE PRACTITIONER

## 2022-08-02 PROCEDURE — 4010F ACE/ARB THERAPY RXD/TAKEN: CPT | Mod: CPTII,S$GLB,, | Performed by: NURSE PRACTITIONER

## 2022-08-02 PROCEDURE — 3078F PR MOST RECENT DIASTOLIC BLOOD PRESSURE < 80 MM HG: ICD-10-PCS | Mod: CPTII,S$GLB,, | Performed by: NURSE PRACTITIONER

## 2022-08-02 PROCEDURE — 3075F SYST BP GE 130 - 139MM HG: CPT | Mod: CPTII,S$GLB,, | Performed by: NURSE PRACTITIONER

## 2022-08-02 PROCEDURE — 3008F BODY MASS INDEX DOCD: CPT | Mod: CPTII,S$GLB,, | Performed by: NURSE PRACTITIONER

## 2022-08-02 NOTE — PATIENT INSTRUCTIONS
Follow up in 6 months w/Irielle  A1c urine mac, cmp in 3 months   A1c in 6 months (prior to appt)  Podiatry 2022     Janumet 50/500 mg twice a day  Once started stop metformin   Jardiance 25 mg daily- checking in with patient assistance program    Lab Results   Component Value Date    HGBA1C 8.1 (H) 07/29/2022     Goal less than 7.5%    [email or web address]  Eat fit luann  [employer] luann  [email or web address]

## 2022-08-05 ENCOUNTER — PATIENT MESSAGE (OUTPATIENT)
Dept: INTERNAL MEDICINE | Facility: CLINIC | Age: 67
End: 2022-08-05
Payer: MEDICARE

## 2022-08-09 ENCOUNTER — PES CALL (OUTPATIENT)
Dept: ADMINISTRATIVE | Facility: CLINIC | Age: 67
End: 2022-08-09
Payer: MEDICARE

## 2022-08-17 ENCOUNTER — PATIENT MESSAGE (OUTPATIENT)
Dept: PHARMACY | Facility: CLINIC | Age: 67
End: 2022-08-17
Payer: MEDICARE

## 2022-08-17 ENCOUNTER — TELEPHONE (OUTPATIENT)
Dept: PHARMACY | Facility: CLINIC | Age: 67
End: 2022-08-17
Payer: MEDICARE

## 2022-08-18 NOTE — TELEPHONE ENCOUNTER
----- Message from Joshua Swanson sent at 8/2/2022 12:46 PM CDT -----  Regarding: FW: jardiance 10 mg daily  New Patient    ----- Message -----  From: MARYBEL Lopez FNP  Sent: 8/2/2022  11:11 AM CDT  To: Pharmacy Patient Assistance Team  Subject: jardiance 10 mg daily                            Hi!  Pt needs assistance with oles[facility] Dempsey

## 2022-08-18 NOTE — TELEPHONE ENCOUNTER
I have reached out to to inform MS YOUSSEF of the JARDIANCE application process  and whats required to apply. PT did not answer. I left a voicemail and sent a my chart msg introducing her to the pharmacy patient assistance program. I will follow up in 5 business days.

## 2022-08-22 ENCOUNTER — PATIENT MESSAGE (OUTPATIENT)
Dept: INTERNAL MEDICINE | Facility: CLINIC | Age: 67
End: 2022-08-22
Payer: MEDICARE

## 2022-08-22 RX ORDER — EMPAGLIFLOZIN 25 MG/1
25 TABLET, FILM COATED ORAL DAILY
Qty: 30 TABLET | Refills: 8 | Status: SHIPPED | OUTPATIENT
Start: 2022-08-22 | End: 2023-04-10

## 2022-09-13 ENCOUNTER — OFFICE VISIT (OUTPATIENT)
Dept: PODIATRY | Facility: CLINIC | Age: 67
End: 2022-09-13
Payer: MEDICARE

## 2022-09-13 VITALS
HEART RATE: 75 BPM | SYSTOLIC BLOOD PRESSURE: 143 MMHG | BODY MASS INDEX: 29.49 KG/M2 | HEIGHT: 65 IN | DIASTOLIC BLOOD PRESSURE: 75 MMHG | WEIGHT: 177 LBS

## 2022-09-13 DIAGNOSIS — L60.9 DISEASE OF NAIL: Primary | ICD-10-CM

## 2022-09-13 DIAGNOSIS — E11.42 DIABETIC POLYNEUROPATHY ASSOCIATED WITH TYPE 2 DIABETES MELLITUS: ICD-10-CM

## 2022-09-13 DIAGNOSIS — Z79.4 TYPE 2 DIABETES MELLITUS WITH HYPERGLYCEMIA, WITH LONG-TERM CURRENT USE OF INSULIN: ICD-10-CM

## 2022-09-13 DIAGNOSIS — E11.65 TYPE 2 DIABETES MELLITUS WITH HYPERGLYCEMIA, WITH LONG-TERM CURRENT USE OF INSULIN: ICD-10-CM

## 2022-09-13 PROCEDURE — 4010F ACE/ARB THERAPY RXD/TAKEN: CPT | Mod: CPTII,S$GLB,, | Performed by: PODIATRIST

## 2022-09-13 PROCEDURE — 3052F HG A1C>EQUAL 8.0%<EQUAL 9.0%: CPT | Mod: CPTII,S$GLB,, | Performed by: PODIATRIST

## 2022-09-13 PROCEDURE — 4010F PR ACE/ARB THEARPY RXD/TAKEN: ICD-10-PCS | Mod: CPTII,S$GLB,, | Performed by: PODIATRIST

## 2022-09-13 PROCEDURE — 1126F PR PAIN SEVERITY QUANTIFIED, NO PAIN PRESENT: ICD-10-PCS | Mod: CPTII,S$GLB,, | Performed by: PODIATRIST

## 2022-09-13 PROCEDURE — 3078F PR MOST RECENT DIASTOLIC BLOOD PRESSURE < 80 MM HG: ICD-10-PCS | Mod: CPTII,S$GLB,, | Performed by: PODIATRIST

## 2022-09-13 PROCEDURE — 3052F PR MOST RECENT HEMOGLOBIN A1C LEVEL 8.0 - < 9.0%: ICD-10-PCS | Mod: CPTII,S$GLB,, | Performed by: PODIATRIST

## 2022-09-13 PROCEDURE — 3078F DIAST BP <80 MM HG: CPT | Mod: CPTII,S$GLB,, | Performed by: PODIATRIST

## 2022-09-13 PROCEDURE — 1159F MED LIST DOCD IN RCRD: CPT | Mod: CPTII,S$GLB,, | Performed by: PODIATRIST

## 2022-09-13 PROCEDURE — 99213 OFFICE O/P EST LOW 20 MIN: CPT | Mod: S$GLB,,, | Performed by: PODIATRIST

## 2022-09-13 PROCEDURE — 3008F PR BODY MASS INDEX (BMI) DOCUMENTED: ICD-10-PCS | Mod: CPTII,S$GLB,, | Performed by: PODIATRIST

## 2022-09-13 PROCEDURE — 1126F AMNT PAIN NOTED NONE PRSNT: CPT | Mod: CPTII,S$GLB,, | Performed by: PODIATRIST

## 2022-09-13 PROCEDURE — 3008F BODY MASS INDEX DOCD: CPT | Mod: CPTII,S$GLB,, | Performed by: PODIATRIST

## 2022-09-13 PROCEDURE — 99999 PR PBB SHADOW E&M-EST. PATIENT-LVL III: CPT | Mod: PBBFAC,,, | Performed by: PODIATRIST

## 2022-09-13 PROCEDURE — 3077F SYST BP >= 140 MM HG: CPT | Mod: CPTII,S$GLB,, | Performed by: PODIATRIST

## 2022-09-13 PROCEDURE — 1159F PR MEDICATION LIST DOCUMENTED IN MEDICAL RECORD: ICD-10-PCS | Mod: CPTII,S$GLB,, | Performed by: PODIATRIST

## 2022-09-13 PROCEDURE — 3077F PR MOST RECENT SYSTOLIC BLOOD PRESSURE >= 140 MM HG: ICD-10-PCS | Mod: CPTII,S$GLB,, | Performed by: PODIATRIST

## 2022-09-13 PROCEDURE — 99213 PR OFFICE/OUTPT VISIT, EST, LEVL III, 20-29 MIN: ICD-10-PCS | Mod: S$GLB,,, | Performed by: PODIATRIST

## 2022-09-13 PROCEDURE — 99999 PR PBB SHADOW E&M-EST. PATIENT-LVL III: ICD-10-PCS | Mod: PBBFAC,,, | Performed by: PODIATRIST

## 2022-09-13 NOTE — PROGRESS NOTES
Subjective:      Patient ID: Lizabeth Saha is a 66 y.o. female.    Chief Complaint:   Diabetic Foot Exam (Pcp-Tonny 8/2/2022)    Lizabeth is a 66 y.o. female who presents to the clinic upon referral from Dr. Maier  for evaluation and treatment of diabetic feet. Lizabeth has a past medical history of Diabetes mellitus type II, Hyperlipemia, Irritable bowel syndrome, Obesity, Osteopenia, Rhinitis, Sleep apnea, and Vertigo. Patient relates no major problem with feet. Only complaints today consist of foot exam    Patient is new to me  ' she relates she has a little bit of discomfort in her toenails on occasion.  Sometimes when she walks long She does not think she has ingrown toenails.  Overall she is improving her A1c by watching her diet as well as increasing her walking  No numbness or tingling noted however occasionally at night when she is sitting or eating dinner she will notice an aunt type feeling on the front of her legs.  She relates like bugs crawling on her but there is nothing there    Patient has a history of smoking nothing recent  Saw Dr. Bush in 2021.    PCP: Orquidea Lancaster MD    Date Last Seen by PCP: 8/2/22    Current shoe gear: Casual shoes    Hemoglobin A1C   Date Value Ref Range Status   07/29/2022 8.1 (H) 4.0 - 5.6 % Final     Comment:     ADA Screening Guidelines:  5.7-6.4%  Consistent with prediabetes  >or=6.5%  Consistent with diabetes    High levels of fetal hemoglobin interfere with the HbA1C  assay. Heterozygous hemoglobin variants (HbS, HgC, etc)do  not significantly interfere with this assay.   However, presence of multiple variants may affect accuracy.     03/29/2022 8.0 (H) 4.0 - 5.6 % Final     Comment:     ADA Screening Guidelines:  5.7-6.4%  Consistent with prediabetes  >or=6.5%  Consistent with diabetes    High levels of fetal hemoglobin interfere with the HbA1C  assay. Heterozygous hemoglobin variants (HbS, HgC, etc)do  not significantly interfere with this assay.   However,  presence of multiple variants may affect accuracy.     10/15/2021 9.0 (H) 4.0 - 5.6 % Final     Comment:     ADA Screening Guidelines:  5.7-6.4%  Consistent with prediabetes  >or=6.5%  Consistent with diabetes    High levels of fetal hemoglobin interfere with the HbA1C  assay. Heterozygous hemoglobin variants (HbS, HgC, etc)do  not significantly interfere with this assay.   However, presence of multiple variants may affect accuracy.            Past Medical History:   Diagnosis Date    Diabetes mellitus type II     Hyperlipemia     Irritable bowel syndrome     Obesity     Osteopenia     Rhinitis     Sleep apnea     Vertigo      Past Surgical History:   Procedure Laterality Date    CHOLECYSTECTOMY      COLONOSCOPY N/A 5/31/2022    Procedure: COLONOSCOPY;  Surgeon: Todd Suárez MD;  Location: [facility] ([address] [place] [address]);  Service: Endoscopy;  Laterality: N/A;  fully vaccinated-GT    PARTIAL HYSTERECTOMY       Current Outpatient Medications on File Prior to Visit   Medication Sig Dispense Refill    alcohol swabs (BD ALCOHOL SWABS) PadM Apply 1 each topically daily as needed (testing). 100 each 4    aspirin (ECOTRIN) 81 MG EC tablet Take 1 tablet (81 mg total) by mouth once daily.  0    blood-glucose meter Misc Use for blood sugar testing as instructed once daily - true air 1 each 0    clobetasol 0.05% (TEMOVATE) 0.05 % Oint Apply topically 2 (two) times daily. Focally to keloid scar of the r ear.  Start after surgery site healed.  Stop if flat or no regrowth after a month. 30 g 0    empagliflozin (JARDIANCE) 25 mg tablet Take 1 tablet (25 mg total) by mouth once daily. 30 tablet 8    fluticasone propionate (FLONASE) 50 mcg/actuation nasal spray 1 spray (50 mcg total) by Each Nostril route once daily. 3 Bottle 4    lancets Misc Use for blood sugar testing once daily - true  plus ultra thin 30 g 100 each 3    LIDOcaine HCL 2% (XYLOCAINE) 2 % jelly Apply topically as needed. Apply topically once nightly to affected part of  foot/feet. 30 mL 2    rosuvastatin (CRESTOR) 5 MG tablet TAKE 1 TABLET EVERY DAY 90 tablet 4    SITagliptan-metformin (JANUMET)  mg per tablet Take 1 tablet by mouth 2 (two) times daily with meals. 180 tablet 3    TRUE METRIX GLUCOSE TEST STRIP Strp USE FOR BLOOD SUGAR TESTING ONE TIME DAILY 100 strip 3    TRUEPLUS LANCETS 30 gauge Misc CHECK BLOOD SUGAR ONE TIME DAILY 100 each 6    valsartan (DIOVAN) 80 MG tablet TAKE 1 TABLET EVERY DAY (NEED MD APPOINTMENT) 90 tablet 2    albuterol (PROVENTIL/VENTOLIN HFA) 90 mcg/actuation inhaler Inhale 2 puffs into the lungs every 6 (six) hours as needed for Wheezing. 3 Inhaler 4     No current facility-administered medications on file prior to visit.     Review of patient's allergies indicates:   Allergen Reactions    Phenergan  [promethazine]      Other reaction(s): Hives    Atorvastatin      Myalgia         Review of Systems   Constitutional: Negative for chills, decreased appetite, fever, malaise/fatigue, night sweats, weight gain and weight loss.   Cardiovascular:  Negative for chest pain, claudication, dyspnea on exertion, leg swelling, palpitations and syncope.   Respiratory:  Negative for cough and shortness of breath.    Endocrine: Negative for cold intolerance and heat intolerance.   Hematologic/Lymphatic: Negative for bleeding problem. Does not bruise/bleed easily.   Skin:  Positive for nail changes. Negative for color change, dry skin, flushing, itching, poor wound healing, rash, skin cancer, suspicious lesions and unusual hair distribution.   Musculoskeletal:  Negative for arthritis, back pain, falls, gout, joint pain, joint swelling, muscle cramps, muscle weakness, myalgias, neck pain and stiffness.   Gastrointestinal:  Negative for diarrhea, nausea and vomiting.   Neurological:  Positive for paresthesias. Negative for dizziness, focal weakness, light-headedness, numbness, tremors, vertigo and weakness.   Psychiatric/Behavioral:  Negative for altered mental  "status and depression. The patient does not have insomnia.    Allergic/Immunologic: Negative.          Objective:       Vitals:    09/13/22 1028   BP: (!) 143/75   Pulse: 75   Weight: 80.3 kg (177 lb 0.5 oz)   Height: 5' 5" (1.651 m)   PainSc: 0-No pain   80.3 kg (177 lb 0.5 oz)     Physical Exam  Vitals reviewed.   Constitutional:       General: She is not in acute distress.     Appearance: She is well-developed. She is not ill-appearing, toxic-appearing or diaphoretic.      Comments: Proper supportive shoegear      Cardiovascular:      Pulses:           Dorsalis pedis pulses are 2+ on the right side and 2+ on the left side.        Posterior tibial pulses are 2+ on the right side and 2+ on the left side.   Musculoskeletal:         General: No tenderness.      Right lower leg: No edema.      Left lower leg: No edema.      Right ankle: Normal.      Right Achilles Tendon: Normal.      Left ankle: Normal.      Left Achilles Tendon: Normal.      Right foot: Decreased range of motion. Deformity and prominent metatarsal heads present. No tenderness or bony tenderness.      Left foot: Decreased range of motion. Deformity and prominent metatarsal heads present. No tenderness or bony tenderness.      Comments: Flexible pes planus foot type w/ medial arch collapse and mild gastroc equinus       Reducible extensor and flexor contractures at the MTPJ and PIPJ of toes 2-5, bilat.       No pain on palpation to hallux   Feet:      Right foot:      Protective Sensation: 10 sites tested.  5 sites sensed.      Skin integrity: No ulcer, blister, skin breakdown, erythema, warmth, callus or dry skin.      Toenail Condition: Right toenails are ingrown.      Left foot:      Protective Sensation: 10 sites tested.  6 sites sensed.      Skin integrity: No ulcer, blister, skin breakdown, erythema, warmth, callus or dry skin.      Toenail Condition: Left toenails are ingrown.      Comments: Dun[place] Steve slightly decreased all " digits    Hallux nails mildly incurvated no acute signs of infection    Skin:     General: Skin is warm.      Capillary Refill: Capillary refill takes 2 to 3 seconds.      Coloration: Skin is not pale.      Findings: No erythema or rash.   Neurological:      Mental Status: She is alert and oriented to person, place, and time.      Sensory: Sensory deficit present.      Gait: Gait is intact.   Psychiatric:         Attention and Perception: Attention normal.         Mood and Affect: Mood normal.         Speech: Speech normal.         Behavior: Behavior normal.         Thought Content: Thought content normal.         Cognition and Memory: Cognition normal.         Judgment: Judgment normal.             Assessment:       Encounter Diagnoses   Name Primary?    Type 2 diabetes mellitus with hyperglycemia, with long-term current use of insulin     Disease of nail Yes    Diabetic polyneuropathy associated with type 2 diabetes mellitus          Plan:       Lizabeth was seen today for diabetic foot exam.    Diagnoses and all orders for this visit:    Disease of nail    Type 2 diabetes mellitus with hyperglycemia, with long-term current use of insulin  -     Ambulatory referral/consult to Podiatry    Diabetic polyneuropathy associated with type 2 diabetes mellitus    I counseled the patient on her conditions, their implications and medical management.  Discussed with patient proper shoe gear    Discussed with patient neuropathy findings also suggest the bug bite symptoms are likely neuropathy  Could be positional sitting in chair    Shoe inspection. Diabetic Foot Education. Patient reminded of the importance of good nutrition and blood sugar control to help prevent podiatric complications of diabetes. Patient instructed on proper foot hygeine. We discussed wearing proper shoe gear, daily foot inspections, never walking without protective shoe gear, never putting sharp instruments to feet         Utilizing sterile toenail clippers  I aggressively debrided the offending nail border approximately 3 mm from its edge and carried the nail plate incision down at an angle in order to wedge out the offending cryptotic portion of the nail plate. The offending border was then removed in toto. The area was cleansed with alcohol. Patient tolerated the procedure well and related significant relief.    Briefly discussed with patient consider ingrown toenail procedures discussed what would be involved for the procedure as well as postop course    Follow-up in 1 year sooner if needed        No follow-ups on file.

## 2022-10-08 ENCOUNTER — PATIENT MESSAGE (OUTPATIENT)
Dept: ADMINISTRATIVE | Facility: OTHER | Age: 67
End: 2022-10-08
Payer: MEDICARE

## 2022-10-18 ENCOUNTER — PES CALL (OUTPATIENT)
Dept: ADMINISTRATIVE | Facility: CLINIC | Age: 67
End: 2022-10-18
Payer: MEDICARE

## 2022-10-19 NOTE — PROGRESS NOTES
[facility] Note    Chief Complaint    annual wellness visit.       History of Present Illness      Lizabeth Saha is a 67 y.o. female who presents today for annual wellness visit. She is retired from working for [employer] as an . She does walk for exercise but not on a daily basis. She states she needs to walk more. She is feeling well today. She denies any SOB, chest pain, N/V, unintentional weight loss, loss of appetite, fatigue, diarrhea, constipation. She is active daily and remains independent with ADL's. She will be seeing her grandchildren next week and therefore would like to wait 2 weeks to get her pneumonia, influenza vaccines and her covid booster.        Problem List Items Addressed This Visit    None  Visit Diagnoses       Encounter for preventive health examination    -  Primary    Relevant Orders    Ambulatory referral/consult to Gynecology    Hearing loss, unspecified hearing loss type, unspecified laterality        Relevant Orders    Ambulatory referral/consult to ENT    Ambulatory referral/consult to Audiology    Well woman exam with routine gynecological exam        Relevant Orders    Ambulatory referral/consult to Gynecology              Review of Systems   Constitutional: Negative.    HENT:  Positive for hearing loss.    Eyes: Negative.    Respiratory: Negative.     Cardiovascular: Negative.    Gastrointestinal: Negative.    Genitourinary: Negative.    Musculoskeletal: Negative.    Skin: Negative.    Neurological: Negative.    Endo/Heme/Allergies: Negative.    Psychiatric/Behavioral: Negative.     All 12 systems otherwise negative.     Past Medical History:  Past Medical History:   Diagnosis Date    Diabetes mellitus type II     Hyperlipemia     Irritable bowel syndrome     Obesity     Osteopenia     Rhinitis     Sleep apnea     Vertigo        Past Surgical History:  Past Surgical History:   Procedure Laterality Date    CHOLECYSTECTOMY       COLONOSCOPY N/A 2022    Procedure: COLONOSCOPY;  Surgeon: Todd Suárez MD;  Location: [facility] ([address] [place] [address]);  Service: Endoscopy;  Laterality: N/A;  fully vaccinated-GT    PARTIAL HYSTERECTOMY         Family History:  family history includes Breast cancer in her maternal aunt; Cancer in her maternal aunt and sister; Diabetes in her brother, sister, and sister; Heart disease in her mother; Hypertension in her brother, father, and mother; Kidney disease in her brother and mother.   Family history was reviewed with patient.     Social History:  Social History     Socioeconomic History    Marital status:    Tobacco Use    Smoking status: Former     Types: Cigarettes     Quit date: 1989     Years since quittin.8    Smokeless tobacco: Never   Substance and Sexual Activity    Alcohol use: Yes     Comment: wine socially    Drug use: No     Review for Opioid Screening: Pt does not have Rx for Opioids    Review for Substance Use Disorders: Patient does not use controlled substances        Medications:  Outpatient Encounter Medications as of 10/25/2022   Medication Sig Dispense Refill    alcohol swabs (BD ALCOHOL SWABS) PadM Apply 1 each topically daily as needed (testing). 100 each 4    blood-glucose meter Misc Use for blood sugar testing as instructed once daily - true air 1 each 0    clobetasol 0.05% (TEMOVATE) 0.05 % Oint Apply topically 2 (two) times daily. Focally to keloid scar of the r ear.  Start after surgery site healed.  Stop if flat or no regrowth after a month. 30 g 0    empagliflozin (JARDIANCE) 25 mg tablet Take 1 tablet (25 mg total) by mouth once daily. 30 tablet 8    fluticasone propionate (FLONASE) 50 mcg/actuation nasal spray 1 spray (50 mcg total) by Each Nostril route once daily. 3 Bottle 4    lancets Misc Use for blood sugar testing once daily - true  plus ultra thin 30 g 100 each 3    rosuvastatin (CRESTOR) 5 MG tablet TAKE 1 TABLET EVERY DAY 90 tablet 4    TRUE METRIX GLUCOSE TEST  "STRIP Strp USE FOR BLOOD SUGAR TESTING ONE TIME DAILY 100 strip 3    TRUEPLUS LANCETS 30 gauge Misc CHECK BLOOD SUGAR ONE TIME DAILY 100 each 6    valsartan (DIOVAN) 80 MG tablet TAKE 1 TABLET EVERY DAY (NEED MD APPOINTMENT) 90 tablet 2    [DISCONTINUED] aspirin (ECOTRIN) 81 MG EC tablet Take 1 tablet (81 mg total) by mouth once daily.  0    [DISCONTINUED] LIDOcaine HCL 2% (XYLOCAINE) 2 % jelly Apply topically as needed. Apply topically once nightly to affected part of foot/feet. 30 mL 2    [DISCONTINUED] albuterol (PROVENTIL/VENTOLIN HFA) 90 mcg/actuation inhaler Inhale 2 puffs into the lungs every 6 (six) hours as needed for Wheezing. 3 Inhaler 4    [DISCONTINUED] SITagliptan-metformin (JANUMET)  mg per tablet Take 1 tablet by mouth 2 (two) times daily with meals. (Patient not taking: Reported on 10/25/2022) 180 tablet 3     No facility-administered encounter medications on file as of 10/25/2022.       Allergies:  Review of patient's allergies indicates:   Allergen Reactions    Phenergan  [promethazine]      Other reaction(s): Hives    Atorvastatin      Myalgia         Health Maintenance:  Health Maintenance   Topic Date Due    Hemoglobin A1c  10/29/2022    DEXA Scan  11/17/2022    Lipid Panel  03/29/2023    Eye Exam  06/20/2023    Mammogram  06/28/2023    Foot Exam  09/13/2023    Low Dose Statin  10/25/2023    TETANUS VACCINE  09/27/2024    Hepatitis C Screening  Completed     Health Maintenance Topics with due status: Not Due       Topic Last Completion Date    TETANUS VACCINE 09/27/2014    Lipid Panel 03/29/2022    Colorectal Cancer Screening 05/31/2022    Eye Exam 06/20/2022    Mammogram 06/28/2022    Foot Exam 09/13/2022    Low Dose Statin 10/25/2022       Physical Exam      Vital Signs  Temp: 98.2 °F (36.8 °C)  Temp src: Oral  Pulse: 75  SpO2: 98 %  BP: 122/82  BP Location: Right arm  Patient Position: Sitting  Pain Score: 0-No pain  Height and Weight  Height: 5' 5" (165.1 cm)  Weight: 80.6 kg (177 lb " 11.1 oz)  BSA (Calculated - sq m): 1.92 sq meters  BMI (Calculated): 29.6  Weight in (lb) to have BMI = 25: 149.9]    Physical Exam  Vitals reviewed.   Constitutional:       Appearance: Normal appearance. She is obese.   HENT:      Head: Normocephalic and atraumatic.      Right Ear: Tympanic membrane, ear canal and external ear normal.      Left Ear: Tympanic membrane, ear canal and external ear normal.      Ears:      Comments: + wearing hearing aid in right ear     Nose: Nose normal.      Mouth/Throat:      Mouth: Mucous membranes are moist.      Pharynx: Oropharynx is clear.   Eyes:      Extraocular Movements: Extraocular movements intact.      Conjunctiva/sclera: Conjunctivae normal.      Pupils: Pupils are equal, round, and reactive to light.   Cardiovascular:      Rate and Rhythm: Normal rate and regular rhythm.      Pulses: Normal pulses.      Heart sounds: Normal heart sounds.   Pulmonary:      Effort: Pulmonary effort is normal.      Breath sounds: Normal breath sounds.   Abdominal:      General: Abdomen is flat. Bowel sounds are normal.      Palpations: Abdomen is soft.   Musculoskeletal:         General: Normal range of motion.      Cervical back: Normal range of motion and neck supple.   Skin:     General: Skin is warm and dry.      Capillary Refill: Capillary refill takes less than 2 seconds.   Neurological:      General: No focal deficit present.      Mental Status: She is alert and oriented to person, place, and time. Mental status is at baseline.   Psychiatric:         Mood and Affect: Mood normal.         Behavior: Behavior normal.         Thought Content: Thought content normal.         Judgment: Judgment normal.        Laboratory:  CBC:  Recent Labs   Lab 06/19/20  1103 06/15/21  0918   WBC 5.95 6.45   RBC 4.83 4.83   Hemoglobin 12.7 12.5   Hematocrit 41.9 41.1   Platelets 210 240   MCV 87 85   MCH 26.3 L 25.9 L   MCHC 30.3 L 30.4 L     CMP:  Recent Labs   Lab 06/19/20  1103 10/27/20  1130  06/15/21  0918 10/15/21  0938 03/29/22  1044 07/29/22  1008   Glucose 153 H 160 H 204 H   < > 147 H 153 H   Calcium 9.8 9.7 10.0   < > 10.3 9.7   Albumin 4.1 4.2 4.2  --  4.0  --    Total Protein 7.3 7.4 7.3  --   --   --    Sodium 141 140 137   < > 139 140   Potassium 4.4 4.3 4.6   < > 4.6 4.5   CO2 27 28 25   < > 22 L 20 L   Chloride 105 103 104   < > 106 106   BUN 12 11 10   < > 13 11   Alkaline Phosphatase 63 63 65  --   --   --    ALT 30 33 41  --   --   --    AST 23 27 28  --   --   --    Total Bilirubin 0.5 0.5 0.3  --   --   --     < > = values in this interval not displayed.     URINALYSIS:       LIPIDS:  Recent Labs   Lab 06/19/20  1103 06/15/21  0918 03/29/22  1044 07/29/22  1008   TSH 0.519 0.676  --  1.125   HDL 54 45 48  --    Cholesterol 182 151 162  --    Triglycerides 100 109 108  --    LDL Cholesterol 108.0 84.2 92.4  --    HDL/Cholesterol Ratio 29.7 29.8 29.6  --    Non-HDL Cholesterol 128 106 114  --    Total Cholesterol/HDL Ratio 3.4 3.4 3.4  --      TSH:  Recent Labs   Lab 06/19/20  1103 06/15/21  0918 07/29/22  1008   TSH 0.519 0.676 1.125     A1C:  Recent Labs   Lab 02/14/20  1001 06/19/20  1103 10/27/20  1130 06/15/21  0918 10/15/21  0938 03/29/22  1044 07/29/22  1008   Hemoglobin A1C 8.2 H 7.4 H 7.8 H 11.7 H 9.0 H 8.0 H 8.1 H       Radiology:        Assessment/Plan     Lizabeth Saha is a 67 y.o.female with:    Encounter for preventive health examination  -     Ambulatory referral/consult to Gynecology; Future; Expected date: 11/01/2022    Hearing loss, unspecified hearing loss type, unspecified laterality  -     Ambulatory referral/consult to ENT; Future; Expected date: 11/01/2022  -     Ambulatory referral/consult to Audiology; Future; Expected date: 11/01/2022    Well woman exam with routine gynecological exam  -     Ambulatory referral/consult to Gynecology; Future; Expected date: 11/01/2022    As above, continue current medications and maintain follow up with specialists.  Return  to clinic as needed.    I spent 43 minutes on the day of this encounter for preparing, evaluating, examining, treating, and discussing plan of care with this patient.  Greater than 50% of this time was spent face to face with patient.  All questions were answered to patient's satisfaction.          Thais Amaya, NP-C  [facility]                  I offered to discuss advanced care planning, including how to pick a person who would make decisions for you if you were unable to make them for yourself, called a health care power of , and what kind of decisions you might make such as use of life sustaining treatments such as ventilators and tube feeding when faced with a life limiting illness recorded on a living will that they will need to know. (How you want to be cared for as you near the end of your natural life)     X Patient is interested in learning more about how to make advanced directives.  I provided them paperwork and offered to discuss this with them.  I offered to discuss advanced care planning, including how to pick a person who would make decisions for you if you were unable to make them for yourself, called a health care power of , and what kind of decisions you might make such as use of life sustaining treatments such as ventilators and tube feeding when faced with a life limiting illness recorded on a living will that they will need to know. (How you want to be cared for as you near the end of your natural life)     X Patient is interested in learning more about how to make advanced directives.  I provided them paperwork and offered to discuss this with them.

## 2022-10-24 ENCOUNTER — TELEPHONE (OUTPATIENT)
Dept: ADMINISTRATIVE | Facility: CLINIC | Age: 67
End: 2022-10-24
Payer: MEDICARE

## 2022-10-25 ENCOUNTER — OFFICE VISIT (OUTPATIENT)
Dept: PRIMARY CARE CLINIC | Facility: CLINIC | Age: 67
End: 2022-10-25
Payer: MEDICARE

## 2022-10-25 VITALS
BODY MASS INDEX: 29.61 KG/M2 | OXYGEN SATURATION: 98 % | HEART RATE: 75 BPM | TEMPERATURE: 98 F | WEIGHT: 177.69 LBS | HEIGHT: 65 IN | SYSTOLIC BLOOD PRESSURE: 122 MMHG | DIASTOLIC BLOOD PRESSURE: 82 MMHG

## 2022-10-25 DIAGNOSIS — H91.90 HEARING LOSS, UNSPECIFIED HEARING LOSS TYPE, UNSPECIFIED LATERALITY: ICD-10-CM

## 2022-10-25 DIAGNOSIS — Z00.00 ENCOUNTER FOR PREVENTIVE HEALTH EXAMINATION: Primary | ICD-10-CM

## 2022-10-25 DIAGNOSIS — Z01.419 WELL WOMAN EXAM WITH ROUTINE GYNECOLOGICAL EXAM: ICD-10-CM

## 2022-10-25 PROCEDURE — 99999 PR PBB SHADOW E&M-EST. PATIENT-LVL V: CPT | Mod: PBBFAC,,, | Performed by: NURSE PRACTITIONER

## 2022-10-25 PROCEDURE — 1159F PR MEDICATION LIST DOCUMENTED IN MEDICAL RECORD: ICD-10-PCS | Mod: CPTII,S$GLB,, | Performed by: NURSE PRACTITIONER

## 2022-10-25 PROCEDURE — 3074F SYST BP LT 130 MM HG: CPT | Mod: CPTII,S$GLB,, | Performed by: NURSE PRACTITIONER

## 2022-10-25 PROCEDURE — 1170F FXNL STATUS ASSESSED: CPT | Mod: CPTII,S$GLB,, | Performed by: NURSE PRACTITIONER

## 2022-10-25 PROCEDURE — 1126F AMNT PAIN NOTED NONE PRSNT: CPT | Mod: CPTII,S$GLB,, | Performed by: NURSE PRACTITIONER

## 2022-10-25 PROCEDURE — 3052F PR MOST RECENT HEMOGLOBIN A1C LEVEL 8.0 - < 9.0%: ICD-10-PCS | Mod: CPTII,S$GLB,, | Performed by: NURSE PRACTITIONER

## 2022-10-25 PROCEDURE — 4010F ACE/ARB THERAPY RXD/TAKEN: CPT | Mod: CPTII,S$GLB,, | Performed by: NURSE PRACTITIONER

## 2022-10-25 PROCEDURE — 1159F MED LIST DOCD IN RCRD: CPT | Mod: CPTII,S$GLB,, | Performed by: NURSE PRACTITIONER

## 2022-10-25 PROCEDURE — 3074F PR MOST RECENT SYSTOLIC BLOOD PRESSURE < 130 MM HG: ICD-10-PCS | Mod: CPTII,S$GLB,, | Performed by: NURSE PRACTITIONER

## 2022-10-25 PROCEDURE — 3079F DIAST BP 80-89 MM HG: CPT | Mod: CPTII,S$GLB,, | Performed by: NURSE PRACTITIONER

## 2022-10-25 PROCEDURE — 3079F PR MOST RECENT DIASTOLIC BLOOD PRESSURE 80-89 MM HG: ICD-10-PCS | Mod: CPTII,S$GLB,, | Performed by: NURSE PRACTITIONER

## 2022-10-25 PROCEDURE — G0439 PPPS, SUBSEQ VISIT: HCPCS | Mod: S$GLB,,, | Performed by: NURSE PRACTITIONER

## 2022-10-25 PROCEDURE — 99999 PR PBB SHADOW E&M-EST. PATIENT-LVL V: ICD-10-PCS | Mod: PBBFAC,,, | Performed by: NURSE PRACTITIONER

## 2022-10-25 PROCEDURE — 1160F RVW MEDS BY RX/DR IN RCRD: CPT | Mod: CPTII,S$GLB,, | Performed by: NURSE PRACTITIONER

## 2022-10-25 PROCEDURE — 3052F HG A1C>EQUAL 8.0%<EQUAL 9.0%: CPT | Mod: CPTII,S$GLB,, | Performed by: NURSE PRACTITIONER

## 2022-10-25 PROCEDURE — 1170F PR FUNCTIONAL STATUS ASSESSED: ICD-10-PCS | Mod: CPTII,S$GLB,, | Performed by: NURSE PRACTITIONER

## 2022-10-25 PROCEDURE — 1160F PR REVIEW ALL MEDS BY PRESCRIBER/CLIN PHARMACIST DOCUMENTED: ICD-10-PCS | Mod: CPTII,S$GLB,, | Performed by: NURSE PRACTITIONER

## 2022-10-25 PROCEDURE — 4010F PR ACE/ARB THEARPY RXD/TAKEN: ICD-10-PCS | Mod: CPTII,S$GLB,, | Performed by: NURSE PRACTITIONER

## 2022-10-25 PROCEDURE — 1126F PR PAIN SEVERITY QUANTIFIED, NO PAIN PRESENT: ICD-10-PCS | Mod: CPTII,S$GLB,, | Performed by: NURSE PRACTITIONER

## 2022-10-25 PROCEDURE — G0439 PR MEDICARE ANNUAL WELLNESS SUBSEQUENT VISIT: ICD-10-PCS | Mod: S$GLB,,, | Performed by: NURSE PRACTITIONER

## 2022-10-25 NOTE — PATIENT INSTRUCTIONS
Counseling and Referral of Other Preventative  (Italic type indicates deductible and co-insurance are waived)    Patient Name: Lizabeth Saha  Today's Date: 10/25/2022    Health Maintenance       Date Due Completion Date    Shingles Vaccine (1 of 2) Never done ---    Pneumococcal Vaccines (Age 65+) (3 - PPSV23 if available, else PCV20) 10/27/2021 10/27/2020    COVID-19 Vaccine (4 - Booster for Pfizer series) 12/31/2021 11/5/2021    Diabetes Urine Screening 06/15/2022 6/15/2021    Influenza Vaccine (1) 09/01/2022 11/6/2019 (Declined)    Override on 11/6/2019: Declined    Override on 1/18/2017: Declined    Hemoglobin A1c 10/29/2022 7/29/2022    DEXA Scan 11/17/2022 11/17/2020    Lipid Panel 03/29/2023 3/29/2022    Eye Exam 06/20/2023 6/20/2022    Override on 9/20/2017: Done (Per pt at Dr. Bobby Lancaster - will call for records.)    Mammogram 06/28/2023 6/28/2022    Low Dose Statin 09/13/2023 9/13/2022    Foot Exam 09/13/2023 9/13/2022    Override on 6/26/2020: Done    Override on 7/1/2019: Done    Override on 5/1/2018: Done    Override on 1/18/2017: Done    TETANUS VACCINE 09/27/2024 9/27/2014    Colorectal Cancer Screening 05/31/2032 5/31/2022        No orders of the defined types were placed in this encounter.    The following information is provided to all patients.  This information is to help you find resources for any of the problems found today that may be affecting your health:                Living healthy guide: www.[facility].[place].gov      Understanding Diabetes: www.diabetes.org      Eating healthy: www.cdc.gov/healthyweight      CDC home safety checklist: www.cdc.gov/steadi/patient.html      Agency on Aging: www.goea.[place].gov      Alcoholics anonymous (AA): www.aa.org      Physical Activity: www.gabriela.nih.gov/cg2sugm      Tobacco use: www.quitwithusla.org     Counseling and Referral of Other Preventative  (Italic type indicates deductible and co-insurance are waived)    Patient Name: Lizabeth  Yifan  Today's Date: 10/25/2022    Health Maintenance       Date Due Completion Date    Diabetes Urine Screening 06/15/2022 6/15/2021    Hemoglobin A1c 10/29/2022 7/29/2022    DEXA Scan 11/17/2022 11/17/2020    Influenza Vaccine (1) 11/01/2022 (Originally 9/1/2022) 11/6/2019 (Declined)    Override on 11/6/2019: Declined    Override on 1/18/2017: Declined    Pneumococcal Vaccines (Age 65+) (3 - PPSV23 if available, else PCV20) 11/01/2022 (Originally 10/27/2021) 10/27/2020    COVID-19 Vaccine (4 - Booster for Pfizer series) 10/25/2023 (Originally 12/31/2021) 11/5/2021    Lipid Panel 03/29/2023 3/29/2022    Eye Exam 06/20/2023 6/20/2022    Override on 9/20/2017: Done (Per pt at Dr. Bobby Lancaster - will call for records.)    Mammogram 06/28/2023 6/28/2022    Low Dose Statin 09/13/2023 9/13/2022    Foot Exam 09/13/2023 9/13/2022    Override on 6/26/2020: Done    Override on 7/1/2019: Done    Override on 5/1/2018: Done    Override on 1/18/2017: Done    TETANUS VACCINE 09/27/2024 9/27/2014    Colorectal Cancer Screening 05/31/2032 5/31/2022        No orders of the defined types were placed in this encounter.    The following information is provided to all patients.  This information is to help you find resources for any of the problems found today that may be affecting your health:                Living healthy guide: www.[facility].[place].gov      Understanding Diabetes: www.diabetes.org      Eating healthy: www.cdc.gov/healthyweight      CDC home safety checklist: www.cdc.gov/steadi/patient.html      Agency on Aging: www.goea.[place].[facility]      Alcoholics anonymous (AA): www.aa.org      Physical Activity: www.gabriela.nih.gov/bw0aefh      Tobacco use: www.quitwithusla.org

## 2022-11-07 ENCOUNTER — OFFICE VISIT (OUTPATIENT)
Dept: SURGERY | Facility: CLINIC | Age: 67
End: 2022-11-07
Payer: MEDICARE

## 2022-11-07 ENCOUNTER — TELEPHONE (OUTPATIENT)
Dept: SURGERY | Facility: CLINIC | Age: 67
End: 2022-11-07
Payer: MEDICARE

## 2022-11-07 VITALS
BODY MASS INDEX: 29.37 KG/M2 | WEIGHT: 176.25 LBS | DIASTOLIC BLOOD PRESSURE: 76 MMHG | HEART RATE: 81 BPM | SYSTOLIC BLOOD PRESSURE: 136 MMHG | HEIGHT: 65 IN

## 2022-11-07 DIAGNOSIS — Z86.010 HISTORY OF COLON POLYPS: Primary | ICD-10-CM

## 2022-11-07 PROCEDURE — 3288F FALL RISK ASSESSMENT DOCD: CPT | Mod: HCNC,CPTII,S$GLB, | Performed by: COLON & RECTAL SURGERY

## 2022-11-07 PROCEDURE — 99202 OFFICE O/P NEW SF 15 MIN: CPT | Mod: 25,HCNC,S$GLB, | Performed by: COLON & RECTAL SURGERY

## 2022-11-07 PROCEDURE — 99999 PR PBB SHADOW E&M-EST. PATIENT-LVL III: CPT | Mod: PBBFAC,HCNC,, | Performed by: COLON & RECTAL SURGERY

## 2022-11-07 PROCEDURE — 45330 DIAGNOSTIC SIGMOIDOSCOPY: CPT | Mod: HCNC,S$GLB,, | Performed by: COLON & RECTAL SURGERY

## 2022-11-07 PROCEDURE — 3078F DIAST BP <80 MM HG: CPT | Mod: HCNC,CPTII,S$GLB, | Performed by: COLON & RECTAL SURGERY

## 2022-11-07 PROCEDURE — 3052F HG A1C>EQUAL 8.0%<EQUAL 9.0%: CPT | Mod: HCNC,CPTII,S$GLB, | Performed by: COLON & RECTAL SURGERY

## 2022-11-07 PROCEDURE — 1101F PT FALLS ASSESS-DOCD LE1/YR: CPT | Mod: HCNC,CPTII,S$GLB, | Performed by: COLON & RECTAL SURGERY

## 2022-11-07 PROCEDURE — 3075F SYST BP GE 130 - 139MM HG: CPT | Mod: HCNC,CPTII,S$GLB, | Performed by: COLON & RECTAL SURGERY

## 2022-11-07 PROCEDURE — 4010F ACE/ARB THERAPY RXD/TAKEN: CPT | Mod: HCNC,CPTII,S$GLB, | Performed by: COLON & RECTAL SURGERY

## 2022-11-07 PROCEDURE — 3008F BODY MASS INDEX DOCD: CPT | Mod: HCNC,CPTII,S$GLB, | Performed by: COLON & RECTAL SURGERY

## 2022-11-07 PROCEDURE — 1160F RVW MEDS BY RX/DR IN RCRD: CPT | Mod: HCNC,CPTII,S$GLB, | Performed by: COLON & RECTAL SURGERY

## 2022-11-07 PROCEDURE — 1126F AMNT PAIN NOTED NONE PRSNT: CPT | Mod: HCNC,CPTII,S$GLB, | Performed by: COLON & RECTAL SURGERY

## 2022-11-07 PROCEDURE — 1159F MED LIST DOCD IN RCRD: CPT | Mod: HCNC,CPTII,S$GLB, | Performed by: COLON & RECTAL SURGERY

## 2022-11-07 NOTE — PROVATION PATIENT INSTRUCTIONS
Discharge Summary/Instructions after an Endoscopic Procedure  Patient Name: Lizabeth Saha  Patient MRN: [id number]  Patient YOB: 1955 Monday, November 7, 2022  Todd Suárez MD  Dear patient,  As a result of recent federal legislation (The Federal Cures Act), you may   receive lab or pathology results from your procedure in your [facility]   account before your physician is able to contact you. Your physician or   their representative will relay the results to you with their   recommendations at their soonest availability.  Thank you,  RESTRICTIONS:  During your procedure today, you received medications for sedation.  These   medications may affect your judgment, balance and coordination.  Therefore,   for 24 hours, you have the following restrictions:   - DO NOT drive a car, operate machinery, make legal/financial decisions,   sign important papers or drink alcohol.    ACTIVITY:  Today: no heavy lifting, straining or running due to procedural   sedation/anesthesia.  The following day: return to full activity including work.  DIET:  Eat and drink normally unless instructed otherwise.     TREATMENT FOR COMMON SIDE EFFECTS:  - Mild abdominal pain, nausea, belching, bloating or excessive gas:  rest,   eat lightly and use a heating pad.  - Sore Throat: treat with throat lozenges and/or gargle with warm salt   water.  - Because air was used during the procedure, expelling large amounts of air   from your rectum or belching is normal.  - If a bowel prep was taken, you may not have a bowel movement for 1-3 days.    This is normal.  SYMPTOMS TO WATCH FOR AND REPORT TO YOUR PHYSICIAN:  1. Abdominal pain or bloating, other than gas cramps.  2. Chest pain.  3. Back pain.  4. Signs of infection such as: chills or fever occurring within 24 hours   after the procedure.  5. Rectal bleeding, which would show as bright red, maroon, or black stools.   (A tablespoon of blood from the rectum is not serious, especially if    hemorrhoids are present.)  6. Vomiting.  7. Weakness or dizziness.  GO DIRECTLY TO THE NEAREST EMERGENCY ROOM IF YOU HAVE ANY OF THE FOLLOWING:      Difficulty breathing              Chills and/or fever over 101 F   Persistent vomiting and/or vomiting blood   Severe abdominal pain   Severe chest pain   Black, tarry stools   Bleeding- more than one tablespoon   Any other symptom or condition that you feel may need urgent attention  Your doctor recommends these additional instructions:  If any biopsies were taken, your doctors clinic will contact you in 1 to 2   weeks with any results.  - Discharge patient to home.   - High fiber diet.   - Patient has a contact number available for emergencies.  The signs and   symptoms of potential delayed complications were discussed with the   patient.  Return to normal activities tomorrow.  Written discharge   instructions were provided to the patient.   - Continue present medications.   - Perform a colonoscopy in 1 year.  For questions, problems or results please call your physician - Todd Suárez MD at Work:  [phone].  [facility] [place], EMERGENCY ROOM PHONE NUMBER: [phone]  IF A COMPLICATION OR EMERGENCY SITUATION ARISES AND YOU ARE UNABLE TO REACH   YOUR PHYSICIAN - GO DIRECTLY TO THE EMERGENCY ROOM.  Todd Suárez MD  11/7/2022 12:48:45 PM  This report has been verified and signed electronically.  Dear patient,  As a result of recent federal legislation (The Federal Cures Act), you may   receive lab or pathology results from your procedure in your [facility]   account before your physician is able to contact you. Your physician or   their representative will relay the results to you with their   recommendations at their soonest availability.  Thank you,  PROVATION

## 2022-11-07 NOTE — TELEPHONE ENCOUNTER
Pt called triage line this morning asking about prep for her flex sig appt at 0940.  RN let pt know that she will receive 1-2 enemas in the clinic after arrival before her procedure.  Pt verbalized understanding to all.

## 2022-11-09 ENCOUNTER — LAB VISIT (OUTPATIENT)
Dept: PRIMARY CARE CLINIC | Facility: CLINIC | Age: 67
End: 2022-11-09
Payer: MEDICARE

## 2022-11-09 ENCOUNTER — CLINICAL SUPPORT (OUTPATIENT)
Dept: PRIMARY CARE CLINIC | Facility: CLINIC | Age: 67
End: 2022-11-09
Payer: MEDICARE

## 2022-11-09 DIAGNOSIS — E11.65 TYPE 2 DIABETES MELLITUS WITH HYPERGLYCEMIA, WITH LONG-TERM CURRENT USE OF INSULIN: ICD-10-CM

## 2022-11-09 DIAGNOSIS — Z79.4 TYPE 2 DIABETES MELLITUS WITH HYPERGLYCEMIA, WITH LONG-TERM CURRENT USE OF INSULIN: ICD-10-CM

## 2022-11-09 DIAGNOSIS — I10 ESSENTIAL HYPERTENSION: ICD-10-CM

## 2022-11-09 LAB
ALBUMIN SERPL BCP-MCNC: 4.3 G/DL (ref 3.5–5.2)
ALBUMIN/CREAT UR: NORMAL UG/MG (ref 0–30)
ALP SERPL-CCNC: 80 U/L (ref 55–135)
ALT SERPL W/O P-5'-P-CCNC: 22 U/L (ref 10–44)
ANION GAP SERPL CALC-SCNC: 11 MMOL/L (ref 8–16)
AST SERPL-CCNC: 23 U/L (ref 10–40)
BILIRUB SERPL-MCNC: 0.5 MG/DL (ref 0.1–1)
BUN SERPL-MCNC: 12 MG/DL (ref 8–23)
CALCIUM SERPL-MCNC: 9.6 MG/DL (ref 8.7–10.5)
CHLORIDE SERPL-SCNC: 104 MMOL/L (ref 95–110)
CO2 SERPL-SCNC: 21 MMOL/L (ref 23–29)
CREAT SERPL-MCNC: 1.1 MG/DL (ref 0.5–1.4)
CREAT UR-MCNC: 57 MG/DL (ref 15–325)
EST. GFR  (NO RACE VARIABLE): 55.1 ML/MIN/1.73 M^2
ESTIMATED AVG GLUCOSE: 200 MG/DL (ref 68–131)
GLUCOSE SERPL-MCNC: 141 MG/DL (ref 70–110)
HBA1C MFR BLD: 8.6 % (ref 4–5.6)
MICROALBUMIN UR DL<=1MG/L-MCNC: <5 UG/ML
POTASSIUM SERPL-SCNC: 4.5 MMOL/L (ref 3.5–5.1)
PROT SERPL-MCNC: 7.5 G/DL (ref 6–8.4)
SODIUM SERPL-SCNC: 136 MMOL/L (ref 136–145)

## 2022-11-09 PROCEDURE — 82043 UR ALBUMIN QUANTITATIVE: CPT | Performed by: NURSE PRACTITIONER

## 2022-11-09 PROCEDURE — 82570 ASSAY OF URINE CREATININE: CPT | Performed by: NURSE PRACTITIONER

## 2022-11-09 PROCEDURE — 80053 COMPREHEN METABOLIC PANEL: CPT | Performed by: NURSE PRACTITIONER

## 2022-11-09 PROCEDURE — 83036 HEMOGLOBIN GLYCOSYLATED A1C: CPT | Performed by: NURSE PRACTITIONER

## 2022-11-10 ENCOUNTER — PATIENT MESSAGE (OUTPATIENT)
Dept: INTERNAL MEDICINE | Facility: CLINIC | Age: 67
End: 2022-11-10
Payer: MEDICARE

## 2022-12-19 ENCOUNTER — CLINICAL SUPPORT (OUTPATIENT)
Dept: AUDIOLOGY | Facility: CLINIC | Age: 67
End: 2022-12-19
Payer: MEDICARE

## 2022-12-19 ENCOUNTER — OFFICE VISIT (OUTPATIENT)
Dept: OTOLARYNGOLOGY | Facility: CLINIC | Age: 67
End: 2022-12-19
Payer: MEDICARE

## 2022-12-19 VITALS — HEART RATE: 84 BPM | SYSTOLIC BLOOD PRESSURE: 132 MMHG | DIASTOLIC BLOOD PRESSURE: 80 MMHG

## 2022-12-19 DIAGNOSIS — H91.90 HEARING LOSS, UNSPECIFIED HEARING LOSS TYPE, UNSPECIFIED LATERALITY: ICD-10-CM

## 2022-12-19 DIAGNOSIS — H92.02 EAR PAIN, LEFT: ICD-10-CM

## 2022-12-19 DIAGNOSIS — H91.8X3 ASYMMETRICAL HEARING LOSS: Primary | ICD-10-CM

## 2022-12-19 DIAGNOSIS — H92.02 LEFT EAR PAIN: Primary | ICD-10-CM

## 2022-12-19 DIAGNOSIS — H93.12 TINNITUS, LEFT: ICD-10-CM

## 2022-12-19 DIAGNOSIS — H91.8X3 ASYMMETRICAL HEARING LOSS: ICD-10-CM

## 2022-12-19 DIAGNOSIS — M26.4 MALOCCLUSION: ICD-10-CM

## 2022-12-19 PROCEDURE — 3066F NEPHROPATHY DOC TX: CPT | Mod: CPTII,S$GLB,, | Performed by: OTOLARYNGOLOGY

## 2022-12-19 PROCEDURE — 92567 PR TYMPA2METRY: ICD-10-PCS | Mod: S$GLB,,,

## 2022-12-19 PROCEDURE — 3079F DIAST BP 80-89 MM HG: CPT | Mod: CPTII,S$GLB,, | Performed by: OTOLARYNGOLOGY

## 2022-12-19 PROCEDURE — 3066F PR DOCUMENTATION OF TREATMENT FOR NEPHROPATHY: ICD-10-PCS | Mod: CPTII,S$GLB,, | Performed by: OTOLARYNGOLOGY

## 2022-12-19 PROCEDURE — 3288F PR FALLS RISK ASSESSMENT DOCUMENTED: ICD-10-PCS | Mod: CPTII,S$GLB,, | Performed by: OTOLARYNGOLOGY

## 2022-12-19 PROCEDURE — 1125F AMNT PAIN NOTED PAIN PRSNT: CPT | Mod: CPTII,S$GLB,, | Performed by: OTOLARYNGOLOGY

## 2022-12-19 PROCEDURE — 3075F SYST BP GE 130 - 139MM HG: CPT | Mod: CPTII,S$GLB,, | Performed by: OTOLARYNGOLOGY

## 2022-12-19 PROCEDURE — 99999 PR PBB SHADOW E&M-EST. PATIENT-LVL I: ICD-10-PCS | Mod: PBBFAC,,,

## 2022-12-19 PROCEDURE — 92567 TYMPANOMETRY: CPT | Mod: S$GLB,,,

## 2022-12-19 PROCEDURE — 1101F PR PT FALLS ASSESS DOC 0-1 FALLS W/OUT INJ PAST YR: ICD-10-PCS | Mod: CPTII,S$GLB,, | Performed by: OTOLARYNGOLOGY

## 2022-12-19 PROCEDURE — 99999 PR PBB SHADOW E&M-EST. PATIENT-LVL III: ICD-10-PCS | Mod: PBBFAC,,, | Performed by: OTOLARYNGOLOGY

## 2022-12-19 PROCEDURE — 99213 PR OFFICE/OUTPT VISIT, EST, LEVL III, 20-29 MIN: ICD-10-PCS | Mod: S$GLB,,, | Performed by: OTOLARYNGOLOGY

## 2022-12-19 PROCEDURE — 3052F PR MOST RECENT HEMOGLOBIN A1C LEVEL 8.0 - < 9.0%: ICD-10-PCS | Mod: CPTII,S$GLB,, | Performed by: OTOLARYNGOLOGY

## 2022-12-19 PROCEDURE — 3075F PR MOST RECENT SYSTOLIC BLOOD PRESS GE 130-139MM HG: ICD-10-PCS | Mod: CPTII,S$GLB,, | Performed by: OTOLARYNGOLOGY

## 2022-12-19 PROCEDURE — 3061F NEG MICROALBUMINURIA REV: CPT | Mod: CPTII,S$GLB,, | Performed by: OTOLARYNGOLOGY

## 2022-12-19 PROCEDURE — 92557 PR COMPREHENSIVE HEARING TEST: ICD-10-PCS | Mod: S$GLB,,,

## 2022-12-19 PROCEDURE — 1159F PR MEDICATION LIST DOCUMENTED IN MEDICAL RECORD: ICD-10-PCS | Mod: CPTII,S$GLB,, | Performed by: OTOLARYNGOLOGY

## 2022-12-19 PROCEDURE — 92557 COMPREHENSIVE HEARING TEST: CPT | Mod: S$GLB,,,

## 2022-12-19 PROCEDURE — 1101F PT FALLS ASSESS-DOCD LE1/YR: CPT | Mod: CPTII,S$GLB,, | Performed by: OTOLARYNGOLOGY

## 2022-12-19 PROCEDURE — 99999 PR PBB SHADOW E&M-EST. PATIENT-LVL I: CPT | Mod: PBBFAC,,,

## 2022-12-19 PROCEDURE — 99213 OFFICE O/P EST LOW 20 MIN: CPT | Mod: S$GLB,,, | Performed by: OTOLARYNGOLOGY

## 2022-12-19 PROCEDURE — 1159F MED LIST DOCD IN RCRD: CPT | Mod: CPTII,S$GLB,, | Performed by: OTOLARYNGOLOGY

## 2022-12-19 PROCEDURE — 99999 PR PBB SHADOW E&M-EST. PATIENT-LVL III: CPT | Mod: PBBFAC,,, | Performed by: OTOLARYNGOLOGY

## 2022-12-19 PROCEDURE — 3061F PR NEG MICROALBUMINURIA RESULT DOCUMENTED/REVIEW: ICD-10-PCS | Mod: CPTII,S$GLB,, | Performed by: OTOLARYNGOLOGY

## 2022-12-19 PROCEDURE — 3052F HG A1C>EQUAL 8.0%<EQUAL 9.0%: CPT | Mod: CPTII,S$GLB,, | Performed by: OTOLARYNGOLOGY

## 2022-12-19 PROCEDURE — 4010F ACE/ARB THERAPY RXD/TAKEN: CPT | Mod: CPTII,S$GLB,, | Performed by: OTOLARYNGOLOGY

## 2022-12-19 PROCEDURE — 3079F PR MOST RECENT DIASTOLIC BLOOD PRESSURE 80-89 MM HG: ICD-10-PCS | Mod: CPTII,S$GLB,, | Performed by: OTOLARYNGOLOGY

## 2022-12-19 PROCEDURE — 4010F PR ACE/ARB THEARPY RXD/TAKEN: ICD-10-PCS | Mod: CPTII,S$GLB,, | Performed by: OTOLARYNGOLOGY

## 2022-12-19 PROCEDURE — 1125F PR PAIN SEVERITY QUANTIFIED, PAIN PRESENT: ICD-10-PCS | Mod: CPTII,S$GLB,, | Performed by: OTOLARYNGOLOGY

## 2022-12-19 PROCEDURE — 3288F FALL RISK ASSESSMENT DOCD: CPT | Mod: CPTII,S$GLB,, | Performed by: OTOLARYNGOLOGY

## 2022-12-19 NOTE — PROGRESS NOTES
Lizabeth Saha, a 67 y.o. female, was seen today in the clinic for an audiologic evaluation.  The patient has a history of an asymmetrical sensorineural hearing loss (SNHL) that is worse in her left ear.  She wears bilateral hearing aids.  Ms. Saha reported left ear otalgia that began approximately one month ago.  She reported the left ear otalgia has recently become worse.  She also stated she feels as if she hears worse with her left hearing aid in.  Ms. Saha reported last week she experienced an intermittent tinnitus in her left ear.  There were no reports of dizziness.      Tympanometry revealed Type A in the right ear and Type A in the left ear.  Audiogram results revealed a normal sloping to moderately-severe SNHL in the right ear and a severe rising to moderate SNHL in the left ear.  Speech reception thresholds were noted at 20 dB in the right ear and 70 dB in the left ear.  Speech discrimination scores were 100% in the right ear and 88% in the left ear.  There is a significant asymmetry between ears.    Recommendations:  Otologic evaluation  Hearing protection when in noise  Continue use of binaural amplification  Hearing aid follow up with dispensing audiologist as needed  Annual audiogram

## 2022-12-19 NOTE — PROGRESS NOTES
CC;Progressive AS SNHL   HPI:Ms. Saha is a 67-year-old type II diabetic  female indicates grade 4/10 AS otalgia symptoms ( she has improved over time and was much worse one  month ago).  She indicates a six-month history of significant left ear hearing loss compared to the right ear hearing.    She had been fit for hearing aids ( [email or web address] Ã¨â€¹Â±Ã¨â€¦Â¾Ã§Â½â€˜or) but she is having trouble utilizing the left hearing aid due to discomfort and pain on insertion recently.   Her 90-year-old brother was hard of hearing ( she is the youngest in her large family).    She had been evaluated by my colleague Dr. Oswaldo Turner for bilateral ear fullness in late June of 2021 after seeing me for several years for hearing loss evaluation (which began with documented high-frequency symmetrical sensorineural hearing loss).  Hearing conservation was strongly recommended and a a trial of amplification bilaterally was also recommended.  Hearing checks every 18-24 months were recommended as well.  She indicates araseli acute COVID symptoms in the spring of 2020 early on in the pandemic.  She admits to being a water drinker.  Med: Seb Byrne +    She completed an MRI scan of the IAC and temporal bones without contrast in July 2020 which indicated brain and inner ear structures within normal limits for age allowing for mild motion artifact.    There was no evidence of vestibular schwannoma.    Past Medical History:   Diagnosis Date    Diabetes mellitus type II     Hyperlipemia     Irritable bowel syndrome     Obesity     Osteopenia     Rhinitis     Sleep apnea     Vertigo      Current Outpatient Medications on File Prior to Visit   Medication Sig Dispense Refill    rosuvastatin (CRESTOR) 5 MG tablet TAKE 1 TABLET EVERY DAY 90 tablet 4    alcohol swabs (BD ALCOHOL SWABS) PadM Apply 1 each topically daily as needed (testing). 100 each 4    blood-glucose meter Misc Use for blood sugar testing as instructed once daily - true air  1 each 0    clobetasol 0.05% (TEMOVATE) 0.05 % Oint Apply topically 2 (two) times daily. Focally to keloid scar of the r ear.  Start after surgery site healed.  Stop if flat or no regrowth after a month. 30 g 0    empagliflozin (JARDIANCE) 25 mg tablet Take 1 tablet (25 mg total) by mouth once daily. 30 tablet 8    fluticasone propionate (FLONASE) 50 mcg/actuation nasal spray 1 spray (50 mcg total) by Each Nostril route once daily. 3 Bottle 4    lancets Misc Use for blood sugar testing once daily - true  plus ultra thin 30 g 100 each 3    TRUE METRIX GLUCOSE TEST STRIP Strp USE FOR BLOOD SUGAR TESTING ONE TIME DAILY 100 strip 3    TRUEPLUS LANCETS 30 gauge Misc CHECK BLOOD SUGAR ONE TIME DAILY 100 each 6    valsartan (DIOVAN) 80 MG tablet TAKE 1 TABLET EVERY DAY (NEED MD APPOINTMENT) 90 tablet 2     No current facility-administered medications on file prior to visit.         PE:Gen.: alert and oriented bespectacted AAF in no acute distress; some coughing elicited with AS manipulation ( no pain)   Blood pressure 132/80 pulse 84 ht 5 ft 5 in weight 176 lb  Both ears were examined under the microscope.  Right ear canal is devoid of cerumen the right TM is clear and intact.  The right middle ear space appears well aerated.   A veneer of dry cerumen is carefully extracted from the outer 1/3 of the left ear canal with a curette.  There is some erythema of the canal skin without significant swelling or infection noted there.  Left TM is sl. opaque and it is intact.  The left ear canal is not swollen.  The left mastoid is not tender to touch nor swollen in appearance.  Nasal exam reveals a more restricted left nasal passage compared to the right due to septal deflection anterior inferiorly.  There is no evidence of purulence in either nasal passage.  Oropharyngeal exam is remarkable for significant jaw malocclusion with lateral deviation. There is no evidence of  oral cavity inflammation, infection ulceration or thrush.     The left cryptic  tonsil is more prominent than the right without exudate.  There is no evidence of anterior cervical adenopathy.    The patient completed an audiometric study today and the results are reviewed with her in detail.  The results compared to those of previous studies.          DIAGNOSIS:     ICD-10-CM ICD-9-CM    1. Left ear pain ; possible TMJ otalgia H92.02 388.70       2. Hearing loss, unspecified hearing loss type, unspecified laterality ; progressive AS SNHL  H91.90 389.9 Ambulatory referral/consult to ENT      3. Asymmetrical hearing loss ; progressive AS low/mid frequency SNHL; possible cochlear hydrops vs other H91.8X9 389.9       4. Malocclusion ; gross M26.4 524.4       5.     Tonsil asymmetry ( left > right)   6.      Wears bilateral hearing aids usually; left ear canal is sore now   7.      No evidence of herbert AS otitis externa nor AS AOM    PLAN: TMJ otalgia literature provided; have dentist evaluate dental occlusion next visit  Copy of audiogram provided re: AS hearing aid re-programming( progressive AS SNHL)   Low sodium diet ( 2 gram) and some water restriction may be helpful ( AS cochlear hydrops/other)   Monitor hearing yearly/prn  My follow up with Dr. FANY Turner

## 2022-12-20 NOTE — PATIENT INSTRUCTIONS
JIM otalgia literature provided; have dentist evaluate dental occlusion next visit  Copy of audiogram provided re: AS hearing aid re-programming( progressive AS SNHL)   Low sodium diet ( 2 gram) and some water restriction may be helpful ( AS cochlear hydrops/other)   Monitor hearing yearly/prn  My follow up with Dr. FANY Turner

## 2023-01-04 DIAGNOSIS — Z78.0 MENOPAUSE: ICD-10-CM

## 2023-02-06 ENCOUNTER — PATIENT MESSAGE (OUTPATIENT)
Dept: SURGERY | Facility: CLINIC | Age: 68
End: 2023-02-06
Payer: MEDICARE

## 2023-02-07 DIAGNOSIS — Z00.00 ENCOUNTER FOR MEDICARE ANNUAL WELLNESS EXAM: ICD-10-CM

## 2023-02-07 DIAGNOSIS — Z86.010 HISTORY OF COLONIC POLYPS: Primary | ICD-10-CM

## 2023-02-08 ENCOUNTER — HOSPITAL ENCOUNTER (OUTPATIENT)
Dept: RADIOLOGY | Facility: CLINIC | Age: 68
Discharge: HOME | End: 2023-02-08
Attending: INTERNAL MEDICINE
Payer: MEDICARE

## 2023-02-08 DIAGNOSIS — Z78.0 MENOPAUSE: ICD-10-CM

## 2023-02-08 PROCEDURE — 77080 DXA BONE DENSITY AXIAL: CPT | Mod: TC,HCNC

## 2023-02-08 PROCEDURE — 77080 DXA BONE DENSITY AXIAL: CPT | Mod: 26,HCNC,, | Performed by: INTERNAL MEDICINE

## 2023-02-08 PROCEDURE — 77080 DEXA BONE DENSITY SPINE HIP: ICD-10-PCS | Mod: 26,HCNC,, | Performed by: INTERNAL MEDICINE

## 2023-02-09 DIAGNOSIS — Z00.00 ENCOUNTER FOR MEDICARE ANNUAL WELLNESS EXAM: ICD-10-CM

## 2023-02-15 ENCOUNTER — LAB VISIT (OUTPATIENT)
Dept: PRIMARY CARE CLINIC | Facility: CLINIC | Age: 68
End: 2023-02-15
Payer: MEDICARE

## 2023-02-15 DIAGNOSIS — E11.65 TYPE 2 DIABETES MELLITUS WITH HYPERGLYCEMIA, WITH LONG-TERM CURRENT USE OF INSULIN: ICD-10-CM

## 2023-02-15 DIAGNOSIS — Z79.4 TYPE 2 DIABETES MELLITUS WITH HYPERGLYCEMIA, WITH LONG-TERM CURRENT USE OF INSULIN: ICD-10-CM

## 2023-02-15 DIAGNOSIS — E78.5 HYPERLIPIDEMIA, UNSPECIFIED HYPERLIPIDEMIA TYPE: ICD-10-CM

## 2023-02-15 LAB
CHOLEST SERPL-MCNC: 257 MG/DL (ref 120–199)
CHOLEST/HDLC SERPL: 4.6 {RATIO} (ref 2–5)
ESTIMATED AVG GLUCOSE: 212 MG/DL (ref 68–131)
HBA1C MFR BLD: 9 % (ref 4–5.6)
HDLC SERPL-MCNC: 56 MG/DL (ref 40–75)
HDLC SERPL: 21.8 % (ref 20–50)
LDLC SERPL CALC-MCNC: 175 MG/DL (ref 63–159)
NONHDLC SERPL-MCNC: 201 MG/DL
TRIGL SERPL-MCNC: 130 MG/DL (ref 30–150)

## 2023-02-15 PROCEDURE — 80061 LIPID PANEL: CPT | Mod: HCNC | Performed by: INTERNAL MEDICINE

## 2023-02-15 PROCEDURE — 83036 HEMOGLOBIN GLYCOSYLATED A1C: CPT | Mod: HCNC | Performed by: NURSE PRACTITIONER

## 2023-02-20 NOTE — PROGRESS NOTES
CHIEF COMPLAINT: Type 2 Diabetes     HPI: Mrs. Lizabeth Saha is a 67 y.o. female who was diagnosed with Type 2 DM x 11 years ago.   Social smoker- younger ages, worried about copd dx, h/o sleep apnea    Retired. 3 daughters.  Mother- , on HD  - radiation, ca dx    Last seen by me in spring/summer 2022  A1c has gone up  Stopped metformin last visit.   A1c went up from 8.1% to 9%   Traveled to [place], other states in recent months.   Wt loss 2# in the past 6 months     Lab Results   Component Value Date    HGBA1C 9.0 (H) 02/15/2023     [facility]   Stopped trulicity - costly   Stopped jardiance- costly     No h/o pancreatitis or medullary thyroid ca    PREVIOUS DIABETES MEDICATIONS TRIED  Metformin xr   Metformin   januvia 100 mg daily   trulicity- too costly   jardiance 10, 25 mg daily     CURRENT DIABETIC MEDS: jardiance 25 mg daily     Testing 3 x a day  Patient is willing and able to use the device  Demonstrated an understanding of the technology and is motivated to use CGM  Patient expected to adhere to a comprehensive diabetes treatment plan and patient has adequate medical supervision  Has multiple impaired awareness of hypoglycemia (hypoglycemia unawareness)    Diabetes Management Status    Statin: Taking  ACE/ARB: Taking    Screening or Prevention Patient's value Goal Complete/Controlled?   HgA1C Testing and Control   Lab Results   Component Value Date    HGBA1C 9.0 (H) 02/15/2023      Annually/Less than 8% No   Lipid profile : 02/15/2023 Annually Yes   LDL control Lab Results   Component Value Date    LDLCALC 175.0 (H) 02/15/2023    Annually/Less than 100 mg/dl  Yes   Nephropathy screening Lab Results   Component Value Date    LABMICR <5.0 2022     Lab Results   Component Value Date    PROTEINUA NEG 2008    Annually Yes   Blood pressure BP Readings from Last 1 Encounters:   22 132/80    Less than 140/90 No   Dilated retinal exam : 2022 Annually Yes   Foot exam    ": 09/13/2022 Annually Yes     REVIEW OF SYSTEMS  General: no weakness, fatigue, + weight stable   Eyes: no double or blurred vision, eye pain, or redness  Cardiovascular: no chest pain, palpitations, edema, or murmurs.   Respiratory: no cough or dyspnea.   GI: no heartburn, nausea, or changes in bowel patterns; good appetite.   Skin: no rashes, dryness, itching, or reactions at insulin injection sites. +hyperpigmentation peripheral   Neuro: no numbness, tingling, tremors, or vertigo.   Endocrine: no polyuria, polydipsia, polyphagia, heat or cold intolerance.     Vital Signs  /78 (BP Location: Left arm, Patient Position: Sitting, BP Method: Medium (Manual))   Pulse 89   Ht 5' 5" (1.651 m)   Wt 80.3 kg (177 lb 0.5 oz)   LMP 01/01/1994 (Approximate) Comment: 1994  SpO2 99%   BMI 29.46 kg/m²     Hemoglobin A1C   Date Value Ref Range Status   02/15/2023 9.0 (H) 4.0 - 5.6 % Final     Comment:     ADA Screening Guidelines:  5.7-6.4%  Consistent with prediabetes  >or=6.5%  Consistent with diabetes    High levels of fetal hemoglobin interfere with the HbA1C  assay. Heterozygous hemoglobin variants (HbS, HgC, etc)do  not significantly interfere with this assay.   However, presence of multiple variants may affect accuracy.     11/09/2022 8.6 (H) 4.0 - 5.6 % Final     Comment:     ADA Screening Guidelines:  5.7-6.4%  Consistent with prediabetes  >or=6.5%  Consistent with diabetes    High levels of fetal hemoglobin interfere with the HbA1C  assay. Heterozygous hemoglobin variants (HbS, HgC, etc)do  not significantly interfere with this assay.   However, presence of multiple variants may affect accuracy.     07/29/2022 8.1 (H) 4.0 - 5.6 % Final     Comment:     ADA Screening Guidelines:  5.7-6.4%  Consistent with prediabetes  >or=6.5%  Consistent with diabetes    High levels of fetal hemoglobin interfere with the HbA1C  assay. Heterozygous hemoglobin variants (HbS, HgC, etc)do  not significantly interfere with this " assay.   However, presence of multiple variants may affect accuracy.          Chemistry        Component Value Date/Time     11/09/2022 0909    K 4.5 11/09/2022 0909     11/09/2022 0909    CO2 21 (L) 11/09/2022 0909    BUN 12 11/09/2022 0909    CREATININE 1.1 11/09/2022 0909     (H) 11/09/2022 0909        Component Value Date/Time    CALCIUM 9.6 11/09/2022 0909    ALKPHOS 80 11/09/2022 0909    AST 23 11/09/2022 0909    ALT 22 11/09/2022 0909    BILITOT 0.5 11/09/2022 0909    ESTGFRAFRICA >60.0 07/29/2022 1008    EGFRNONAA 58.8 (A) 07/29/2022 1008           Lab Results   Component Value Date    TSH 1.125 07/29/2022      Lab Results   Component Value Date    CHOL 257 (H) 02/15/2023    CHOL 162 03/29/2022    CHOL 151 06/15/2021     Lab Results   Component Value Date    HDL 56 02/15/2023    HDL 48 03/29/2022    HDL 45 06/15/2021     Lab Results   Component Value Date    LDLCALC 175.0 (H) 02/15/2023    LDLCALC 92.4 03/29/2022    LDLCALC 84.2 06/15/2021     Lab Results   Component Value Date    TRIG 130 02/15/2023    TRIG 108 03/29/2022    TRIG 109 06/15/2021     Lab Results   Component Value Date    CHOLHDL 21.8 02/15/2023    CHOLHDL 29.6 03/29/2022    CHOLHDL 29.8 06/15/2021         PHYSICAL EXAMINATION  Constitutional: Appears well, no distress Reviewed vitals above.  Eyes: conjunctivae & lids intact; PERRLA, EOMs intact; optic discs   Neck: Supple, trachea midline.   Respiratory: No wheezes, even and unlabored   Cardiovascular: RRR; no edema or varicosities  Lymph: deferred   Skin: warm and dry; no injection site reactions, no acanthosis nigracans observed.  Neuro:patient alert and cooperative, normal affect; steady gait.  Psychiatric: judgement & insight intact, orientation of time, place & person intact, memory; mood & affect wnl     Diabetes Foot Exam:   deferred     Assessment/Plan  1. Type 2 diabetes mellitus with hyperglycemia, without long-term current use of insulin  Hemoglobin A1C     Hemoglobin A1C    Basic Metabolic Panel      2. Obesity, Class I, BMI 30-34.9        3. Sleep apnea, unspecified type        4. Non-proliferative diabetic retinopathy        5. Essential hypertension        6. Hyperlipidemia, unspecified hyperlipidemia type  Lipid Panel      7. Discolored skin  Ambulatory referral/consult to Dermatology        1-7. Follow up in 4 months w/ me   A1c bmp in 6 weeks  A1c lipid in 4 months  Continue jardiance 25 mg daily   Reviewed lipid panel  Increase rosuvastatin to 10 mg qhs   Add glipizide xr 2.5 mg daily w/ breakfast/1st meal   F/u with retinal specialist  A1c goal less than 7.5%  Bp controlled  Dermatology referral

## 2023-02-22 ENCOUNTER — OFFICE VISIT (OUTPATIENT)
Dept: INTERNAL MEDICINE | Facility: CLINIC | Age: 68
End: 2023-02-22
Payer: MEDICARE

## 2023-02-22 VITALS
WEIGHT: 177 LBS | DIASTOLIC BLOOD PRESSURE: 78 MMHG | SYSTOLIC BLOOD PRESSURE: 134 MMHG | HEART RATE: 89 BPM | BODY MASS INDEX: 29.49 KG/M2 | HEIGHT: 65 IN | OXYGEN SATURATION: 99 %

## 2023-02-22 DIAGNOSIS — E66.9 OBESITY, CLASS I, BMI 30-34.9: ICD-10-CM

## 2023-02-22 DIAGNOSIS — I10 ESSENTIAL HYPERTENSION: ICD-10-CM

## 2023-02-22 DIAGNOSIS — L81.9 DISCOLORED SKIN: ICD-10-CM

## 2023-02-22 DIAGNOSIS — E11.65 TYPE 2 DIABETES MELLITUS WITH HYPERGLYCEMIA, WITHOUT LONG-TERM CURRENT USE OF INSULIN: Primary | ICD-10-CM

## 2023-02-22 DIAGNOSIS — E11.3299 NON-PROLIFERATIVE DIABETIC RETINOPATHY: ICD-10-CM

## 2023-02-22 DIAGNOSIS — G47.30 SLEEP APNEA, UNSPECIFIED TYPE: ICD-10-CM

## 2023-02-22 DIAGNOSIS — E78.5 HYPERLIPIDEMIA, UNSPECIFIED HYPERLIPIDEMIA TYPE: ICD-10-CM

## 2023-02-22 PROCEDURE — 1160F PR REVIEW ALL MEDS BY PRESCRIBER/CLIN PHARMACIST DOCUMENTED: ICD-10-PCS | Mod: HCNC,CPTII,S$GLB, | Performed by: NURSE PRACTITIONER

## 2023-02-22 PROCEDURE — 1159F MED LIST DOCD IN RCRD: CPT | Mod: HCNC,CPTII,S$GLB, | Performed by: NURSE PRACTITIONER

## 2023-02-22 PROCEDURE — 3078F PR MOST RECENT DIASTOLIC BLOOD PRESSURE < 80 MM HG: ICD-10-PCS | Mod: HCNC,CPTII,S$GLB, | Performed by: NURSE PRACTITIONER

## 2023-02-22 PROCEDURE — 3075F SYST BP GE 130 - 139MM HG: CPT | Mod: HCNC,CPTII,S$GLB, | Performed by: NURSE PRACTITIONER

## 2023-02-22 PROCEDURE — 3052F PR MOST RECENT HEMOGLOBIN A1C LEVEL 8.0 - < 9.0%: ICD-10-PCS | Mod: HCNC,CPTII,S$GLB, | Performed by: NURSE PRACTITIONER

## 2023-02-22 PROCEDURE — 1101F PT FALLS ASSESS-DOCD LE1/YR: CPT | Mod: HCNC,CPTII,S$GLB, | Performed by: NURSE PRACTITIONER

## 2023-02-22 PROCEDURE — 99214 OFFICE O/P EST MOD 30 MIN: CPT | Mod: HCNC,S$GLB,, | Performed by: NURSE PRACTITIONER

## 2023-02-22 PROCEDURE — 3078F DIAST BP <80 MM HG: CPT | Mod: HCNC,CPTII,S$GLB, | Performed by: NURSE PRACTITIONER

## 2023-02-22 PROCEDURE — 3288F PR FALLS RISK ASSESSMENT DOCUMENTED: ICD-10-PCS | Mod: HCNC,CPTII,S$GLB, | Performed by: NURSE PRACTITIONER

## 2023-02-22 PROCEDURE — 3008F BODY MASS INDEX DOCD: CPT | Mod: HCNC,CPTII,S$GLB, | Performed by: NURSE PRACTITIONER

## 2023-02-22 PROCEDURE — 1159F PR MEDICATION LIST DOCUMENTED IN MEDICAL RECORD: ICD-10-PCS | Mod: HCNC,CPTII,S$GLB, | Performed by: NURSE PRACTITIONER

## 2023-02-22 PROCEDURE — 1101F PR PT FALLS ASSESS DOC 0-1 FALLS W/OUT INJ PAST YR: ICD-10-PCS | Mod: HCNC,CPTII,S$GLB, | Performed by: NURSE PRACTITIONER

## 2023-02-22 PROCEDURE — 1126F PR PAIN SEVERITY QUANTIFIED, NO PAIN PRESENT: ICD-10-PCS | Mod: HCNC,CPTII,S$GLB, | Performed by: NURSE PRACTITIONER

## 2023-02-22 PROCEDURE — 1160F RVW MEDS BY RX/DR IN RCRD: CPT | Mod: HCNC,CPTII,S$GLB, | Performed by: NURSE PRACTITIONER

## 2023-02-22 PROCEDURE — 3075F PR MOST RECENT SYSTOLIC BLOOD PRESS GE 130-139MM HG: ICD-10-PCS | Mod: HCNC,CPTII,S$GLB, | Performed by: NURSE PRACTITIONER

## 2023-02-22 PROCEDURE — 99999 PR PBB SHADOW E&M-EST. PATIENT-LVL III: ICD-10-PCS | Mod: PBBFAC,HCNC,, | Performed by: NURSE PRACTITIONER

## 2023-02-22 PROCEDURE — 99999 PR PBB SHADOW E&M-EST. PATIENT-LVL III: CPT | Mod: PBBFAC,HCNC,, | Performed by: NURSE PRACTITIONER

## 2023-02-22 PROCEDURE — 99214 PR OFFICE/OUTPT VISIT, EST, LEVL IV, 30-39 MIN: ICD-10-PCS | Mod: HCNC,S$GLB,, | Performed by: NURSE PRACTITIONER

## 2023-02-22 PROCEDURE — 3288F FALL RISK ASSESSMENT DOCD: CPT | Mod: HCNC,CPTII,S$GLB, | Performed by: NURSE PRACTITIONER

## 2023-02-22 PROCEDURE — 3008F PR BODY MASS INDEX (BMI) DOCUMENTED: ICD-10-PCS | Mod: HCNC,CPTII,S$GLB, | Performed by: NURSE PRACTITIONER

## 2023-02-22 PROCEDURE — 1126F AMNT PAIN NOTED NONE PRSNT: CPT | Mod: HCNC,CPTII,S$GLB, | Performed by: NURSE PRACTITIONER

## 2023-02-22 PROCEDURE — 3052F HG A1C>EQUAL 8.0%<EQUAL 9.0%: CPT | Mod: HCNC,CPTII,S$GLB, | Performed by: NURSE PRACTITIONER

## 2023-02-22 RX ORDER — GLIPIZIDE 2.5 MG/1
2.5 TABLET, EXTENDED RELEASE ORAL
Qty: 90 TABLET | Refills: 3 | Status: SHIPPED | OUTPATIENT
Start: 2023-02-22 | End: 2023-11-06

## 2023-02-22 RX ORDER — ROSUVASTATIN CALCIUM 10 MG/1
10 TABLET, COATED ORAL DAILY
Qty: 90 TABLET | Refills: 3 | Status: SHIPPED | OUTPATIENT
Start: 2023-02-22 | End: 2023-09-18 | Stop reason: SDUPTHER

## 2023-02-22 RX ORDER — LANCETS
EACH MISCELLANEOUS
Qty: 300 EACH | Refills: 3 | Status: SHIPPED | OUTPATIENT
Start: 2023-02-22 | End: 2024-01-08

## 2023-02-22 RX ORDER — INSULIN PUMP SYRINGE, 3 ML
EACH MISCELLANEOUS
Qty: 1 EACH | Refills: 0 | Status: SHIPPED | OUTPATIENT
Start: 2023-02-22 | End: 2024-03-12

## 2023-02-22 NOTE — PATIENT INSTRUCTIONS
Follow up in 4 months w/ Irielle   A1c bmp in 6 weeks  A1c lipid in 4 months  Continue jardiance 25 mg daily   Increase rosuvastatin to 10 mg qhs   Add glipizide xr 2.5 mg daily w/ breakfast/1st meal     A1c goal less than 7.5%    Dermatology referral     Lab Results   Component Value Date    HGBA1C 9.0 (H) 02/15/2023   [email or web address]  Eat fit luann  [facility]  [email or web address]

## 2023-02-22 NOTE — Clinical Note
Adding glipizide xr 2.5 mg daily Increase statin  Repeat labs in 6 weeks Pt admits to traveling and eating out a lot

## 2023-02-24 ENCOUNTER — TELEPHONE (OUTPATIENT)
Dept: ADMINISTRATIVE | Facility: HOSPITAL | Age: 68
End: 2023-02-24
Payer: MEDICARE

## 2023-03-03 ENCOUNTER — OFFICE VISIT (OUTPATIENT)
Dept: OBSTETRICS AND GYNECOLOGY | Facility: CLINIC | Age: 68
End: 2023-03-03
Payer: MEDICARE

## 2023-03-03 VITALS
BODY MASS INDEX: 28.95 KG/M2 | WEIGHT: 173.94 LBS | DIASTOLIC BLOOD PRESSURE: 76 MMHG | SYSTOLIC BLOOD PRESSURE: 128 MMHG

## 2023-03-03 DIAGNOSIS — M85.80 OSTEOPENIA, UNSPECIFIED LOCATION: ICD-10-CM

## 2023-03-03 DIAGNOSIS — Z90.710 HISTORY OF HYSTERECTOMY: ICD-10-CM

## 2023-03-03 DIAGNOSIS — Z01.419 WELL WOMAN EXAM WITH ROUTINE GYNECOLOGICAL EXAM: Primary | ICD-10-CM

## 2023-03-03 PROCEDURE — 3288F FALL RISK ASSESSMENT DOCD: CPT | Mod: HCNC,CPTII,S$GLB, | Performed by: NURSE PRACTITIONER

## 2023-03-03 PROCEDURE — G0101 CA SCREEN;PELVIC/BREAST EXAM: HCPCS | Mod: HCNC,GZ,S$GLB, | Performed by: NURSE PRACTITIONER

## 2023-03-03 PROCEDURE — 1101F PR PT FALLS ASSESS DOC 0-1 FALLS W/OUT INJ PAST YR: ICD-10-PCS | Mod: HCNC,CPTII,S$GLB, | Performed by: NURSE PRACTITIONER

## 2023-03-03 PROCEDURE — 3052F HG A1C>EQUAL 8.0%<EQUAL 9.0%: CPT | Mod: HCNC,CPTII,S$GLB, | Performed by: NURSE PRACTITIONER

## 2023-03-03 PROCEDURE — 3008F BODY MASS INDEX DOCD: CPT | Mod: HCNC,CPTII,S$GLB, | Performed by: NURSE PRACTITIONER

## 2023-03-03 PROCEDURE — 1101F PT FALLS ASSESS-DOCD LE1/YR: CPT | Mod: HCNC,CPTII,S$GLB, | Performed by: NURSE PRACTITIONER

## 2023-03-03 PROCEDURE — 3078F PR MOST RECENT DIASTOLIC BLOOD PRESSURE < 80 MM HG: ICD-10-PCS | Mod: HCNC,CPTII,S$GLB, | Performed by: NURSE PRACTITIONER

## 2023-03-03 PROCEDURE — 99999 PR PBB SHADOW E&M-EST. PATIENT-LVL III: ICD-10-PCS | Mod: PBBFAC,HCNC,, | Performed by: NURSE PRACTITIONER

## 2023-03-03 PROCEDURE — 3078F DIAST BP <80 MM HG: CPT | Mod: HCNC,CPTII,S$GLB, | Performed by: NURSE PRACTITIONER

## 2023-03-03 PROCEDURE — 3074F SYST BP LT 130 MM HG: CPT | Mod: HCNC,CPTII,S$GLB, | Performed by: NURSE PRACTITIONER

## 2023-03-03 PROCEDURE — 1126F AMNT PAIN NOTED NONE PRSNT: CPT | Mod: HCNC,CPTII,S$GLB, | Performed by: NURSE PRACTITIONER

## 2023-03-03 PROCEDURE — 3074F PR MOST RECENT SYSTOLIC BLOOD PRESSURE < 130 MM HG: ICD-10-PCS | Mod: HCNC,CPTII,S$GLB, | Performed by: NURSE PRACTITIONER

## 2023-03-03 PROCEDURE — 3052F PR MOST RECENT HEMOGLOBIN A1C LEVEL 8.0 - < 9.0%: ICD-10-PCS | Mod: HCNC,CPTII,S$GLB, | Performed by: NURSE PRACTITIONER

## 2023-03-03 PROCEDURE — 3288F PR FALLS RISK ASSESSMENT DOCUMENTED: ICD-10-PCS | Mod: HCNC,CPTII,S$GLB, | Performed by: NURSE PRACTITIONER

## 2023-03-03 PROCEDURE — 1159F PR MEDICATION LIST DOCUMENTED IN MEDICAL RECORD: ICD-10-PCS | Mod: HCNC,CPTII,S$GLB, | Performed by: NURSE PRACTITIONER

## 2023-03-03 PROCEDURE — 3008F PR BODY MASS INDEX (BMI) DOCUMENTED: ICD-10-PCS | Mod: HCNC,CPTII,S$GLB, | Performed by: NURSE PRACTITIONER

## 2023-03-03 PROCEDURE — 1126F PR PAIN SEVERITY QUANTIFIED, NO PAIN PRESENT: ICD-10-PCS | Mod: HCNC,CPTII,S$GLB, | Performed by: NURSE PRACTITIONER

## 2023-03-03 PROCEDURE — G0101 PR CA SCREEN;PELVIC/BREAST EXAM: ICD-10-PCS | Mod: HCNC,GZ,S$GLB, | Performed by: NURSE PRACTITIONER

## 2023-03-03 PROCEDURE — 99999 PR PBB SHADOW E&M-EST. PATIENT-LVL III: CPT | Mod: PBBFAC,HCNC,, | Performed by: NURSE PRACTITIONER

## 2023-03-03 PROCEDURE — 1159F MED LIST DOCD IN RCRD: CPT | Mod: HCNC,CPTII,S$GLB, | Performed by: NURSE PRACTITIONER

## 2023-03-03 NOTE — PROGRESS NOTES
"CC: Annual  HPI: Pt is a 67 y.o.  female who presents for routine annual exam. New pt- here to establish care. She is postmenopausal. She had an elective hysterectomy at age 39 after having her twins- cervix and ovaries are intact per pt. Reports some itching on her right breast and looks "puffy" under her right axilla, no nipple discharge, no lumps. Starting to decrease her Pepsi intake, needs new bra since she lost some weight and breasts are smaller. Walks with dogs for exercise. Plans to start taking a vitamin D and calcium. Current with pcp visit. Three daughters.     FH:   Breast cancer: maternal aunt   Colon cancer: none  Ovarian cancer: none  Uterine cancer: none  Sister with lung cancer    HPV vaccine: na  Last pap smear: none on file  History of abnormal pap smears: no    Colonoscopy: current  DEXA: current- osteopenia, repeat in 2025  Mammogram: current  STD history: no  Birth control:   Tobacco use: no  Diabetes, IBS, osteopenia, partial hysterectomy    ROS:  GENERAL: Feeling well overall. Denies fever or chills.   SKIN: Denies rash or lesions.   HEAD: Denies head injury or headache.   NODES: Denies enlarged lymph nodes.   CHEST: Denies chest pain or shortness of breath.   CARDIOVASCULAR: Denies palpitations or left sided chest pain.   ABDOMEN: No abdominal pain, constipation, diarrhea, nausea, vomiting or rectal bleeding.   URINARY: No dysuria, hematuria, or burning on urination.  REPRODUCTIVE: See HPI.   BREASTS: Denies pain, lumps, or nipple discharge.   HEMATOLOGIC: No easy bruisability or excessive bleeding.   MUSCULOSKELETAL: Denies joint pain or swelling.   NEUROLOGIC: Denies syncope or weakness.   PSYCHIATRIC: Denies depression, anxiety or mood swings.    PE:   APPEARANCE: Well nourished, well developed, Black or  female in no acute distress.  NODES: no cervical, supraclavicular, or inguinal lymphadenopathy  BREASTS: Symmetrical, no skin changes or visible lesions. No palpable " masses, nipple discharge or adenopathy bilaterally.  ABDOMEN: Soft. No tenderness or masses. No distention. No hernias palpated. No CVA tenderness.  VULVA: No lesions. Normal external female genitalia.  URETHRAL MEATUS: Normal size and location, no lesions, no prolapse.  URETHRA: No masses, tenderness, or prolapse.  VAGINA: Moist. No lesions or lacerations noted. No abnormal discharge present. No odor present.   CERVIX: surgically absent  UTERUS: surgically absent  ADNEXA: No tenderness. No fullness or masses palpated in the adnexal regions.   ANUS PERINEUM: Normal.      Diagnosis:  1. Well woman exam with routine gynecological exam    2. History of hysterectomy    3. Osteopenia, unspecified location        Plan:   Pap not indicated- cervix absent  Mammogram due in June DXA current- start supplements and weight bearing exercise  Fu with GI 2/2 polyps on colonoscopy     Patient was counseled today on the new ACS guidelines for cervical cytology screening as well as the current recommendations for breast cancer screening. She was counseled to follow up with her PCP for other routine health maintenance. Counseling session lasted approximately 10 minutes, and all her questions were answered.  For women over the age of 65, you can stop having cervical cancer screenings if you have never had abnormal cervical cells or cervical cancer, and youve had three negative Pap tests in a row. (You also can stop screening if youve had two negative Pap and HPV tests in a row in the past 10 years, with at least one test in the past 5 years.),    Follow-up with me in 2 years for routine exam; mammogram yearly    I spent a total of 20 minutes on the day of the visit.This includes face to face time and non-face to face time preparing to see the patient (eg, review of tests), obtaining and/or reviewing separately obtained history, documenting clinical information in the electronic or other health record, independently interpreting  results and communicating results to the patient/family/caregiver, or care coordinator.    As of April 1, 2021, the Cures Act has been passed nationally. This new law requires that all doctors progress notes, lab results, pathology reports and radiology reports be released IMMEDIATELY to the patient in the patient portal. That means that the results are released to you at the EXACT same time they are released to me. Therefore, with all of the patients that I have I am not able to reply to each patient exactly when the results come in. So there will be a delay from when you see the results to when I see them and have time to come up with a response to send you. Also I only see these results when I am on the computer at work. So if the results come in over the weekend or after 5 pm of a work day, I will not see them until the next business day. As you can tell, this is a challenge as a provider to give every patient the quick response they hope for and deserve. So please be patient!     Thanks for your understanding and patience.

## 2023-03-05 ENCOUNTER — PATIENT MESSAGE (OUTPATIENT)
Dept: INTERNAL MEDICINE | Facility: CLINIC | Age: 68
End: 2023-03-05
Payer: MEDICARE

## 2023-03-15 ENCOUNTER — PES CALL (OUTPATIENT)
Dept: ADMINISTRATIVE | Facility: CLINIC | Age: 68
End: 2023-03-15
Payer: MEDICARE

## 2023-04-05 ENCOUNTER — LAB VISIT (OUTPATIENT)
Dept: PRIMARY CARE CLINIC | Facility: CLINIC | Age: 68
End: 2023-04-05
Payer: MEDICARE

## 2023-04-05 DIAGNOSIS — E11.65 TYPE 2 DIABETES MELLITUS WITH HYPERGLYCEMIA, WITHOUT LONG-TERM CURRENT USE OF INSULIN: ICD-10-CM

## 2023-04-05 LAB
ANION GAP SERPL CALC-SCNC: 11 MMOL/L (ref 8–16)
BUN SERPL-MCNC: 14 MG/DL (ref 8–23)
CALCIUM SERPL-MCNC: 9.8 MG/DL (ref 8.7–10.5)
CHLORIDE SERPL-SCNC: 105 MMOL/L (ref 95–110)
CO2 SERPL-SCNC: 22 MMOL/L (ref 23–29)
CREAT SERPL-MCNC: 1 MG/DL (ref 0.5–1.4)
EST. GFR  (NO RACE VARIABLE): >60 ML/MIN/1.73 M^2
ESTIMATED AVG GLUCOSE: 197 MG/DL (ref 68–131)
GLUCOSE SERPL-MCNC: 142 MG/DL (ref 70–110)
HBA1C MFR BLD: 8.5 % (ref 4–5.6)
POTASSIUM SERPL-SCNC: 4.2 MMOL/L (ref 3.5–5.1)
SODIUM SERPL-SCNC: 138 MMOL/L (ref 136–145)

## 2023-04-05 PROCEDURE — 80048 BASIC METABOLIC PNL TOTAL CA: CPT | Mod: HCNC | Performed by: NURSE PRACTITIONER

## 2023-04-05 PROCEDURE — 83036 HEMOGLOBIN GLYCOSYLATED A1C: CPT | Mod: HCNC | Performed by: NURSE PRACTITIONER

## 2023-04-10 RX ORDER — EMPAGLIFLOZIN 25 MG/1
TABLET, FILM COATED ORAL
Qty: 90 TABLET | Refills: 2 | Status: SHIPPED | OUTPATIENT
Start: 2023-04-10

## 2023-05-09 ENCOUNTER — PATIENT MESSAGE (OUTPATIENT)
Dept: INTERNAL MEDICINE | Facility: CLINIC | Age: 68
End: 2023-05-09
Payer: MEDICARE

## 2023-05-09 NOTE — TELEPHONE ENCOUNTER
Tried to call pt about portal message. No answer.     You are stating that her A1c is to high, because of her labs from last month correct?

## 2023-05-10 ENCOUNTER — TELEPHONE (OUTPATIENT)
Dept: INTERNAL MEDICINE | Facility: CLINIC | Age: 68
End: 2023-05-10
Payer: MEDICARE

## 2023-05-10 NOTE — TELEPHONE ENCOUNTER
----- Message from Alesha Meza sent at 5/9/2023 12:01 PM CDT -----  Contact: [phone]  Pt needs callback @ # [phone]

## 2023-05-24 ENCOUNTER — PES CALL (OUTPATIENT)
Dept: ADMINISTRATIVE | Facility: CLINIC | Age: 68
End: 2023-05-24
Payer: MEDICARE

## 2023-06-06 NOTE — Clinical Note
Price may be better with janumet-rx just sent Issues with cost for jardiance- sent message to pt assistance program
No

## 2023-06-07 RX ORDER — VALSARTAN 80 MG/1
TABLET ORAL
Qty: 90 TABLET | Refills: 2 | Status: SHIPPED | OUTPATIENT
Start: 2023-06-07

## 2023-06-21 NOTE — PROGRESS NOTES
CHIEF COMPLAINT: Type 2 Diabetes     HPI: Mrs. Lizabeth Saha is a 67 y.o. female who was diagnosed with Type 2 DM x 11 years ago.   Social smoker- younger ages, worried about copd dx, h/o sleep apnea    Retired. 3 daughters.  Mother- , on HD  - radiation, ca dx    Last seen by me in winter 2023  Being seen by me again today  Overdue for labs  Cost issue w/ jardiance $500 for 90 day   Did not do well with metformin     No exercise  Traveling    Highest 189 mg/dl  Lab Results   Component Value Date    HGBA1C 8.5 (H) 2023     [facility]   Stopped trulicity - costly   Stopped jardiance- costly     No h/o pancreatitis or medullary thyroid ca    PREVIOUS DIABETES MEDICATIONS TRIED  Metformin xr   Metformin   januvia 100 mg daily   trulicity- too costly   jardiance 10, 25 mg daily     CURRENT DIABETIC MEDS: jardiance 25 mg daily , glipizide xr 2.5 mg daily w/ breakfast    Testing 3 x a day  147-158 mg/dl   Patient is willing and able to use the device  Demonstrated an understanding of the technology and is motivated to use CGM  Patient expected to adhere to a comprehensive diabetes treatment plan and patient has adequate medical supervision  Has multiple impaired awareness of hypoglycemia (hypoglycemia unawareness)    Diabetes Management Status    Statin: Taking  ACE/ARB: Taking    Screening or Prevention Patient's value Goal Complete/Controlled?   HgA1C Testing and Control   Lab Results   Component Value Date    HGBA1C 8.5 (H) 2023      Annually/Less than 8% No   Lipid profile : 02/15/2023 Annually Yes   LDL control Lab Results   Component Value Date    LDLCALC 175.0 (H) 02/15/2023    Annually/Less than 100 mg/dl  Yes   Nephropathy screening Lab Results   Component Value Date    LABMICR <5.0 2022     Lab Results   Component Value Date    PROTEINUA NEG 2008    Annually Yes   Blood pressure BP Readings from Last 1 Encounters:   23 128/76    Less than 140/90 No  "  Dilated retinal exam : 06/20/2022 Annually Yes   Foot exam   : 09/13/2022 Annually Yes     REVIEW OF SYSTEMS  General: no weakness, fatigue, + weight stable   Eyes/Ears: no double or blurred vision, eye pain, or redness, +hard of hearing, hearing aids  Cardiovascular: no chest pain, palpitations, edema, or murmurs.   Respiratory: no cough or dyspnea.   GI: no heartburn, nausea, or changes in bowel patterns; good appetite.   Skin: no rashes, dryness, itching, or reactions at insulin injection sites. +hyperpigmentation peripheral   Neuro: no numbness, tingling, tremors, or vertigo.   Endocrine: no polyuria, polydipsia, polyphagia, heat or cold intolerance.     Vital Signs  /78 (BP Location: Left arm, Patient Position: Sitting, BP Method: Medium (Manual))   Pulse 86   Ht 5' 5" (1.651 m)   Wt 80.1 kg (176 lb 9.4 oz)   LMP 01/01/1994 (Approximate) Comment: 1994  SpO2 97%   BMI 29.39 kg/m²     Hemoglobin A1C   Date Value Ref Range Status   04/05/2023 8.5 (H) 4.0 - 5.6 % Final     Comment:     ADA Screening Guidelines:  5.7-6.4%  Consistent with prediabetes  >or=6.5%  Consistent with diabetes    High levels of fetal hemoglobin interfere with the HbA1C  assay. Heterozygous hemoglobin variants (HbS, HgC, etc)do  not significantly interfere with this assay.   However, presence of multiple variants may affect accuracy.     02/15/2023 9.0 (H) 4.0 - 5.6 % Final     Comment:     ADA Screening Guidelines:  5.7-6.4%  Consistent with prediabetes  >or=6.5%  Consistent with diabetes    High levels of fetal hemoglobin interfere with the HbA1C  assay. Heterozygous hemoglobin variants (HbS, HgC, etc)do  not significantly interfere with this assay.   However, presence of multiple variants may affect accuracy.     11/09/2022 8.6 (H) 4.0 - 5.6 % Final     Comment:     ADA Screening Guidelines:  5.7-6.4%  Consistent with prediabetes  >or=6.5%  Consistent with diabetes    High levels of fetal hemoglobin interfere with the " HbA1C  assay. Heterozygous hemoglobin variants (HbS, HgC, etc)do  not significantly interfere with this assay.   However, presence of multiple variants may affect accuracy.          Chemistry        Component Value Date/Time     04/05/2023 0852    K 4.2 04/05/2023 0852     04/05/2023 0852    CO2 22 (L) 04/05/2023 0852    BUN 14 04/05/2023 0852    CREATININE 1.0 04/05/2023 0852     (H) 04/05/2023 0852        Component Value Date/Time    CALCIUM 9.8 04/05/2023 0852    ALKPHOS 80 11/09/2022 0909    AST 23 11/09/2022 0909    ALT 22 11/09/2022 0909    BILITOT 0.5 11/09/2022 0909    ESTGFRAFRICA >60.0 07/29/2022 1008    EGFRNONAA 58.8 (A) 07/29/2022 1008           Lab Results   Component Value Date    TSH 1.125 07/29/2022      Lab Results   Component Value Date    CHOL 257 (H) 02/15/2023    CHOL 162 03/29/2022    CHOL 151 06/15/2021     Lab Results   Component Value Date    HDL 56 02/15/2023    HDL 48 03/29/2022    HDL 45 06/15/2021     Lab Results   Component Value Date    LDLCALC 175.0 (H) 02/15/2023    LDLCALC 92.4 03/29/2022    LDLCALC 84.2 06/15/2021     Lab Results   Component Value Date    TRIG 130 02/15/2023    TRIG 108 03/29/2022    TRIG 109 06/15/2021     Lab Results   Component Value Date    CHOLHDL 21.8 02/15/2023    CHOLHDL 29.6 03/29/2022    CHOLHDL 29.8 06/15/2021         PHYSICAL EXAMINATION  Constitutional: Appears well, no distress Reviewed vitals above.  Eyes: conjunctivae & lids intact; PERRLA, EOMs intact; optic discs   Neck: Supple, trachea midline.   Respiratory: No wheezes, even and unlabored   Cardiovascular: RRR; no edema or varicosities  Lymph: deferred   Skin: warm and dry; no injection site reactions, no acanthosis nigracans observed. Hyperpigmentation to legs  Neuro:patient alert and cooperative, normal affect; steady gait.  Psychiatric: judgement & insight intact, orientation of time, place & person intact, memory; mood & affect wnl     Diabetes Foot Exam:   deferred      Assessment/Plan  1. Type 2 diabetes mellitus with hyperglycemia, without long-term current use of insulin  Hemoglobin A1C    Hemoglobin A1C    Comprehensive Metabolic Panel      2. Sleep apnea, unspecified type        3. Obesity, Class I, BMI 30-34.9        4. Essential hypertension        5. Hyperlipidemia, unspecified hyperlipidemia type        6. Non-proliferative diabetic retinopathy        7. Encounter for screening mammogram for malignant neoplasm of breast  Mammo Digital Screening Bilat w/ Martir      8. Discoloration of skin  Ambulatory referral/consult to Dermatology        1-8. Follow up in 4 months w/ me  A1c cmp +in 6 weeks  A1c in 4 months  A1c goal less than 7.5%  Goal to phase out jardiance r/t price  Switch to rybelsus 7 mg daily   3 mg trial given, discussed 30 mins wait time, 4-8 oz of water only   Discussed glp1a injection-defer for now  Davis is an issues  Bp controlled  Lab Results   Component Value Date    LDLCALC 199.8 (H) 06/28/2023     Will start back statin   F/u with optometry   Mammogram 2023   Dermatology 2023

## 2023-06-28 ENCOUNTER — LAB VISIT (OUTPATIENT)
Dept: PRIMARY CARE CLINIC | Facility: CLINIC | Age: 68
End: 2023-06-28
Payer: MEDICARE

## 2023-06-28 DIAGNOSIS — E78.5 HYPERLIPIDEMIA, UNSPECIFIED HYPERLIPIDEMIA TYPE: ICD-10-CM

## 2023-06-28 DIAGNOSIS — E11.65 TYPE 2 DIABETES MELLITUS WITH HYPERGLYCEMIA, WITHOUT LONG-TERM CURRENT USE OF INSULIN: ICD-10-CM

## 2023-06-28 LAB
CHOLEST SERPL-MCNC: 288 MG/DL (ref 120–199)
CHOLEST/HDLC SERPL: 5.1 {RATIO} (ref 2–5)
ESTIMATED AVG GLUCOSE: 177 MG/DL (ref 68–131)
HBA1C MFR BLD: 7.8 % (ref 4–5.6)
HDLC SERPL-MCNC: 57 MG/DL (ref 40–75)
HDLC SERPL: 19.8 % (ref 20–50)
LDLC SERPL CALC-MCNC: 199.8 MG/DL (ref 63–159)
NONHDLC SERPL-MCNC: 231 MG/DL
TRIGL SERPL-MCNC: 156 MG/DL (ref 30–150)

## 2023-06-28 PROCEDURE — 80061 LIPID PANEL: CPT | Performed by: NURSE PRACTITIONER

## 2023-06-28 PROCEDURE — 83036 HEMOGLOBIN GLYCOSYLATED A1C: CPT | Performed by: NURSE PRACTITIONER

## 2023-06-29 ENCOUNTER — OFFICE VISIT (OUTPATIENT)
Dept: INTERNAL MEDICINE | Facility: CLINIC | Age: 68
End: 2023-06-29
Payer: MEDICARE

## 2023-06-29 VITALS
HEART RATE: 86 BPM | DIASTOLIC BLOOD PRESSURE: 78 MMHG | WEIGHT: 176.56 LBS | BODY MASS INDEX: 29.42 KG/M2 | OXYGEN SATURATION: 97 % | HEIGHT: 65 IN | SYSTOLIC BLOOD PRESSURE: 130 MMHG

## 2023-06-29 DIAGNOSIS — Z12.31 ENCOUNTER FOR SCREENING MAMMOGRAM FOR MALIGNANT NEOPLASM OF BREAST: ICD-10-CM

## 2023-06-29 DIAGNOSIS — E11.65 TYPE 2 DIABETES MELLITUS WITH HYPERGLYCEMIA, WITHOUT LONG-TERM CURRENT USE OF INSULIN: Primary | ICD-10-CM

## 2023-06-29 DIAGNOSIS — I10 ESSENTIAL HYPERTENSION: ICD-10-CM

## 2023-06-29 DIAGNOSIS — L81.9 DISCOLORATION OF SKIN: ICD-10-CM

## 2023-06-29 DIAGNOSIS — G47.30 SLEEP APNEA, UNSPECIFIED TYPE: ICD-10-CM

## 2023-06-29 DIAGNOSIS — E66.9 OBESITY, CLASS I, BMI 30-34.9: ICD-10-CM

## 2023-06-29 DIAGNOSIS — E11.3299 NON-PROLIFERATIVE DIABETIC RETINOPATHY: ICD-10-CM

## 2023-06-29 DIAGNOSIS — E78.5 HYPERLIPIDEMIA, UNSPECIFIED HYPERLIPIDEMIA TYPE: ICD-10-CM

## 2023-06-29 PROCEDURE — 3051F HG A1C>EQUAL 7.0%<8.0%: CPT | Mod: CPTII,S$GLB,, | Performed by: NURSE PRACTITIONER

## 2023-06-29 PROCEDURE — 1160F PR REVIEW ALL MEDS BY PRESCRIBER/CLIN PHARMACIST DOCUMENTED: ICD-10-PCS | Mod: CPTII,S$GLB,, | Performed by: NURSE PRACTITIONER

## 2023-06-29 PROCEDURE — 1101F PT FALLS ASSESS-DOCD LE1/YR: CPT | Mod: CPTII,S$GLB,, | Performed by: NURSE PRACTITIONER

## 2023-06-29 PROCEDURE — 1126F PR PAIN SEVERITY QUANTIFIED, NO PAIN PRESENT: ICD-10-PCS | Mod: CPTII,S$GLB,, | Performed by: NURSE PRACTITIONER

## 2023-06-29 PROCEDURE — 1126F AMNT PAIN NOTED NONE PRSNT: CPT | Mod: CPTII,S$GLB,, | Performed by: NURSE PRACTITIONER

## 2023-06-29 PROCEDURE — 3075F PR MOST RECENT SYSTOLIC BLOOD PRESS GE 130-139MM HG: ICD-10-PCS | Mod: CPTII,S$GLB,, | Performed by: NURSE PRACTITIONER

## 2023-06-29 PROCEDURE — 3288F PR FALLS RISK ASSESSMENT DOCUMENTED: ICD-10-PCS | Mod: CPTII,S$GLB,, | Performed by: NURSE PRACTITIONER

## 2023-06-29 PROCEDURE — 99214 PR OFFICE/OUTPT VISIT, EST, LEVL IV, 30-39 MIN: ICD-10-PCS | Mod: S$GLB,,, | Performed by: NURSE PRACTITIONER

## 2023-06-29 PROCEDURE — 3008F PR BODY MASS INDEX (BMI) DOCUMENTED: ICD-10-PCS | Mod: CPTII,S$GLB,, | Performed by: NURSE PRACTITIONER

## 2023-06-29 PROCEDURE — 3078F DIAST BP <80 MM HG: CPT | Mod: CPTII,S$GLB,, | Performed by: NURSE PRACTITIONER

## 2023-06-29 PROCEDURE — 3075F SYST BP GE 130 - 139MM HG: CPT | Mod: CPTII,S$GLB,, | Performed by: NURSE PRACTITIONER

## 2023-06-29 PROCEDURE — 1159F PR MEDICATION LIST DOCUMENTED IN MEDICAL RECORD: ICD-10-PCS | Mod: CPTII,S$GLB,, | Performed by: NURSE PRACTITIONER

## 2023-06-29 PROCEDURE — 1101F PR PT FALLS ASSESS DOC 0-1 FALLS W/OUT INJ PAST YR: ICD-10-PCS | Mod: CPTII,S$GLB,, | Performed by: NURSE PRACTITIONER

## 2023-06-29 PROCEDURE — 4010F ACE/ARB THERAPY RXD/TAKEN: CPT | Mod: CPTII,S$GLB,, | Performed by: NURSE PRACTITIONER

## 2023-06-29 PROCEDURE — 99999 PR PBB SHADOW E&M-EST. PATIENT-LVL IV: ICD-10-PCS | Mod: PBBFAC,,, | Performed by: NURSE PRACTITIONER

## 2023-06-29 PROCEDURE — 1159F MED LIST DOCD IN RCRD: CPT | Mod: CPTII,S$GLB,, | Performed by: NURSE PRACTITIONER

## 2023-06-29 PROCEDURE — 1160F RVW MEDS BY RX/DR IN RCRD: CPT | Mod: CPTII,S$GLB,, | Performed by: NURSE PRACTITIONER

## 2023-06-29 PROCEDURE — 99999 PR PBB SHADOW E&M-EST. PATIENT-LVL IV: CPT | Mod: PBBFAC,,, | Performed by: NURSE PRACTITIONER

## 2023-06-29 PROCEDURE — 3051F PR MOST RECENT HEMOGLOBIN A1C LEVEL 7.0 - < 8.0%: ICD-10-PCS | Mod: CPTII,S$GLB,, | Performed by: NURSE PRACTITIONER

## 2023-06-29 PROCEDURE — 4010F PR ACE/ARB THEARPY RXD/TAKEN: ICD-10-PCS | Mod: CPTII,S$GLB,, | Performed by: NURSE PRACTITIONER

## 2023-06-29 PROCEDURE — 3008F BODY MASS INDEX DOCD: CPT | Mod: CPTII,S$GLB,, | Performed by: NURSE PRACTITIONER

## 2023-06-29 PROCEDURE — 3288F FALL RISK ASSESSMENT DOCD: CPT | Mod: CPTII,S$GLB,, | Performed by: NURSE PRACTITIONER

## 2023-06-29 PROCEDURE — 3078F PR MOST RECENT DIASTOLIC BLOOD PRESSURE < 80 MM HG: ICD-10-PCS | Mod: CPTII,S$GLB,, | Performed by: NURSE PRACTITIONER

## 2023-06-29 PROCEDURE — 99214 OFFICE O/P EST MOD 30 MIN: CPT | Mod: S$GLB,,, | Performed by: NURSE PRACTITIONER

## 2023-06-29 RX ORDER — ORAL SEMAGLUTIDE 7 MG/1
TABLET ORAL
Qty: 90 TABLET | Refills: 2 | Status: SHIPPED | OUTPATIENT
Start: 2023-06-29 | End: 2023-09-18

## 2023-06-29 NOTE — PATIENT INSTRUCTIONS
Follow up in 4 months w/Irielle   A1c cmp  in 6 weeks  A1c in 4 months  Dermatology : skin discoloration on legs   Mammogram 2023     Lab Results   Component Value Date    HGBA1C 7.8 (H) 06/28/2023     Goal less than 7.5%    Rybelsus 7 mg 2nd month, empty stomach, water only 4-8 oz, wait 30 mins -2nd month and on  Rybelsus 3 mg start with this first -first month  Goal to phase out jardiance 25 mg daily   Continue glipizide 2.5 mg xr w/ 1st meal (15-30 mins prior take med before food)    Goal  no higher than 180

## 2023-06-30 ENCOUNTER — CLINICAL SUPPORT (OUTPATIENT)
Dept: ENDOSCOPY | Facility: HOSPITAL | Age: 68
End: 2023-06-30
Attending: COLON & RECTAL SURGERY
Payer: MEDICARE

## 2023-06-30 ENCOUNTER — TELEPHONE (OUTPATIENT)
Dept: ENDOSCOPY | Facility: HOSPITAL | Age: 68
End: 2023-06-30

## 2023-06-30 DIAGNOSIS — Z86.010 HISTORY OF COLONIC POLYPS: ICD-10-CM

## 2023-06-30 NOTE — PLAN OF CARE
Contacted the patient to schedule an endoscopy procedure(s) Colonoscopy. See past procedure report with DR. Suárez dated 11/7/22, Pt due for 1 year follow up in November 2023. Per Pt request PAT appointment scheduled for 9/1/23, Pt verbalized understanding.

## 2023-07-05 ENCOUNTER — TELEPHONE (OUTPATIENT)
Dept: ADMINISTRATIVE | Facility: HOSPITAL | Age: 68
End: 2023-07-05
Payer: MEDICARE

## 2023-07-13 ENCOUNTER — HOSPITAL ENCOUNTER (OUTPATIENT)
Dept: RADIOLOGY | Facility: HOSPITAL | Age: 68
Discharge: HOME | End: 2023-07-13
Attending: NURSE PRACTITIONER
Payer: MEDICARE

## 2023-07-13 ENCOUNTER — OFFICE VISIT (OUTPATIENT)
Dept: INTERNAL MEDICINE | Facility: CLINIC | Age: 68
End: 2023-07-13
Payer: MEDICARE

## 2023-07-13 VITALS
SYSTOLIC BLOOD PRESSURE: 108 MMHG | HEART RATE: 75 BPM | BODY MASS INDEX: 29.34 KG/M2 | WEIGHT: 176.13 LBS | HEIGHT: 65 IN | OXYGEN SATURATION: 96 % | DIASTOLIC BLOOD PRESSURE: 58 MMHG

## 2023-07-13 DIAGNOSIS — I10 ESSENTIAL HYPERTENSION: ICD-10-CM

## 2023-07-13 DIAGNOSIS — M85.80 OSTEOPENIA, UNSPECIFIED LOCATION: ICD-10-CM

## 2023-07-13 DIAGNOSIS — Z12.31 ENCOUNTER FOR SCREENING MAMMOGRAM FOR MALIGNANT NEOPLASM OF BREAST: ICD-10-CM

## 2023-07-13 DIAGNOSIS — G47.30 SLEEP APNEA, UNSPECIFIED TYPE: ICD-10-CM

## 2023-07-13 DIAGNOSIS — I70.0 AORTIC ATHEROSCLEROSIS: ICD-10-CM

## 2023-07-13 DIAGNOSIS — Z00.00 ENCOUNTER FOR PREVENTIVE HEALTH EXAMINATION: Primary | ICD-10-CM

## 2023-07-13 DIAGNOSIS — R91.1 PULMONARY NODULE: ICD-10-CM

## 2023-07-13 DIAGNOSIS — E11.65 TYPE 2 DIABETES MELLITUS WITH HYPERGLYCEMIA, WITHOUT LONG-TERM CURRENT USE OF INSULIN: ICD-10-CM

## 2023-07-13 DIAGNOSIS — E11.3299 MILD NONPROLIFERATIVE DIABETIC RETINOPATHY ASSOCIATED WITH TYPE 2 DIABETES MELLITUS, MACULAR EDEMA PRESENCE UNSPECIFIED, UNSPECIFIED LATERALITY: ICD-10-CM

## 2023-07-13 PROCEDURE — 1159F MED LIST DOCD IN RCRD: CPT | Mod: HCNC,CPTII,S$GLB, | Performed by: NURSE PRACTITIONER

## 2023-07-13 PROCEDURE — 3051F HG A1C>EQUAL 7.0%<8.0%: CPT | Mod: HCNC,CPTII,S$GLB, | Performed by: NURSE PRACTITIONER

## 2023-07-13 PROCEDURE — G0439 PR MEDICARE ANNUAL WELLNESS SUBSEQUENT VISIT: ICD-10-PCS | Mod: HCNC,S$GLB,, | Performed by: NURSE PRACTITIONER

## 2023-07-13 PROCEDURE — 1159F PR MEDICATION LIST DOCUMENTED IN MEDICAL RECORD: ICD-10-PCS | Mod: HCNC,CPTII,S$GLB, | Performed by: NURSE PRACTITIONER

## 2023-07-13 PROCEDURE — 3288F FALL RISK ASSESSMENT DOCD: CPT | Mod: HCNC,CPTII,S$GLB, | Performed by: NURSE PRACTITIONER

## 2023-07-13 PROCEDURE — 77067 MAMMO DIGITAL SCREENING BILAT WITH TOMO: ICD-10-PCS | Mod: 26,HCNC,, | Performed by: RADIOLOGY

## 2023-07-13 PROCEDURE — 77067 SCR MAMMO BI INCL CAD: CPT | Mod: 26,HCNC,, | Performed by: RADIOLOGY

## 2023-07-13 PROCEDURE — 77063 BREAST TOMOSYNTHESIS BI: CPT | Mod: 26,HCNC,, | Performed by: RADIOLOGY

## 2023-07-13 PROCEDURE — 1101F PR PT FALLS ASSESS DOC 0-1 FALLS W/OUT INJ PAST YR: ICD-10-PCS | Mod: HCNC,CPTII,S$GLB, | Performed by: NURSE PRACTITIONER

## 2023-07-13 PROCEDURE — 3074F PR MOST RECENT SYSTOLIC BLOOD PRESSURE < 130 MM HG: ICD-10-PCS | Mod: HCNC,CPTII,S$GLB, | Performed by: NURSE PRACTITIONER

## 2023-07-13 PROCEDURE — 1160F RVW MEDS BY RX/DR IN RCRD: CPT | Mod: HCNC,CPTII,S$GLB, | Performed by: NURSE PRACTITIONER

## 2023-07-13 PROCEDURE — 3078F PR MOST RECENT DIASTOLIC BLOOD PRESSURE < 80 MM HG: ICD-10-PCS | Mod: HCNC,CPTII,S$GLB, | Performed by: NURSE PRACTITIONER

## 2023-07-13 PROCEDURE — 1160F PR REVIEW ALL MEDS BY PRESCRIBER/CLIN PHARMACIST DOCUMENTED: ICD-10-PCS | Mod: HCNC,CPTII,S$GLB, | Performed by: NURSE PRACTITIONER

## 2023-07-13 PROCEDURE — 3074F SYST BP LT 130 MM HG: CPT | Mod: HCNC,CPTII,S$GLB, | Performed by: NURSE PRACTITIONER

## 2023-07-13 PROCEDURE — 3078F DIAST BP <80 MM HG: CPT | Mod: HCNC,CPTII,S$GLB, | Performed by: NURSE PRACTITIONER

## 2023-07-13 PROCEDURE — G0439 PPPS, SUBSEQ VISIT: HCPCS | Mod: HCNC,S$GLB,, | Performed by: NURSE PRACTITIONER

## 2023-07-13 PROCEDURE — 99999 PR PBB SHADOW E&M-EST. PATIENT-LVL IV: ICD-10-PCS | Mod: PBBFAC,HCNC,, | Performed by: NURSE PRACTITIONER

## 2023-07-13 PROCEDURE — 1170F FXNL STATUS ASSESSED: CPT | Mod: HCNC,CPTII,S$GLB, | Performed by: NURSE PRACTITIONER

## 2023-07-13 PROCEDURE — 1101F PT FALLS ASSESS-DOCD LE1/YR: CPT | Mod: HCNC,CPTII,S$GLB, | Performed by: NURSE PRACTITIONER

## 2023-07-13 PROCEDURE — 1126F PR PAIN SEVERITY QUANTIFIED, NO PAIN PRESENT: ICD-10-PCS | Mod: HCNC,CPTII,S$GLB, | Performed by: NURSE PRACTITIONER

## 2023-07-13 PROCEDURE — 3008F BODY MASS INDEX DOCD: CPT | Mod: HCNC,CPTII,S$GLB, | Performed by: NURSE PRACTITIONER

## 2023-07-13 PROCEDURE — 3288F PR FALLS RISK ASSESSMENT DOCUMENTED: ICD-10-PCS | Mod: HCNC,CPTII,S$GLB, | Performed by: NURSE PRACTITIONER

## 2023-07-13 PROCEDURE — 1170F PR FUNCTIONAL STATUS ASSESSED: ICD-10-PCS | Mod: HCNC,CPTII,S$GLB, | Performed by: NURSE PRACTITIONER

## 2023-07-13 PROCEDURE — 3008F PR BODY MASS INDEX (BMI) DOCUMENTED: ICD-10-PCS | Mod: HCNC,CPTII,S$GLB, | Performed by: NURSE PRACTITIONER

## 2023-07-13 PROCEDURE — 77063 MAMMO DIGITAL SCREENING BILAT WITH TOMO: ICD-10-PCS | Mod: 26,HCNC,, | Performed by: RADIOLOGY

## 2023-07-13 PROCEDURE — 99999 PR PBB SHADOW E&M-EST. PATIENT-LVL IV: CPT | Mod: PBBFAC,HCNC,, | Performed by: NURSE PRACTITIONER

## 2023-07-13 PROCEDURE — 77067 SCR MAMMO BI INCL CAD: CPT | Mod: TC,HCNC

## 2023-07-13 PROCEDURE — 3051F PR MOST RECENT HEMOGLOBIN A1C LEVEL 7.0 - < 8.0%: ICD-10-PCS | Mod: HCNC,CPTII,S$GLB, | Performed by: NURSE PRACTITIONER

## 2023-07-13 PROCEDURE — 4010F PR ACE/ARB THEARPY RXD/TAKEN: ICD-10-PCS | Mod: HCNC,CPTII,S$GLB, | Performed by: NURSE PRACTITIONER

## 2023-07-13 PROCEDURE — 1126F AMNT PAIN NOTED NONE PRSNT: CPT | Mod: HCNC,CPTII,S$GLB, | Performed by: NURSE PRACTITIONER

## 2023-07-13 PROCEDURE — 4010F ACE/ARB THERAPY RXD/TAKEN: CPT | Mod: HCNC,CPTII,S$GLB, | Performed by: NURSE PRACTITIONER

## 2023-07-13 NOTE — PROGRESS NOTES
"  Lizabeth Saha presented for a  Medicare AWV and comprehensive Health Risk Assessment today. The following components were reviewed and updated:    Medical history  Family History  Social history  Allergies and Current Medications  Health Risk Assessment  Health Maintenance  Care Team         ** See Completed Assessments for Annual Wellness Visit within the encounter summary.**         The following assessments were completed:  Living Situation  CAGE  Depression Screening  Timed Get Up and Go  Whisper Test  Cognitive Function Screening      Nutrition Screening  ADL Screening  PAQ Screening  OPIOID Screening: Patient does not have a prescription for narcotics. Patient does not use substance         Vitals:    07/13/23 1001 07/13/23 1027   BP:  (!) 108/58   BP Location:  Left arm   Pulse:  75   SpO2:  96%   Weight: 79.9 kg (176 lb 2.4 oz)    Height: 5' 5" (1.651 m)      Body mass index is 29.31 kg/m².  Physical Exam  Vitals and nursing note reviewed.   Constitutional:       Appearance: She is well-developed.   HENT:      Head: Normocephalic.   Cardiovascular:      Rate and Rhythm: Normal rate and regular rhythm.      Heart sounds: Normal heart sounds. No murmur heard.  Pulmonary:      Effort: Pulmonary effort is normal.      Breath sounds: Normal breath sounds.   Abdominal:      General: Bowel sounds are normal.      Palpations: Abdomen is soft.   Musculoskeletal:         General: Normal range of motion.   Skin:     General: Skin is warm and dry.   Neurological:      Mental Status: She is alert and oriented to person, place, and time.      Motor: No abnormal muscle tone.   Psychiatric:         Mood and Affect: Mood normal.             Diagnoses and health risks identified today and associated recommendations/orders:    1. Encounter for preventive health examination  Here for Health Risk Assessment/Annual Wellness Visit.  Health maintenance reviewed and updated. Follow up in one year.   Discussed Shingrix    2. " Essential hypertension  Chronic, stable on current medications. Followed by PCP.     3. Aortic atherosclerosis  Chronic, stable on current medications. Noted CT Chest 11/17/20. Followed by PCP.     4. Pulmonary nodule  Chronic, stable. Followed by PCP.     5. Type 2 diabetes mellitus with hyperglycemia, without long-term current use of insulin  Chronic, not at goal, trending downward. Last A1c 7.8 (previous 8.5). Followed by PCP, Diabetes NP.     6. Mild nonproliferative diabetic retinopathy associated with type 2 diabetes mellitus, macular edema presence unspecified, unspecified laterality  Chronic, stable. Followed by outside Optometry.    7. BMI 29.0-29.9,adult  Chronic, stable. Followed by PCP.    8. Osteopenia, unspecified location  Chronic, stable on current medications. Followed by PCP.     9. Sleep apnea, unspecified type  Chronic, stable with CPAP. Followed by PCP, Sleep Medicine.      Provided Lizabeth with a 5-10 year written screening schedule and personal prevention plan. Recommendations were developed using the USPSTF age appropriate recommendations. Education, counseling, and referrals were provided as needed. After Visit Summary printed and given to patient which includes a list of additional screenings\tests needed.    Follow up in 2 months (on 9/18/2023).with PCP    Luanne Fishman NP

## 2023-07-13 NOTE — PATIENT INSTRUCTIONS
Counseling and Referral of Other Preventative  (Italic type indicates deductible and co-insurance are waived)    Patient Name: Lizabeth Saha  Today's Date: 7/13/2023    Health Maintenance         Date Due Completion Date    Foot Exam 09/13/2023 9/13/2022    Override on 6/26/2020: Done    Override on 7/1/2019: Done    Override on 5/1/2018: Done    Override on 1/18/2017: Done    Eye Exam 08/10/2023 (Originally 6/20/2023) 6/20/2022    Override on 9/20/2017: Done (Per pt at Dr. Bobby Lancaster - will call for records.)    COVID-19 Vaccine (6 - Pfizer series) 10/25/2023 (Originally 12/31/2021) 11/5/2021    Pneumococcal Vaccines (Age 65+) (3 - PPSV23 if available, else PCV20) 10/27/2025 (Originally 10/27/2021) 10/27/2020    Influenza Vaccine (1) 09/01/2023 11/6/2019 (Declined)    Override on 11/6/2019: Declined    Override on 1/18/2017: Declined    Diabetes Urine Screening 11/09/2023 11/9/2022    Hemoglobin A1c 12/28/2023 6/28/2023    Lipid Panel 06/28/2024 6/28/2023    Low Dose Statin 06/29/2024 6/29/2023    Mammogram 07/13/2024 7/13/2023    TETANUS VACCINE 09/27/2024 9/27/2014    DEXA Scan 02/08/2025 2/8/2023    Colorectal Cancer Screening 05/31/2032 11/7/2022          No orders of the defined types were placed in this encounter.    The following information is provided to all patients.  This information is to help you find resources for any of the problems found today that may be affecting your health:                Living healthy guide: www.[facility].[place].gov      Understanding Diabetes: www.diabetes.org      Eating healthy: www.cdc.gov/healthyweight      CDC home safety checklist: www.cdc.gov/steadi/patient.html      Agency on Aging: www.goea.[place].gov      Alcoholics anonymous (AA): www.aa.org      Physical Activity: www.gabriela.nih.gov/ur1mhdr      Tobacco use: www.quitwithusla.org    8

## 2023-07-28 RX ORDER — CALCIUM CITRATE/VITAMIN D3 200MG-6.25
TABLET ORAL
Qty: 100 STRIP | Refills: 5 | Status: SHIPPED | OUTPATIENT
Start: 2023-07-28

## 2023-07-28 NOTE — TELEPHONE ENCOUNTER
No care due was identified.  [facility] Embedded Care Due Messages. Reference number: [id number].   7/28/2023 11:18:59 AM CDT

## 2023-08-10 ENCOUNTER — LAB VISIT (OUTPATIENT)
Dept: PRIMARY CARE CLINIC | Facility: CLINIC | Age: 68
End: 2023-08-10
Payer: MEDICARE

## 2023-08-10 DIAGNOSIS — E11.65 TYPE 2 DIABETES MELLITUS WITH HYPERGLYCEMIA, WITHOUT LONG-TERM CURRENT USE OF INSULIN: ICD-10-CM

## 2023-08-10 LAB
ALBUMIN SERPL BCP-MCNC: 4 G/DL (ref 3.5–5.2)
ALP SERPL-CCNC: 85 U/L (ref 55–135)
ALT SERPL W/O P-5'-P-CCNC: 20 U/L (ref 10–44)
ANION GAP SERPL CALC-SCNC: 11 MMOL/L (ref 8–16)
AST SERPL-CCNC: 23 U/L (ref 10–40)
BILIRUB SERPL-MCNC: 0.6 MG/DL (ref 0.1–1)
BUN SERPL-MCNC: 11 MG/DL (ref 8–23)
CALCIUM SERPL-MCNC: 9.7 MG/DL (ref 8.7–10.5)
CHLORIDE SERPL-SCNC: 104 MMOL/L (ref 95–110)
CO2 SERPL-SCNC: 25 MMOL/L (ref 23–29)
CREAT SERPL-MCNC: 1 MG/DL (ref 0.5–1.4)
EST. GFR  (NO RACE VARIABLE): >60 ML/MIN/1.73 M^2
ESTIMATED AVG GLUCOSE: 183 MG/DL (ref 68–131)
GLUCOSE SERPL-MCNC: 173 MG/DL (ref 70–110)
HBA1C MFR BLD: 8 % (ref 4–5.6)
POTASSIUM SERPL-SCNC: 4.4 MMOL/L (ref 3.5–5.1)
PROT SERPL-MCNC: 7.2 G/DL (ref 6–8.4)
SODIUM SERPL-SCNC: 140 MMOL/L (ref 136–145)

## 2023-08-10 PROCEDURE — 80053 COMPREHEN METABOLIC PANEL: CPT | Mod: HCNC | Performed by: NURSE PRACTITIONER

## 2023-08-10 PROCEDURE — 83036 HEMOGLOBIN GLYCOSYLATED A1C: CPT | Mod: HCNC | Performed by: NURSE PRACTITIONER

## 2023-08-11 ENCOUNTER — PATIENT MESSAGE (OUTPATIENT)
Dept: INTERNAL MEDICINE | Facility: CLINIC | Age: 68
End: 2023-08-11
Payer: MEDICARE

## 2023-09-01 ENCOUNTER — TELEPHONE (OUTPATIENT)
Dept: ENDOSCOPY | Facility: HOSPITAL | Age: 68
End: 2023-09-01

## 2023-09-01 ENCOUNTER — CLINICAL SUPPORT (OUTPATIENT)
Dept: ENDOSCOPY | Facility: HOSPITAL | Age: 68
End: 2023-09-01
Attending: COLON & RECTAL SURGERY
Payer: MEDICARE

## 2023-09-01 VITALS — HEIGHT: 65 IN | WEIGHT: 176 LBS | BODY MASS INDEX: 29.32 KG/M2

## 2023-09-01 DIAGNOSIS — Z86.010 HISTORY OF COLONIC POLYPS: ICD-10-CM

## 2023-09-01 DIAGNOSIS — Z12.11 SCREEN FOR COLON CANCER: Primary | ICD-10-CM

## 2023-09-01 RX ORDER — POLYETHYLENE GLYCOL 3350, SODIUM SULFATE ANHYDROUS, SODIUM BICARBONATE, SODIUM CHLORIDE, POTASSIUM CHLORIDE 236; 22.74; 6.74; 5.86; 2.97 G/4L; G/4L; G/4L; G/4L; G/4L
4 POWDER, FOR SOLUTION ORAL ONCE
Qty: 4000 ML | Refills: 0 | Status: SHIPPED | OUTPATIENT
Start: 2023-09-01 | End: 2023-09-01

## 2023-09-01 NOTE — TELEPHONE ENCOUNTER
Spoke to patient to schedule procedure(s) Colonoscopy       Physician to perform procedure(s) Dr. RIMMA Suárez  Date of Procedure (s) 11/14/23  Arrival Time 9:30 AM  Time of Procedure(s) 10:30 AM   Location of Procedure(s) [facility] [address] [facility]  Type of Rx Prep sent to patient: PEG  Instructions provided to patient via Postal Mail/portal    Patient was informed on the following information and verbalized understanding. Screening questionnaire reviewed with patient and complete. If procedure requires anesthesia, a responsible adult needs to be present to accompany the patient home, patient cannot drive after receiving anesthesia. Appointment details are tentative, especially check-in time. Patient will receive a prep-op call 4 days prior to confirm check-in time for procedure. If applicable the patient should contact their pharmacy to verify Rx for procedure prep is ready for pick-up. Patient was advised to call the scheduling department at [phone] if pharmacy states no Rx is available. Patient was advised to call the endoscopy scheduling department if any questions or concerns arise.      [facility] Scheduling Department

## 2023-09-18 ENCOUNTER — OFFICE VISIT (OUTPATIENT)
Dept: INTERNAL MEDICINE | Facility: CLINIC | Age: 68
End: 2023-09-18
Payer: MEDICARE

## 2023-09-18 VITALS
OXYGEN SATURATION: 98 % | HEIGHT: 65 IN | DIASTOLIC BLOOD PRESSURE: 70 MMHG | HEART RATE: 90 BPM | SYSTOLIC BLOOD PRESSURE: 126 MMHG | WEIGHT: 179 LBS | BODY MASS INDEX: 29.82 KG/M2

## 2023-09-18 DIAGNOSIS — M25.551 RIGHT HIP PAIN: ICD-10-CM

## 2023-09-18 DIAGNOSIS — G47.30 SLEEP APNEA, UNSPECIFIED TYPE: ICD-10-CM

## 2023-09-18 DIAGNOSIS — H91.90 HEARING LOSS, UNSPECIFIED HEARING LOSS TYPE, UNSPECIFIED LATERALITY: ICD-10-CM

## 2023-09-18 DIAGNOSIS — M25.562 LEFT KNEE PAIN, UNSPECIFIED CHRONICITY: ICD-10-CM

## 2023-09-18 DIAGNOSIS — H92.02 LEFT EAR PAIN: ICD-10-CM

## 2023-09-18 DIAGNOSIS — I10 ESSENTIAL HYPERTENSION: Primary | ICD-10-CM

## 2023-09-18 DIAGNOSIS — G47.9 SLEEP DISTURBANCE: ICD-10-CM

## 2023-09-18 DIAGNOSIS — E11.65 TYPE 2 DIABETES MELLITUS WITH HYPERGLYCEMIA, WITHOUT LONG-TERM CURRENT USE OF INSULIN: ICD-10-CM

## 2023-09-18 DIAGNOSIS — E04.1 THYROID NODULE: ICD-10-CM

## 2023-09-18 DIAGNOSIS — E78.5 HYPERLIPIDEMIA, UNSPECIFIED HYPERLIPIDEMIA TYPE: ICD-10-CM

## 2023-09-18 PROCEDURE — 1101F PT FALLS ASSESS-DOCD LE1/YR: CPT | Mod: HCNC,CPTII,S$GLB, | Performed by: INTERNAL MEDICINE

## 2023-09-18 PROCEDURE — 1160F RVW MEDS BY RX/DR IN RCRD: CPT | Mod: HCNC,CPTII,S$GLB, | Performed by: INTERNAL MEDICINE

## 2023-09-18 PROCEDURE — 3078F DIAST BP <80 MM HG: CPT | Mod: HCNC,CPTII,S$GLB, | Performed by: INTERNAL MEDICINE

## 2023-09-18 PROCEDURE — 99214 OFFICE O/P EST MOD 30 MIN: CPT | Mod: HCNC,S$GLB,, | Performed by: INTERNAL MEDICINE

## 2023-09-18 PROCEDURE — 99214 PR OFFICE/OUTPT VISIT, EST, LEVL IV, 30-39 MIN: ICD-10-PCS | Mod: HCNC,S$GLB,, | Performed by: INTERNAL MEDICINE

## 2023-09-18 PROCEDURE — 3008F PR BODY MASS INDEX (BMI) DOCUMENTED: ICD-10-PCS | Mod: HCNC,CPTII,S$GLB, | Performed by: INTERNAL MEDICINE

## 2023-09-18 PROCEDURE — 3052F HG A1C>EQUAL 8.0%<EQUAL 9.0%: CPT | Mod: HCNC,CPTII,S$GLB, | Performed by: INTERNAL MEDICINE

## 2023-09-18 PROCEDURE — 1101F PR PT FALLS ASSESS DOC 0-1 FALLS W/OUT INJ PAST YR: ICD-10-PCS | Mod: HCNC,CPTII,S$GLB, | Performed by: INTERNAL MEDICINE

## 2023-09-18 PROCEDURE — 3288F PR FALLS RISK ASSESSMENT DOCUMENTED: ICD-10-PCS | Mod: HCNC,CPTII,S$GLB, | Performed by: INTERNAL MEDICINE

## 2023-09-18 PROCEDURE — 4010F ACE/ARB THERAPY RXD/TAKEN: CPT | Mod: HCNC,CPTII,S$GLB, | Performed by: INTERNAL MEDICINE

## 2023-09-18 PROCEDURE — 3074F PR MOST RECENT SYSTOLIC BLOOD PRESSURE < 130 MM HG: ICD-10-PCS | Mod: HCNC,CPTII,S$GLB, | Performed by: INTERNAL MEDICINE

## 2023-09-18 PROCEDURE — 3288F FALL RISK ASSESSMENT DOCD: CPT | Mod: HCNC,CPTII,S$GLB, | Performed by: INTERNAL MEDICINE

## 2023-09-18 PROCEDURE — 99999 PR PBB SHADOW E&M-EST. PATIENT-LVL V: CPT | Mod: PBBFAC,HCNC,, | Performed by: INTERNAL MEDICINE

## 2023-09-18 PROCEDURE — 3074F SYST BP LT 130 MM HG: CPT | Mod: HCNC,CPTII,S$GLB, | Performed by: INTERNAL MEDICINE

## 2023-09-18 PROCEDURE — 1160F PR REVIEW ALL MEDS BY PRESCRIBER/CLIN PHARMACIST DOCUMENTED: ICD-10-PCS | Mod: HCNC,CPTII,S$GLB, | Performed by: INTERNAL MEDICINE

## 2023-09-18 PROCEDURE — 4010F PR ACE/ARB THEARPY RXD/TAKEN: ICD-10-PCS | Mod: HCNC,CPTII,S$GLB, | Performed by: INTERNAL MEDICINE

## 2023-09-18 PROCEDURE — 3052F PR MOST RECENT HEMOGLOBIN A1C LEVEL 8.0 - < 9.0%: ICD-10-PCS | Mod: HCNC,CPTII,S$GLB, | Performed by: INTERNAL MEDICINE

## 2023-09-18 PROCEDURE — 99999 PR PBB SHADOW E&M-EST. PATIENT-LVL V: ICD-10-PCS | Mod: PBBFAC,HCNC,, | Performed by: INTERNAL MEDICINE

## 2023-09-18 PROCEDURE — 3008F BODY MASS INDEX DOCD: CPT | Mod: HCNC,CPTII,S$GLB, | Performed by: INTERNAL MEDICINE

## 2023-09-18 PROCEDURE — 1159F MED LIST DOCD IN RCRD: CPT | Mod: HCNC,CPTII,S$GLB, | Performed by: INTERNAL MEDICINE

## 2023-09-18 PROCEDURE — 1125F AMNT PAIN NOTED PAIN PRSNT: CPT | Mod: HCNC,CPTII,S$GLB, | Performed by: INTERNAL MEDICINE

## 2023-09-18 PROCEDURE — 1159F PR MEDICATION LIST DOCUMENTED IN MEDICAL RECORD: ICD-10-PCS | Mod: HCNC,CPTII,S$GLB, | Performed by: INTERNAL MEDICINE

## 2023-09-18 PROCEDURE — 3078F PR MOST RECENT DIASTOLIC BLOOD PRESSURE < 80 MM HG: ICD-10-PCS | Mod: HCNC,CPTII,S$GLB, | Performed by: INTERNAL MEDICINE

## 2023-09-18 PROCEDURE — 1125F PR PAIN SEVERITY QUANTIFIED, PAIN PRESENT: ICD-10-PCS | Mod: HCNC,CPTII,S$GLB, | Performed by: INTERNAL MEDICINE

## 2023-09-18 RX ORDER — ROSUVASTATIN CALCIUM 20 MG/1
20 TABLET, COATED ORAL DAILY
Qty: 90 TABLET | Refills: 3 | Status: SHIPPED | OUTPATIENT
Start: 2023-09-18 | End: 2024-09-17

## 2023-09-18 NOTE — PROGRESS NOTES
"Subjective:       Patient ID: Lizabeth Saha is a 67 y.o. female.    Chief Complaint: Follow-up  This is a 67-year-old who presents today for follow-up.  Patient reports that she has been doing fairly well continues follow with endocrinology and has ups and Downs in her diabetes depending on her dietary measures more recently they were going to switch her medicines but she decided to stick with what she was doing because she had been off her diet and is now back on it her sugars at home have been running in the 130s and she thinks she will be able to continue control with Jardiance and glipizde  if not next time she will consider adjustment.  She has been taking her cholesterol medicine but her lipids remain elevated she is been having some trouble with hearing in the left ear primarily she can not wear the hearing aid on that side because it seems to interfere with the other 1 she gets pain in her neck on occasion     Follow-up  Associated symptoms include arthralgias.     Review of Systems   HENT:          Hearing loss left ear  And pain at times neck    Musculoskeletal:  Positive for arthralgias.        Hip and knee        Objective:    Blood pressure 126/70, pulse 90, height 5' 5" (1.651 m), weight 81.2 kg (179 lb 0.2 oz), last menstrual period 01/01/1994, SpO2 98 %.   Physical Exam  Constitutional:       General: She is not in acute distress.  HENT:      Head: Normocephalic.      Mouth/Throat:      Pharynx: Oropharynx is clear.   Eyes:      General: No scleral icterus.  Cardiovascular:      Rate and Rhythm: Normal rate and regular rhythm.      Heart sounds: Normal heart sounds. No murmur heard.     No friction rub. No gallop.   Pulmonary:      Effort: Pulmonary effort is normal. No respiratory distress.      Breath sounds: Normal breath sounds.   Abdominal:      General: Bowel sounds are normal.      Palpations: Abdomen is soft. There is no mass.      Tenderness: There is no abdominal tenderness. "   Musculoskeletal:      Cervical back: Neck supple.      Right foot: No deformity.      Left foot: No deformity.   Feet:      Right foot:      Protective Sensation: 4 sites tested.  4 sites sensed.      Left foot:      Protective Sensation: 4 sites tested.  4 sites sensed.   Skin:     Findings: No erythema.   Neurological:      Mental Status: She is alert.   Psychiatric:         Mood and Affect: Mood normal.         Assessment:       1. Essential hypertension    2. Type 2 diabetes mellitus with hyperglycemia, without long-term current use of insulin    3. Sleep disturbance    4. Sleep apnea, unspecified type    5. Right hip pain    6. Left knee pain, unspecified chronicity    7. Thyroid nodule    8. Hearing loss, unspecified hearing loss type, unspecified laterality    9. Left ear pain    10. Hyperlipidemia, unspecified hyperlipidemia type        Plan:       Lizabeth was seen today for follow-up.    Diagnoses and all orders for this visit:    Essential hypertension  Blood pressure acceptable has been controlled  -     Hepatic Function Panel; Future  -     Lipid Panel; Future    Type 2 diabetes mellitus with hyperglycemia, without long-term current use of insulin  History of she is following with endocrinology working on improved dietary measures    Sleep disturbance  Sleep apnea, unspecified type  History of using CPAP but she is not been into sleep clinic in some time referral placed for scheduling  -     Ambulatory referral/consult to Sleep Disorders; Future    Right hip pain  Left knee pain, unspecified chronicity  Referral placed  -     Ambulatory referral/consult to Orthopedics; Future    Thyroid nodule  -     US Soft Tissue Head Neck Thyroid; Future    Hearing loss, unspecified hearing loss type, unspecified laterality  -     Ambulatory referral/consult to ENT; Future  -     Ambulatory referral/consult to Audiology; Future    Left ear pain  -     Ambulatory referral/consult to ENT; Future  -     Ambulatory  referral/consult to Audiology; Future    Hyperlipidemia, unspecified hyperlipidemia type  Discussed resuming her rosuvastatin but will increase the dose to 20 mg recheck lipids back on medication and 10-12 weeks  -     Hepatic Function Panel; Future  -     Lipid Panel; Future    Other orders  -     rosuvastatin (CRESTOR) 20 MG tablet; Take 1 tablet (20 mg total) by mouth once daily.    She declined vaccines at this time

## 2023-09-19 ENCOUNTER — DOCUMENTATION ONLY (OUTPATIENT)
Dept: AUDIOLOGY | Facility: CLINIC | Age: 68
End: 2023-09-19
Payer: MEDICARE

## 2023-09-19 ENCOUNTER — CLINICAL SUPPORT (OUTPATIENT)
Dept: AUDIOLOGY | Facility: CLINIC | Age: 68
End: 2023-09-19
Payer: MEDICARE

## 2023-09-19 DIAGNOSIS — H92.02 LEFT EAR PAIN: ICD-10-CM

## 2023-09-19 DIAGNOSIS — H91.90 HEARING LOSS, UNSPECIFIED HEARING LOSS TYPE, UNSPECIFIED LATERALITY: ICD-10-CM

## 2023-09-19 NOTE — PROGRESS NOTES
Patient scheduled incorrectly for audiogram.  Patient stated she had a hearing evaluation in December and told her PCP that she did not need another hearing evaluation that she wanted to see an ENT specialist due to otalgia.  Patient wears hearing aids purchased through [employer].  Provided patient with number to ENT department to schedule an appointment (no  today).  Patient verbalized understanding.

## 2023-09-21 ENCOUNTER — OFFICE VISIT (OUTPATIENT)
Dept: OTOLARYNGOLOGY | Facility: CLINIC | Age: 68
End: 2023-09-21
Payer: MEDICARE

## 2023-09-21 DIAGNOSIS — M54.2 NECK PAIN ON LEFT SIDE: ICD-10-CM

## 2023-09-21 DIAGNOSIS — H91.8X3 ASYMMETRICAL HEARING LOSS: ICD-10-CM

## 2023-09-21 DIAGNOSIS — H92.02 LEFT EAR PAIN: Primary | ICD-10-CM

## 2023-09-21 DIAGNOSIS — Z97.4 WEARS HEARING AID IN BOTH EARS: ICD-10-CM

## 2023-09-21 PROCEDURE — 99215 OFFICE O/P EST HI 40 MIN: CPT | Mod: 25,HCNC,S$GLB, | Performed by: NURSE PRACTITIONER

## 2023-09-21 PROCEDURE — 3288F FALL RISK ASSESSMENT DOCD: CPT | Mod: HCNC,CPTII,S$GLB, | Performed by: NURSE PRACTITIONER

## 2023-09-21 PROCEDURE — 99215 PR OFFICE/OUTPT VISIT, EST, LEVL V, 40-54 MIN: ICD-10-PCS | Mod: 25,HCNC,S$GLB, | Performed by: NURSE PRACTITIONER

## 2023-09-21 PROCEDURE — 99999 PR PBB SHADOW E&M-EST. PATIENT-LVL I: CPT | Mod: PBBFAC,HCNC,, | Performed by: NURSE PRACTITIONER

## 2023-09-21 PROCEDURE — 1101F PR PT FALLS ASSESS DOC 0-1 FALLS W/OUT INJ PAST YR: ICD-10-PCS | Mod: HCNC,CPTII,S$GLB, | Performed by: NURSE PRACTITIONER

## 2023-09-21 PROCEDURE — 3052F HG A1C>EQUAL 8.0%<EQUAL 9.0%: CPT | Mod: HCNC,CPTII,S$GLB, | Performed by: NURSE PRACTITIONER

## 2023-09-21 PROCEDURE — 4010F PR ACE/ARB THEARPY RXD/TAKEN: ICD-10-PCS | Mod: HCNC,CPTII,S$GLB, | Performed by: NURSE PRACTITIONER

## 2023-09-21 PROCEDURE — 4010F ACE/ARB THERAPY RXD/TAKEN: CPT | Mod: HCNC,CPTII,S$GLB, | Performed by: NURSE PRACTITIONER

## 2023-09-21 PROCEDURE — 99999 PR PBB SHADOW E&M-EST. PATIENT-LVL I: ICD-10-PCS | Mod: PBBFAC,HCNC,, | Performed by: NURSE PRACTITIONER

## 2023-09-21 PROCEDURE — 1125F AMNT PAIN NOTED PAIN PRSNT: CPT | Mod: HCNC,CPTII,S$GLB, | Performed by: NURSE PRACTITIONER

## 2023-09-21 PROCEDURE — 1125F PR PAIN SEVERITY QUANTIFIED, PAIN PRESENT: ICD-10-PCS | Mod: HCNC,CPTII,S$GLB, | Performed by: NURSE PRACTITIONER

## 2023-09-21 PROCEDURE — 3052F PR MOST RECENT HEMOGLOBIN A1C LEVEL 8.0 - < 9.0%: ICD-10-PCS | Mod: HCNC,CPTII,S$GLB, | Performed by: NURSE PRACTITIONER

## 2023-09-21 PROCEDURE — 1101F PT FALLS ASSESS-DOCD LE1/YR: CPT | Mod: HCNC,CPTII,S$GLB, | Performed by: NURSE PRACTITIONER

## 2023-09-21 PROCEDURE — 3288F PR FALLS RISK ASSESSMENT DOCUMENTED: ICD-10-PCS | Mod: HCNC,CPTII,S$GLB, | Performed by: NURSE PRACTITIONER

## 2023-09-21 NOTE — PROGRESS NOTES
Subjective:   Lizabeth is a 67 y.o. female who presents for left ear pain and left neck pain. She reports intermittent, sharp shooting left ear pain that has been present for about 1 year. The pain is brief and often comes at night when she is lying on the left side. She also reports left neck pain- there is no pattern to its presentation. It does not occur with the ear pain. She has not tried anything for either of these problems.Denies any concerning throat symptoms. Unrelated, she reports her left ear hearing has decreased dramatically over the past year. She wears hearing aids [facility], but feels like the left hearing aid is actually interfering with her hearing abilities. She denies any tinnitus, otorrhea, or vertigo.     The patient's medications, allergies, past medical, surgical, social and family histories were reviewed and updated as appropriate.    A detailed review of systems was obtained with pertinent positives as per the above HPI, and otherwise negative.   Objective:     Constitutional:   She is oriented to person, place, and time. She appears well-developed and well-nourished. She appears alert. She is cooperative.  Non-toxic appearance. She does not have a sickly appearance. She does not appear ill. Normal speech.      Head:  Normocephalic and atraumatic. Not macrocephalic and not microcephalic. Head is without raccoon's eyes, without Trevizo's sign, without abrasion, without laceration, without right periorbital erythema, without left periorbital erythema and without TMJ tenderness.         Ears:    Right Ear: No lacerations. No drainage, swelling or tenderness. No foreign bodies. No mastoid tenderness. Tympanic membrane is not injected, not scarred, not perforated, not erythematous, not retracted and not bulging. No middle ear effusion. No hemotympanum.   Left Ear: No lacerations. No drainage, swelling or tenderness. No foreign bodies. No mastoid tenderness. Tympanic membrane is not injected, not scarred,  "not perforated, not erythematous, not retracted and not bulging.  No middle ear effusion. No hemotympanum.     Neck:  No adenopathy.     Pulmonary/Chest:   Effort normal.     Psychiatric:   She has a normal mood and affect. Her speech is normal and behavior is normal.     Neurological:   She is alert and oriented to person, place, and time.     Procedure  None    Audiogram  No testing obtained today.    MRI/IAC Temporal bone from 6/26/2020 showed no evidence of a vestibular schwannoma.  Assessment:     1. Left ear pain    2. Neck pain on left side    3. Asymmetrical hearing loss    4. Wears hearing aid in both ears      Plan:   Left ear pain  Ear exam WNL, no signs of infection. 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 Ahuja. Discussed with patient the etiology of TMJ syndrome and management strategies.  Recommended conservative treatment measures such as OTC anti-inflammatories, "Jaw rest" (soft foods, no crunchy foods, gum or ice chewing), ice pack on jaw joint and stress reduction/ behavioral management.     Neck pain on left side  Possibly related to TMJ, but also could me musculoskeletal. No ENT cause noted.     Asymmetrical hearing loss  Prior MRI reviewed, asymmetrical hearing loss has been gradually worsening. Denies tinnitus, fullness or vertigo. Will schedule 3 month follow-up with audio and appointment with Dr. Sood.    "

## 2023-09-22 ENCOUNTER — HOSPITAL ENCOUNTER (OUTPATIENT)
Dept: RADIOLOGY | Facility: HOSPITAL | Age: 68
Discharge: HOME | End: 2023-09-22
Attending: INTERNAL MEDICINE
Payer: MEDICARE

## 2023-09-22 DIAGNOSIS — E04.1 THYROID NODULE: ICD-10-CM

## 2023-09-22 PROCEDURE — 76536 US EXAM OF HEAD AND NECK: CPT | Mod: TC,HCNC

## 2023-09-22 PROCEDURE — 76536 US SOFT TISSUE HEAD NECK THYROID: ICD-10-PCS | Mod: 26,HCNC,, | Performed by: RADIOLOGY

## 2023-09-22 PROCEDURE — 76536 US EXAM OF HEAD AND NECK: CPT | Mod: 26,HCNC,, | Performed by: RADIOLOGY

## 2023-10-13 ENCOUNTER — TELEPHONE (OUTPATIENT)
Dept: INTERNAL MEDICINE | Facility: CLINIC | Age: 68
End: 2023-10-13
Payer: MEDICARE

## 2023-10-13 RX ORDER — AMOXICILLIN AND CLAVULANATE POTASSIUM 875; 125 MG/1; MG/1
1 TABLET, FILM COATED ORAL 2 TIMES DAILY
Qty: 20 TABLET | Refills: 0 | Status: SHIPPED | OUTPATIENT
Start: 2023-10-13 | End: 2023-10-23

## 2023-10-13 NOTE — TELEPHONE ENCOUNTER
----- Message from Naima Hanna MA sent at 10/13/2023  9:40 AM CDT -----  Contact: [phone] ([phone]    ----- Message -----  From: Thais Jeffery  Sent: 10/13/2023   8:41 AM CDT  To: Tonny GARCIA ([facility] Staff    .1 Patient would like to get medical advice.  Symptoms (please be specific): cough, soar throat, yellow fleam  How long has patient had these symptoms: a week  Pharmacy name and phone#:[id number] [id number] [place] [address] AT [facility]   Phone:  [phone]  Fax:  [phone]  Any drug allergies: on file  Comments: Patient would like to get medical advice. Patient stated that he has been at home for a week, took covid test and negative, temperature is running on the 99,  Patient would like some antibiotics to be call to the pharmacy, if is possible to get  amoxicillin because that's what it work for her. Thank you          
Called spoke with pt discussed   Symptoms sinuslitis conservative mearues  F or 2 weeks no sob facial pain and   Sinus drainage  Rx sent augmentin  appt if no resolution  
[facility]

## 2023-10-23 ENCOUNTER — PATIENT MESSAGE (OUTPATIENT)
Dept: INTERNAL MEDICINE | Facility: CLINIC | Age: 68
End: 2023-10-23
Payer: MEDICARE

## 2023-10-25 ENCOUNTER — PATIENT MESSAGE (OUTPATIENT)
Dept: SPORTS MEDICINE | Facility: CLINIC | Age: 68
End: 2023-10-25
Payer: MEDICARE

## 2023-10-25 DIAGNOSIS — M25.562 LEFT KNEE PAIN, UNSPECIFIED CHRONICITY: Primary | ICD-10-CM

## 2023-10-25 DIAGNOSIS — M25.551 RIGHT HIP PAIN: ICD-10-CM

## 2023-10-31 ENCOUNTER — OFFICE VISIT (OUTPATIENT)
Dept: SPORTS MEDICINE | Facility: CLINIC | Age: 68
End: 2023-10-31
Payer: MEDICARE

## 2023-10-31 ENCOUNTER — HOSPITAL ENCOUNTER (OUTPATIENT)
Dept: RADIOLOGY | Facility: HOSPITAL | Age: 68
Discharge: HOME | End: 2023-10-31
Attending: STUDENT IN AN ORGANIZED HEALTH CARE EDUCATION/TRAINING PROGRAM
Payer: MEDICARE

## 2023-10-31 VITALS
DIASTOLIC BLOOD PRESSURE: 78 MMHG | HEART RATE: 88 BPM | WEIGHT: 178.13 LBS | SYSTOLIC BLOOD PRESSURE: 140 MMHG | BODY MASS INDEX: 29.64 KG/M2

## 2023-10-31 DIAGNOSIS — M17.12 PRIMARY OSTEOARTHRITIS OF LEFT KNEE: ICD-10-CM

## 2023-10-31 DIAGNOSIS — M25.551 GREATER TROCHANTERIC PAIN SYNDROME OF RIGHT LOWER EXTREMITY: ICD-10-CM

## 2023-10-31 DIAGNOSIS — M17.12 PATELLOFEMORAL ARTHRITIS OF LEFT KNEE: ICD-10-CM

## 2023-10-31 DIAGNOSIS — M25.562 CHRONIC PAIN OF LEFT KNEE: Primary | ICD-10-CM

## 2023-10-31 DIAGNOSIS — M25.551 RIGHT HIP PAIN: ICD-10-CM

## 2023-10-31 DIAGNOSIS — M25.562 LEFT KNEE PAIN, UNSPECIFIED CHRONICITY: ICD-10-CM

## 2023-10-31 DIAGNOSIS — G89.29 CHRONIC PAIN OF LEFT KNEE: Primary | ICD-10-CM

## 2023-10-31 PROCEDURE — 73564 X-RAY EXAM KNEE 4 OR MORE: CPT | Mod: TC,PN,LT

## 2023-10-31 PROCEDURE — 73562 X-RAY EXAM OF KNEE 3: CPT | Mod: 26,RT,, | Performed by: RADIOLOGY

## 2023-10-31 PROCEDURE — 3052F HG A1C>EQUAL 8.0%<EQUAL 9.0%: CPT | Mod: HCNC,CPTII,S$GLB, | Performed by: STUDENT IN AN ORGANIZED HEALTH CARE EDUCATION/TRAINING PROGRAM

## 2023-10-31 PROCEDURE — 3008F BODY MASS INDEX DOCD: CPT | Mod: HCNC,CPTII,S$GLB, | Performed by: STUDENT IN AN ORGANIZED HEALTH CARE EDUCATION/TRAINING PROGRAM

## 2023-10-31 PROCEDURE — 1125F PR PAIN SEVERITY QUANTIFIED, PAIN PRESENT: ICD-10-PCS | Mod: HCNC,CPTII,S$GLB, | Performed by: STUDENT IN AN ORGANIZED HEALTH CARE EDUCATION/TRAINING PROGRAM

## 2023-10-31 PROCEDURE — 1125F AMNT PAIN NOTED PAIN PRSNT: CPT | Mod: HCNC,CPTII,S$GLB, | Performed by: STUDENT IN AN ORGANIZED HEALTH CARE EDUCATION/TRAINING PROGRAM

## 2023-10-31 PROCEDURE — 4010F ACE/ARB THERAPY RXD/TAKEN: CPT | Mod: HCNC,CPTII,S$GLB, | Performed by: STUDENT IN AN ORGANIZED HEALTH CARE EDUCATION/TRAINING PROGRAM

## 2023-10-31 PROCEDURE — 3008F PR BODY MASS INDEX (BMI) DOCUMENTED: ICD-10-PCS | Mod: HCNC,CPTII,S$GLB, | Performed by: STUDENT IN AN ORGANIZED HEALTH CARE EDUCATION/TRAINING PROGRAM

## 2023-10-31 PROCEDURE — 3078F DIAST BP <80 MM HG: CPT | Mod: HCNC,CPTII,S$GLB, | Performed by: STUDENT IN AN ORGANIZED HEALTH CARE EDUCATION/TRAINING PROGRAM

## 2023-10-31 PROCEDURE — 3077F SYST BP >= 140 MM HG: CPT | Mod: HCNC,CPTII,S$GLB, | Performed by: STUDENT IN AN ORGANIZED HEALTH CARE EDUCATION/TRAINING PROGRAM

## 2023-10-31 PROCEDURE — 3288F FALL RISK ASSESSMENT DOCD: CPT | Mod: HCNC,CPTII,S$GLB, | Performed by: STUDENT IN AN ORGANIZED HEALTH CARE EDUCATION/TRAINING PROGRAM

## 2023-10-31 PROCEDURE — 4010F PR ACE/ARB THEARPY RXD/TAKEN: ICD-10-PCS | Mod: HCNC,CPTII,S$GLB, | Performed by: STUDENT IN AN ORGANIZED HEALTH CARE EDUCATION/TRAINING PROGRAM

## 2023-10-31 PROCEDURE — 99999 PR PBB SHADOW E&M-EST. PATIENT-LVL IV: ICD-10-PCS | Mod: PBBFAC,,, | Performed by: STUDENT IN AN ORGANIZED HEALTH CARE EDUCATION/TRAINING PROGRAM

## 2023-10-31 PROCEDURE — 73562 XR KNEE ORTHO LEFT WITH FLEXION: ICD-10-PCS | Mod: 26,RT,, | Performed by: RADIOLOGY

## 2023-10-31 PROCEDURE — 97110 THERAPEUTIC EXERCISES: CPT | Mod: HCNC,GP,S$GLB,97 | Performed by: STUDENT IN AN ORGANIZED HEALTH CARE EDUCATION/TRAINING PROGRAM

## 2023-10-31 PROCEDURE — 73502 X-RAY EXAM HIP UNI 2-3 VIEWS: CPT | Mod: 26,RT,, | Performed by: RADIOLOGY

## 2023-10-31 PROCEDURE — 1101F PR PT FALLS ASSESS DOC 0-1 FALLS W/OUT INJ PAST YR: ICD-10-PCS | Mod: HCNC,CPTII,S$GLB, | Performed by: STUDENT IN AN ORGANIZED HEALTH CARE EDUCATION/TRAINING PROGRAM

## 2023-10-31 PROCEDURE — 73502 X-RAY EXAM HIP UNI 2-3 VIEWS: CPT | Mod: TC,PN,RT

## 2023-10-31 PROCEDURE — 99214 PR OFFICE/OUTPT VISIT, EST, LEVL IV, 30-39 MIN: ICD-10-PCS | Mod: 25,HCNC,S$GLB, | Performed by: STUDENT IN AN ORGANIZED HEALTH CARE EDUCATION/TRAINING PROGRAM

## 2023-10-31 PROCEDURE — 1160F RVW MEDS BY RX/DR IN RCRD: CPT | Mod: HCNC,CPTII,S$GLB, | Performed by: STUDENT IN AN ORGANIZED HEALTH CARE EDUCATION/TRAINING PROGRAM

## 2023-10-31 PROCEDURE — 1160F PR REVIEW ALL MEDS BY PRESCRIBER/CLIN PHARMACIST DOCUMENTED: ICD-10-PCS | Mod: HCNC,CPTII,S$GLB, | Performed by: STUDENT IN AN ORGANIZED HEALTH CARE EDUCATION/TRAINING PROGRAM

## 2023-10-31 PROCEDURE — 99214 OFFICE O/P EST MOD 30 MIN: CPT | Mod: 25,HCNC,S$GLB, | Performed by: STUDENT IN AN ORGANIZED HEALTH CARE EDUCATION/TRAINING PROGRAM

## 2023-10-31 PROCEDURE — 1159F PR MEDICATION LIST DOCUMENTED IN MEDICAL RECORD: ICD-10-PCS | Mod: HCNC,CPTII,S$GLB, | Performed by: STUDENT IN AN ORGANIZED HEALTH CARE EDUCATION/TRAINING PROGRAM

## 2023-10-31 PROCEDURE — 73564 X-RAY EXAM KNEE 4 OR MORE: CPT | Mod: 26,LT,, | Performed by: RADIOLOGY

## 2023-10-31 PROCEDURE — 3288F PR FALLS RISK ASSESSMENT DOCUMENTED: ICD-10-PCS | Mod: HCNC,CPTII,S$GLB, | Performed by: STUDENT IN AN ORGANIZED HEALTH CARE EDUCATION/TRAINING PROGRAM

## 2023-10-31 PROCEDURE — 97110 PR THERAPEUTIC EXERCISES: ICD-10-PCS | Mod: HCNC,GP,S$GLB,97 | Performed by: STUDENT IN AN ORGANIZED HEALTH CARE EDUCATION/TRAINING PROGRAM

## 2023-10-31 PROCEDURE — 3078F PR MOST RECENT DIASTOLIC BLOOD PRESSURE < 80 MM HG: ICD-10-PCS | Mod: HCNC,CPTII,S$GLB, | Performed by: STUDENT IN AN ORGANIZED HEALTH CARE EDUCATION/TRAINING PROGRAM

## 2023-10-31 PROCEDURE — 73564 XR KNEE ORTHO LEFT WITH FLEXION: ICD-10-PCS | Mod: 26,LT,, | Performed by: RADIOLOGY

## 2023-10-31 PROCEDURE — 3077F PR MOST RECENT SYSTOLIC BLOOD PRESSURE >= 140 MM HG: ICD-10-PCS | Mod: HCNC,CPTII,S$GLB, | Performed by: STUDENT IN AN ORGANIZED HEALTH CARE EDUCATION/TRAINING PROGRAM

## 2023-10-31 PROCEDURE — 20611 DRAIN/INJ JOINT/BURSA W/US: CPT | Mod: HCNC,50,S$GLB, | Performed by: STUDENT IN AN ORGANIZED HEALTH CARE EDUCATION/TRAINING PROGRAM

## 2023-10-31 PROCEDURE — 20611 LARGE JOINT ASPIRATION/INJECTION: R GREATER TROCHANTERIC BURSA: ICD-10-PCS | Mod: HCNC,50,S$GLB, | Performed by: STUDENT IN AN ORGANIZED HEALTH CARE EDUCATION/TRAINING PROGRAM

## 2023-10-31 PROCEDURE — 1101F PT FALLS ASSESS-DOCD LE1/YR: CPT | Mod: HCNC,CPTII,S$GLB, | Performed by: STUDENT IN AN ORGANIZED HEALTH CARE EDUCATION/TRAINING PROGRAM

## 2023-10-31 PROCEDURE — 3052F PR MOST RECENT HEMOGLOBIN A1C LEVEL 8.0 - < 9.0%: ICD-10-PCS | Mod: HCNC,CPTII,S$GLB, | Performed by: STUDENT IN AN ORGANIZED HEALTH CARE EDUCATION/TRAINING PROGRAM

## 2023-10-31 PROCEDURE — 73502 XR HIP WITH PELVIS WHEN PERFORMED, 2 OR 3  VIEWS RIGHT: ICD-10-PCS | Mod: 26,RT,, | Performed by: RADIOLOGY

## 2023-10-31 PROCEDURE — 99999 PR PBB SHADOW E&M-EST. PATIENT-LVL IV: CPT | Mod: PBBFAC,,, | Performed by: STUDENT IN AN ORGANIZED HEALTH CARE EDUCATION/TRAINING PROGRAM

## 2023-10-31 PROCEDURE — 1159F MED LIST DOCD IN RCRD: CPT | Mod: HCNC,CPTII,S$GLB, | Performed by: STUDENT IN AN ORGANIZED HEALTH CARE EDUCATION/TRAINING PROGRAM

## 2023-10-31 RX ORDER — TRIAMCINOLONE ACETONIDE 40 MG/ML
40 INJECTION, SUSPENSION INTRA-ARTICULAR; INTRAMUSCULAR
Status: DISCONTINUED | OUTPATIENT
Start: 2023-10-31 | End: 2023-10-31 | Stop reason: HOSPADM

## 2023-10-31 RX ADMIN — TRIAMCINOLONE ACETONIDE 40 MG: 40 INJECTION, SUSPENSION INTRA-ARTICULAR; INTRAMUSCULAR at 02:10

## 2023-10-31 NOTE — PROGRESS NOTES
"CC: left knee pain, right hip pain    68 y.o. Female presents today for follow up evaluation of her left knee pain. Pt reports knee pain worsened over the past 6-7 months. Pt reports her pain is 5/10 today. Pt reports popping and giving out in the knee. Pt denies numbness/tingling, but notes referred pain down anterior thigh.     Attempted treatments: biofreeze, heating pad  Pain score: 5/10  History of trauma/injury: none since last visit in 2021  Affecting ADLs: yes     68 y.o. Female also presents today for evaluation of her right hip pain. Pt reports gradual onset and gradual worsening of pain over the past 8 months. Pt reports increased pain with prolonged walking. Pt localizes pain to lateral hip with intermittent pain into posterior hip. Pt reports pain is 2-3/10 today. Pt reports pain is intermittent. Pt denies mechanical symptoms. Pt notes intermittent tingling into anterolateral thigh.     Side: right  Problem: hip pain  Pain Score: 2-3/10  SYMPTOMS:   Time of onset: ~8 months  Trauma, injury: gradual onset  Pain location: lateral    C-sign: no    Radiation past knee: no    Exacerbating factors / difficult actions:    Nocturnal pain lying with ipsilateral side down: yes    Pivoting: no    Putting on shoes / socks: no    Getting into / out of cars: "a little bit"    Getting up / down from chairs: "sometimes"    Known back problems: none  Weakness: none  Numbness: tingling in R thigh  Mechanical symptoms:     Clicking, catching: none    Snapping internal: none    Snapping external: none    INTERVENTIONS:   Medications tried: biofreeze, tylenol  Physical therapy: none  Injections: none    RELEVANT HISTORY:  Imaging to date: 10/31/23     REVIEW OF SYSTEMS:   Constitution: Patient denies fever or chills.  Eyes: Patient denies eye pain or vision changes.  HEENT: Patient denies ear pain, sore throat, or nasal discharge.  CVS: Patient denies chest pain.  Lungs: Patient denies shortness of breath. Pt reports " cough.  Skin: Patient denies skin rash or itching.    Musculoskeletal: Patient denies recent falls. See HPI.  Psych: Patient denies any current anxiety or nervousness.    PAST MEDICAL HISTORY:   Past Medical History:   Diagnosis Date    Diabetes mellitus type II     Hyperlipemia     Irritable bowel syndrome     Obesity     Osteopenia     Rhinitis     Sleep apnea     Vertigo        MEDICATIONS:     Current Outpatient Medications:     alcohol swabs (BD ALCOHOL SWABS) PadM, Apply 1 each topically daily as needed (testing)., Disp: 100 each, Rfl: 4    blood sugar diagnostic (TRUE METRIX GLUCOSE TEST STRIP) Strp, USE FOR BLOOD SUGAR TESTING ONE TIME DAILY, Disp: 100 strip, Rfl: 5    blood-glucose meter kit, To check BG 3 times daily, to use with insurance preferred meter, newest meter, e 11.65, Disp: 1 each, Rfl: 0    calcium-vitamin D 250 mg-2.5 mcg (100 unit) per tablet, Take 1 tablet by mouth 3 (three) times a week., Disp: , Rfl:     empagliflozin (JARDIANCE) 25 mg tablet, TAKE 1 TABLET EVERY DAY, Disp: 90 tablet, Rfl: 2    fluticasone propionate (FLONASE) 50 mcg/actuation nasal spray, 1 spray (50 mcg total) by Each Nostril route once daily., Disp: 3 Bottle, Rfl: 4    glipiZIDE (GLUCOTROL) 2.5 MG TR24, Take 1 tablet (2.5 mg total) by mouth daily with breakfast., Disp: 90 tablet, Rfl: 3    lancets Misc, To check BG 3 times daily, to use with insurance preferred meter, newest meter, e 11.65, Disp: 300 each, Rfl: 3    rosuvastatin (CRESTOR) 20 MG tablet, Take 1 tablet (20 mg total) by mouth once daily., Disp: 90 tablet, Rfl: 3    valsartan (DIOVAN) 80 MG tablet, TAKE 1 TABLET EVERY DAY (NEED MD APPOINTMENT), Disp: 90 tablet, Rfl: 2    ALLERGIES:   Review of patient's allergies indicates:   Allergen Reactions    Phenergan  [promethazine]      Other reaction(s): Hives    Atorvastatin      Myalgia          PHYSICAL EXAMINATION:  BP (!) 140/78   Pulse 88   Wt 80.8 kg (178 lb 2.1 oz)   LMP 01/01/1994 (Approximate) Comment:    BMI 29.64 kg/m²   Vitals signs and nursing note have been reviewed.    General: In no acute distress, well developed, well nourished, no diaphoresis  Eyes: EOM full and smooth, no eye redness or discharge  HENT: normocephalic and atraumatic, neck supple, trachea midline, no nasal discharge  Cardiovascular: no LE edema  Lungs: respirations non-labored, no conversational dyspnea   Neuro: AAOx3, CN2-12 grossly intact  Skin: No rashes, warm and dry  Psychiatric: cooperative, pleasant, mood and affect appropriate for age    Left Knee:   Gait: wnl    Inspection/Palpation:   -Rubor   -Calor  -Effusion   -Patella ballotable   -Patellar apprehension  -Retinacular tenderness   +Patellar crepitus   Patellar tilt grossly normal     TTP at:  -Joint line   -MCL   -LCL   -Popliteal region   -Quad tendon   -Patella  -Pat tendon  -Pat border  -Med condyle   -Lat condyle   -Pes   -Prox fibula   -Tib tub  -Gerdy's tubercle  -Distal Hamstring tendons  -Proximal Hamstrings/Ischial tuberosity  -ITB    ROM:   Ext: 0°   Flex:130°   Popliteal Angle: 30°   +Discomfort w/ full flex   -Bounce-home discomfort     Ligamentous:   -Ant drawer   -Post drawer   -Lachman's   Good endpoints & no pain w/ valgus & varus stress    Meniscal:  -Minda's   -Hedy   +Pain w/ squat     Other:  -Patellar apprehension  -Patellar grind  -López's   -J sign  -Easton's  Abductors 5/5    Right Hip:   Gait: wnl    Inspection/Palpation:   -Deformities     TTP:  +Greater trochanter  +Abductors near GT insertion  -ASIS  -AIIS     ROM:    Flexion & extension WNL & sym B/L  -Log roll B/L     Functional:    +FALLON laterally   -FADIR   -Snapping ext    Strength/Functional:  + 4+/5 in glut med / abductors with pain against resistance      IMAGIN. Knee X-ray ordered due to left knee pain. 4 views taken today.   2. X-ray images were reviewed personally by me and then directly with patient.  3. FINDINGS: There is bilateral medial compartmental narrowing.   There is lateral defect of the patella, stable.  There is a well corticated ossific focus along the lateral aspect of the lateral femoral condyle.  May reflect sequela of recent or prior avulsion injury.  No large knee joint effusion.  No radiopaque foreign body    4. IMPRESSION:  No acute osseous abnormalities appreciated.    IMAGIN. Hip X-ray ordered due to right hip pain. 2 views taken today.   2. X-ray images were reviewed personally by me and then directly with patient.  3. FINDINGS: The bilateral sacroiliac joints are intact.  The pubic symphysis is intact.  The bilateral femoroacetabular joints are intact    4. IMPRESSION:   No acute osseous abnormalities appreciated.    Diagnostic Extremity - [facility]-Sports Ultrasound was recommended due to left knee(s) evaluation.    FOCUSED: examination.     TECHNIQUE: Real time ultrasound examination of the left knee was performed with SonoSite Edge 2, 9-L MHz linear probe.     FINDINGS: The images are of adequate diagnostic quality with identification of all echogenic structures made except for the vascular structures unless otherwise noted. There is no sonographic evidence of periosteal abnormalities, soft tissue edema, musculotendinous or nerve irregularities. The rest of the sonographic examination was unremarkable.    Ultrasound images were directly reviewed with the patient and then a treatment plan was discussed.     Images were stored in the patients medical record.     IMPRESSION: No effusion to be aspirated.       ASSESSMENT:      ICD-10-CM ICD-9-CM   1. Chronic pain of left knee  M25.562 719.46    G89.29 338.29   2. Primary osteoarthritis of left knee  M17.12 715.16   3. Patellofemoral arthritis of left knee  M17.12 716.96   4. Right hip pain  M25.551 719.45   5. Greater trochanteric pain syndrome of right lower extremity  M25.551 719.45         PLAN:  1-3:  Patient with overall improvement following corticosteroid injection over 2 years ago.  Plan will be  to move for with ultrasound-guided injection for pain relief.    4-5:  Based on patient history, physical exam findings, and imaging my working diagnosis is greater trochanteric pain syndrome to the patient's right hip.  Patient will be given a home exercise program at today's visit.  We will also perform ultrasound-guided injection for pain relief.  Follow-up in 6 weeks.    Risks and benefits were discussed with patient prior to receiving injection.  Depending on injection type, risks include the possibility of infection, pain, disruptions in blood pressure and blood sugar, and cosmetic deformity at site of injection.      Future planning includes - HOME EXERCISE PROGRAM (HEP): The patient was taught a homegoing physical therapy regimen for greater trochanteric pain syndrome. The patient demonstrated understanding of the exercises and proper technique of their execution. This interaction took 15 minutes and included demonstration of exercise as well as counseling to encourage patient to do exercises daily.        All questions were answered to the best of my ability and all concerns were addressed at this time.    Follow up in 6 week(s) for above, or sooner if needed.      This note is dictated using the M*Modal Fluency Direct word recognition program. There are word recognition mistakes that are occasionally missed on review.

## 2023-10-31 NOTE — PROCEDURES
Large Joint Aspiration/Injection: L knee    Date/Time: 10/31/2023 2:30 PM    Performed by: Carol Valdes MD  Authorized by: Carol Valdes MD    Consent Done?:  Yes (Verbal)  Indications:  Arthritis and pain  Site marked: the procedure site was marked    Timeout: prior to procedure the correct patient, procedure, and site was verified    Prep: patient was prepped and draped in usual sterile fashion      Local anesthesia used?: Yes    Anesthesia:  Local infiltration  Local anesthetic:  Co-phenylcaine spray    Details:  Needle Size:  22 G  Ultrasonic Guidance for needle placement?: Yes (Ultrasound guidance used to avoid neurovascular injury and/or to improve accuracy given body habitus.)    Images are saved and documented.  Approach: Superolateral.  Location:  Knee  Site:  L knee  Medications:  40 mg triamcinolone acetonide 40 mg/mL  Medications comment:  Ropivacaine 0.2% 2mL  Patient tolerance:  Patient tolerated the procedure well with no immediate complications     TECHNIQUE: Real time ultrasound examination of the left knee was performed with SonRADEUMte Edge 2, 9-L MHz linear probe(s). Ultrasound guidance was used for needle localization. Images were saved and stored for documentation. Dynamic visualization of the needle was continuous throughout the procedures and maintained in good position.

## 2023-10-31 NOTE — PROCEDURES
Large Joint Aspiration/Injection: R greater trochanteric bursa    Date/Time: 10/31/2023 2:30 PM    Performed by: Carol Valdes MD  Authorized by: Carol Valdes MD    Consent Done?:  Yes (Verbal)  Indications:  Pain and diagnostic evaluation  Site marked: the procedure site was marked    Timeout: prior to procedure the correct patient, procedure, and site was verified    Prep: patient was prepped and draped in usual sterile fashion      Local anesthesia used?: Yes    Anesthesia:  Local infiltration  Local anesthetic:  Co-phenylcaine spray    Details:  Needle Size:  22 G  Ultrasonic Guidance for needle placement?: Yes (Ultrasound guidance used to avoid neurovascular injury and/or to improve accuracy given body habitus.)    Images are saved and documented.  Approach:  Lateral  Location:  Hip  Site:  R greater trochanteric bursa  Medications:  40 mg triamcinolone acetonide 40 mg/mL  Medications comment:  Bupivacaine 0.25% 2mL  Patient tolerance:  Patient tolerated the procedure well with no immediate complications     TECHNIQUE: Real time ultrasound examination of the right greater trochanteric bursa(e)/gluteus medius tendon(s) was performed with [employer] Edge 2, C1-5 MHz probe(s). Ultrasound guidance was used for needle localization. Images were saved and stored for documentation. Dynamic visualization of the needle was continuous throughout the procedures and maintained in good position.

## 2023-11-03 ENCOUNTER — LAB VISIT (OUTPATIENT)
Dept: PRIMARY CARE CLINIC | Facility: CLINIC | Age: 68
End: 2023-11-03
Payer: MEDICARE

## 2023-11-03 DIAGNOSIS — E11.65 TYPE 2 DIABETES MELLITUS WITH HYPERGLYCEMIA, WITHOUT LONG-TERM CURRENT USE OF INSULIN: ICD-10-CM

## 2023-11-03 LAB
ESTIMATED AVG GLUCOSE: 192 MG/DL (ref 68–131)
HBA1C MFR BLD: 8.3 % (ref 4–5.6)

## 2023-11-03 PROCEDURE — 83036 HEMOGLOBIN GLYCOSYLATED A1C: CPT | Mod: HCNC | Performed by: NURSE PRACTITIONER

## 2023-11-03 NOTE — PROGRESS NOTES
CHIEF COMPLAINT: Type 2 Diabetes     HPI: Mrs. Lizabeth Saha is a 68 y.o. female who was diagnosed with Type 2 DM x 11 years ago.   Social smoker- younger ages, worried about copd dx, h/o sleep apnea    Retired. 3 daughters.  Mother- , on HD  - radiation, ca dx    Hip , knee steroid injections in the past month   Last seen by me in spring 2023.  This am 142 mg/dl    No exercise  Traveling    Lab Results   Component Value Date    HGBA1C 8.3 (H) 2023     [facility]   Stopped trulicity - costly   Stopped metformin -gi issues     No h/o pancreatitis or medullary thyroid ca    PREVIOUS DIABETES MEDICATIONS TRIED  Metformin xr   Metformin   januvia 100 mg daily   trulicity- too costly   jardiance 10, 25 mg daily     CURRENT DIABETIC MEDS: jardiance 25 mg daily , glipizide xr 2.5 mg daily w/ breakfast    Testing 3 x a day  147-158 mg/dl   Patient is willing and able to use the device  Demonstrated an understanding of the technology and is motivated to use CGM  Patient expected to adhere to a comprehensive diabetes treatment plan and patient has adequate medical supervision  Has multiple impaired awareness of hypoglycemia (hypoglycemia unawareness)    Diabetes Management Status    Statin: Taking  ACE/ARB: Taking    Screening or Prevention Patient's value Goal Complete/Controlled?   HgA1C Testing and Control   Lab Results   Component Value Date    HGBA1C 8.0 (H) 08/10/2023      Annually/Less than 8% No   Lipid profile : 2023 Annually Yes   LDL control Lab Results   Component Value Date    LDLCALC 199.8 (H) 2023    Annually/Less than 100 mg/dl  Yes   Nephropathy screening Lab Results   Component Value Date    LABMICR <5.0 2022     Lab Results   Component Value Date    PROTEINUA NEG 2008    Annually Yes   Blood pressure BP Readings from Last 1 Encounters:   23 126/78    Less than 140/90 No   Dilated retinal exam : 2023 Annually Yes   Foot exam   : 2023  "Annually Yes     REVIEW OF SYSTEMS  General: no weakness, fatigue, + weight stable   Eyes/Ears: no double or blurred vision, eye pain, or redness, +hard of hearing, hearing aids  Cardiovascular: no chest pain, palpitations, edema, or murmurs.   Respiratory: no cough or dyspnea.   GI: no heartburn, nausea, or changes in bowel patterns; good appetite.   Skin: no rashes, dryness, itching, or reactions at insulin injection sites. +hyperpigmentation peripheral   Neuro: no numbness, tingling, tremors, or vertigo.   Endocrine: no polyuria, polydipsia, polyphagia, heat or cold intolerance.     Vital Signs  /78 (BP Location: Left arm, Patient Position: Sitting, BP Method: Medium (Manual))   Pulse 82   Ht 5' 5" (1.651 m)   Wt 78.9 kg (173 lb 15.1 oz)   LMP 01/01/1994 (Approximate) Comment: 1994  SpO2 99%   BMI 28.95 kg/m²     Hemoglobin A1C   Date Value Ref Range Status   08/10/2023 8.0 (H) 4.0 - 5.6 % Final     Comment:     ADA Screening Guidelines:  5.7-6.4%  Consistent with prediabetes  >or=6.5%  Consistent with diabetes    High levels of fetal hemoglobin interfere with the HbA1C  assay. Heterozygous hemoglobin variants (HbS, HgC, etc)do  not significantly interfere with this assay.   However, presence of multiple variants may affect accuracy.     06/28/2023 7.8 (H) 4.0 - 5.6 % Final     Comment:     ADA Screening Guidelines:  5.7-6.4%  Consistent with prediabetes  >or=6.5%  Consistent with diabetes    High levels of fetal hemoglobin interfere with the HbA1C  assay. Heterozygous hemoglobin variants (HbS, HgC, etc)do  not significantly interfere with this assay.   However, presence of multiple variants may affect accuracy.     04/05/2023 8.5 (H) 4.0 - 5.6 % Final     Comment:     ADA Screening Guidelines:  5.7-6.4%  Consistent with prediabetes  >or=6.5%  Consistent with diabetes    High levels of fetal hemoglobin interfere with the HbA1C  assay. Heterozygous hemoglobin variants (HbS, HgC, etc)do  not " significantly interfere with this assay.   However, presence of multiple variants may affect accuracy.          Chemistry        Component Value Date/Time     08/10/2023 0840    K 4.4 08/10/2023 0840     08/10/2023 0840    CO2 25 08/10/2023 0840    BUN 11 08/10/2023 0840    CREATININE 1.0 08/10/2023 0840     (H) 08/10/2023 0840        Component Value Date/Time    CALCIUM 9.7 08/10/2023 0840    ALKPHOS 85 08/10/2023 0840    AST 23 08/10/2023 0840    ALT 20 08/10/2023 0840    BILITOT 0.6 08/10/2023 0840    ESTGFRAFRICA >60.0 07/29/2022 1008    EGFRNONAA 58.8 (A) 07/29/2022 1008           Lab Results   Component Value Date    TSH 1.125 07/29/2022      Lab Results   Component Value Date    CHOL 288 (H) 06/28/2023    CHOL 257 (H) 02/15/2023    CHOL 162 03/29/2022     Lab Results   Component Value Date    HDL 57 06/28/2023    HDL 56 02/15/2023    HDL 48 03/29/2022     Lab Results   Component Value Date    LDLCALC 199.8 (H) 06/28/2023    LDLCALC 175.0 (H) 02/15/2023    LDLCALC 92.4 03/29/2022     Lab Results   Component Value Date    TRIG 156 (H) 06/28/2023    TRIG 130 02/15/2023    TRIG 108 03/29/2022     Lab Results   Component Value Date    CHOLHDL 19.8 (L) 06/28/2023    CHOLHDL 21.8 02/15/2023    CHOLHDL 29.6 03/29/2022         PHYSICAL EXAMINATION  Constitutional: Appears well, no distress Reviewed vitals above.  Eyes: conjunctivae & lids intact; PERRLA, EOMs intact; optic discs   Neck: Supple, trachea midline.   Respiratory: No wheezes, even and unlabored   Cardiovascular: RRR; no edema or varicosities  Lymph: deferred   Skin: warm and dry; no injection site reactions, no acanthosis nigracans observed. Hyperpigmentation to legs  Neuro:patient alert and cooperative, normal affect; steady gait.  Psychiatric: judgement & insight intact, orientation of time, place & person intact, memory; mood & affect wnl     Diabetes Foot Exam:   deferred     Assessment/Plan  1. Type 2 diabetes mellitus with  hyperglycemia, without long-term current use of insulin  Hemoglobin A1C    Microalbumin/Creatinine Ratio, Urine      2. Non-proliferative diabetic retinopathy        3. Sleep apnea, unspecified type        4. Essential hypertension  Microalbumin/Creatinine Ratio, Urine      5. Hyperlipidemia, unspecified hyperlipidemia type        6. BMI 29.0-29.9,adult          1-6. Follow up in 3 months w/ me   A1c urine mac in 3 months   A1c goal less than 7.5%   Add rybelsus 3 mg   Discussed proper way to take oral glp1a  Bp controlled  Lab Results   Component Value Date    LDLCALC 199.8 (H) 06/28/2023     Above goal   Discussed statin-consistency   Body mass index is 28.95 kg/m².

## 2023-11-06 ENCOUNTER — OFFICE VISIT (OUTPATIENT)
Dept: INTERNAL MEDICINE | Facility: CLINIC | Age: 68
End: 2023-11-06
Payer: MEDICARE

## 2023-11-06 VITALS
DIASTOLIC BLOOD PRESSURE: 78 MMHG | OXYGEN SATURATION: 99 % | SYSTOLIC BLOOD PRESSURE: 126 MMHG | HEART RATE: 82 BPM | WEIGHT: 173.94 LBS | BODY MASS INDEX: 28.98 KG/M2 | HEIGHT: 65 IN

## 2023-11-06 DIAGNOSIS — I10 ESSENTIAL HYPERTENSION: ICD-10-CM

## 2023-11-06 DIAGNOSIS — E11.3299 NON-PROLIFERATIVE DIABETIC RETINOPATHY: ICD-10-CM

## 2023-11-06 DIAGNOSIS — G47.30 SLEEP APNEA, UNSPECIFIED TYPE: ICD-10-CM

## 2023-11-06 DIAGNOSIS — E78.5 HYPERLIPIDEMIA, UNSPECIFIED HYPERLIPIDEMIA TYPE: ICD-10-CM

## 2023-11-06 DIAGNOSIS — E11.65 TYPE 2 DIABETES MELLITUS WITH HYPERGLYCEMIA, WITHOUT LONG-TERM CURRENT USE OF INSULIN: Primary | ICD-10-CM

## 2023-11-06 PROCEDURE — 99999 PR PBB SHADOW E&M-EST. PATIENT-LVL III: CPT | Mod: PBBFAC,HCNC,, | Performed by: NURSE PRACTITIONER

## 2023-11-06 PROCEDURE — 99999 PR PBB SHADOW E&M-EST. PATIENT-LVL III: ICD-10-PCS | Mod: PBBFAC,HCNC,, | Performed by: NURSE PRACTITIONER

## 2023-11-06 PROCEDURE — 1101F PR PT FALLS ASSESS DOC 0-1 FALLS W/OUT INJ PAST YR: ICD-10-PCS | Mod: HCNC,CPTII,S$GLB, | Performed by: NURSE PRACTITIONER

## 2023-11-06 PROCEDURE — 3074F PR MOST RECENT SYSTOLIC BLOOD PRESSURE < 130 MM HG: ICD-10-PCS | Mod: HCNC,CPTII,S$GLB, | Performed by: NURSE PRACTITIONER

## 2023-11-06 PROCEDURE — 3008F PR BODY MASS INDEX (BMI) DOCUMENTED: ICD-10-PCS | Mod: HCNC,CPTII,S$GLB, | Performed by: NURSE PRACTITIONER

## 2023-11-06 PROCEDURE — 3008F BODY MASS INDEX DOCD: CPT | Mod: HCNC,CPTII,S$GLB, | Performed by: NURSE PRACTITIONER

## 2023-11-06 PROCEDURE — 99214 OFFICE O/P EST MOD 30 MIN: CPT | Mod: HCNC,S$GLB,, | Performed by: NURSE PRACTITIONER

## 2023-11-06 PROCEDURE — 3078F DIAST BP <80 MM HG: CPT | Mod: HCNC,CPTII,S$GLB, | Performed by: NURSE PRACTITIONER

## 2023-11-06 PROCEDURE — 3052F HG A1C>EQUAL 8.0%<EQUAL 9.0%: CPT | Mod: HCNC,CPTII,S$GLB, | Performed by: NURSE PRACTITIONER

## 2023-11-06 PROCEDURE — 1126F AMNT PAIN NOTED NONE PRSNT: CPT | Mod: HCNC,CPTII,S$GLB, | Performed by: NURSE PRACTITIONER

## 2023-11-06 PROCEDURE — 1160F PR REVIEW ALL MEDS BY PRESCRIBER/CLIN PHARMACIST DOCUMENTED: ICD-10-PCS | Mod: HCNC,CPTII,S$GLB, | Performed by: NURSE PRACTITIONER

## 2023-11-06 PROCEDURE — 3078F PR MOST RECENT DIASTOLIC BLOOD PRESSURE < 80 MM HG: ICD-10-PCS | Mod: HCNC,CPTII,S$GLB, | Performed by: NURSE PRACTITIONER

## 2023-11-06 PROCEDURE — 3288F PR FALLS RISK ASSESSMENT DOCUMENTED: ICD-10-PCS | Mod: HCNC,CPTII,S$GLB, | Performed by: NURSE PRACTITIONER

## 2023-11-06 PROCEDURE — 1159F PR MEDICATION LIST DOCUMENTED IN MEDICAL RECORD: ICD-10-PCS | Mod: HCNC,CPTII,S$GLB, | Performed by: NURSE PRACTITIONER

## 2023-11-06 PROCEDURE — 1159F MED LIST DOCD IN RCRD: CPT | Mod: HCNC,CPTII,S$GLB, | Performed by: NURSE PRACTITIONER

## 2023-11-06 PROCEDURE — 3074F SYST BP LT 130 MM HG: CPT | Mod: HCNC,CPTII,S$GLB, | Performed by: NURSE PRACTITIONER

## 2023-11-06 PROCEDURE — 3052F PR MOST RECENT HEMOGLOBIN A1C LEVEL 8.0 - < 9.0%: ICD-10-PCS | Mod: HCNC,CPTII,S$GLB, | Performed by: NURSE PRACTITIONER

## 2023-11-06 PROCEDURE — 3288F FALL RISK ASSESSMENT DOCD: CPT | Mod: HCNC,CPTII,S$GLB, | Performed by: NURSE PRACTITIONER

## 2023-11-06 PROCEDURE — 1126F PR PAIN SEVERITY QUANTIFIED, NO PAIN PRESENT: ICD-10-PCS | Mod: HCNC,CPTII,S$GLB, | Performed by: NURSE PRACTITIONER

## 2023-11-06 PROCEDURE — 1101F PT FALLS ASSESS-DOCD LE1/YR: CPT | Mod: HCNC,CPTII,S$GLB, | Performed by: NURSE PRACTITIONER

## 2023-11-06 PROCEDURE — 1160F RVW MEDS BY RX/DR IN RCRD: CPT | Mod: HCNC,CPTII,S$GLB, | Performed by: NURSE PRACTITIONER

## 2023-11-06 PROCEDURE — 4010F PR ACE/ARB THEARPY RXD/TAKEN: ICD-10-PCS | Mod: HCNC,CPTII,S$GLB, | Performed by: NURSE PRACTITIONER

## 2023-11-06 PROCEDURE — 99214 PR OFFICE/OUTPT VISIT, EST, LEVL IV, 30-39 MIN: ICD-10-PCS | Mod: HCNC,S$GLB,, | Performed by: NURSE PRACTITIONER

## 2023-11-06 PROCEDURE — 4010F ACE/ARB THERAPY RXD/TAKEN: CPT | Mod: HCNC,CPTII,S$GLB, | Performed by: NURSE PRACTITIONER

## 2023-11-06 RX ORDER — ORAL SEMAGLUTIDE 3 MG/1
3 TABLET ORAL DAILY
Qty: 90 TABLET | Refills: 2
Start: 2023-11-06 | End: 2023-12-20 | Stop reason: SDUPTHER

## 2023-11-06 NOTE — PATIENT INSTRUCTIONS
Rybelsus 3 mg   Take on empty stomach, wait 30 mins in am,  4-6 oz water only.  Stop glipizide  Continue jardiance 25 mg daily    Lab Results   Component Value Date    HGBA1C 8.3 (H) 11/03/2023     Goal less than 7.5%    [email or web address]  Eat fit luann  MyHealthyOutpal luann  [email or web address]    mySugr luann     Goal  no higher than 180/200     A1c urine mac in 3 months  Follow up in 3 months w/[facility]

## 2023-11-07 DIAGNOSIS — Z12.11 SPECIAL SCREENING FOR MALIGNANT NEOPLASMS, COLON: Primary | ICD-10-CM

## 2023-11-07 RX ORDER — SODIUM, POTASSIUM,MAG SULFATES 17.5-3.13G
1 SOLUTION, RECONSTITUTED, ORAL ORAL DAILY
Qty: 1 KIT | Refills: 0 | Status: SHIPPED | OUTPATIENT
Start: 2023-11-07 | End: 2023-11-09

## 2023-11-09 NOTE — PROGRESS NOTES
[facility]: Health  Follow-Up    The history is provided by the patient.             Reason for review: Blood pressure at goal        Topics Covered on Call: physical activity and Diet    Additional Follow-up details: Avg BP as of Sept 14, 2020 is 124/69.    Has still been watching what she's eating and walking more.    Says she feels good and was pleased with her average number.             Diet-no change to diet    No change to diet.  Patient reports eating or drinking the following: Still watching what she's eating.       Physical Activity-no change to routine  No change to exercise routine.       Additional physical activity details: Still walking.       Medication Adherence-Medication adherence was assessed.      Substance, Sleep, Stress-Not assessed      Continue current diet/physical activity routine.       Addressed any questions or concerns and patient has my contact information if needed prior to next outreach. Patient verbalizes understanding.      Explained the importance of self-monitoring and medication adherence. Encouraged the patient to communicate with their health  for lifestyle modifications to help improve or maintain a healthy lifestyle.            There are no preventive care reminders to display for this patient.    Last 5 Patient Entered Readings                                      Current 30 Day Average: 124/69     Recent Readings 9/9/2020 9/8/2020 9/6/2020 9/5/2020 9/5/2020    SBP (mmHg) 112 108 132 129 142    DBP (mmHg) 66 65 78 68 74    Pulse 91 86 74 78 79                ,

## 2023-11-10 ENCOUNTER — TELEPHONE (OUTPATIENT)
Dept: ENDOSCOPY | Facility: HOSPITAL | Age: 68
End: 2023-11-10
Payer: MEDICARE

## 2023-11-10 NOTE — TELEPHONE ENCOUNTER
Updated pt Instructions:        Colonoscopy Procedure Prep Instructions      Date of procedure: 11/14/23 Arrive at: 9:30AM    Location of Department:   [facility] [address] [place] [address]  Take the [facility] Elevators to 4th Floor Endoscopy Lab    As soon as possible:   your prep from pharmacy and over the counter DULCOLAX LAXATIVE TABLETS     On the day before your procedure   What You CAN do:   You may have clear liquids ONLY -see below for list.     Liquids That Are OK to Drink:   Water  Sports drinks (Gatorade, Power-Aid)  Coffee or tea (no cream or nondairy creamer)  Clear juices without pulp (apple, white grape)  Gelatin desserts (no fruit or toppings)  Clear soda (sprite, coke, ginger ale)  Chicken broth (until 12 midnight the night before procedure)      What You CANNOT do:   Do not EAT solid food, drink milk or anything   colored red.  Do not drink alcohol.  Do not take oral medications within 1 hour of starting   each dose of SUPREP.  No gum chewing or candy morning of procedure.      Note:   (Please disregard the insert instructions from pharmacy).  SUPREP Bowel Prep Kit is indicated for cleansing of the colon as a preparation for colonoscopy in adults.   Be sure to tell your doctor about all the medicines you take, including prescription and non-prescription medicines, vitamins, and herbal supplements. SUPREP Bowel Prep Kit may affect how other medicines work.  Medication taken by mouth may not be absorbed properly when taken within 1 hour before the start of each dose of SUPREP Bowel Prep Kit.    It is not uncommon to experience some abdominal cramping, nausea and/or vomiting when taking the prep. If you have nausea and/or vomiting while taking the prep, stop drinking for 20 to 30 minutes then continue.    How to take prep:    SUPREP Bowel Prep Kit is a (2-day) prep.   Both 6-ounce bottles are required for a complete preparation for colonoscopy. Dilute the solution  concentrate as directed prior to use. You must drink water with each dose of SUPREP, and additional water after each dose.    DOSE 1--Day Before Colonoscopy 11/13/23    Drink at least 6 to 8 glasses of clear liquids from time you wake up until you begin your prep and then continue until bedtime to avoid dehydration.     12:00 pm (NOON) Take four (4) Dulcolax (Bisacodyl) tablets with at least 8 ounces or more of clear liquids.      6:00 pm:    You must complete Steps 1 through 4 using one (1) 6-ounce bottle before going to bed as shown below:    Step 1-Pour ONE (1) 6-ounce bottle of SUPREP liquid into the mixing container.  Step 2-Add cool drinking water to the 16-ounce line on the container and mix.  Step 3-Drink ALL the liquid in the container.  Step 4-You must drink two (2) more 16-ounce containers of water over the next 1 hour.  IMPORTANT: If you experience preparation-related symptoms (for example, nausea, bloating, or cramping), stop, or slow the rate of drinking the additional water until your symptoms decrease.    DOSE 2--Day of the Colonoscopy 11/14/23 at 2-3 AM    For this dose, repeat Steps 1 through 4 shown above using the other 6-ounce bottle.   You may continue drinking water/clear liquids until   4 hours before your colonoscopy or as directed by the scheduling nurse  6:30AM.    For more information about your procedure, please watch this informational video. It is important to watch this animated consent video prior to your arrival. If you haven't watched the video prior to arriving, you are required to watch it during admission which can cause delays.     Options for viewing:  Using a keyboard:  press and hold the control tab (Ctrl) and left mouse click to follow link          Colonoscopy Instructional Video                                                         OR    Type link address into your web browser's address bar:  [email or web address]/[email or web address]    Using a mobile phone: tap on  web address/link.     Comments:         IMPORTANT INFORMATION TO KNOW BEFORE YOUR PROCEDURE    [facility] 4th Floor    If your procedure requires the administration of anesthesia, it is necessary for a responsible adult to drive you home. (Medical Transportation, Uber, Lyft, Taxi, etc. may ONLY be used if a responsible adult is present to accompany you home.  The responsible adult CAN'T be the  of the service).      person must be available to return to pick you up within 30 minutes of being notified of discharge.     Due to the limited socially distant seating in our waiting room, please limit your guest (1) who accompany you for this procedure. If someone accompanies you for this procedure into the facility:    Consider having them proceed to an area that is socially distant other than the lobby until a member of the medical team contacts them to provide an update after the procedure.     Also, please consider being dropped off and picked up from the facility.      Please bring a picture ID, insurance card, & copayment    Take Medications as directed below:    If you are taking any injectable medication (s) for weight loss and/or diabetes  weekly, please hold for 7 days prior to your scheduled procedure start holding on 11/6/23.     If you begin taking any blood thinning medications or injectable weight loss/diabetes medications (other than insulin) , please contact the endoscopy scheduling department listed below as soon as possible    If you are diabetic see the attached instruction sheet regarding your medication.     If you take HEART, BLOOD PRESSURE, SEIZURE, PAIN, LUNG (including inhalers/nebulizers), ANTI-REJECTION (transplant patients), or PSYCHIATRIC medications, please take at your regular times with a sip of water or as directed by the scheduling nurse.     Important contact information:    Endoscopy [phone] Hours of operation Monday-Friday  8:00-4:30pm.    Questions about insurance or financial obligations call [phone] or [phone].    If you have questions regarding the prep or need to reschedule, please call [phone]. After hours questions requiring immediate assistance, contact [facility] nurse line at [phone] or [phone].   NOTE:     On occasion, unforeseen circumstances may cause a delay in your procedure start time. We respect your time and appreciate your patience during these circumstances.      Comments:      Are you ready for your Colonoscopy?      __ If you take blood thinners, have you stopped taking them according to your doctor's instructions before your procedures?  __ Have you stopped eating solid foods and followed a clear liquid diet a full day before your procedure or followed the diet       guidelines indicated in your instructions? REMINDER: NO BROTH AFTER MIDNIGHT THE DAY BEFORE PROCEDURE.   __ Have you completed all your prep solution? PLEASE DO NOT FOLLOW the insert/or box instructions from pharmacy)  __ Have you taken your blood pressure, heart, seizure, or other essential medications the morning of your procedure?  __ Have you planned for a ride to and from procedure?  (Medical Transportation, Uber, Lyft, Taxi, etc. may ONLY be used if a responsible adult is present to accompany you home). The responsible adult CANNOT be the  of the service.       Questions or Concerns?  Please call us!  [phone] (M-F) [phone] (Nights and weekends)    Listed below are some helpful tips:    Please bring protective cases for eyewear and hearing aids-wear comfortable clothing/shoes.  Follow prep instructions closely so you don't have to do it twice.  Bowel prep is prescription used to clean out the colon before a colonoscopy. The prep increases movement of your colon by causing you to have diarrhea (loose stools). Cleaning out stool from the colon helps your doctor to see in your colon clearly  during this procedure.   It is important to stay hydrated before, during and after bowel prep to prevent loss of fluid (dehydration). You can have water and your choice of clear liquids. Reminder: these liquids you will drink in addition to the bowel prep.     Preparing the mixture:    First, mix the prep with water.  Make the taste better by adding a sugar free drink mix (Crystal Light) can improve the taste of your prep.   Use a large bore (opening) straw. Place towards the back of mouth (throat) as tolerated.  Prepare a prep mixture that is lightly chilled, but not ice-cold. Drinking a large amount of ice-cold liquid can make you feel very ill.  Avoid drinking any RED beverages or eating popsicles with this color for the 24 hours leading up to your procedure. This color can look like blood in the colon.    Consuming the prep:    Take your time. If you feel ill, take a 15-minute break from drinking the prep mixture  Combat hunger and dehydration with clear liquids. Options like JELL-O, or popsicles will help.  Settle in with good reading material. The goal is to clean out 6 feet of colon, so you can plan on spending a good deal of time in the bathroom. Have some good reading material on-hand or an iPad ready to keep yourself entertained!  Keep yourself comfortable. We recommend applying personal hygiene wipes, Tucks pads and a soothing ointment, like A&D ointment, Desitin, or Vaseline to your bottom before starting and as needed to protect your skin.          If you are unsure about any of these instructions, call [facility] at [phone].    Oral Medicine Day of Prep Day of Procedure   Glyburide  Glucotrol (Glipizide)  Amaryl (Glimepiride) Do NOT take. Do NOT take morning of procedure. If you take twice daily, take with dinner.   Glucophage  Glumetza  Fortamet (Metformin) Take as prescribed. Do NOT take morning of procedure. Take when you start eating again.   Januvia (Sitaglipitin)  Nesina  (Aloglipitin)  Onglyza (Saxaglipitin)  Tradjenta (Linaglipitin) Take as prescribed. Do NOT take morning of procedure. Take when you start eating again.   Invokana (Canagliflozin)  Farxiga (Dapagliflozin)  Jardiance(Empagliflozin) Stop 2 days before prep. Do NOT take morning of procedure. Take when you start eating again.   Actos (Pioglitazone) Take as prescribed. Do NOT take morning of procedure. Take when you start eating again.        If you have a combination oral medicine and one medicine is listed as DO NOT TAKE- then do not take the medicine.     If you take these injectables weekly, they must be held for 7 days prior to your procedure.  Injectable Medicine Day of Prep Day of Procedure   Adlyxin (Lixisenatide)  Byetta (Exenatide)   Bydureon (Exenatide XL)  Ozempic (Semaglutide)  Trulicity (Dulaglutide)  Victoza (Liraglutide)  Mounjaro (Tirzepatide) Do NOT take Adlyxin, Byetta or Victoza.  Do NOT take Adlyxin, Byetta or Victoza the morning of procedure. Take when you start eating meals again. May take Ozempic, Trulicity, and Bydureon after procedure.       Combination Injectable Medicine Day of Prep Day of Procedure   Soliqua  Xultophy Inject 50% of usual dose. Inject 50% of usual dose.     It is important to monitor your blood sugar while doing the bowel preparation. On the day of your bowel prep, when you are on a clear liquid diet, you may drink beverages with sugar as your source of glucose. Be sure to mix the prep with water or sugar free liquid only. Below are instructions on how to adjust your diabetic medications prior to your scheduled procedure. Call the healthcare provider who manages your diabetes if you have questions.     Insulin for Type 1   Diabetes Mellitus Day of Prep Day of Procedure   Litaglar  Lantus  Levemir  Toujeo  Tresiba  Semglee If you use in the morning, take as prescribed. If you use in the evening, inject 70% of dose. If you take in the morning, inject 80% of dose. If you take in  the evenings, inject usual dose.    Afrezza  Apidra  Humalog 100  Humalog 200  Novolog Count carbs and adjust dose accordingly. If not carb counting, take 25% of usual meal dose. May use correction dose every 4 hours. Do NOT use once sugar-free clear liquid prep is started. Do NOT take morning of procedure. Take when you start eating again.   Insulin Pump Count carbs and adjust your dose as needed. May use temp basal rate at 70% after sugar-free clear liquid prep is started until midnight. May use temp basal rate at 70% starting at midnight until after procedure.     Insulin for Type 2 Diabetes Mellitus Day of Prep Day of Procedure   Basaglar  Lantus  Levemir  Toujeo  Tresiba If you use in the morning, take as prescribed. If you use in the evening, inject 70% of dose. If take in morning, inject 80% of dose. If take in evenings, resume usual dose.    Afrezza  Apidra  Fiasp  Humalog 100  Humalog 200  Novolog  Inject 50% of dose with clear liquid diet. Do NOT use once sugar-free clear liquid prep is started. If you are using a correction scale, take dose every 4 hours as needed. Do NOT take morning of procedure. Take when you start eating meals again.   NPH Inject 50% of breakfast and dinner doses with clear liquid diet.  Do NOT take morning of procedure. Take usual dose when you eat dinner.   Regular Inject 50% of breakfast dose and do NOT take dinner dose once sugar-free clear liquid prep has started. If using correction scale, may take dose every 6 hours as needed. Do NOT take morning of procedure. Take usual dose when you eat dinner.   Novolog Mix 70/30  Humalog Mix 75/25  Humalog Mix 50/50 Inject 50% of breakfast dose. Inject 25% of dinner dose. Do NOT take breakfast dose. Take usual dose when you eat dinner.   U500 Take 50% of AM or breakfast unit kellie dose. Take 25% of lunch or dinner or PM unit kellie dose. Do NOT take morning of procedure. Take when you start eating again.   V-Go Continue to wear your V-Go  device. DO NOT click once sugar-free clear liquid prep is started. Continue to wear your V-Go device. Resume clicks with meals.

## 2023-11-10 NOTE — TELEPHONE ENCOUNTER
Rec'd message that pt needs her colonoscopy instructions re-sent to her via email. Sent copy of instructions to her at [email or web address]

## 2023-11-10 NOTE — TELEPHONE ENCOUNTER
Medical assistant returned patient call that was left on voicemail no answer left voicemail asking patient to call us back @ [phone]

## 2023-11-10 NOTE — TELEPHONE ENCOUNTER
Rec'd message that while pt received her emailed instructions, her prep had been changed to Suprep recently, but no instructions sent for that. Created new instructions and emailed to pt.

## 2023-11-14 ENCOUNTER — HOSPITAL ENCOUNTER (OUTPATIENT)
Facility: HOSPITAL | Age: 68
Discharge: HOME | End: 2023-11-14
Attending: COLON & RECTAL SURGERY | Admitting: COLON & RECTAL SURGERY
Payer: MEDICARE

## 2023-11-14 ENCOUNTER — ANESTHESIA EVENT (OUTPATIENT)
Dept: ENDOSCOPY | Facility: HOSPITAL | Age: 68
End: 2023-11-14
Payer: MEDICARE

## 2023-11-14 ENCOUNTER — ANESTHESIA (OUTPATIENT)
Dept: ENDOSCOPY | Facility: HOSPITAL | Age: 68
End: 2023-11-14
Payer: MEDICARE

## 2023-11-14 VITALS
HEIGHT: 65 IN | WEIGHT: 173 LBS | OXYGEN SATURATION: 100 % | TEMPERATURE: 98 F | SYSTOLIC BLOOD PRESSURE: 112 MMHG | DIASTOLIC BLOOD PRESSURE: 68 MMHG | RESPIRATION RATE: 18 BRPM | BODY MASS INDEX: 28.82 KG/M2 | HEART RATE: 70 BPM

## 2023-11-14 DIAGNOSIS — Z86.010 HISTORY OF COLON POLYPS: Primary | ICD-10-CM

## 2023-11-14 LAB — POCT GLUCOSE: 138 MG/DL (ref 70–110)

## 2023-11-14 PROCEDURE — 63600175 PHARM REV CODE 636 W HCPCS: Mod: HCNC | Performed by: NURSE ANESTHETIST, CERTIFIED REGISTERED

## 2023-11-14 PROCEDURE — 45380 COLONOSCOPY AND BIOPSY: CPT | Mod: 59,PT,HCNC | Performed by: COLON & RECTAL SURGERY

## 2023-11-14 PROCEDURE — 88305 TISSUE EXAM BY PATHOLOGIST: CPT | Mod: 26,HCNC,, | Performed by: PATHOLOGY

## 2023-11-14 PROCEDURE — 45385 PR COLONOSCOPY,REMV LESN,SNARE: ICD-10-PCS | Mod: PT,HCNC,, | Performed by: COLON & RECTAL SURGERY

## 2023-11-14 PROCEDURE — 45385 COLONOSCOPY W/LESION REMOVAL: CPT | Mod: PT,HCNC,, | Performed by: COLON & RECTAL SURGERY

## 2023-11-14 PROCEDURE — 45385 COLONOSCOPY W/LESION REMOVAL: CPT | Mod: PT,HCNC | Performed by: COLON & RECTAL SURGERY

## 2023-11-14 PROCEDURE — E9220 PRA ENDO ANESTHESIA: HCPCS | Mod: PT,,, | Performed by: NURSE ANESTHETIST, CERTIFIED REGISTERED

## 2023-11-14 PROCEDURE — 27201012 HC FORCEPS, HOT/COLD, DISP: Mod: HCNC | Performed by: COLON & RECTAL SURGERY

## 2023-11-14 PROCEDURE — 88305 TISSUE EXAM BY PATHOLOGIST: ICD-10-PCS | Mod: 26,HCNC,, | Performed by: PATHOLOGY

## 2023-11-14 PROCEDURE — 82962 GLUCOSE BLOOD TEST: CPT | Mod: HCNC | Performed by: COLON & RECTAL SURGERY

## 2023-11-14 PROCEDURE — E9220 PRA ENDO ANESTHESIA: ICD-10-PCS | Mod: PT,,, | Performed by: NURSE ANESTHETIST, CERTIFIED REGISTERED

## 2023-11-14 PROCEDURE — 25000003 PHARM REV CODE 250: Mod: HCNC | Performed by: COLON & RECTAL SURGERY

## 2023-11-14 PROCEDURE — 37000008 HC ANESTHESIA 1ST 15 MINUTES: Mod: HCNC | Performed by: COLON & RECTAL SURGERY

## 2023-11-14 PROCEDURE — 37000009 HC ANESTHESIA EA ADD 15 MINS: Mod: HCNC | Performed by: COLON & RECTAL SURGERY

## 2023-11-14 PROCEDURE — 27201089 HC SNARE, DISP (ANY): Mod: HCNC | Performed by: COLON & RECTAL SURGERY

## 2023-11-14 PROCEDURE — 45380 PR COLONOSCOPY,BIOPSY: ICD-10-PCS | Mod: 59,PT,HCNC, | Performed by: COLON & RECTAL SURGERY

## 2023-11-14 PROCEDURE — 25000003 PHARM REV CODE 250: Mod: HCNC | Performed by: NURSE ANESTHETIST, CERTIFIED REGISTERED

## 2023-11-14 PROCEDURE — 88305 TISSUE EXAM BY PATHOLOGIST: CPT | Mod: HCNC | Performed by: PATHOLOGY

## 2023-11-14 PROCEDURE — 45380 COLONOSCOPY AND BIOPSY: CPT | Mod: 59,PT,HCNC, | Performed by: COLON & RECTAL SURGERY

## 2023-11-14 RX ORDER — SODIUM CHLORIDE 9 MG/ML
INJECTION, SOLUTION INTRAVENOUS CONTINUOUS
Status: DISCONTINUED | OUTPATIENT
Start: 2023-11-14 | End: 2023-11-14 | Stop reason: HOSPADM

## 2023-11-14 RX ORDER — LIDOCAINE HYDROCHLORIDE 20 MG/ML
INJECTION INTRAVENOUS
Status: DISCONTINUED | OUTPATIENT
Start: 2023-11-14 | End: 2023-11-14

## 2023-11-14 RX ORDER — PROPOFOL 10 MG/ML
VIAL (ML) INTRAVENOUS
Status: DISCONTINUED | OUTPATIENT
Start: 2023-11-14 | End: 2023-11-14

## 2023-11-14 RX ORDER — PROPOFOL 10 MG/ML
INJECTION, EMULSION INTRAVENOUS CONTINUOUS PRN
Status: DISCONTINUED | OUTPATIENT
Start: 2023-11-14 | End: 2023-11-14

## 2023-11-14 RX ADMIN — SODIUM CHLORIDE: 0.9 INJECTION, SOLUTION INTRAVENOUS at 10:11

## 2023-11-14 RX ADMIN — PROPOFOL 100 MG: 10 INJECTION, EMULSION INTRAVENOUS at 10:11

## 2023-11-14 RX ADMIN — PROPOFOL 150 MCG/KG/MIN: 10 INJECTION, EMULSION INTRAVENOUS at 10:11

## 2023-11-14 RX ADMIN — LIDOCAINE HYDROCHLORIDE 100 MG: 20 INJECTION INTRAVENOUS at 10:11

## 2023-11-14 NOTE — H&P
Procedure : Colonoscopy    Indication(s):  personal history of colon polyps    Last colonoscopy: Nov 2022 - tattoos at prior polypectomy sites, no new polyps, diveticulosis    Review of patient's allergies indicates:   Allergen Reactions    Phenergan  [promethazine]      Other reaction(s): Hives    Atorvastatin      Myalgia         Past Medical History:   Diagnosis Date    Diabetes mellitus type II     Hyperlipemia     Irritable bowel syndrome     Obesity     Osteopenia     Rhinitis     Sleep apnea     Vertigo        Prior to Admission medications    Medication Sig Start Date End Date Taking? Authorizing Provider   empagliflozin (JARDIANCE) 25 mg tablet TAKE 1 TABLET EVERY DAY 4/10/23  Yes Ke Maier APRN, FNP   rosuvastatin (CRESTOR) 20 MG tablet Take 1 tablet (20 mg total) by mouth once daily. 9/18/23 9/17/24 Yes Orquidea Lancaster MD   valsartan (DIOVAN) 80 MG tablet TAKE 1 TABLET EVERY DAY (NEED MD APPOINTMENT) 6/7/23  Yes Ke Maier APRN, FNP   alcohol swabs (BD ALCOHOL SWABS) PadM Apply 1 each topically daily as needed (testing). 8/14/20   Orquidea Lancaster MD   blood sugar diagnostic (TRUE METRIX GLUCOSE TEST STRIP) Strp USE FOR BLOOD SUGAR TESTING ONE TIME DAILY 7/28/23   Orquidea Lancaster MD   blood-glucose meter kit To check BG 3 times daily, to use with insurance preferred meter, newest meter, e 11.65 2/22/23 2/22/24  Ke Maier APRN, JASON   calcium-vitamin D 250 mg-2.5 mcg (100 unit) per tablet Take 1 tablet by mouth 3 (three) times a week.    Provider, Historical   fluticasone propionate (FLONASE) 50 mcg/actuation nasal spray 1 spray (50 mcg total) by Each Nostril route once daily. 9/11/19   Orquidea Lancaster MD   lancets [facility] [place] BG 3 times daily, to use with insurance preferred meter, newest meter, e 11.65 2/22/23   Ke Maier APRN, JASON   semaglutide (RYBELSUS) 3 mg tablet Take 1 tablet (3 mg total) by mouth once daily. Take on  empty stomach, wait 30 mins in am,  4-6 oz water only. 23   Ke Maier, APRN, FNP       Sedation Problems: NO    Family History   Problem Relation Age of Onset    Hypertension Mother     Heart disease Mother     Kidney disease Mother     Hypertension Father     Cancer Sister         liver     Diabetes Sister     Diabetes Sister     Diabetes Brother     Hypertension Brother     Kidney disease Brother     No Known Problems Daughter     Cancer Maternal Aunt         breast cancer     Breast cancer Maternal Aunt        Fam Hx of Sedation Problems: NO    Social History     Socioeconomic History    Marital status:    Tobacco Use    Smoking status: Former     Current packs/day: 0.00     Types: Cigarettes     Quit date: 1989     Years since quittin.8    Smokeless tobacco: Never   Substance and Sexual Activity    Alcohol use: Yes     Alcohol/week: 1.0 standard drink of alcohol     Types: 1 Glasses of wine per week     Comment: 4-5 glasses of wine monthly    Drug use: No     Social Determinants of Health     Financial Resource Strain: Low Risk  (2019)    Overall Financial Resource Strain (CARDIA)     Difficulty of Paying Living Expenses: Not hard at all   Food Insecurity: No Food Insecurity (2019)    Hunger Vital Sign     Worried About Running Out of Food in the Last Year: Never true     Ran Out of Food in the Last Year: Never true   Transportation Needs: No Transportation Needs (2019)    PRAPARE - Transportation     Lack of Transportation (Medical): No     Lack of Transportation (Non-Medical): No   Physical Activity: Insufficiently Active (2020)    Exercise Vital Sign     Days of Exercise per Week: 2 days     Minutes of Exercise per Session: 20 min   Stress: No Stress Concern Present (2020)    [employer] [place] [employer] - Occupational Stress Questionnaire     Feeling of Stress : Not at all   Social Connections: Unknown (2020)    Social Connection and  Isolation Panel [NHANES]     Frequency of Social Gatherings with Friends and Family: Once a week     Active Member of Clubs or Organizations: No       Review of Systems -     Respiratory ROS: no cough, shortness of breath, or wheezing  Cardiovascular ROS: no chest pain or dyspnea on exertion  Gastrointestinal ROS: no abdominal pain, change in bowel habits, or black or bloody stools  Musculoskeletal ROS: negative  Neurological ROS: no TIA or stroke symptoms        Physical Exam:  General: no distress  Head: normocephalic  Airway:  normal oropharynx, airway normal  Neck: supple, symmetrical, trachea midline  Lungs:  normal respiratory effort  Heart: regular rate and rhythm  Abdomen: soft, non-tender non-distented; bowel sounds normal; no masses,  no organomegaly  Extremities: no cyanosis or edema, or clubbing       Deep Sedation: Mallampati Score per anesthesia     SedationPlan :Moderate     ASA : III    Patient is medically cleared for anesthesia.    Anesthesia/Surgery risks, benefits and alternative options discussed and understood by patient/family.

## 2023-11-14 NOTE — PROVATION PATIENT INSTRUCTIONS
Discharge Summary/Instructions after an Endoscopic Procedure  Patient Name: Lizabeth Saha  Patient MRN: [id number]  Patient YOB: 1955 Tuesday, November 14, 2023  Todd Suárez MD  Dear patient,  As a result of recent federal legislation (The Federal Cures Act), you may   receive lab or pathology results from your procedure in your [facility]   account before your physician is able to contact you. Your physician or   their representative will relay the results to you with their   recommendations at their soonest availability.  Thank you,  RESTRICTIONS:  During your procedure today, you received medications for sedation.  These   medications may affect your judgment, balance and coordination.  Therefore,   for 24 hours, you have the following restrictions:   - DO NOT drive a car, operate machinery, make legal/financial decisions,   sign important papers or drink alcohol.    ACTIVITY:  Today: no heavy lifting, straining or running due to procedural   sedation/anesthesia.  The following day: return to full activity including work.  DIET:  Eat and drink normally unless instructed otherwise.     TREATMENT FOR COMMON SIDE EFFECTS:  - Mild abdominal pain, nausea, belching, bloating or excessive gas:  rest,   eat lightly and use a heating pad.  - Sore Throat: treat with throat lozenges and/or gargle with warm salt   water.  - Because air was used during the procedure, expelling large amounts of air   from your rectum or belching is normal.  - If a bowel prep was taken, you may not have a bowel movement for 1-3 days.    This is normal.  SYMPTOMS TO WATCH FOR AND REPORT TO YOUR PHYSICIAN:  1. Abdominal pain or bloating, other than gas cramps.  2. Chest pain.  3. Back pain.  4. Signs of infection such as: chills or fever occurring within 24 hours   after the procedure.  5. Rectal bleeding, which would show as bright red, maroon, or black stools.   (A tablespoon of blood from the rectum is not serious, especially if    hemorrhoids are present.)  6. Vomiting.  7. Weakness or dizziness.  GO DIRECTLY TO THE NEAREST EMERGENCY ROOM IF YOU HAVE ANY OF THE FOLLOWING:      Difficulty breathing              Chills and/or fever over 101 F   Persistent vomiting and/or vomiting blood   Severe abdominal pain   Severe chest pain   Black, tarry stools   Bleeding- more than one tablespoon   Any other symptom or condition that you feel may need urgent attention  Your doctor recommends these additional instructions:  If any biopsies were taken, your doctors clinic will contact you in 1 to 2   weeks with any results.  - Discharge patient to home.   - High fiber diet.   - Continue present medications.   - Await pathology results.   - Repeat colonoscopy date to be determined after pending pathology results   are reviewed for surveillance based on pathology results.   - Patient has a contact number available for emergencies.  The signs and   symptoms of potential delayed complications were discussed with the   patient.  Return to normal activities tomorrow.  Written discharge   instructions were provided to the patient.  For questions, problems or results please call your physician - Todd Suárez MD at Work:  [phone].  [facility], EMERGENCY ROOM PHONE NUMBER: [phone]  IF A COMPLICATION OR EMERGENCY SITUATION ARISES AND YOU ARE UNABLE TO REACH   YOUR PHYSICIAN - GO DIRECTLY TO THE EMERGENCY ROOM.  Todd Suárez MD  11/14/2023 11:17:41 AM  This report has been verified and signed electronically.  Dear patient,  As a result of recent federal legislation (The Federal Cures Act), you may   receive lab or pathology results from your procedure in your [facility]   account before your physician is able to contact you. Your physician or   their representative will relay the results to you with their   recommendations at their soonest availability.  Thank you,  PROVATION

## 2023-11-14 NOTE — ANESTHESIA POSTPROCEDURE EVALUATION
Anesthesia Post Evaluation    Patient: Lizabeth Saha    Procedure(s) Performed: Procedure(s) (LRB):  COLONOSCOPY (N/A)    Final Anesthesia Type: general      Patient location during evaluation: GI PACU  Patient participation: Yes- Able to Participate  Level of consciousness: awake  Post-procedure vital signs: reviewed and stable  Pain management: adequate  Airway patency: patent    PONV status at discharge: No PONV  Anesthetic complications: no      Cardiovascular status: blood pressure returned to baseline and stable  Respiratory status: unassisted, spontaneous ventilation and room air  Hydration status: euvolemic  Follow-up not needed.          Vitals Value Taken Time   /68 11/14/23 1139   Temp 36.7 °C (98.1 °F) 11/14/23 1117   Pulse 70 11/14/23 1139   Resp 18 11/14/23 1139   SpO2 100 % 11/14/23 1139         No case tracking events are documented in the log.      Pain/Alvaro Score: Alvaro Score: 10 (11/14/2023 11:40 AM)

## 2023-11-14 NOTE — ANESTHESIA PREPROCEDURE EVALUATION
2023  Lizabeth Saha is a 68 y.o., female.    Past Medical History:   Diagnosis Date    Diabetes mellitus type II     Hyperlipemia     Irritable bowel syndrome     Obesity     Osteopenia     Rhinitis     Sleep apnea     Vertigo      Patient Active Problem List   Diagnosis    Sleep apnea    Type 2 diabetes mellitus with hyperglycemia, without long-term current use of insulin    Hyperlipemia    Non-proliferative diabetic retinopathy    Pulmonary nodule    BMI 29.0-29.9,adult    Essential hypertension    Mild nonproliferative diabetic retinopathy associated with type 2 diabetes mellitus    Osteopenia    Aortic atherosclerosis     Review of patient's allergies indicates:   Allergen Reactions    Phenergan  [promethazine]      Other reaction(s): Hives    Atorvastatin      Myalgia       Social History     Socioeconomic History    Marital status:    Tobacco Use    Smoking status: Former     Current packs/day: 0.00     Types: Cigarettes     Quit date: 1989     Years since quittin.8    Smokeless tobacco: Never   Substance and Sexual Activity    Alcohol use: Yes     Alcohol/week: 1.0 standard drink of alcohol     Types: 1 Glasses of wine per week     Comment: 4-5 glasses of wine monthly    Drug use: No     Social Determinants of Health     Financial Resource Strain: Low Risk  (2019)    Overall Financial Resource Strain (CARDIA)     Difficulty of Paying Living Expenses: Not hard at all   Food Insecurity: No Food Insecurity (2019)    Hunger Vital Sign     Worried About Running Out of Food in the Last Year: Never true     Ran Out of Food in the Last Year: Never true   Transportation Needs: No Transportation Needs (2019)    PRAPARE - Transportation     Lack of Transportation (Medical): No     Lack of Transportation (Non-Medical): No   Physical Activity: Insufficiently Active  (6/24/2020)    Exercise Vital Sign     Days of Exercise per Week: 2 days     Minutes of Exercise per Session: 20 min   Stress: No Stress Concern Present (6/24/2020)    [employer] [place] [employer] - Occupational Stress Questionnaire     Feeling of Stress : Not at all   Social Connections: Unknown (6/24/2020)    Social Connection and Isolation Panel [NHANES]     Frequency of Social Gatherings with Friends and Family: Once a week     Active Member of Clubs or Organizations: No     Past Surgical History:   Procedure Laterality Date    CHOLECYSTECTOMY      COLONOSCOPY N/A 5/31/2022    Procedure: COLONOSCOPY;  Surgeon: Todd Suárez MD;  Location: [facility] ([address] [place] [address]);  Service: Endoscopy;  Laterality: N/A;  fully vaccinated-GT    PARTIAL HYSTERECTOMY       No current facility-administered medications on file prior to encounter.     Current Outpatient Medications on File Prior to Encounter   Medication Sig Dispense Refill    empagliflozin (JARDIANCE) 25 mg tablet TAKE 1 TABLET EVERY DAY 90 tablet 2    valsartan (DIOVAN) 80 MG tablet TAKE 1 TABLET EVERY DAY (NEED MD APPOINTMENT) 90 tablet 2    alcohol swabs (BD ALCOHOL SWABS) PadM Apply 1 each topically daily as needed (testing). 100 each 4    blood sugar diagnostic (TRUE METRIX GLUCOSE TEST STRIP) Strp USE FOR BLOOD SUGAR TESTING ONE TIME DAILY 100 strip 5    blood-glucose meter kit To check BG 3 times daily, to use with insurance preferred meter, newest meter, e 11.65 1 each 0    calcium-vitamin D 250 mg-2.5 mcg (100 unit) per tablet Take 1 tablet by mouth 3 (three) times a week.      fluticasone propionate (FLONASE) 50 mcg/actuation nasal spray 1 spray (50 mcg total) by Each Nostril route once daily. 3 Bottle 4    lancets Misc To check BG 3 times daily, to use with insurance preferred meter, newest meter, e 11.65 300 each 3     Pre-op Assessment    I have reviewed the NPO Status.      Review of Systems  Anesthesia Hx:               Denies Personal Hx of  Anesthesia complications.                    Social:  Former Smoker, Social Alcohol Use       Cardiovascular:     Hypertension           hyperlipidemia                             Pulmonary:       Recent URI (4 weeks ago), resolved Sleep Apnea (uses CPAP occasionally)                Endocrine:  Diabetes               Physical Exam    Airway:  Mallampati: I   Neck ROM: Normal ROM        Anesthesia Plan  Type of Anesthesia, risks & benefits discussed:    Anesthesia Type: Gen Natural Airway  Informed Consent: Informed consent signed with the Patient and all parties understand the risks and agree with anesthesia plan.  All questions answered.   ASA Score: 3    Ready For Surgery From Anesthesia Perspective.     .

## 2023-11-14 NOTE — TRANSFER OF CARE
"Anesthesia Transfer of Care Note    Patient: Lizabeth Saha    Procedure(s) Performed: Procedure(s) (LRB):  COLONOSCOPY (N/A)    Patient location: PACU    Anesthesia Type: general    Transport from OR: Transported from OR on room air with adequate spontaneous ventilation    Post pain: adequate analgesia    Post assessment: no apparent anesthetic complications    Post vital signs: stable    Level of consciousness: awake    Nausea/Vomiting: no nausea/vomiting    Complications: none    Transfer of care protocol was followed      Last vitals: Visit Vitals  BP (!) 144/78 (BP Location: Left arm, Patient Position: Lying)   Pulse 85   Temp 36.5 °C (97.7 °F) (Temporal)   Resp 16   Ht 5' 5" (1.651 m)   Wt 78.5 kg (173 lb)   LMP 01/01/1994 (Approximate)   SpO2 99%   Breastfeeding No   BMI 28.79 kg/m²     "

## 2023-11-16 LAB
FINAL PATHOLOGIC DIAGNOSIS: NORMAL
GROSS: NORMAL
Lab: NORMAL

## 2023-12-04 ENCOUNTER — PATIENT MESSAGE (OUTPATIENT)
Dept: INTERNAL MEDICINE | Facility: CLINIC | Age: 68
End: 2023-12-04
Payer: MEDICARE

## 2023-12-11 ENCOUNTER — PATIENT MESSAGE (OUTPATIENT)
Dept: SPORTS MEDICINE | Facility: CLINIC | Age: 68
End: 2023-12-11
Payer: MEDICARE

## 2023-12-19 ENCOUNTER — PATIENT MESSAGE (OUTPATIENT)
Dept: INTERNAL MEDICINE | Facility: CLINIC | Age: 68
End: 2023-12-19
Payer: MEDICARE

## 2023-12-20 DIAGNOSIS — E11.65 TYPE 2 DIABETES MELLITUS WITH HYPERGLYCEMIA, WITHOUT LONG-TERM CURRENT USE OF INSULIN: Primary | ICD-10-CM

## 2023-12-20 DIAGNOSIS — E78.5 HYPERLIPIDEMIA, UNSPECIFIED HYPERLIPIDEMIA TYPE: Primary | ICD-10-CM

## 2023-12-20 RX ORDER — ORAL SEMAGLUTIDE 3 MG/1
3 TABLET ORAL DAILY
Qty: 30 TABLET | Refills: 2 | Status: SHIPPED | OUTPATIENT
Start: 2023-12-20 | End: 2023-12-20 | Stop reason: SDUPTHER

## 2023-12-20 RX ORDER — ORAL SEMAGLUTIDE 3 MG/1
3 TABLET ORAL DAILY
Qty: 30 TABLET | Refills: 2 | Status: SHIPPED | OUTPATIENT
Start: 2023-12-20 | End: 2024-03-12

## 2023-12-21 ENCOUNTER — LAB VISIT (OUTPATIENT)
Dept: PRIMARY CARE CLINIC | Facility: CLINIC | Age: 68
End: 2023-12-21
Payer: MEDICARE

## 2023-12-21 ENCOUNTER — CLINICAL SUPPORT (OUTPATIENT)
Dept: AUDIOLOGY | Facility: CLINIC | Age: 68
End: 2023-12-21
Payer: MEDICARE

## 2023-12-21 ENCOUNTER — OFFICE VISIT (OUTPATIENT)
Dept: OTOLARYNGOLOGY | Facility: CLINIC | Age: 68
End: 2023-12-21
Payer: MEDICARE

## 2023-12-21 DIAGNOSIS — E11.65 TYPE 2 DIABETES MELLITUS WITH HYPERGLYCEMIA, WITHOUT LONG-TERM CURRENT USE OF INSULIN: ICD-10-CM

## 2023-12-21 DIAGNOSIS — R42 DIZZINESS: ICD-10-CM

## 2023-12-21 DIAGNOSIS — E78.5 HYPERLIPIDEMIA, UNSPECIFIED HYPERLIPIDEMIA TYPE: ICD-10-CM

## 2023-12-21 DIAGNOSIS — H91.8X3 ASYMMETRICAL HEARING LOSS: Primary | ICD-10-CM

## 2023-12-21 DIAGNOSIS — H92.02 LEFT EAR PAIN: ICD-10-CM

## 2023-12-21 DIAGNOSIS — H93.8X2 EAR FULLNESS, LEFT: ICD-10-CM

## 2023-12-21 DIAGNOSIS — H93.12 TINNITUS, LEFT: ICD-10-CM

## 2023-12-21 LAB
CHOLEST SERPL-MCNC: 154 MG/DL (ref 120–199)
CHOLEST/HDLC SERPL: 2.8 {RATIO} (ref 2–5)
ESTIMATED AVG GLUCOSE: 203 MG/DL (ref 68–131)
HBA1C MFR BLD: 8.7 % (ref 4–5.6)
HDLC SERPL-MCNC: 55 MG/DL (ref 40–75)
HDLC SERPL: 35.7 % (ref 20–50)
LDLC SERPL CALC-MCNC: 78.8 MG/DL (ref 63–159)
NONHDLC SERPL-MCNC: 99 MG/DL
TRIGL SERPL-MCNC: 101 MG/DL (ref 30–150)

## 2023-12-21 PROCEDURE — 92567 PR TYMPA2METRY: ICD-10-PCS | Mod: HCNC,S$GLB,,

## 2023-12-21 PROCEDURE — 3288F PR FALLS RISK ASSESSMENT DOCUMENTED: ICD-10-PCS | Mod: HCNC,CPTII,S$GLB, | Performed by: OTOLARYNGOLOGY

## 2023-12-21 PROCEDURE — 1101F PR PT FALLS ASSESS DOC 0-1 FALLS W/OUT INJ PAST YR: ICD-10-PCS | Mod: HCNC,CPTII,S$GLB, | Performed by: OTOLARYNGOLOGY

## 2023-12-21 PROCEDURE — 1101F PT FALLS ASSESS-DOCD LE1/YR: CPT | Mod: HCNC,CPTII,S$GLB, | Performed by: OTOLARYNGOLOGY

## 2023-12-21 PROCEDURE — 3288F FALL RISK ASSESSMENT DOCD: CPT | Mod: HCNC,CPTII,S$GLB, | Performed by: OTOLARYNGOLOGY

## 2023-12-21 PROCEDURE — 4010F ACE/ARB THERAPY RXD/TAKEN: CPT | Mod: HCNC,CPTII,S$GLB, | Performed by: OTOLARYNGOLOGY

## 2023-12-21 PROCEDURE — 83036 HEMOGLOBIN GLYCOSYLATED A1C: CPT | Mod: HCNC | Performed by: NURSE PRACTITIONER

## 2023-12-21 PROCEDURE — 92567 TYMPANOMETRY: CPT | Mod: HCNC,S$GLB,,

## 2023-12-21 PROCEDURE — 3052F HG A1C>EQUAL 8.0%<EQUAL 9.0%: CPT | Mod: HCNC,CPTII,S$GLB, | Performed by: OTOLARYNGOLOGY

## 2023-12-21 PROCEDURE — 3052F PR MOST RECENT HEMOGLOBIN A1C LEVEL 8.0 - < 9.0%: ICD-10-PCS | Mod: HCNC,CPTII,S$GLB, | Performed by: OTOLARYNGOLOGY

## 2023-12-21 PROCEDURE — 92557 PR COMPREHENSIVE HEARING TEST: ICD-10-PCS | Mod: HCNC,S$GLB,,

## 2023-12-21 PROCEDURE — 99214 OFFICE O/P EST MOD 30 MIN: CPT | Mod: HCNC,S$GLB,, | Performed by: OTOLARYNGOLOGY

## 2023-12-21 PROCEDURE — 92557 COMPREHENSIVE HEARING TEST: CPT | Mod: HCNC,S$GLB,,

## 2023-12-21 PROCEDURE — 4010F PR ACE/ARB THEARPY RXD/TAKEN: ICD-10-PCS | Mod: HCNC,CPTII,S$GLB, | Performed by: OTOLARYNGOLOGY

## 2023-12-21 PROCEDURE — 80061 LIPID PANEL: CPT | Mod: HCNC | Performed by: NURSE PRACTITIONER

## 2023-12-21 PROCEDURE — 99214 PR OFFICE/OUTPT VISIT, EST, LEVL IV, 30-39 MIN: ICD-10-PCS | Mod: HCNC,S$GLB,, | Performed by: OTOLARYNGOLOGY

## 2023-12-21 NOTE — PROGRESS NOTES
Lizabeth Saha, a 68 y.o. female, was seen today in the clinic for an audiologic evaluation.  The patient has a history of an asymmetric sensorineural hearing loss (SNHL) that is more severe in the left ear.  The patient reported left ear otalgia, left ear fullness, and intermittent tinnitus in the left ear.  Ms. Saha wears binaural hearing aids but reported when she wears the left hearing aid she feels that she hears worse in the left ear.  She also reported she experiences an off-balance dizziness.      Tympanometry revealed Type A in the right ear and Type A in the left ear.  Audiogram results revealed a normal sloping to moderately-severe SNHL in the right ear and a severe rising to moderately-severe SNHL in the left ear.  Speech reception thresholds were noted at 30 dB in the right ear and 70 dB in the left ear.  Speech discrimination scores were 92% in the right ear and 72% in the left ear.  There is an asymmetry between ears.     Recommendations:  Otologic evaluation  Hearing protection when in noise  Proper daily use of binaural hearing aids  Hearing aid follow-up with dispensing audiologist  Annual audiogram or sooner if change in hearing is perceived

## 2023-12-21 NOTE — PROGRESS NOTES
Subjective:   Lizabeth is a 68 y.o. female who presents for left ear pain and left neck pain. She reports intermittent, sharp shooting left ear pain that has been present for about 1 year. The pain is brief and often comes at night when she is lying on the left side. She also reports left neck pain- there is no pattern to its presentation. It does not occur with the ear pain. She has not tried anything for either of these problems.Denies any concerning throat symptoms. Unrelated, she reports her left ear hearing has decreased dramatically over the past year. She wears hearing aids AU, but feels like the left hearing aid is actually interfering with her hearing abilities. She denies any tinnitus, otorrhea, or vertigo.     The patient's medications, allergies, past medical, surgical, social and family histories were reviewed and updated as appropriate.    A detailed review of systems was obtained with pertinent positives as per the above HPI, and otherwise negative.   Objective:     Constitutional:   She is oriented to person, place, and time. She appears well-developed and well-nourished. She appears alert. She is cooperative.  Non-toxic appearance. She does not have a sickly appearance. She does not appear ill. Normal speech.      Head:  Normocephalic and atraumatic. Not macrocephalic and not microcephalic. Head is without raccoon's eyes, without Trevizo's sign, without abrasion, without laceration, without right periorbital erythema, without left periorbital erythema and without TMJ tenderness.         Ears:    Right Ear: No lacerations. No drainage, swelling or tenderness. No foreign bodies. No mastoid tenderness. Tympanic membrane is not injected, not scarred, not perforated, not erythematous, not retracted and not bulging. No middle ear effusion. No hemotympanum.   Left Ear: No lacerations. No drainage, swelling or tenderness. No foreign bodies. No mastoid tenderness. Tympanic membrane is not injected, not scarred,  "not perforated, not erythematous, not retracted and not bulging.  No middle ear effusion. No hemotympanum.     Neck:  No adenopathy.     Pulmonary/Chest:   Effort normal.     Psychiatric:   She has a normal mood and affect. Her speech is normal and behavior is normal.     Neurological:   She is alert and oriented to person, place, and time.     Procedure  None    Audiogram      MRI/IAC Temporal bone from 6/26/2020 showed no evidence of a vestibular schwannoma.  Assessment:     1. Asymmetrical hearing loss      Plan:   Left ear pain  Ear exam WNL, no signs of infection. 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 Ahuja. Discussed with patient the etiology of TMJ syndrome and management strategies.  Recommended conservative treatment measures such as OTC anti-inflammatories, "Jaw rest" (soft foods, no crunchy foods, gum or ice chewing), ice pack on jaw joint and stress reduction/ behavioral management.     Neck pain on left side  Possibly related to TMJ, but also could me musculoskeletal. No ENT cause noted.     Asymmetrical hearing loss  Prior MRI reviewed, asymmetrical hearing loss has been gradually worsening. Denies tinnitus, fullness or vertigo.    Rec: BiCROS HA.    Re check 1 yr.    "

## 2024-01-03 ENCOUNTER — PATIENT MESSAGE (OUTPATIENT)
Dept: INTERNAL MEDICINE | Facility: CLINIC | Age: 69
End: 2024-01-03
Payer: MEDICARE

## 2024-01-08 RX ORDER — LANCETS 33 GAUGE
EACH MISCELLANEOUS 3 TIMES DAILY
Qty: 300 EACH | Refills: 3 | Status: SHIPPED | OUTPATIENT
Start: 2024-01-08

## 2024-01-08 RX ORDER — GLIPIZIDE 2.5 MG/1
2.5 TABLET, EXTENDED RELEASE ORAL
Qty: 90 TABLET | Refills: 3 | Status: SHIPPED | OUTPATIENT
Start: 2024-01-08 | End: 2025-01-07

## 2024-01-17 ENCOUNTER — TELEPHONE (OUTPATIENT)
Dept: INTERNAL MEDICINE | Facility: CLINIC | Age: 69
End: 2024-01-17
Payer: MEDICARE

## 2024-01-17 NOTE — PROGRESS NOTES
INTERNAL MEDICINE CLINIC - SAME DAY APPOINTMENT  Progress Note    PRESENTING HISTORY     PCP: Orquidea Lancaster MD    Chief Complaint/Reason for Visit:   No chief complaint on file.    History of Present Illness & ROS : Ms. Lizabeth Saha is a 68 y.o. female.    Same day apt  New to me  Very pleasant lady.   Notations made to chart to PcP's office on yesterday.   Reports that has been having a sore throat and noticed 'white patches to back of throat earlier this week'. Had 'fever' during the week.   Still with cough, 'runny nose', and sore throat as of today.   Resides at home with her spouse.     Review of Systems:  Eyes: denies visual changes at this time denies floaters   Respiratory: no cough or shorness of breath  Cardiovascular: no chest pain or palpitations  Gastrointestinal: no nausea or vomiting, no abdominal pain or change in bowel habits  Genitourinary: no hematuria or dysuria; denies frequency  Hematologic/Lymphatic: no easy bruising or lymphadenopathy  Musculoskeletal: no arthralgias or myalgias  Neurological: no seizures or tremors  Endocrine: no heat or cold intolerance      PAST HISTORY:     Past Medical History:   Diagnosis Date    Diabetes mellitus type II     Hyperlipemia     Irritable bowel syndrome     Obesity     Osteopenia     Rhinitis     Sleep apnea     Vertigo        Past Surgical History:   Procedure Laterality Date    CHOLECYSTECTOMY      COLONOSCOPY N/A 5/31/2022    Procedure: COLONOSCOPY;  Surgeon: Todd Suárez MD;  Location: [address]);  Service: Endoscopy;  Laterality: N/A;  fully vaccinated-GT    COLONOSCOPY N/A 11/14/2023    Procedure: COLONOSCOPY;  Surgeon: Todd Suárez MD;  Location: [address]);  Service: Colon and Rectal;  Laterality: N/A;  Per Pt last flex-sig with Dr. Suárez on 11/7/22, Pt due for 1 year f/u colonoscopy in November 2023  Ref by Dr RIMMA Suárez, pt states not taking Semaglutide, PEG, portal/mail - PC  11/7-precall complete-MS     PARTIAL HYSTERECTOMY         Family History   Problem Relation Age of Onset    Hypertension Mother     Heart disease Mother     Kidney disease Mother     Hypertension Father     Cancer Sister         liver     Diabetes Sister     Diabetes Sister     Diabetes Brother     Hypertension Brother     Kidney disease Brother     No Known Problems Daughter     Cancer Maternal Aunt         breast cancer     Breast cancer Maternal Aunt        Social History     Socioeconomic History    Marital status:    Tobacco Use    Smoking status: Former     Current packs/day: 0.00     Types: Cigarettes     Quit date: 1989     Years since quittin.0    Smokeless tobacco: Never   Substance and Sexual Activity    Alcohol use: Yes     Alcohol/week: 1.0 standard drink of alcohol     Types: 1 Glasses of wine per week     Comment: 4-5 glasses of wine monthly    Drug use: No     Social Determinants of Health     Financial Resource Strain: Low Risk  (2019)    Overall Financial Resource Strain (CARDIA)     Difficulty of Paying Living Expenses: Not hard at all   Food Insecurity: No Food Insecurity (2019)    Hunger Vital Sign     Worried About Running Out of Food in the Last Year: Never true     Ran Out of Food in the Last Year: Never true   Transportation Needs: No Transportation Needs (2019)    PRAPARE - Transportation     Lack of Transportation (Medical): No     Lack of Transportation (Non-Medical): No   Physical Activity: Insufficiently Active (2020)    Exercise Vital Sign     Days of Exercise per Week: 2 days     Minutes of Exercise per Session: 20 min   Stress: No Stress Concern Present (2020)    [employer] - Occupational Stress Questionnaire     Feeling of Stress : Not at all   Social Connections: Unknown (2020)    Social Connection and Isolation Panel [NHANES]     Frequency of Social Gatherings with Friends and Family: Once a week     Active Member of Clubs or  Organizations: No       MEDICATIONS & ALLERGIES:     Current Outpatient Medications on File Prior to Visit   Medication Sig Dispense Refill    alcohol swabs (BD ALCOHOL SWABS) PadM Apply 1 each topically daily as needed (testing). 100 each 4    blood sugar diagnostic (TRUE METRIX GLUCOSE TEST STRIP) Strp USE FOR BLOOD SUGAR TESTING ONE TIME DAILY 100 strip 5    blood-glucose meter kit To check BG 3 times daily, to use with insurance preferred meter, newest meter, e 11.65 1 each 0    calcium-vitamin D 250 mg-2.5 mcg (100 unit) per tablet Take 1 tablet by mouth 3 (three) times a week.      empagliflozin (JARDIANCE) 25 mg tablet TAKE 1 TABLET EVERY DAY 90 tablet 2    fluticasone propionate (FLONASE) 50 mcg/actuation nasal spray 1 spray (50 mcg total) by Each Nostril route once daily. 3 Bottle 4    glipiZIDE (GLUCOTROL) 2.5 MG TR24 TAKE 1 TABLET (2.5 MG TOTAL) BY MOUTH DAILY WITH BREAKFAST. 90 tablet 3    rosuvastatin (CRESTOR) 20 MG tablet Take 1 tablet (20 mg total) by mouth once daily. 90 tablet 3    semaglutide (RYBELSUS) 3 mg tablet Take 1 tablet (3 mg total) by mouth once daily. Take on empty stomach, wait 30 mins in am,  4-6 oz water only. 30 tablet 2    TRUEPLUS LANCETS 33 gauge Misc TEST BLOOD SUGAR THREE TIMES DAILY 300 each 3    valsartan (DIOVAN) 80 MG tablet TAKE 1 TABLET EVERY DAY (NEED MD APPOINTMENT) 90 tablet 2     No current facility-administered medications on file prior to visit.        Review of patient's allergies indicates:   Allergen Reactions    Phenergan  [promethazine]      Other reaction(s): Hives    Atorvastatin      Myalgia         Medications Reconciliation:   I have reconciled the patient's home medications with the patient/family. I have updated all changes.  Refer to After-Visit Medication List.    OBJECTIVE:     Vital Signs:  There were no vitals filed for this visit.  Wt Readings from Last 3 Encounters:   11/14/23 0958 78.5 kg (173 lb)   11/06/23 0952 78.9 kg (173 lb 15.1 oz)    10/31/23 1422 80.8 kg (178 lb 2.1 oz)     There is no height or weight on file to calculate BMI.   Wt Readings from Last 3 Encounters:   01/18/24 77.8 kg (171 lb 8.3 oz)   11/14/23 78.5 kg (173 lb)   11/06/23 78.9 kg (173 lb 15.1 oz)     Temp Readings from Last 3 Encounters:   11/14/23 98.1 °F (36.7 °C) (Temporal)   10/25/22 98.2 °F (36.8 °C) (Oral)   05/31/22 97.5 °F (36.4 °C) (Temporal)     BP Readings from Last 3 Encounters:   01/18/24 (!) 102/58   11/14/23 112/68   11/06/23 126/78     Pulse Readings from Last 3 Encounters:   01/18/24 85   11/14/23 70   11/06/23 82         Physical Exam:   (Focused Exam)  General: Well developed, well nourished. No distress.  HEENT: Head is normocephalic, atraumatic  Right TM: + hearing aid  Left TM: unremarkable   Posterior oral pharynx with small white exudative patch to right   Eyes: Clear conjunctiva.  Neck: Supple, symmetrical neck; trachea midline.  Extremities: No LE edema. Pulses 2+ and symmetric.   Skin: Skin color, texture, turgor normal. No rashes.  Musculoskeletal: Normal gait.   Neurologic: Normal strength and tone. No focal numbness or weakness.     Laboratory  Lab Results   Component Value Date    WBC 6.45 06/15/2021    HGB 12.5 06/15/2021    HCT 41.1 06/15/2021     06/15/2021    CHOL 154 12/21/2023    TRIG 101 12/21/2023    HDL 55 12/21/2023    ALT 20 08/10/2023    AST 23 08/10/2023     08/10/2023    K 4.4 08/10/2023     08/10/2023    CREATININE 1.0 08/10/2023    BUN 11 08/10/2023    CO2 25 08/10/2023    TSH 1.125 07/29/2022    HGBA1C 8.7 (H) 12/21/2023       ASSESSMENT & PLAN:     Same day apt.     Streptococcus pharyngitis  Exudative pharyngitis  Otalgia, unspecified laterality  Sating today: 98% RA   -     POCT Strep A, Molecular (positive)  -     benzonatate (TESSALON) 100 MG capsule; Take 1 capsule (100 mg total) by mouth 3 (three) times daily as needed for Cough.  Dispense: 30 capsule; Refill: 0  -     amoxicillin-clavulanate 875-125mg  (AUGMENTIN) 875-125 mg per tablet; Take 1 tablet by mouth every 12 (twelve) hours.  Dispense: 20 tablet; Refill: 0  -     guaiFENesin-codeine 100-10 mg/5 ml (TUSSI-ORGANIDIN NR)  mg/5 mL syrup; Take 5 mLs by mouth 3 (three) times daily as needed for Cough.  Dispense: 105 mL; Refill: 0  *Advised to no eating, drinking or sharing of cups, eating utensils with her spouse until completed full course of antibiotic therapy. She understands that spread via saliva transfer.     *Followed by Dr. HERBERT Ac for chronic 'ear' issues. Encouraged to keep follow ups with ENT and her PcP    Future Appointments   Date Time Provider Department Center   1/23/2024  1:15 PM [facility]   2/7/2024  1:15 PM Donte Bush DPM [facility] POD [facility]   2/26/2024  2:00 PM Donte Sood MD [facility] ENT [facility]   3/7/2024  9:30 [facility]   3/12/2024 10:00 AM Ke Maier APRN, FNP [facility]   3/19/2024 10:00 AM Orquidea Lancaster MD [facility]        Medication List            Accurate as of January 18, 2024 11:04 AM. If you have any questions, ask your nurse or doctor.                START taking these medications      amoxicillin-clavulanate 875-125mg 875-125 mg per tablet  Commonly known as: AUGMENTIN  Take 1 tablet by mouth every 12 (twelve) hours.  Started by: JASON Lira     benzonatate 100 MG capsule  Commonly known as: TESSALON  Take 1 capsule (100 mg total) by mouth 3 (three) times daily as needed for Cough.  Started by: JASON Lira     guaiFENesin-codeine 100-10 mg/5 ml  mg/5 mL syrup  Commonly known as: TUSSI-ORGANIDIN NR  Take 5 mLs by mouth 3 (three) times daily as needed for Cough.  Started by: JASON Lira            CONTINUE taking these medications      alcohol swabs Padm  Commonly known as: BD ALCOHOL SWABS  Apply 1 each topically daily as needed (testing).      blood-glucose meter kit  To check BG 3 times daily, to use with insurance preferred meter, newest meter, e 11.65     calcium-vitamin D 250 mg-2.5 mcg (100 unit) per tablet     fluticasone propionate 50 mcg/actuation nasal spray  Commonly known as: FLONASE  1 spray (50 mcg total) by Each Nostril route once daily.     glipiZIDE 2.5 MG Tr24  Commonly known as: GLUCOTROL  TAKE 1 TABLET (2.5 MG TOTAL) BY MOUTH DAILY WITH BREAKFAST.     JARDIANCE 25 mg tablet  Generic drug: empagliflozin  TAKE 1 TABLET EVERY DAY     rosuvastatin 20 MG tablet  Commonly known as: CRESTOR  Take 1 tablet (20 mg total) by mouth once daily.     RYBELSUS 3 mg tablet  Generic drug: semaglutide  Take 1 tablet (3 mg total) by mouth once daily. Take on empty stomach, wait 30 mins in am,  4-6 oz water only.     TRUE METRIX GLUCOSE TEST STRIP Strp  Generic drug: blood sugar diagnostic  USE FOR BLOOD SUGAR TESTING ONE TIME DAILY     TRUEPLUS LANCETS 33 gauge Misc  Generic drug: lancets  TEST BLOOD SUGAR THREE TIMES DAILY     valsartan 80 MG tablet  Commonly known as: DIOVAN  TAKE 1 TABLET EVERY DAY (NEED MD APPOINTMENT)               Where to Get Your Medications        These medications were sent to [id number] [address] AT [address] [address]      Phone: [phone]   amoxicillin-clavulanate 875-125mg 875-125 mg per tablet  benzonatate 100 MG capsule  guaiFENesin-codeine 100-10 mg/5 ml  mg/5 mL syrup       Signing Physician:  JASON Lira

## 2024-01-17 NOTE — TELEPHONE ENCOUNTER
Pt states she has sore throat and white patches on throat. Pt was scheduled urgent appt. Pt is requesting RX sent to pharmacy for sore throat. Pt was advised she will need to come in for evaluation, pt verbally understand and states she will come in tomorrow to be seen.

## 2024-01-17 NOTE — TELEPHONE ENCOUNTER
----- Message from Dianne Bartlett sent at 1/17/2024 12:49 PM CST -----  Contact: [phone]  1MEDICALADVICE     Patient is calling for Medical Advice regarding:    Symptom: Sore Throat  Outcome: Schedule an appointment to be seen within 24 hours.  Reason: Caller denied all higher acuity questions  The caller accepted this outcome       How long has patient had these symptoms:Today    Pharmacy name and phone#:      [facility] #[id number] - [address] AT [address] [address]  Phone: [phone] Fax: [phone]        Would like response via [facility]:  call     Comments:

## 2024-01-18 ENCOUNTER — OFFICE VISIT (OUTPATIENT)
Dept: INTERNAL MEDICINE | Facility: CLINIC | Age: 69
End: 2024-01-18
Payer: MEDICARE

## 2024-01-18 VITALS
HEIGHT: 65 IN | BODY MASS INDEX: 28.57 KG/M2 | DIASTOLIC BLOOD PRESSURE: 58 MMHG | WEIGHT: 171.5 LBS | HEART RATE: 85 BPM | OXYGEN SATURATION: 98 % | SYSTOLIC BLOOD PRESSURE: 102 MMHG

## 2024-01-18 DIAGNOSIS — J02.9 EXUDATIVE PHARYNGITIS: ICD-10-CM

## 2024-01-18 DIAGNOSIS — J02.0 STREPTOCOCCUS PHARYNGITIS: Primary | ICD-10-CM

## 2024-01-18 DIAGNOSIS — H92.09 OTALGIA, UNSPECIFIED LATERALITY: ICD-10-CM

## 2024-01-18 LAB
CTP QC/QA: YES
MOLECULAR STREP A: POSITIVE

## 2024-01-18 PROCEDURE — 99999 PR PBB SHADOW E&M-EST. PATIENT-LVL IV: CPT | Mod: PBBFAC,HCNC,, | Performed by: NURSE PRACTITIONER

## 2024-01-18 PROCEDURE — 1126F AMNT PAIN NOTED NONE PRSNT: CPT | Mod: HCNC,CPTII,S$GLB, | Performed by: NURSE PRACTITIONER

## 2024-01-18 PROCEDURE — 3074F SYST BP LT 130 MM HG: CPT | Mod: HCNC,CPTII,S$GLB, | Performed by: NURSE PRACTITIONER

## 2024-01-18 PROCEDURE — 87651 STREP A DNA AMP PROBE: CPT | Mod: QW,HCNC,S$GLB, | Performed by: NURSE PRACTITIONER

## 2024-01-18 PROCEDURE — 1159F MED LIST DOCD IN RCRD: CPT | Mod: HCNC,CPTII,S$GLB, | Performed by: NURSE PRACTITIONER

## 2024-01-18 PROCEDURE — 3008F BODY MASS INDEX DOCD: CPT | Mod: HCNC,CPTII,S$GLB, | Performed by: NURSE PRACTITIONER

## 2024-01-18 PROCEDURE — 99214 OFFICE O/P EST MOD 30 MIN: CPT | Mod: HCNC,S$GLB,, | Performed by: NURSE PRACTITIONER

## 2024-01-18 PROCEDURE — 1160F RVW MEDS BY RX/DR IN RCRD: CPT | Mod: HCNC,CPTII,S$GLB, | Performed by: NURSE PRACTITIONER

## 2024-01-18 PROCEDURE — 1101F PT FALLS ASSESS-DOCD LE1/YR: CPT | Mod: HCNC,CPTII,S$GLB, | Performed by: NURSE PRACTITIONER

## 2024-01-18 PROCEDURE — 3288F FALL RISK ASSESSMENT DOCD: CPT | Mod: HCNC,CPTII,S$GLB, | Performed by: NURSE PRACTITIONER

## 2024-01-18 PROCEDURE — 3078F DIAST BP <80 MM HG: CPT | Mod: HCNC,CPTII,S$GLB, | Performed by: NURSE PRACTITIONER

## 2024-01-18 RX ORDER — BENZONATATE 100 MG/1
100 CAPSULE ORAL 3 TIMES DAILY PRN
Qty: 30 CAPSULE | Refills: 0 | Status: SHIPPED | OUTPATIENT
Start: 2024-01-18 | End: 2024-01-28

## 2024-01-18 RX ORDER — CODEINE PHOSPHATE AND GUAIFENESIN 10; 100 MG/5ML; MG/5ML
5 SOLUTION ORAL 3 TIMES DAILY PRN
Qty: 105 ML | Refills: 0 | Status: SHIPPED | OUTPATIENT
Start: 2024-01-18 | End: 2024-01-25

## 2024-01-18 RX ORDER — AMOXICILLIN AND CLAVULANATE POTASSIUM 875; 125 MG/1; MG/1
1 TABLET, FILM COATED ORAL EVERY 12 HOURS
Qty: 20 TABLET | Refills: 0 | Status: SHIPPED | OUTPATIENT
Start: 2024-01-18 | End: 2024-03-12

## 2024-01-23 ENCOUNTER — OFFICE VISIT (OUTPATIENT)
Dept: SPORTS MEDICINE | Facility: CLINIC | Age: 69
End: 2024-01-23
Payer: MEDICARE

## 2024-01-23 VITALS
SYSTOLIC BLOOD PRESSURE: 135 MMHG | DIASTOLIC BLOOD PRESSURE: 78 MMHG | HEART RATE: 96 BPM | BODY MASS INDEX: 28.4 KG/M2 | WEIGHT: 170.63 LBS

## 2024-01-23 DIAGNOSIS — M17.12 PATELLOFEMORAL ARTHRITIS OF LEFT KNEE: ICD-10-CM

## 2024-01-23 DIAGNOSIS — M25.551 GREATER TROCHANTERIC PAIN SYNDROME OF RIGHT LOWER EXTREMITY: ICD-10-CM

## 2024-01-23 DIAGNOSIS — G89.29 CHRONIC PAIN OF LEFT KNEE: Primary | ICD-10-CM

## 2024-01-23 DIAGNOSIS — M17.12 PRIMARY OSTEOARTHRITIS OF LEFT KNEE: ICD-10-CM

## 2024-01-23 DIAGNOSIS — M25.561 MECHANICAL KNEE PAIN, RIGHT: ICD-10-CM

## 2024-01-23 DIAGNOSIS — M25.562 CHRONIC PAIN OF LEFT KNEE: Primary | ICD-10-CM

## 2024-01-23 PROCEDURE — 99999 PR PBB SHADOW E&M-EST. PATIENT-LVL III: CPT | Mod: PBBFAC,HCNC,, | Performed by: STUDENT IN AN ORGANIZED HEALTH CARE EDUCATION/TRAINING PROGRAM

## 2024-01-23 PROCEDURE — 1160F RVW MEDS BY RX/DR IN RCRD: CPT | Mod: HCNC,CPTII,S$GLB, | Performed by: STUDENT IN AN ORGANIZED HEALTH CARE EDUCATION/TRAINING PROGRAM

## 2024-01-23 PROCEDURE — 1159F MED LIST DOCD IN RCRD: CPT | Mod: HCNC,CPTII,S$GLB, | Performed by: STUDENT IN AN ORGANIZED HEALTH CARE EDUCATION/TRAINING PROGRAM

## 2024-01-23 PROCEDURE — 3008F BODY MASS INDEX DOCD: CPT | Mod: HCNC,CPTII,S$GLB, | Performed by: STUDENT IN AN ORGANIZED HEALTH CARE EDUCATION/TRAINING PROGRAM

## 2024-01-23 PROCEDURE — 3288F FALL RISK ASSESSMENT DOCD: CPT | Mod: HCNC,CPTII,S$GLB, | Performed by: STUDENT IN AN ORGANIZED HEALTH CARE EDUCATION/TRAINING PROGRAM

## 2024-01-23 PROCEDURE — 1101F PT FALLS ASSESS-DOCD LE1/YR: CPT | Mod: HCNC,CPTII,S$GLB, | Performed by: STUDENT IN AN ORGANIZED HEALTH CARE EDUCATION/TRAINING PROGRAM

## 2024-01-23 PROCEDURE — 1125F AMNT PAIN NOTED PAIN PRSNT: CPT | Mod: HCNC,CPTII,S$GLB, | Performed by: STUDENT IN AN ORGANIZED HEALTH CARE EDUCATION/TRAINING PROGRAM

## 2024-01-23 PROCEDURE — 99214 OFFICE O/P EST MOD 30 MIN: CPT | Mod: HCNC,S$GLB,, | Performed by: STUDENT IN AN ORGANIZED HEALTH CARE EDUCATION/TRAINING PROGRAM

## 2024-01-23 PROCEDURE — 3075F SYST BP GE 130 - 139MM HG: CPT | Mod: HCNC,CPTII,S$GLB, | Performed by: STUDENT IN AN ORGANIZED HEALTH CARE EDUCATION/TRAINING PROGRAM

## 2024-01-23 PROCEDURE — 3078F DIAST BP <80 MM HG: CPT | Mod: HCNC,CPTII,S$GLB, | Performed by: STUDENT IN AN ORGANIZED HEALTH CARE EDUCATION/TRAINING PROGRAM

## 2024-01-23 NOTE — PROGRESS NOTES
CC: bilateral knee pain, right hip pain    68 y.o. Female presents today for follow up evaluation of her left knee pain following CSI. Pt reports she initially did not get relief, then had relief for about 1 week, then pain returned. Pt reports pain is 5/10 today. Pt denies mechanical symptoms. Pt reports giving out in the left knee. Pt denies numbness/tingling.      Pt reports gradual onset of right knee pain about 2-3 weeks ago. Pt reports pain is 4/10 in the right knee today. Pt localizes pain to anteromedial and posterior knee. Pt denies mechanical symptoms. Pt notes giving out in the right knee as well. Pt denies numbness/tingling.     Attempted treatments: left knee CSI  Pain score: 5/10 left, 4/10 right   History of trauma/injury: increased R knee pain, 2-3 weeks ago  Affecting ADLs: yes    68 y.o. Female presents today for follow up evaluation of her right hip pain following CSI. Pt reports pain has improved, and reports pain is 1/10 today. Pt denies mechanical symptoms. Pt denies numbness/tingling.     Attempted treatments: CSI  Pain score: 1/10  History of trauma/injury: see above  Affecting ADLs: no      REVIEW OF SYSTEMS:   Constitution: Patient denies fever or chills.  Eyes: Patient denies eye pain or vision changes.  HEENT: Patient denies ear pain, sore throat, or nasal discharge.  CVS: Patient denies chest pain.  Lungs: Patient denies shortness of breath or cough.  Skin: Patient denies skin rash or itching.    Musculoskeletal: Patient denies recent falls. See HPI.  Psych: Patient denies any current anxiety or nervousness.    PAST MEDICAL HISTORY:   Past Medical History:   Diagnosis Date    Diabetes mellitus type II     Hyperlipemia     Irritable bowel syndrome     Obesity     Osteopenia     Rhinitis     Sleep apnea     Vertigo        MEDICATIONS:     Current Outpatient Medications:     alcohol swabs (BD ALCOHOL SWABS) PadM, Apply 1 each topically daily as needed (testing)., Disp: 100 each, Rfl: 4     amoxicillin-clavulanate 875-125mg (AUGMENTIN) 875-125 mg per tablet, Take 1 tablet by mouth every 12 (twelve) hours., Disp: 20 tablet, Rfl: 0    benzonatate (TESSALON) 100 MG capsule, Take 1 capsule (100 mg total) by mouth 3 (three) times daily as needed for Cough., Disp: 30 capsule, Rfl: 0    blood sugar diagnostic (TRUE METRIX GLUCOSE TEST STRIP) Strp, USE FOR BLOOD SUGAR TESTING ONE TIME DAILY, Disp: 100 strip, Rfl: 5    blood-glucose meter kit, To check BG 3 times daily, to use with insurance preferred meter, newest meter, e 11.65, Disp: 1 each, Rfl: 0    calcium-vitamin D 250 mg-2.5 mcg (100 unit) per tablet, Take 1 tablet by mouth 3 (three) times a week., Disp: , Rfl:     empagliflozin (JARDIANCE) 25 mg tablet, TAKE 1 TABLET EVERY DAY, Disp: 90 tablet, Rfl: 2    fluticasone propionate (FLONASE) 50 mcg/actuation nasal spray, 1 spray (50 mcg total) by Each Nostril route once daily., Disp: 3 Bottle, Rfl: 4    glipiZIDE (GLUCOTROL) 2.5 MG TR24, TAKE 1 TABLET (2.5 MG TOTAL) BY MOUTH DAILY WITH BREAKFAST., Disp: 90 tablet, Rfl: 3    guaiFENesin-codeine 100-10 mg/5 ml (TUSSI-ORGANIDIN NR)  mg/5 mL syrup, Take 5 mLs by mouth 3 (three) times daily as needed for Cough., Disp: 105 mL, Rfl: 0    rosuvastatin (CRESTOR) 20 MG tablet, Take 1 tablet (20 mg total) by mouth once daily., Disp: 90 tablet, Rfl: 3    semaglutide (RYBELSUS) 3 mg tablet, Take 1 tablet (3 mg total) by mouth once daily. Take on empty stomach, wait 30 mins in am,  4-6 oz water only., Disp: 30 tablet, Rfl: 2    TRUEPLUS LANCETS 33 gauge Misc, TEST BLOOD SUGAR THREE TIMES DAILY, Disp: 300 each, Rfl: 3    valsartan (DIOVAN) 80 MG tablet, TAKE 1 TABLET EVERY DAY (NEED MD APPOINTMENT), Disp: 90 tablet, Rfl: 2    ALLERGIES:   Review of patient's allergies indicates:   Allergen Reactions    Phenergan  [promethazine]      Other reaction(s): Hives    Atorvastatin      Myalgia          PHYSICAL EXAMINATION:  /78   Pulse 96   Wt 77.4 kg (170 lb 10.2  oz)   LMP 01/01/1994 (Approximate) Comment: 1994  BMI 28.40 kg/m²   Vitals signs and nursing note have been reviewed.    General: In no acute distress, well developed, well nourished, no diaphoresis  Eyes: EOM full and smooth, no eye redness or discharge  HENT: normocephalic and atraumatic, neck supple, trachea midline, no nasal discharge  Cardiovascular: no LE edema  Lungs: respirations non-labored, no conversational dyspnea   Neuro: AAOx3, CN2-12 grossly intact  Skin: No rashes, warm and dry  Psychiatric: cooperative, pleasant, mood and affect appropriate for age    Bilateral Knee:   Gait: slightly antalgic    Inspection/Palpation:   -Rubor   -Calor  -Effusion   -Patella ballotable   -Patellar apprehension  -Retinacular tenderness   +Patellar crepitus   Patellar tilt grossly normal     TTP at:  +Joint line right knee  -MCL   -LCL   -Popliteal region   -Quad tendon   -Patella  -Pat tendon  -Pat border  -Med condyle   -Lat condyle   -Pes   -Prox fibula   -Tib tub  -Gerdy's tubercle  -Distal Hamstring tendons  -Proximal Hamstrings/Ischial tuberosity  -ITB    ROM:   Ext: 0°   Flex:140°   +Discomfort w/ full flex right knee   +Bounce-home discomfort right knee    Ligamentous:   -Ant drawer   -Post drawer   -Lachman's   Good endpoints & no pain w/ valgus & varus stress    Meniscal:  +Minda's for pain on right  +Hedy on right  -Pain w/ squat     Other:  -Patellar apprehension  +Patellar grind  +López's   -J sign  -Easton's  Abductors 5/5    ASSESSMENT:      ICD-10-CM ICD-9-CM   1. Chronic pain of left knee  M25.562 719.46    G89.29 338.29   2. Primary osteoarthritis of left knee  M17.12 715.16   3. Patellofemoral arthritis of left knee  M17.12 716.96   4. Greater trochanteric pain syndrome of right lower extremity  M25.551 719.45   5. Mechanical knee pain, right  M25.561 719.46         PLAN:  1-3:  Patient with Kellgren-Mihir Grade 2 or higher osteoarthritic changes to the knee(s) (please see previous note  with description of xray images).  Prior to hyaluronic injections, patient had functional limitations with decreased range of motion and persistent pain.  Patient has tried aerobic and resistance training and weight reduction without improvement of symptoms.  Patient failed to improve with the use of NSAIDs and Acetaminophen over the last 12 months.  Patient did not receive more than 8 weeks of relief from corticosteroid injection.       4:  Symptoms resolved    5:  On patient history, physical exam findings, and imaging I am concerned for internal derangement to the patient's right knee to include meniscal pathologies.  Patient does have patellofemoral osteoarthritic changes on x-ray, but given the mechanical symptoms on physical exam, I would like to move forward with an MRI for further evaluation.    Future planning includes - Orthovisc left knee, MRI right knee    All questions were answered to the best of my ability and all concerns were addressed at this time.    Follow up for above, or sooner if needed.      This note is dictated using the M*Modal Fluency Direct word recognition program. There are word recognition mistakes that are occasionally missed on review.

## 2024-02-16 ENCOUNTER — PATIENT MESSAGE (OUTPATIENT)
Dept: SPORTS MEDICINE | Facility: CLINIC | Age: 69
End: 2024-02-16
Payer: MEDICARE

## 2024-02-19 ENCOUNTER — PATIENT MESSAGE (OUTPATIENT)
Dept: INTERNAL MEDICINE | Facility: CLINIC | Age: 69
End: 2024-02-19
Payer: MEDICARE

## 2024-02-19 ENCOUNTER — PATIENT MESSAGE (OUTPATIENT)
Dept: SPORTS MEDICINE | Facility: CLINIC | Age: 69
End: 2024-02-19
Payer: MEDICARE

## 2024-02-20 NOTE — PROGRESS NOTES
CC: left knee pain    68 y.o. Female presents today for her 1st Orthovisc injection of her left knee.     Attempted treatments: none since last visit  Pain score: 4/10  History of trauma/injury: none since last visit  Affecting ADLs: yes     REVIEW OF SYSTEMS:   Constitution: Patient denies fever or chills.  Eyes: Patient denies eye pain or vision changes.  HEENT: Patient denies ear pain, sore throat, or nasal discharge.  CVS: Patient denies chest pain.  Lungs: Patient denies shortness of breath or cough.  Skin: Patient denies skin rash or itching.    Musculoskeletal: Patient denies recent falls. See HPI.  Psych: Patient denies any current anxiety or nervousness.    PAST MEDICAL HISTORY:   Past Medical History:   Diagnosis Date    Diabetes mellitus type II     Hyperlipemia     Irritable bowel syndrome     Obesity     Osteopenia     Rhinitis     Sleep apnea     Vertigo        MEDICATIONS:     Current Outpatient Medications:     alcohol swabs (BD ALCOHOL SWABS) PadM, Apply 1 each topically daily as needed (testing)., Disp: 100 each, Rfl: 4    amoxicillin-clavulanate 875-125mg (AUGMENTIN) 875-125 mg per tablet, Take 1 tablet by mouth every 12 (twelve) hours., Disp: 20 tablet, Rfl: 0    blood sugar diagnostic (TRUE METRIX GLUCOSE TEST STRIP) Strp, USE FOR BLOOD SUGAR TESTING ONE TIME DAILY, Disp: 100 strip, Rfl: 5    blood-glucose meter kit, To check BG 3 times daily, to use with insurance preferred meter, newest meter, e 11.65, Disp: 1 each, Rfl: 0    calcium-vitamin D 250 mg-2.5 mcg (100 unit) per tablet, Take 1 tablet by mouth 3 (three) times a week., Disp: , Rfl:     cyclobenzaprine (FLEXERIL) 10 MG tablet, Take 1 tablet (10 mg total) by mouth 3 (three) times daily as needed for Muscle spasms., Disp: 30 tablet, Rfl: 1    empagliflozin (JARDIANCE) 25 mg tablet, TAKE 1 TABLET EVERY DAY, Disp: 90 tablet, Rfl: 2    fluticasone propionate (FLONASE) 50 mcg/actuation nasal spray, 1 spray (50 mcg total) by Each Nostril  route once daily., Disp: 3 Bottle, Rfl: 4    glipiZIDE (GLUCOTROL) 2.5 MG TR24, TAKE 1 TABLET (2.5 MG TOTAL) BY MOUTH DAILY WITH BREAKFAST., Disp: 90 tablet, Rfl: 3    meloxicam (MOBIC) 7.5 MG tablet, Take 1 tablet (7.5 mg total) by mouth once daily., Disp: 30 tablet, Rfl: 3    rosuvastatin (CRESTOR) 20 MG tablet, Take 1 tablet (20 mg total) by mouth once daily., Disp: 90 tablet, Rfl: 3    semaglutide (RYBELSUS) 3 mg tablet, Take 1 tablet (3 mg total) by mouth once daily. Take on empty stomach, wait 30 mins in am,  4-6 oz water only., Disp: 30 tablet, Rfl: 2    TRUEPLUS LANCETS 33 gauge Misc, TEST BLOOD SUGAR THREE TIMES DAILY, Disp: 300 each, Rfl: 3    valsartan (DIOVAN) 80 MG tablet, TAKE 1 TABLET EVERY DAY (NEED MD APPOINTMENT), Disp: 90 tablet, Rfl: 2    ALLERGIES:   Review of patient's allergies indicates:   Allergen Reactions    Phenergan  [promethazine]      Other reaction(s): Hives    Atorvastatin      Myalgia          PHYSICAL EXAMINATION:  LMP 01/01/1994 (Approximate) Comment: 1994  There are no signs of infection at the injection site, including no rubor, calor, or skin lesions.  Gen: NAD.  Psych: Affect & judgment nl.  Neuro: Grossly CNI. MOMIN.  HEENT: -Trach dev. -Eye d/c. -Rhinorrhea.  CV: Color nl. -E/C/C. WWPx4.  Pulm: -Dyspnea. -Cough.  Lymph: -Edema.  Int: -Rash/lesion noted. Skin is warm and dry    Diagnostic Extremity - [facility]-Sports Ultrasound was recommended due to left knee(s) evaluation.    FOCUSED: examination.     TECHNIQUE: Real time ultrasound examination of the left knee was performed with SonSplitGigste Edge 2, 9-L MHz linear probe.     FINDINGS: The images are of adequate diagnostic quality with identification of all echogenic structures made except for the vascular structures unless otherwise noted. There is no sonographic evidence of periosteal abnormalities, soft tissue edema, musculotendinous or nerve irregularities. The rest of the sonographic examination was unremarkable.    Ultrasound  images were directly reviewed with the patient and then a treatment plan was discussed.     Images were stored in the patients medical record.     IMPRESSION: No effusion to be aspirated      ASSESSMENT:      ICD-10-CM ICD-9-CM   1. Primary osteoarthritis of left knee  M17.12 715.16         PLAN:  Ultrasound-guided injection of the left knee with Orthovisc performed at visit today.    Future planning includes - Continue exercise program    Risks and benefits were discussed with patient prior to receiving injection.  Depending on injection type, risks include the possibility of infection, pain, disruptions in blood pressure and blood sugar, and cosmetic deformity at site of injection.    All questions were answered to the best of my ability and all concerns were addressed at this time.    Follow up in 1 week(s) for above, or sooner if needed.      This note is dictated using the M*Modal Fluency Direct word recognition program. There are word recognition mistakes that are occasionally missed on review.

## 2024-02-26 ENCOUNTER — OFFICE VISIT (OUTPATIENT)
Dept: OTOLARYNGOLOGY | Facility: CLINIC | Age: 69
End: 2024-02-26
Payer: MEDICARE

## 2024-02-26 DIAGNOSIS — H92.02 LEFT EAR PAIN: Primary | ICD-10-CM

## 2024-02-26 DIAGNOSIS — H91.8X3 ASYMMETRICAL HEARING LOSS: ICD-10-CM

## 2024-02-26 PROCEDURE — 99214 OFFICE O/P EST MOD 30 MIN: CPT | Mod: HCNC,S$GLB,, | Performed by: OTOLARYNGOLOGY

## 2024-02-26 PROCEDURE — 99999 PR PBB SHADOW E&M-EST. PATIENT-LVL II: CPT | Mod: PBBFAC,HCNC,, | Performed by: OTOLARYNGOLOGY

## 2024-02-26 PROCEDURE — 3288F FALL RISK ASSESSMENT DOCD: CPT | Mod: HCNC,CPTII,S$GLB, | Performed by: OTOLARYNGOLOGY

## 2024-02-26 PROCEDURE — 4010F ACE/ARB THERAPY RXD/TAKEN: CPT | Mod: HCNC,CPTII,S$GLB, | Performed by: OTOLARYNGOLOGY

## 2024-02-26 PROCEDURE — 1159F MED LIST DOCD IN RCRD: CPT | Mod: HCNC,CPTII,S$GLB, | Performed by: OTOLARYNGOLOGY

## 2024-02-26 PROCEDURE — 1101F PT FALLS ASSESS-DOCD LE1/YR: CPT | Mod: HCNC,CPTII,S$GLB, | Performed by: OTOLARYNGOLOGY

## 2024-02-26 RX ORDER — MELOXICAM 7.5 MG/1
7.5 TABLET ORAL DAILY
Qty: 30 TABLET | Refills: 3 | Status: SHIPPED | OUTPATIENT
Start: 2024-02-26 | End: 2024-03-12

## 2024-02-26 RX ORDER — CYCLOBENZAPRINE HCL 10 MG
10 TABLET ORAL 3 TIMES DAILY PRN
Qty: 30 TABLET | Refills: 1 | Status: SHIPPED | OUTPATIENT
Start: 2024-02-26 | End: 2024-03-12

## 2024-02-26 NOTE — PROGRESS NOTES
Subjective:   Lizabeth is a 68 y.o. female who presents for left ear pain and left neck pain. She reports intermittent, sharp shooting left ear pain that has been present for about 1 year. The pain is brief and often comes at night when she is lying on the left side. She also reports left neck pain- there is no pattern to its presentation. It does not occur with the ear pain. She has not tried anything for either of these problems.Denies any concerning throat symptoms. Unrelated, she reports her left ear hearing has decreased dramatically over the past year. She wears hearing aids [place], but feels like the left hearing aid is actually interfering with her hearing abilities. She denies any tinnitus, otorrhea, or vertigo.     The patient's medications, allergies, past medical, surgical, social and family histories were reviewed and updated as appropriate.    A detailed review of systems was obtained with pertinent positives as per the above HPI, and otherwise negative.     Last seen 12/23 with same C/O.    Objective:     Constitutional:   She is oriented to person, place, and time. She appears well-developed and well-nourished. She appears alert. She is cooperative.  Non-toxic appearance. She does not have a sickly appearance. She does not appear ill. Normal speech.      Head:  Normocephalic and atraumatic. Not macrocephalic and not microcephalic. Head is without raccoon's eyes, without Trevizo's sign, without abrasion, without laceration, without right periorbital erythema, without left periorbital erythema and without TMJ tenderness.         Ears:    Right Ear: No lacerations. No drainage, swelling or tenderness. No foreign bodies. No mastoid tenderness. Tympanic membrane is not injected, not scarred, not perforated, not erythematous, not retracted and not bulging. No middle ear effusion. No hemotympanum.   Left Ear: No lacerations. No drainage, swelling or tenderness. No foreign bodies. No mastoid tenderness. Tympanic  "membrane is not injected, not scarred, not perforated, not erythematous, not retracted and not bulging.  No middle ear effusion. No hemotympanum.     Neck:  No adenopathy.     Pulmonary/Chest:   Effort normal.     Psychiatric:   She has a normal mood and affect. Her speech is normal and behavior is normal.     Neurological:   She is alert and oriented to person, place, and time.     Procedure  None    Audiogram      MRI/IAC Temporal bone from 6/26/2020 showed no evidence of a vestibular schwannoma.  Assessment:     1. Left ear pain    2. Asymmetrical hearing loss      Plan:   Left ear pain  Ear exam WNL, no signs of infection. Recommended conservative treatment measures such as OTC anti-inflammatories, "Jaw rest" (soft foods, no crunchy foods, gum or ice chewing) Heating pad Tid    Neck pain on left side: No ENT cause noted.     Asymmetrical hearing loss  Prior MRI reviewed, asymmetrical hearing loss has been gradually worsening. Denies tinnitus, fullness or vertigo.    Rec: Sugey OLIVEIRA    Lizabeth was seen today for otalgia.    Diagnoses and all orders for this visit:    Left ear pain    Asymmetrical hearing loss    Other orders  -     meloxicam (MOBIC) 7.5 MG tablet; Take 1 tablet (7.5 mg total) by mouth once daily.  -     cyclobenzaprine (FLEXERIL) 10 MG tablet; Take 1 tablet (10 mg total) by mouth 3 (three) times daily as needed for Muscle spasms.      May need referral to Pain Clinic    Re check 1 yr.    "

## 2024-02-27 ENCOUNTER — OFFICE VISIT (OUTPATIENT)
Dept: SPORTS MEDICINE | Facility: CLINIC | Age: 69
End: 2024-02-27
Payer: MEDICARE

## 2024-02-27 ENCOUNTER — PATIENT MESSAGE (OUTPATIENT)
Dept: OTOLARYNGOLOGY | Facility: CLINIC | Age: 69
End: 2024-02-27
Payer: MEDICARE

## 2024-02-27 VITALS
HEART RATE: 84 BPM | BODY MASS INDEX: 28.62 KG/M2 | DIASTOLIC BLOOD PRESSURE: 73 MMHG | SYSTOLIC BLOOD PRESSURE: 106 MMHG | WEIGHT: 171.94 LBS

## 2024-02-27 DIAGNOSIS — M17.12 PRIMARY OSTEOARTHRITIS OF LEFT KNEE: Primary | ICD-10-CM

## 2024-02-27 PROCEDURE — 99999 PR PBB SHADOW E&M-EST. PATIENT-LVL III: CPT | Mod: PBBFAC,HCNC,, | Performed by: STUDENT IN AN ORGANIZED HEALTH CARE EDUCATION/TRAINING PROGRAM

## 2024-02-27 PROCEDURE — 99499 UNLISTED E&M SERVICE: CPT | Mod: HCNC,S$GLB,, | Performed by: STUDENT IN AN ORGANIZED HEALTH CARE EDUCATION/TRAINING PROGRAM

## 2024-02-27 PROCEDURE — 1125F AMNT PAIN NOTED PAIN PRSNT: CPT | Mod: HCNC,CPTII,S$GLB, | Performed by: STUDENT IN AN ORGANIZED HEALTH CARE EDUCATION/TRAINING PROGRAM

## 2024-02-27 PROCEDURE — 20611 DRAIN/INJ JOINT/BURSA W/US: CPT | Mod: HCNC,LT,S$GLB, | Performed by: STUDENT IN AN ORGANIZED HEALTH CARE EDUCATION/TRAINING PROGRAM

## 2024-02-27 NOTE — PROCEDURES
Large Joint Aspiration/Injection: L knee    Date/Time: 2/27/2024 11:00 AM    Performed by: Carol Valdes MD  Authorized by: Carol Valdes MD    Consent Done?:  Yes (Verbal)  Indications:  Arthritis and pain  Site marked: the procedure site was marked    Timeout: prior to procedure the correct patient, procedure, and site was verified    Prep: patient was prepped and draped in usual sterile fashion      Local anesthesia used?: Yes    Anesthesia:  Local infiltration  Local anesthetic:  Co-phenylcaine spray    Details:  Needle Size:  22 G  Ultrasonic Guidance for needle placement?: Yes (Ultrasound guidance used to avoid neurovascular injury and/or to improve accuracy given body habitus.)    Images are saved and documented.  Approach: Superolateral.  Location:  Knee  Site:  L knee  Medications:  30 mg sodium hyaluronate (orthovisc) 30 mg/2 mL  Medications comment:  Ropivacaine 0.2% 2mL  Patient tolerance:  Patient tolerated the procedure well with no immediate complications     TECHNIQUE: Real time ultrasound examination of the left knee was performed with SonVivonette Edge 2, 9-L MHz linear probe(s). Ultrasound guidance was used for needle localization. Images were saved and stored for documentation. Dynamic visualization of the needle was continuous throughout the procedures and maintained in good position.

## 2024-03-05 ENCOUNTER — OFFICE VISIT (OUTPATIENT)
Dept: SPORTS MEDICINE | Facility: CLINIC | Age: 69
End: 2024-03-05
Payer: MEDICARE

## 2024-03-05 VITALS — DIASTOLIC BLOOD PRESSURE: 86 MMHG | SYSTOLIC BLOOD PRESSURE: 136 MMHG

## 2024-03-05 DIAGNOSIS — M17.12 PRIMARY OSTEOARTHRITIS OF LEFT KNEE: Primary | ICD-10-CM

## 2024-03-05 PROCEDURE — 99499 UNLISTED E&M SERVICE: CPT | Mod: HCNC,S$GLB,, | Performed by: STUDENT IN AN ORGANIZED HEALTH CARE EDUCATION/TRAINING PROGRAM

## 2024-03-05 PROCEDURE — 99999 PR PBB SHADOW E&M-EST. PATIENT-LVL III: CPT | Mod: PBBFAC,HCNC,, | Performed by: STUDENT IN AN ORGANIZED HEALTH CARE EDUCATION/TRAINING PROGRAM

## 2024-03-05 PROCEDURE — 20611 DRAIN/INJ JOINT/BURSA W/US: CPT | Mod: HCNC,LT,S$GLB, | Performed by: STUDENT IN AN ORGANIZED HEALTH CARE EDUCATION/TRAINING PROGRAM

## 2024-03-05 NOTE — PROCEDURES
Large Joint Aspiration/Injection: L knee    Date/Time: 3/5/2024 11:00 AM    Performed by: Carol Valdes MD  Authorized by: Carol Valdes MD    Consent Done?:  Yes (Verbal)  Indications:  Arthritis and pain  Site marked: the procedure site was marked    Timeout: prior to procedure the correct patient, procedure, and site was verified    Prep: patient was prepped and draped in usual sterile fashion      Local anesthesia used?: Yes    Anesthesia:  Local infiltration  Local anesthetic:  Co-phenylcaine spray    Details:  Needle Size:  22 G  Ultrasonic Guidance for needle placement?: Yes (Ultrasound guidance used to avoid neurovascular injury and/or to improve accuracy given body habitus.)    Images are saved and documented.  Approach: Superolateral.  Location:  Knee  Site:  L knee  Medications:  30 mg sodium hyaluronate (orthovisc) 30 mg/2 mL  Medications comment:  Ropivacaine 0.2% 2mL  Patient tolerance:  Patient tolerated the procedure well with no immediate complications     TECHNIQUE: Real time ultrasound examination of the left knee was performed with SonoSite Edge 2, 9-L MHz linear probe(s). Ultrasound guidance was used for needle localization. Images were saved and stored for documentation. Dynamic visualization of the needle was continuous throughout the procedures and maintained in good position.

## 2024-03-05 NOTE — PROGRESS NOTES
CC: left knee pain    68 y.o. Female presents today for her 2nd Orthovisc injection of her left knee.     Attempted treatments: none since last visit  Pain score: 2-3/10  History of trauma/injury: none since last visit  Affecting ADLs: no      REVIEW OF SYSTEMS:   Constitution: Patient denies fever or chills.  Eyes: Patient denies eye pain or vision changes.  HEENT: Patient denies ear pain, sore throat, or nasal discharge.  CVS: Patient denies chest pain.  Lungs: Patient denies shortness of breath or cough.  Skin: Patient denies skin rash or itching.    Musculoskeletal: Patient denies recent falls. See HPI.  Psych: Patient denies any current anxiety or nervousness.    PAST MEDICAL HISTORY:   Past Medical History:   Diagnosis Date    Diabetes mellitus type II     Hyperlipemia     Irritable bowel syndrome     Obesity     Osteopenia     Rhinitis     Sleep apnea     Vertigo        MEDICATIONS:     Current Outpatient Medications:     alcohol swabs (BD ALCOHOL SWABS) PadM, Apply 1 each topically daily as needed (testing)., Disp: 100 each, Rfl: 4    blood sugar diagnostic (TRUE METRIX GLUCOSE TEST STRIP) Strp, USE FOR BLOOD SUGAR TESTING ONE TIME DAILY, Disp: 100 strip, Rfl: 5    calcium-vitamin D 250 mg-2.5 mcg (100 unit) per tablet, Take 1 tablet by mouth 3 (three) times a week., Disp: , Rfl:     cyclobenzaprine (FLEXERIL) 10 MG tablet, Take 1 tablet (10 mg total) by mouth 3 (three) times daily as needed for Muscle spasms., Disp: 30 tablet, Rfl: 1    empagliflozin (JARDIANCE) 25 mg tablet, TAKE 1 TABLET EVERY DAY, Disp: 90 tablet, Rfl: 2    fluticasone propionate (FLONASE) 50 mcg/actuation nasal spray, 1 spray (50 mcg total) by Each Nostril route once daily., Disp: 3 Bottle, Rfl: 4    glipiZIDE (GLUCOTROL) 2.5 MG TR24, TAKE 1 TABLET (2.5 MG TOTAL) BY MOUTH DAILY WITH BREAKFAST., Disp: 90 tablet, Rfl: 3    meloxicam (MOBIC) 7.5 MG tablet, Take 1 tablet (7.5 mg total) by mouth once daily., Disp: 30 tablet, Rfl: 3     rosuvastatin (CRESTOR) 20 MG tablet, Take 1 tablet (20 mg total) by mouth once daily., Disp: 90 tablet, Rfl: 3    semaglutide (RYBELSUS) 3 mg tablet, Take 1 tablet (3 mg total) by mouth once daily. Take on empty stomach, wait 30 mins in am,  4-6 oz water only., Disp: 30 tablet, Rfl: 2    TRUEPLUS LANCETS 33 gauge Misc, TEST BLOOD SUGAR THREE TIMES DAILY, Disp: 300 each, Rfl: 3    valsartan (DIOVAN) 80 MG tablet, TAKE 1 TABLET EVERY DAY (NEED MD APPOINTMENT), Disp: 90 tablet, Rfl: 2    amoxicillin-clavulanate 875-125mg (AUGMENTIN) 875-125 mg per tablet, Take 1 tablet by mouth every 12 (twelve) hours., Disp: 20 tablet, Rfl: 0    blood-glucose meter kit, To check BG 3 times daily, to use with insurance preferred meter, newest meter, e 11.65, Disp: 1 each, Rfl: 0    ALLERGIES:   Review of patient's allergies indicates:   Allergen Reactions    Phenergan  [promethazine]      Other reaction(s): Hives    Atorvastatin      Myalgia          PHYSICAL EXAMINATION:  /86   LMP 01/01/1994 (Approximate) Comment: 1994  There are no signs of infection at the injection site, including no rubor, calor, or skin lesions.  Gen: NAD.  Psych: Affect & judgment nl.  Neuro: Grossly CNI. MOMIN.  HEENT: -Trach dev. -Eye d/c. -Rhinorrhea.  CV: Color nl. -E/C/C. WWPx4.  Pulm: -Dyspnea. -Cough.  Lymph: -Edema.  Int: -Rash/lesion noted. Skin is warm and dry    ASSESSMENT:      ICD-10-CM ICD-9-CM   1. Primary osteoarthritis of left knee  M17.12 715.16         PLAN:  Ultrasound-guided injection of the left knee with Orthovisc performed at visit today.    Future planning includes - Continue exercise program    Risks and benefits were discussed with patient prior to receiving injection.  Depending on injection type, risks include the possibility of infection, pain, disruptions in blood pressure and blood sugar, and cosmetic deformity at site of injection.    All questions were answered to the best of my ability and all concerns were addressed at  this time.    Follow up in 1 week(s) for above, or sooner if needed.      This note is dictated using the M*Modal Fluency Direct word recognition program. There are word recognition mistakes that are occasionally missed on review.

## 2024-03-06 ENCOUNTER — LAB VISIT (OUTPATIENT)
Dept: PRIMARY CARE CLINIC | Facility: CLINIC | Age: 69
End: 2024-03-06
Payer: MEDICARE

## 2024-03-06 ENCOUNTER — TELEPHONE (OUTPATIENT)
Dept: PRIMARY CARE CLINIC | Facility: CLINIC | Age: 69
End: 2024-03-06
Payer: MEDICARE

## 2024-03-06 DIAGNOSIS — E11.65 TYPE 2 DIABETES MELLITUS WITH HYPERGLYCEMIA, WITHOUT LONG-TERM CURRENT USE OF INSULIN: ICD-10-CM

## 2024-03-06 LAB
ESTIMATED AVG GLUCOSE: 186 MG/DL (ref 68–131)
HBA1C MFR BLD: 8.1 % (ref 4–5.6)

## 2024-03-06 PROCEDURE — 83036 HEMOGLOBIN GLYCOSYLATED A1C: CPT | Mod: HCNC | Performed by: NURSE PRACTITIONER

## 2024-03-09 ENCOUNTER — PATIENT MESSAGE (OUTPATIENT)
Dept: SPORTS MEDICINE | Facility: CLINIC | Age: 69
End: 2024-03-09
Payer: MEDICARE

## 2024-03-11 ENCOUNTER — TELEPHONE (OUTPATIENT)
Dept: SPORTS MEDICINE | Facility: CLINIC | Age: 69
End: 2024-03-11
Payer: MEDICARE

## 2024-03-11 NOTE — PROGRESS NOTES
CHIEF COMPLAINT: Type 2 Diabetes     HPI: Mrs. Lizabeth Saha is a 68 y.o. female who was diagnosed with Type 2 DM x 11 years ago.   Social smoker- younger ages, worried about copd dx, h/o sleep apnea    Retired. 3 daughters.  Mother- , on HD  - radiation, ca dx    Hip , knee steroid injections in the past month   Last seen by me in 2023  Has worked on dietary habits, cutting back pastas, cho   Concerns with A1c not coming down fast enough with changes in the past 6 weeks.   Will like to be off medications.   In the am 110s-120s mg/dl  Postprandial 167-200 mg/dl     Breakfast: skips at times, grits/eggs, hot sausage occ (1-2x a month)  Lunch: seafood, salad-broiled or fried  Dinner: protein, veggie, starch-bread or rice  Cabbage   Arnold mendez, reg lemonade unsweetened tea  Stopped pepsi  water  Snacks: not often  Stop eating before 7p    Walking 3x a week  30 mins per session    A1c trending down 8.7% to 8.1%  Not taking rybelsus   Lab Results   Component Value Date    HGBA1C 8.1 (H) 2024     [facility]   Stopped trulicity - costly   Stopped metformin -gi issues     No h/o pancreatitis or medullary thyroid ca    PREVIOUS DIABETES MEDICATIONS TRIED  Metformin xr   Metformin   januvia 100 mg daily   trulicity- too costly   jardiance 10, 25 mg daily     CURRENT DIABETIC MEDS: jardiance 25 mg daily , glipizide xr 2.5 mg daily w/ breakfast    Testing 3 x a day  See above   Patient is willing and able to use the device  Demonstrated an understanding of the technology and is motivated to use CGM  Patient expected to adhere to a comprehensive diabetes treatment plan and patient has adequate medical supervision  Has multiple impaired awareness of hypoglycemia (hypoglycemia unawareness)    Diabetes Management Status    Statin: Taking  ACE/ARB: Taking    Screening or Prevention Patient's value Goal Complete/Controlled?   HgA1C Testing and Control   Lab Results   Component Value Date     "HGBA1C 8.1 (H) 03/06/2024      Annually/Less than 8% No   Lipid profile : 12/21/2023 Annually Yes   LDL control Lab Results   Component Value Date    LDLCALC 78.8 12/21/2023    Annually/Less than 100 mg/dl  Yes   Nephropathy screening Lab Results   Component Value Date    LABMICR 6.0 12/21/2023     Lab Results   Component Value Date    PROTEINUA NEG 04/18/2008    Annually Yes   Blood pressure BP Readings from Last 1 Encounters:   03/05/24 136/86    Less than 140/90 No   Dilated retinal exam : 07/06/2023 Annually Yes   Foot exam   : 09/18/2023 Annually Yes     REVIEW OF SYSTEMS  General: no weakness, fatigue, + weight stable 2# (loss)  Eyes/Ears: no double or blurred vision, eye pain, or redness, +hard of hearing, hearing aids  Cardiovascular: no chest pain, palpitations, edema, or murmurs.   Respiratory: no cough or dyspnea.   GI: no heartburn, nausea, or changes in bowel patterns; good appetite.   Skin: no rashes, dryness, itching, or reactions at insulin injection sites. +hyperpigmentation peripheral   Neuro: no numbness, tingling, tremors, or vertigo. (L) knee pain- gel treatment  Endocrine: no polyuria, polydipsia, polyphagia, heat or cold intolerance.     Vital Signs  /72 (BP Location: Left arm, Patient Position: Sitting, BP Method: Medium (Manual))   Pulse 70   Ht 5' 5" (1.651 m)   Wt 77.7 kg (171 lb 4.8 oz)   LMP 01/01/1994 (Approximate) Comment: 1994  SpO2 98%   BMI 28.51 kg/m²     Hemoglobin A1C   Date Value Ref Range Status   03/06/2024 8.1 (H) 4.0 - 5.6 % Final     Comment:     ADA Screening Guidelines:  5.7-6.4%  Consistent with prediabetes  >or=6.5%  Consistent with diabetes    High levels of fetal hemoglobin interfere with the HbA1C  assay. Heterozygous hemoglobin variants (HbS, HgC, etc)do  not significantly interfere with this assay.   However, presence of multiple variants may affect accuracy.     12/21/2023 8.7 (H) 4.0 - 5.6 % Final     Comment:     ADA Screening Guidelines:  5.7-6.4%  " Consistent with prediabetes  >or=6.5%  Consistent with diabetes    High levels of fetal hemoglobin interfere with the HbA1C  assay. Heterozygous hemoglobin variants (HbS, HgC, etc)do  not significantly interfere with this assay.   However, presence of multiple variants may affect accuracy.     11/03/2023 8.3 (H) 4.0 - 5.6 % Final     Comment:     ADA Screening Guidelines:  5.7-6.4%  Consistent with prediabetes  >or=6.5%  Consistent with diabetes    High levels of fetal hemoglobin interfere with the HbA1C  assay. Heterozygous hemoglobin variants (HbS, HgC, etc)do  not significantly interfere with this assay.   However, presence of multiple variants may affect accuracy.          Chemistry        Component Value Date/Time     08/10/2023 0840    K 4.4 08/10/2023 0840     08/10/2023 0840    CO2 25 08/10/2023 0840    BUN 11 08/10/2023 0840    CREATININE 1.0 08/10/2023 0840     (H) 08/10/2023 0840        Component Value Date/Time    CALCIUM 9.7 08/10/2023 0840    ALKPHOS 85 08/10/2023 0840    AST 23 08/10/2023 0840    ALT 20 08/10/2023 0840    BILITOT 0.6 08/10/2023 0840    ESTGFRAFRICA >60.0 07/29/2022 1008    EGFRNONAA 58.8 (A) 07/29/2022 1008           Lab Results   Component Value Date    TSH 1.125 07/29/2022      Lab Results   Component Value Date    CHOL 154 12/21/2023    CHOL 288 (H) 06/28/2023    CHOL 257 (H) 02/15/2023     Lab Results   Component Value Date    HDL 55 12/21/2023    HDL 57 06/28/2023    HDL 56 02/15/2023     Lab Results   Component Value Date    LDLCALC 78.8 12/21/2023    LDLCALC 199.8 (H) 06/28/2023    LDLCALC 175.0 (H) 02/15/2023     Lab Results   Component Value Date    TRIG 101 12/21/2023    TRIG 156 (H) 06/28/2023    TRIG 130 02/15/2023     Lab Results   Component Value Date    CHOLHDL 35.7 12/21/2023    CHOLHDL 19.8 (L) 06/28/2023    CHOLHDL 21.8 02/15/2023         PHYSICAL EXAMINATION  Constitutional: Appears well, no distress Reviewed vitals above.  Eyes: conjunctivae &  lids intact; PERRLA, EOMs intact; optic discs   Neck: Supple, trachea midline.   Respiratory: No wheezes, even and unlabored   Cardiovascular: RRR; no edema or varicosities  Lymph: deferred   Skin: warm and dry; no injection site reactions, no acanthosis nigracans observed. Hyperpigmentation to legs  Neuro:patient alert and cooperative, normal affect; steady gait.  Psychiatric: judgement & insight intact, orientation of time, place & person intact, memory; mood & affect wnl     Diabetes Foot Exam:   deferred     Assessment/Plan  1. Type 2 diabetes mellitus with hyperglycemia, without long-term current use of insulin  Hemoglobin A1C    C-PEPTIDE    TSH    Basic Metabolic Panel      2. Essential hypertension  Basic Metabolic Panel      3. Hyperlipidemia, unspecified hyperlipidemia type        4. Mild nonproliferative diabetic retinopathy associated with type 2 diabetes mellitus, macular edema presence unspecified, unspecified laterality        5. Sleep apnea, unspecified type          1-5. Follow up in 4 months w/ me   A1c cpeptide , tsh, bmp prior -July 2024 -concerns with weight/A1c not coming down swiftly w/ changes   A1c goal less than 7.5%  Lab Results   Component Value Date    LDLCALC 78.8 12/21/2023     Near goal   F/u w/ retinal specialist   Discussed affect of sleep/if not using cpap can affect metabolism  Bp managed per digital med/pcp  Affect of pain/stress  Continue mounjaro

## 2024-03-11 NOTE — TELEPHONE ENCOUNTER
Called and spoke to patient.  Her 11am with Dr. Valdes has been moved to 1:45pm due to 10 am appointment at [facility]

## 2024-03-12 ENCOUNTER — OFFICE VISIT (OUTPATIENT)
Dept: SPORTS MEDICINE | Facility: CLINIC | Age: 69
End: 2024-03-12
Payer: MEDICARE

## 2024-03-12 ENCOUNTER — OFFICE VISIT (OUTPATIENT)
Dept: INTERNAL MEDICINE | Facility: CLINIC | Age: 69
End: 2024-03-12
Payer: MEDICARE

## 2024-03-12 VITALS
HEART RATE: 70 BPM | OXYGEN SATURATION: 98 % | DIASTOLIC BLOOD PRESSURE: 72 MMHG | WEIGHT: 171.31 LBS | BODY MASS INDEX: 28.54 KG/M2 | HEIGHT: 65 IN | SYSTOLIC BLOOD PRESSURE: 122 MMHG

## 2024-03-12 VITALS
WEIGHT: 172.63 LBS | BODY MASS INDEX: 28.73 KG/M2 | SYSTOLIC BLOOD PRESSURE: 129 MMHG | DIASTOLIC BLOOD PRESSURE: 78 MMHG | HEART RATE: 90 BPM

## 2024-03-12 DIAGNOSIS — G47.30 SLEEP APNEA, UNSPECIFIED TYPE: ICD-10-CM

## 2024-03-12 DIAGNOSIS — I10 ESSENTIAL HYPERTENSION: ICD-10-CM

## 2024-03-12 DIAGNOSIS — M17.12 PRIMARY OSTEOARTHRITIS OF LEFT KNEE: Primary | ICD-10-CM

## 2024-03-12 DIAGNOSIS — E11.65 TYPE 2 DIABETES MELLITUS WITH HYPERGLYCEMIA, WITHOUT LONG-TERM CURRENT USE OF INSULIN: Primary | ICD-10-CM

## 2024-03-12 DIAGNOSIS — E55.9 VITAMIN D DEFICIENCY: ICD-10-CM

## 2024-03-12 DIAGNOSIS — E11.3299 MILD NONPROLIFERATIVE DIABETIC RETINOPATHY ASSOCIATED WITH TYPE 2 DIABETES MELLITUS, MACULAR EDEMA PRESENCE UNSPECIFIED, UNSPECIFIED LATERALITY: ICD-10-CM

## 2024-03-12 DIAGNOSIS — E78.5 HYPERLIPIDEMIA, UNSPECIFIED HYPERLIPIDEMIA TYPE: ICD-10-CM

## 2024-03-12 PROCEDURE — 1101F PT FALLS ASSESS-DOCD LE1/YR: CPT | Mod: HCNC,CPTII,S$GLB, | Performed by: NURSE PRACTITIONER

## 2024-03-12 PROCEDURE — 20611 DRAIN/INJ JOINT/BURSA W/US: CPT | Mod: HCNC,LT,S$GLB, | Performed by: STUDENT IN AN ORGANIZED HEALTH CARE EDUCATION/TRAINING PROGRAM

## 2024-03-12 PROCEDURE — 1126F AMNT PAIN NOTED NONE PRSNT: CPT | Mod: HCNC,CPTII,S$GLB, | Performed by: NURSE PRACTITIONER

## 2024-03-12 PROCEDURE — 3008F BODY MASS INDEX DOCD: CPT | Mod: HCNC,CPTII,S$GLB, | Performed by: NURSE PRACTITIONER

## 2024-03-12 PROCEDURE — 1159F MED LIST DOCD IN RCRD: CPT | Mod: HCNC,CPTII,S$GLB, | Performed by: NURSE PRACTITIONER

## 2024-03-12 PROCEDURE — 3052F HG A1C>EQUAL 8.0%<EQUAL 9.0%: CPT | Mod: HCNC,CPTII,S$GLB, | Performed by: NURSE PRACTITIONER

## 2024-03-12 PROCEDURE — 1160F RVW MEDS BY RX/DR IN RCRD: CPT | Mod: HCNC,CPTII,S$GLB, | Performed by: NURSE PRACTITIONER

## 2024-03-12 PROCEDURE — 99214 OFFICE O/P EST MOD 30 MIN: CPT | Mod: HCNC,S$GLB,, | Performed by: NURSE PRACTITIONER

## 2024-03-12 PROCEDURE — 3074F SYST BP LT 130 MM HG: CPT | Mod: HCNC,CPTII,S$GLB, | Performed by: NURSE PRACTITIONER

## 2024-03-12 PROCEDURE — 4010F ACE/ARB THERAPY RXD/TAKEN: CPT | Mod: HCNC,CPTII,S$GLB, | Performed by: NURSE PRACTITIONER

## 2024-03-12 PROCEDURE — 3288F FALL RISK ASSESSMENT DOCD: CPT | Mod: HCNC,CPTII,S$GLB, | Performed by: NURSE PRACTITIONER

## 2024-03-12 PROCEDURE — 99999 PR PBB SHADOW E&M-EST. PATIENT-LVL III: CPT | Mod: PBBFAC,HCNC,, | Performed by: STUDENT IN AN ORGANIZED HEALTH CARE EDUCATION/TRAINING PROGRAM

## 2024-03-12 PROCEDURE — 3078F DIAST BP <80 MM HG: CPT | Mod: HCNC,CPTII,S$GLB, | Performed by: NURSE PRACTITIONER

## 2024-03-12 PROCEDURE — 1125F AMNT PAIN NOTED PAIN PRSNT: CPT | Mod: HCNC,CPTII,S$GLB, | Performed by: STUDENT IN AN ORGANIZED HEALTH CARE EDUCATION/TRAINING PROGRAM

## 2024-03-12 PROCEDURE — 99499 UNLISTED E&M SERVICE: CPT | Mod: HCNC,S$GLB,, | Performed by: STUDENT IN AN ORGANIZED HEALTH CARE EDUCATION/TRAINING PROGRAM

## 2024-03-12 PROCEDURE — 99999 PR PBB SHADOW E&M-EST. PATIENT-LVL III: CPT | Mod: PBBFAC,HCNC,, | Performed by: NURSE PRACTITIONER

## 2024-03-12 NOTE — PROCEDURES
Large Joint Aspiration/Injection: L knee    Date/Time: 3/12/2024 1:45 PM    Performed by: Carol Valdes MD  Authorized by: Carol Valdes MD    Consent Done?:  Yes (Verbal)  Indications:  Arthritis and pain  Site marked: the procedure site was marked    Timeout: prior to procedure the correct patient, procedure, and site was verified    Prep: patient was prepped and draped in usual sterile fashion      Local anesthesia used?: Yes    Anesthesia:  Local infiltration  Local anesthetic:  Co-phenylcaine spray    Details:  Needle Size:  22 G  Ultrasonic Guidance for needle placement?: Yes (Ultrasound guidance used to avoid neurovascular injury and/or to improve accuracy given body habitus.)    Images are saved and documented.  Approach: Superolateral.  Location:  Knee  Site:  L knee  Medications:  30 mg sodium hyaluronate (orthovisc) 30 mg/2 mL  Medications comment:  Ropivacaine 0.2% 2mL  Patient tolerance:  Patient tolerated the procedure well with no immediate complications     TECHNIQUE: Real time ultrasound examination of the left knee was performed with SonoSite Edge 2, 9-L MHz linear probe(s). Ultrasound guidance was used for needle localization. Images were saved and stored for documentation. Dynamic visualization of the needle was continuous throughout the procedures and maintained in good position.

## 2024-03-12 NOTE — PATIENT INSTRUCTIONS
Follow up in 4 months w/ me   A1c cpeptide , tsh, bmp, cbc, vit d prior -July 2024    Glipizide 2.5 xr daily   Jardiance 25 mg daily     Lab Results   Component Value Date    HGBA1C 8.1 (H) 03/06/2024     Goal less than 7.5%    Goal  no higher than 150    [email or web address]  Eat fit luann  [employer] luann  [email or web address]    mySugr luann        Yes

## 2024-03-12 NOTE — PROGRESS NOTES
CC: left knee pain    68 y.o. Female presents today for her 3rd Orthovisc injection of her left knee.     Attempted treatments: none since last visit  Pain score: 2/10  History of trauma/injury: none since last visit  Affecting ADLs: no     REVIEW OF SYSTEMS:   Constitution: Patient denies fever or chills.  Eyes: Patient denies eye pain or vision changes.  HEENT: Patient denies ear pain, sore throat, or nasal discharge.  CVS: Patient denies chest pain.  Lungs: Patient denies shortness of breath or cough.  Skin: Patient denies skin rash or itching.    Musculoskeletal: Patient denies recent falls. See HPI.  Psych: Patient denies any current anxiety or nervousness.    PAST MEDICAL HISTORY:   Past Medical History:   Diagnosis Date    Diabetes mellitus type II     Hyperlipemia     Irritable bowel syndrome     Obesity     Osteopenia     Rhinitis     Sleep apnea     Vertigo        MEDICATIONS:     Current Outpatient Medications:     alcohol swabs (BD ALCOHOL SWABS) PadM, Apply 1 each topically daily as needed (testing)., Disp: 100 each, Rfl: 4    blood sugar diagnostic (TRUE METRIX GLUCOSE TEST STRIP) Strp, USE FOR BLOOD SUGAR TESTING ONE TIME DAILY, Disp: 100 strip, Rfl: 5    blood-glucose meter kit, To check BG 3 times daily, to use with insurance preferred meter, newest meter, e 11.65, Disp: 1 each, Rfl: 0    calcium-vitamin D 250 mg-2.5 mcg (100 unit) per tablet, Take 1 tablet by mouth 3 (three) times a week., Disp: , Rfl:     empagliflozin (JARDIANCE) 25 mg tablet, TAKE 1 TABLET EVERY DAY, Disp: 90 tablet, Rfl: 2    glipiZIDE (GLUCOTROL) 2.5 MG TR24, TAKE 1 TABLET (2.5 MG TOTAL) BY MOUTH DAILY WITH BREAKFAST., Disp: 90 tablet, Rfl: 3    rosuvastatin (CRESTOR) 20 MG tablet, Take 1 tablet (20 mg total) by mouth once daily., Disp: 90 tablet, Rfl: 3    TRUEPLUS LANCETS 33 gauge Misc, TEST BLOOD SUGAR THREE TIMES DAILY, Disp: 300 each, Rfl: 3    valsartan (DIOVAN) 80 MG tablet, TAKE 1 TABLET EVERY DAY (NEED MD  APPOINTMENT), Disp: 90 tablet, Rfl: 2    ALLERGIES:   Review of patient's allergies indicates:   Allergen Reactions    Phenergan  [promethazine]      Other reaction(s): Hives    Atorvastatin      Myalgia          PHYSICAL EXAMINATION:  /78   Pulse 90   Wt 78.3 kg (172 lb 9.9 oz)   LMP 01/01/1994 (Approximate) Comment: 1994  BMI 28.73 kg/m²   There are no signs of infection at the injection site, including no rubor, calor, or skin lesions.  Gen: NAD.  Psych: Affect & judgment nl.  Neuro: Grossly CNI. MOMIN.  HEENT: -Trach dev. -Eye d/c. -Rhinorrhea.  CV: Color nl. -E/C/C. WWPx4.  Pulm: -Dyspnea. -Cough.  Lymph: -Edema.  Int: -Rash/lesion noted. Skin is warm and dry    ASSESSMENT:      ICD-10-CM ICD-9-CM   1. Primary osteoarthritis of left knee  M17.12 715.16         PLAN:  Ultrasound-guided injection of the left knee with Orthovisc performed at visit today.    Future planning includes - Continue exercise program    Risks and benefits were discussed with patient prior to receiving injection.  Depending on injection type, risks include the possibility of infection, pain, disruptions in blood pressure and blood sugar, and cosmetic deformity at site of injection.    All questions were answered to the best of my ability and all concerns were addressed at this time.    Follow up in 6 week(s) for above, or sooner if needed.      This note is dictated using the M*Modal Fluency Direct word recognition program. There are word recognition mistakes that are occasionally missed on review.

## 2024-03-18 ENCOUNTER — LAB VISIT (OUTPATIENT)
Dept: PRIMARY CARE CLINIC | Facility: CLINIC | Age: 69
End: 2024-03-18
Payer: MEDICARE

## 2024-03-18 DIAGNOSIS — E11.65 TYPE 2 DIABETES MELLITUS WITH HYPERGLYCEMIA, WITHOUT LONG-TERM CURRENT USE OF INSULIN: ICD-10-CM

## 2024-03-18 DIAGNOSIS — I10 ESSENTIAL HYPERTENSION: ICD-10-CM

## 2024-03-18 DIAGNOSIS — E55.9 VITAMIN D DEFICIENCY: ICD-10-CM

## 2024-03-18 LAB
25(OH)D3+25(OH)D2 SERPL-MCNC: 10 NG/ML (ref 30–96)
ANION GAP SERPL CALC-SCNC: 10 MMOL/L (ref 8–16)
BASOPHILS # BLD AUTO: 0.03 K/UL (ref 0–0.2)
BASOPHILS NFR BLD: 0.6 % (ref 0–1.9)
BUN SERPL-MCNC: 15 MG/DL (ref 8–23)
C PEPTIDE SERPL-MCNC: 2.18 NG/ML (ref 0.78–5.19)
CALCIUM SERPL-MCNC: 10.4 MG/DL (ref 8.7–10.5)
CHLORIDE SERPL-SCNC: 105 MMOL/L (ref 95–110)
CO2 SERPL-SCNC: 26 MMOL/L (ref 23–29)
CREAT SERPL-MCNC: 1.1 MG/DL (ref 0.5–1.4)
DIFFERENTIAL METHOD BLD: ABNORMAL
EOSINOPHIL # BLD AUTO: 0.1 K/UL (ref 0–0.5)
EOSINOPHIL NFR BLD: 1.1 % (ref 0–8)
ERYTHROCYTE [DISTWIDTH] IN BLOOD BY AUTOMATED COUNT: 13.5 % (ref 11.5–14.5)
EST. GFR  (NO RACE VARIABLE): 54.7 ML/MIN/1.73 M^2
GLUCOSE SERPL-MCNC: 159 MG/DL (ref 70–110)
HCT VFR BLD AUTO: 47 % (ref 37–48.5)
HGB BLD-MCNC: 14.2 G/DL (ref 12–16)
IMM GRANULOCYTES # BLD AUTO: 0.01 K/UL (ref 0–0.04)
IMM GRANULOCYTES NFR BLD AUTO: 0.2 % (ref 0–0.5)
LYMPHOCYTES # BLD AUTO: 2.2 K/UL (ref 1–4.8)
LYMPHOCYTES NFR BLD: 41.5 % (ref 18–48)
MCH RBC QN AUTO: 26.6 PG (ref 27–31)
MCHC RBC AUTO-ENTMCNC: 30.2 G/DL (ref 32–36)
MCV RBC AUTO: 88 FL (ref 82–98)
MONOCYTES # BLD AUTO: 0.5 K/UL (ref 0.3–1)
MONOCYTES NFR BLD: 9.6 % (ref 4–15)
NEUTROPHILS # BLD AUTO: 2.5 K/UL (ref 1.8–7.7)
NEUTROPHILS NFR BLD: 47 % (ref 38–73)
NRBC BLD-RTO: 0 /100 WBC
PLATELET # BLD AUTO: 214 K/UL (ref 150–450)
PMV BLD AUTO: 11 FL (ref 9.2–12.9)
POTASSIUM SERPL-SCNC: 4.5 MMOL/L (ref 3.5–5.1)
RBC # BLD AUTO: 5.33 M/UL (ref 4–5.4)
SODIUM SERPL-SCNC: 141 MMOL/L (ref 136–145)
TSH SERPL DL<=0.005 MIU/L-ACNC: 1.22 UIU/ML (ref 0.4–4)
WBC # BLD AUTO: 5.32 K/UL (ref 3.9–12.7)

## 2024-03-18 PROCEDURE — 84681 ASSAY OF C-PEPTIDE: CPT | Mod: HCNC | Performed by: NURSE PRACTITIONER

## 2024-03-18 PROCEDURE — 85025 COMPLETE CBC W/AUTO DIFF WBC: CPT | Mod: HCNC | Performed by: NURSE PRACTITIONER

## 2024-03-18 PROCEDURE — 36415 COLL VENOUS BLD VENIPUNCTURE: CPT | Mod: HCNC | Performed by: NURSE PRACTITIONER

## 2024-03-18 PROCEDURE — 82306 VITAMIN D 25 HYDROXY: CPT | Mod: HCNC | Performed by: NURSE PRACTITIONER

## 2024-03-18 PROCEDURE — 84443 ASSAY THYROID STIM HORMONE: CPT | Mod: HCNC | Performed by: NURSE PRACTITIONER

## 2024-03-18 PROCEDURE — 80048 BASIC METABOLIC PNL TOTAL CA: CPT | Mod: HCNC | Performed by: NURSE PRACTITIONER

## 2024-03-19 ENCOUNTER — OFFICE VISIT (OUTPATIENT)
Dept: INTERNAL MEDICINE | Facility: CLINIC | Age: 69
End: 2024-03-19
Payer: MEDICARE

## 2024-03-19 ENCOUNTER — HOSPITAL ENCOUNTER (OUTPATIENT)
Dept: RADIOLOGY | Facility: HOSPITAL | Age: 69
Discharge: HOME | End: 2024-03-19
Attending: INTERNAL MEDICINE
Payer: MEDICARE

## 2024-03-19 ENCOUNTER — HOSPITAL ENCOUNTER (OUTPATIENT)
Dept: PULMONOLOGY | Facility: CLINIC | Age: 69
Discharge: HOME | End: 2024-03-19
Payer: MEDICARE

## 2024-03-19 VITALS
HEIGHT: 65 IN | OXYGEN SATURATION: 99 % | WEIGHT: 172.63 LBS | DIASTOLIC BLOOD PRESSURE: 68 MMHG | BODY MASS INDEX: 28.76 KG/M2 | HEART RATE: 86 BPM | SYSTOLIC BLOOD PRESSURE: 136 MMHG

## 2024-03-19 DIAGNOSIS — I10 PRIMARY HYPERTENSION: Primary | ICD-10-CM

## 2024-03-19 DIAGNOSIS — H91.90 HEARING LOSS, UNSPECIFIED HEARING LOSS TYPE, UNSPECIFIED LATERALITY: ICD-10-CM

## 2024-03-19 DIAGNOSIS — E11.8 TYPE 2 DIABETES MELLITUS WITH UNSPECIFIED COMPLICATIONS: ICD-10-CM

## 2024-03-19 DIAGNOSIS — R05.9 COUGH, UNSPECIFIED TYPE: ICD-10-CM

## 2024-03-19 DIAGNOSIS — L81.9 DISCOLORATION OF SKIN OF FACE: ICD-10-CM

## 2024-03-19 DIAGNOSIS — R07.9 CHEST PAIN, UNSPECIFIED TYPE: ICD-10-CM

## 2024-03-19 DIAGNOSIS — R21 RASH: ICD-10-CM

## 2024-03-19 DIAGNOSIS — Z12.31 ENCOUNTER FOR SCREENING MAMMOGRAM FOR BREAST CANCER: ICD-10-CM

## 2024-03-19 DIAGNOSIS — I70.0 AORTIC ATHEROSCLEROSIS: ICD-10-CM

## 2024-03-19 PROCEDURE — 1101F PT FALLS ASSESS-DOCD LE1/YR: CPT | Mod: HCNC,CPTII,S$GLB, | Performed by: INTERNAL MEDICINE

## 2024-03-19 PROCEDURE — 71046 X-RAY EXAM CHEST 2 VIEWS: CPT | Mod: TC,HCNC

## 2024-03-19 PROCEDURE — 99214 OFFICE O/P EST MOD 30 MIN: CPT | Mod: HCNC,S$GLB,, | Performed by: INTERNAL MEDICINE

## 2024-03-19 PROCEDURE — 4010F ACE/ARB THERAPY RXD/TAKEN: CPT | Mod: HCNC,CPTII,S$GLB, | Performed by: INTERNAL MEDICINE

## 2024-03-19 PROCEDURE — 1159F MED LIST DOCD IN RCRD: CPT | Mod: HCNC,CPTII,S$GLB, | Performed by: INTERNAL MEDICINE

## 2024-03-19 PROCEDURE — 3052F HG A1C>EQUAL 8.0%<EQUAL 9.0%: CPT | Mod: HCNC,CPTII,S$GLB, | Performed by: INTERNAL MEDICINE

## 2024-03-19 PROCEDURE — 3075F SYST BP GE 130 - 139MM HG: CPT | Mod: HCNC,CPTII,S$GLB, | Performed by: INTERNAL MEDICINE

## 2024-03-19 PROCEDURE — 99999 PR PBB SHADOW E&M-EST. PATIENT-LVL V: CPT | Mod: PBBFAC,HCNC,, | Performed by: INTERNAL MEDICINE

## 2024-03-19 PROCEDURE — 1126F AMNT PAIN NOTED NONE PRSNT: CPT | Mod: HCNC,CPTII,S$GLB, | Performed by: INTERNAL MEDICINE

## 2024-03-19 PROCEDURE — 3078F DIAST BP <80 MM HG: CPT | Mod: HCNC,CPTII,S$GLB, | Performed by: INTERNAL MEDICINE

## 2024-03-19 PROCEDURE — 1160F RVW MEDS BY RX/DR IN RCRD: CPT | Mod: HCNC,CPTII,S$GLB, | Performed by: INTERNAL MEDICINE

## 2024-03-19 PROCEDURE — 94060 EVALUATION OF WHEEZING: CPT | Mod: HCNC,S$GLB,, | Performed by: INTERNAL MEDICINE

## 2024-03-19 PROCEDURE — 3288F FALL RISK ASSESSMENT DOCD: CPT | Mod: HCNC,CPTII,S$GLB, | Performed by: INTERNAL MEDICINE

## 2024-03-19 PROCEDURE — 3008F BODY MASS INDEX DOCD: CPT | Mod: HCNC,CPTII,S$GLB, | Performed by: INTERNAL MEDICINE

## 2024-03-19 PROCEDURE — 71046 X-RAY EXAM CHEST 2 VIEWS: CPT | Mod: 26,HCNC,, | Performed by: STUDENT IN AN ORGANIZED HEALTH CARE EDUCATION/TRAINING PROGRAM

## 2024-03-19 RX ORDER — FLUTICASONE PROPIONATE 50 MCG
1 SPRAY, SUSPENSION (ML) NASAL DAILY
Qty: 48 G | Refills: 4 | Status: SHIPPED | OUTPATIENT
Start: 2024-03-19

## 2024-03-19 RX ORDER — ALBUTEROL SULFATE 90 UG/1
2 AEROSOL, METERED RESPIRATORY (INHALATION) EVERY 6 HOURS PRN
Qty: 18 G | Refills: 3 | Status: SHIPPED | OUTPATIENT
Start: 2024-03-19

## 2024-03-19 NOTE — PROGRESS NOTES
"Subjective:       Patient ID: Lizabeth Saha is a 68 y.o. female.    Chief Complaint: Follow-up  This is a 68-year-old who presents today for follow-up she reports working with endocrinology to try to get her diabetes better controlled she has tried to do things like exercise and watch her diet but her A1c is still elevated she is tolerating her Jardiance and glipizide.  She reports they may consider additional agents.  She has some issues with hearing in her left ear and has seen ENT.  She does have trouble with a cough that is ongoing but intermittent she reports she had some episodes of chest discomfort and sometimes sweating associated she does continues take her cholesterol medicines.    Follow-up  Associated symptoms include arthralgias, chest pain, coughing and a rash. Pertinent negatives include no abdominal pain.     Review of Systems   Respiratory:  Positive for cough.    Cardiovascular:  Positive for chest pain. Negative for palpitations.   Gastrointestinal:  Negative for abdominal pain and constipation.   Endocrine:        Sweats at times    Musculoskeletal:  Positive for arthralgias.   Skin:  Positive for rash.   Neurological:  Negative for dizziness.       Objective:    Blood pressure 136/68, pulse 86, height 5' 5" (1.651 m), weight 78.3 kg (172 lb 9.9 oz), last menstrual period 01/01/1994, SpO2 99 %.   Physical Exam  Constitutional:       General: She is not in acute distress.  HENT:      Head: Normocephalic.      Mouth/Throat:      Pharynx: Oropharynx is clear.   Eyes:      General: No scleral icterus.  Cardiovascular:      Rate and Rhythm: Normal rate and regular rhythm.      Heart sounds: Normal heart sounds. No murmur heard.     No friction rub. No gallop.   Pulmonary:      Effort: Pulmonary effort is normal. No respiratory distress.      Breath sounds: Normal breath sounds.   Abdominal:      General: Bowel sounds are normal.      Palpations: Abdomen is soft. There is no mass.      " Tenderness: There is no abdominal tenderness.   Musculoskeletal:      Cervical back: Neck supple.   Skin:     Findings: No erythema.   Neurological:      Mental Status: She is alert.   Psychiatric:         Mood and Affect: Mood normal.         Assessment:       1. Primary hypertension    2. Encounter for screening mammogram for breast cancer    3. Discoloration of skin of face    4. Rash    5. Type 2 diabetes mellitus with unspecified complications    6. Cough, unspecified type    7. Chest pain, unspecified type        Plan:       Lizabeth was seen today for follow-up.    Diagnoses and all orders for this visit:    Primary hypertension  Blood pressure acceptable continue current regimen    Encounter for screening mammogram for breast cancer  -     Mammo Digital Screening Bilat w/ Martir; Future    Discoloration of skin of face  Referral placed as requested  -     Ambulatory referral/consult to Dermatology; Future    Rash  -     Ambulatory referral/consult to Dermatology; Future    Type 2 diabetes mellitus with unspecified complications  A1c remains up continue to follow with endocrinology she continues with exercise and dietary measures    Cough, unspecified type  She has albuterol when needed will proceed with additional testing and treatment if needed  -     X-Ray Chest PA And Lateral; Future  -     Spirometry with/without bronchodilator; Future  -     Cancel: Spirometry with/without bronchodilator; Future    Chest pain, unspecified type  Continue statin referral placed  -     Ambulatory referral/consult to Cardiology; Future    Hearing loss she continues to follow with ENT    Other orders  -     fluticasone propionate (FLONASE) 50 mcg/actuation nasal spray; 1 spray (50 mcg total) by Each Nostril route once daily.  -     albuterol (PROVENTIL HFA) 90 mcg/actuation inhaler; Inhale 2 puffs into the lungs every 6 (six) hours as needed for Wheezing. Rescue    Recent labs reviewed    Follow-up 6 months Will review test when  available

## 2024-03-20 ENCOUNTER — PATIENT MESSAGE (OUTPATIENT)
Dept: INTERNAL MEDICINE | Facility: CLINIC | Age: 69
End: 2024-03-20
Payer: MEDICARE

## 2024-03-23 LAB
FEF 25 75 LLN: 0.65
FEF 25 75 PRE REF: 67 %
FEF 25 75 REF: 1.67
FET100 CHG: -7.4 %
FEV05 LLN: 0.83
FEV05 REF: 1.68
FEV1 CHG: -0.1 %
FEV1 FVC LLN: 66
FEV1 FVC PRE REF: 92.4 %
FEV1 FVC REF: 79
FEV1 LLN: 1.32
FEV1 PRE REF: 85.1 %
FEV1 REF: 1.88
FEV1 VOL CHG: -0
FVC CHG: -5.1 %
FVC LLN: 1.71
FVC PRE REF: 91.7 %
FVC REF: 2.39
FVC VOL CHG: -0.11
PEF LLN: 2.91
PEF PRE REF: 63.8 %
PEF REF: 4.85
PHYSICIAN COMMENT: NORMAL
POST FEF 25 75: 1.12 L/S (ref 0.65–3.21)
POST FET 100: 6.1 SEC
POST FEV1 FVC: 76.75 % (ref 66.16–89.87)
POST FEV1: 1.6 L (ref 1.32–2.41)
POST FEV5: 1.13 L (ref 0.83–2.54)
POST FVC: 2.08 L (ref 1.71–3.12)
POST PEF: 3.13 L/S (ref 2.91–6.79)
PRE FEF 25 75: 1.12 L/S (ref 0.65–3.21)
PRE FET 100: 6.59 SEC
PRE FEV05 REF: 70.3 %
PRE FEV1 FVC: 72.85 % (ref 66.16–89.87)
PRE FEV1: 1.6 L (ref 1.32–2.41)
PRE FEV5: 1.18 L (ref 0.83–2.54)
PRE FVC: 2.2 L (ref 1.71–3.12)
PRE PEF: 3.1 L/S (ref 2.91–6.79)

## 2024-03-25 ENCOUNTER — TELEPHONE (OUTPATIENT)
Dept: INTERNAL MEDICINE | Facility: CLINIC | Age: 69
End: 2024-03-25
Payer: MEDICARE

## 2024-03-25 DIAGNOSIS — R05.9 COUGH, UNSPECIFIED TYPE: ICD-10-CM

## 2024-03-25 DIAGNOSIS — G47.30 SLEEP APNEA, UNSPECIFIED TYPE: Primary | ICD-10-CM

## 2024-03-25 NOTE — TELEPHONE ENCOUNTER
----- Message from Orquidea Lancaster MD sent at 3/25/2024 12:04 PM CDT -----  Called and reviewd with pt  Discussed allergy consult due to persistant cough she would like to do so  Discussed sleep consult she would like    [facility]

## 2024-03-27 ENCOUNTER — OFFICE VISIT (OUTPATIENT)
Dept: SLEEP MEDICINE | Facility: CLINIC | Age: 69
End: 2024-03-27
Payer: MEDICARE

## 2024-03-27 VITALS
BODY MASS INDEX: 28.54 KG/M2 | HEIGHT: 65 IN | OXYGEN SATURATION: 98 % | DIASTOLIC BLOOD PRESSURE: 78 MMHG | HEART RATE: 89 BPM | SYSTOLIC BLOOD PRESSURE: 138 MMHG | WEIGHT: 171.31 LBS

## 2024-03-27 DIAGNOSIS — G47.33 OSA (OBSTRUCTIVE SLEEP APNEA): Primary | ICD-10-CM

## 2024-03-27 DIAGNOSIS — G47.30 SLEEP APNEA, UNSPECIFIED TYPE: ICD-10-CM

## 2024-03-27 PROCEDURE — 1126F AMNT PAIN NOTED NONE PRSNT: CPT | Mod: HCNC,CPTII,S$GLB, | Performed by: INTERNAL MEDICINE

## 2024-03-27 PROCEDURE — 3008F BODY MASS INDEX DOCD: CPT | Mod: HCNC,CPTII,S$GLB, | Performed by: INTERNAL MEDICINE

## 2024-03-27 PROCEDURE — 99999 PR PBB SHADOW E&M-EST. PATIENT-LVL IV: CPT | Mod: PBBFAC,HCNC,, | Performed by: INTERNAL MEDICINE

## 2024-03-27 PROCEDURE — 99203 OFFICE O/P NEW LOW 30 MIN: CPT | Mod: HCNC,S$GLB,, | Performed by: INTERNAL MEDICINE

## 2024-03-27 PROCEDURE — 1159F MED LIST DOCD IN RCRD: CPT | Mod: HCNC,CPTII,S$GLB, | Performed by: INTERNAL MEDICINE

## 2024-03-27 PROCEDURE — 1160F RVW MEDS BY RX/DR IN RCRD: CPT | Mod: HCNC,CPTII,S$GLB, | Performed by: INTERNAL MEDICINE

## 2024-03-27 PROCEDURE — 3288F FALL RISK ASSESSMENT DOCD: CPT | Mod: HCNC,CPTII,S$GLB, | Performed by: INTERNAL MEDICINE

## 2024-03-27 PROCEDURE — 4010F ACE/ARB THERAPY RXD/TAKEN: CPT | Mod: HCNC,CPTII,S$GLB, | Performed by: INTERNAL MEDICINE

## 2024-03-27 PROCEDURE — 3052F HG A1C>EQUAL 8.0%<EQUAL 9.0%: CPT | Mod: HCNC,CPTII,S$GLB, | Performed by: INTERNAL MEDICINE

## 2024-03-27 PROCEDURE — 1101F PT FALLS ASSESS-DOCD LE1/YR: CPT | Mod: HCNC,CPTII,S$GLB, | Performed by: INTERNAL MEDICINE

## 2024-03-27 PROCEDURE — 3078F DIAST BP <80 MM HG: CPT | Mod: HCNC,CPTII,S$GLB, | Performed by: INTERNAL MEDICINE

## 2024-03-27 PROCEDURE — 3075F SYST BP GE 130 - 139MM HG: CPT | Mod: HCNC,CPTII,S$GLB, | Performed by: INTERNAL MEDICINE

## 2024-03-27 NOTE — PROGRESS NOTES
Subjective:   Patient ID: Lizabeth Saha is a 68 y.o. female    Chief Complaint:   Chief Complaint   Patient presents with    Sleep Apnea       Referred by Dr. Orquidea Callahan Sharon*    HPI  Lizabeth Saha was diagnosed with Obstructive Sleep Apnea in the past.   She was previously seen by prior sleep providers, including Dr. Alvares and most recently, Dr. Nuñez back in April 2022.    GERARDO was diagnosed in 2011. She was prescribed CPAP back in 2015. Her device was recalled. She has been issued a Dreamstation 2.    She just recetly started back on cpap - for the past 6 nights. She reports feeling well with cpap.     Initial triggers for sleep study include snoring, daytime hypersomnia.    Today, the patient presents for follow up visit.      PAP history   DME [facility] phone number- [phone]     Mask Dreamwear nasal pillow - no issue   Pressure 9 - 15   Problems None.  reports loud noise   Machine age  years   Download Compliance:  Days used:   6/6  Days used > 4hrs:  6/6  Average use (hr:min):  6:46  Pressure:   11.4  Leak:    28  Residual AHI:   3.0         Currently, with PAP  use:  Lizabeth Saha does feel refreshed upon awakening.   An assessment of daytime sleep tendency reveals that she does not have episodes of inadvertent dozing even when inactive or being sedentary.     Residual AHI:           [place] Sleepiness Scale:          3/31/2022     6:48 PM   Sleep Clinic New Patient   What time do you go to bed on a week day? (Give a range) 12pm   What time do you go to bed on a day off? (Give a range) 12:30pm   How long does it take you to fall asleep? (Give a range) 10 minutes   On average, how many times per night do you wake up? Fernando   How long does it take you to fall back into sleep? (Give a range) 3 mins   What time do you wake up to start your day on a week day? (Give a range) 9-10 am   What time do you wake up to start your day on a day off? (Give a range) I  am off I am reyired   What time do you get out of bed? (Give a range) 9:00-88lm7-8 hours   On average, how many hours do you sleep? 8-9 hrs   On average, how many naps do you take per day? None   Rate your sleep quality from 0 to 5 (0-poor, 5-great). 4   Have you experienced:  N/a   How much weight have you lost or gained (in lbs.) in the last year? 20 lbs   On average, how many times per night do you go to the bathroom?  Once   Have you ever had a sleep study/CPAP machine/surgery for sleep apnea? Yes   Have you ever had a CPAP machine for sleep apnea? Yes   Have you ever had surgery for sleep apnea? No         3/31/2022     6:48 PM   Sleep Clinic ROS    Difficulty breathing through the nose?  No   Sore throat or dry mouth in the morning? No   Irregular or very fast heart beat?  Yes   Shortness of breath?  Yes   Acid reflux? No   Body aches and pains?  Yes   Morning headaches? Yes   Dizziness? No   Mood changes?  No   Do you exercise?  No   Do you feel like moving your legs a lot?  Yes          Objective:   Vitals reviewed   Physical Exam  Vitals reviewed.   Constitutional:       Appearance: Normal appearance.   HENT:      Head: Normocephalic.   Pulmonary:      Effort: Pulmonary effort is normal.   Neurological:      General: No focal deficit present.      Mental Status: She is alert and oriented to person, place, and time.   Psychiatric:         Mood and Affect: Mood normal.         Behavior: Behavior normal.         Assessment:     Problem List Items Addressed This Visit          Other    Sleep apnea    Relevant Orders    CPAP/BIPAP SUPPLIES     Other Visit Diagnoses       GERARDO (obstructive sleep apnea)    -  Primary              Plan:       Patient is on PAP therapy at this time. Just started using cpap again in the past week.  Good Compliance  Good Efficacy  Cpap checked today - good seal, no unusual obvious noice.    - Teaching in regards to PAP use and mask adjustment was given to the patient during the visit  today.  - Use PAP >4hours per night for 7 nights per week  - Use PAP for any daytime sleeping  - Interface patient chose that was prescribed today: nasal - no change  - Prescription for PAP device supplies will be send to the [employer] company today. Filter, mask, tube with climate line and humidification system.      Patient suffers from a complex medical condition and if sleep disordered breathing is not properly treated it will increase her morbidity and mortality and patient is aware that has to be optimally compliant with therapy. Sleep disordered breathing has been associated with uncontrolled hypertension, insulin resistance, pulmonary hypertension, dementia, glaucoma, cardiac arrhythmias, cerebro vascular accident and multiple other conditions when not treated appropriately. Patient benefits from PAP therapy.      - Additionally close attention to patient's past medical history as described below were discussed today during the office evaluation.   she  has a past medical history of Diabetes mellitus type II, Hyperlipemia, Irritable bowel syndrome, Obesity, Osteopenia, Rhinitis, Sleep apnea, and Vertigo.         I will follow up the patient closely.    RTC annually

## 2024-04-02 ENCOUNTER — TELEPHONE (OUTPATIENT)
Dept: CARDIOLOGY | Facility: CLINIC | Age: 69
End: 2024-04-02
Payer: MEDICARE

## 2024-04-02 DIAGNOSIS — R07.9 CHEST PAIN, UNSPECIFIED TYPE: Primary | ICD-10-CM

## 2024-04-02 NOTE — PROGRESS NOTES
PCP - Orquidea Lancaster MD  Subjective:     Lizabeth Saha is a 68 y.o. female with PMH of DM2, HTN, HLD who presents to cardiology clinic to establish care. She was referred by her PCP for episodes of chest discomfort. She states she has had intermittent episodes of chest pain over the past few months. She does not relate the symptoms to exertion, saying the pain comes unpredictably either at rest or on exertion. She says the pain starts in her left posterior chest wall area, and radiates anteriorly and down her left arm. Difficult to characterize. Typically lasts about a minute before resolving spontaneously. No prior cardiac history.         ---------------  Cardiac Studies ---------------  PET Stress 3/2019:   The relative PET images are normal showing no clinically significant regional resting or stress induced perfusion defects.  Whole heart absolute myocardial perfusion (cc/min/g) averaged 0.92 at rest (which is normal), 2.06 at stress (which is low normal), and 2.27 CFR (which is mildly reduced).  Visually estimated ejection fraction is normal at rest and normal at stress.  Wall motion was normal at rest and stress.  LV cavity size is normal at rest and normal at stress.  The EKG portion of this study is negative for myocardial ischemia.  The patient did not have chest pain during the stress test.  There were no arrhythmias during stress.      History:     Social History     Tobacco Use    Smoking status: Former     Current packs/day: 0.00     Types: Cigarettes     Quit date: 1989     Years since quittin.2    Smokeless tobacco: Never   Substance Use Topics    Alcohol use: Yes     Alcohol/week: 1.0 standard drink of alcohol     Types: 1 Glasses of wine per week     Comment: 4-5 glasses of wine monthly     Family History   Problem Relation Age of Onset    Hypertension Mother     Heart disease Mother     Kidney disease Mother     Hypertension Father     Cancer Sister         liver      "Diabetes Sister     Diabetes Sister     Diabetes Brother     Hypertension Brother     Kidney disease Brother     No Known Problems Daughter     Cancer Maternal Aunt         breast cancer     Breast cancer Maternal Aunt        Meds:     Review of patient's allergies indicates:   Allergen Reactions    Phenergan  [promethazine]      Other reaction(s): Hives    Atorvastatin      Myalgia         Current Outpatient Medications:     albuterol (PROVENTIL HFA) 90 mcg/actuation inhaler, Inhale 2 puffs into the lungs every 6 (six) hours as needed for Wheezing. Rescue, Disp: 18 g, Rfl: 3    alcohol swabs (BD ALCOHOL SWABS) PadM, Apply 1 each topically daily as needed (testing)., Disp: 100 each, Rfl: 4    blood sugar diagnostic (TRUE METRIX GLUCOSE TEST STRIP) Strp, USE FOR BLOOD SUGAR TESTING ONE TIME DAILY, Disp: 100 strip, Rfl: 5    calcium-vitamin D 250 mg-2.5 mcg (100 unit) per tablet, Take 1 tablet by mouth 3 (three) times a week., Disp: , Rfl:     empagliflozin (JARDIANCE) 25 mg tablet, TAKE 1 TABLET EVERY DAY, Disp: 90 tablet, Rfl: 2    fluticasone propionate (FLONASE) 50 mcg/actuation nasal spray, 1 spray (50 mcg total) by Each Nostril route once daily., Disp: 48 g, Rfl: 4    glipiZIDE (GLUCOTROL) 2.5 MG TR24, TAKE 1 TABLET (2.5 MG TOTAL) BY MOUTH DAILY WITH BREAKFAST., Disp: 90 tablet, Rfl: 3    rosuvastatin (CRESTOR) 20 MG tablet, Take 1 tablet (20 mg total) by mouth once daily., Disp: 90 tablet, Rfl: 3    TRUEPLUS LANCETS 33 gauge Misc, TEST BLOOD SUGAR THREE TIMES DAILY, Disp: 300 each, Rfl: 3    valsartan (DIOVAN) 80 MG tablet, TAKE 1 TABLET EVERY DAY (NEED MD APPOINTMENT), Disp: 90 tablet, Rfl: 2    blood-glucose meter kit, To check BG 3 times daily, to use with insurance preferred meter, newest meter, e 11.65, Disp: 1 each, Rfl: 0    10 point ROS performed and negative except as stated in HPI     Objective:   BP (!) 155/72   Pulse 77   Ht 5' 5" (1.651 m)   Wt 79 kg (174 lb 2.6 oz)   LMP 01/01/1994 " "(Approximate) Comment: 1994  SpO2 99%   BMI 28.98 kg/m²     Physical Exam:   Constitutional: no acute distress  HEENT: NCAT, EOMI, no scleral icterus  Cardiovascular: Regular rate and rhythm, no murmurs appreciated. 2+ carotid, radial, DP pulses bilaterally    Pulmonary: Clear to auscultation bilaterally   Abdomen: nontender, non-distended   Neuro: alert and oriented, no focal deficits  Extremities: warm, no edema   MSK: no deformities  Integument: intact, no rashes       Labs:     Lab Results   Component Value Date     03/18/2024    K 4.5 03/18/2024     03/18/2024    CO2 26 03/18/2024    BUN 15 03/18/2024    CREATININE 1.1 03/18/2024    ANIONGAP 10 03/18/2024     Lab Results   Component Value Date    HGBA1C 8.1 (H) 03/06/2024     No results found for: "BNP", "BNPTRIAGEBLO"    Lab Results   Component Value Date    WBC 5.32 03/18/2024    HGB 14.2 03/18/2024    HCT 47.0 03/18/2024     03/18/2024    GRAN 2.5 03/18/2024    GRAN 47.0 03/18/2024     Lab Results   Component Value Date    CHOL 154 12/21/2023    HDL 55 12/21/2023    LDLCALC 78.8 12/21/2023    TRIG 101 12/21/2023       Lab Results   Component Value Date     03/18/2024    K 4.5 03/18/2024     03/18/2024    CO2 26 03/18/2024    BUN 15 03/18/2024    CREATININE 1.1 03/18/2024    ANIONGAP 10 03/18/2024     Lab Results   Component Value Date    HGBA1C 8.1 (H) 03/06/2024     No results found for: "BNP", "BNPTRIAGEBLO" Lab Results   Component Value Date    WBC 5.32 03/18/2024    HGB 14.2 03/18/2024    HCT 47.0 03/18/2024     03/18/2024    GRAN 2.5 03/18/2024    GRAN 47.0 03/18/2024     Lab Results   Component Value Date    CHOL 154 12/21/2023    HDL 55 12/21/2023    LDLCALC 78.8 12/21/2023    TRIG 101 12/21/2023            Assessment     1. Chest pain, unspecified type    2. Hyperlipidemia, unspecified hyperlipidemia type    3. Essential hypertension    4. Type 2 diabetes mellitus with hyperglycemia, without long-term current " use of insulin        Plan:   68 y.o. female with PMH of DM2, HTN, HLD who presents to cardiology clinic to establish care.       Chest discomfort: Sounds atypical by history, however she does have multiple risk factors for coronary disease. Check coronary artery calcium score with possible stress testing depending on result.   Check CAC score     HTN: Slightly above goal today but historically has had good control. Possibly white coat phenomenon, no changes today. Encouraged checking blood pressure at home.     HLD: LDL 70s, reasonable. Continue statin.       Follow up in 6 months     Sunny Belle MD  [facility]  Cardiovascular Disease, PGY-VI

## 2024-04-03 ENCOUNTER — HOSPITAL ENCOUNTER (OUTPATIENT)
Dept: CARDIOLOGY | Facility: CLINIC | Age: 69
Discharge: HOME | End: 2024-04-03
Payer: MEDICARE

## 2024-04-03 ENCOUNTER — OFFICE VISIT (OUTPATIENT)
Dept: CARDIOLOGY | Facility: CLINIC | Age: 69
End: 2024-04-03
Payer: MEDICARE

## 2024-04-03 ENCOUNTER — PATIENT MESSAGE (OUTPATIENT)
Dept: INTERNAL MEDICINE | Facility: CLINIC | Age: 69
End: 2024-04-03
Payer: MEDICARE

## 2024-04-03 VITALS
WEIGHT: 174.19 LBS | BODY MASS INDEX: 29.02 KG/M2 | SYSTOLIC BLOOD PRESSURE: 155 MMHG | OXYGEN SATURATION: 99 % | HEIGHT: 65 IN | HEART RATE: 77 BPM | DIASTOLIC BLOOD PRESSURE: 72 MMHG

## 2024-04-03 DIAGNOSIS — I10 ESSENTIAL HYPERTENSION: ICD-10-CM

## 2024-04-03 DIAGNOSIS — E78.5 HYPERLIPIDEMIA, UNSPECIFIED HYPERLIPIDEMIA TYPE: ICD-10-CM

## 2024-04-03 DIAGNOSIS — R07.9 CHEST PAIN, UNSPECIFIED TYPE: Primary | ICD-10-CM

## 2024-04-03 DIAGNOSIS — E11.65 TYPE 2 DIABETES MELLITUS WITH HYPERGLYCEMIA, WITHOUT LONG-TERM CURRENT USE OF INSULIN: ICD-10-CM

## 2024-04-03 DIAGNOSIS — R07.9 CHEST PAIN, UNSPECIFIED TYPE: ICD-10-CM

## 2024-04-03 LAB
OHS QRS DURATION: 76 MS
OHS QTC CALCULATION: 431 MS

## 2024-04-03 PROCEDURE — 93005 ELECTROCARDIOGRAM TRACING: CPT | Mod: S$GLB,,, | Performed by: INTERNAL MEDICINE

## 2024-04-03 PROCEDURE — 99999 PR PBB SHADOW E&M-EST. PATIENT-LVL V: CPT | Mod: PBBFAC,GC,, | Performed by: INTERNAL MEDICINE

## 2024-04-03 PROCEDURE — 1159F MED LIST DOCD IN RCRD: CPT | Mod: HCNC,CPTII,GC,S$GLB | Performed by: INTERNAL MEDICINE

## 2024-04-03 PROCEDURE — 3077F SYST BP >= 140 MM HG: CPT | Mod: HCNC,CPTII,GC,S$GLB | Performed by: INTERNAL MEDICINE

## 2024-04-03 PROCEDURE — 3078F DIAST BP <80 MM HG: CPT | Mod: HCNC,CPTII,GC,S$GLB | Performed by: INTERNAL MEDICINE

## 2024-04-03 PROCEDURE — 3288F FALL RISK ASSESSMENT DOCD: CPT | Mod: HCNC,CPTII,GC,S$GLB | Performed by: INTERNAL MEDICINE

## 2024-04-03 PROCEDURE — 3052F HG A1C>EQUAL 8.0%<EQUAL 9.0%: CPT | Mod: HCNC,CPTII,GC,S$GLB | Performed by: INTERNAL MEDICINE

## 2024-04-03 PROCEDURE — 1126F AMNT PAIN NOTED NONE PRSNT: CPT | Mod: HCNC,CPTII,GC,S$GLB | Performed by: INTERNAL MEDICINE

## 2024-04-03 PROCEDURE — 1101F PT FALLS ASSESS-DOCD LE1/YR: CPT | Mod: HCNC,CPTII,GC,S$GLB | Performed by: INTERNAL MEDICINE

## 2024-04-03 PROCEDURE — 99214 OFFICE O/P EST MOD 30 MIN: CPT | Mod: HCNC,25,GC,S$GLB | Performed by: INTERNAL MEDICINE

## 2024-04-03 PROCEDURE — 4010F ACE/ARB THERAPY RXD/TAKEN: CPT | Mod: HCNC,CPTII,GC,S$GLB | Performed by: INTERNAL MEDICINE

## 2024-04-03 PROCEDURE — 93010 ELECTROCARDIOGRAM REPORT: CPT | Mod: S$GLB,,, | Performed by: INTERNAL MEDICINE

## 2024-04-03 PROCEDURE — 3008F BODY MASS INDEX DOCD: CPT | Mod: HCNC,CPTII,GC,S$GLB | Performed by: INTERNAL MEDICINE

## 2024-04-04 RX ORDER — ERGOCALCIFEROL 1.25 MG/1
50000 CAPSULE ORAL
Qty: 12 CAPSULE | Refills: 2 | Status: SHIPPED | OUTPATIENT
Start: 2024-04-04

## 2024-04-23 ENCOUNTER — OFFICE VISIT (OUTPATIENT)
Dept: SPORTS MEDICINE | Facility: CLINIC | Age: 69
End: 2024-04-23
Payer: MEDICARE

## 2024-04-23 VITALS
SYSTOLIC BLOOD PRESSURE: 145 MMHG | WEIGHT: 174.19 LBS | BODY MASS INDEX: 28.98 KG/M2 | DIASTOLIC BLOOD PRESSURE: 79 MMHG

## 2024-04-23 DIAGNOSIS — M25.562 CHRONIC PAIN OF LEFT KNEE: Primary | ICD-10-CM

## 2024-04-23 DIAGNOSIS — M17.12 PRIMARY OSTEOARTHRITIS OF LEFT KNEE: ICD-10-CM

## 2024-04-23 DIAGNOSIS — G89.29 CHRONIC PAIN OF LEFT KNEE: Primary | ICD-10-CM

## 2024-04-23 PROCEDURE — 1126F AMNT PAIN NOTED NONE PRSNT: CPT | Mod: HCNC,CPTII,S$GLB, | Performed by: STUDENT IN AN ORGANIZED HEALTH CARE EDUCATION/TRAINING PROGRAM

## 2024-04-23 PROCEDURE — 3052F HG A1C>EQUAL 8.0%<EQUAL 9.0%: CPT | Mod: HCNC,CPTII,S$GLB, | Performed by: STUDENT IN AN ORGANIZED HEALTH CARE EDUCATION/TRAINING PROGRAM

## 2024-04-23 PROCEDURE — 3008F BODY MASS INDEX DOCD: CPT | Mod: HCNC,CPTII,S$GLB, | Performed by: STUDENT IN AN ORGANIZED HEALTH CARE EDUCATION/TRAINING PROGRAM

## 2024-04-23 PROCEDURE — 4010F ACE/ARB THERAPY RXD/TAKEN: CPT | Mod: HCNC,CPTII,S$GLB, | Performed by: STUDENT IN AN ORGANIZED HEALTH CARE EDUCATION/TRAINING PROGRAM

## 2024-04-23 PROCEDURE — 3078F DIAST BP <80 MM HG: CPT | Mod: HCNC,CPTII,S$GLB, | Performed by: STUDENT IN AN ORGANIZED HEALTH CARE EDUCATION/TRAINING PROGRAM

## 2024-04-23 PROCEDURE — 1160F RVW MEDS BY RX/DR IN RCRD: CPT | Mod: HCNC,CPTII,S$GLB, | Performed by: STUDENT IN AN ORGANIZED HEALTH CARE EDUCATION/TRAINING PROGRAM

## 2024-04-23 PROCEDURE — 99214 OFFICE O/P EST MOD 30 MIN: CPT | Mod: HCNC,S$GLB,, | Performed by: STUDENT IN AN ORGANIZED HEALTH CARE EDUCATION/TRAINING PROGRAM

## 2024-04-23 PROCEDURE — 1159F MED LIST DOCD IN RCRD: CPT | Mod: HCNC,CPTII,S$GLB, | Performed by: STUDENT IN AN ORGANIZED HEALTH CARE EDUCATION/TRAINING PROGRAM

## 2024-04-23 PROCEDURE — 3077F SYST BP >= 140 MM HG: CPT | Mod: HCNC,CPTII,S$GLB, | Performed by: STUDENT IN AN ORGANIZED HEALTH CARE EDUCATION/TRAINING PROGRAM

## 2024-04-23 PROCEDURE — 99999 PR PBB SHADOW E&M-EST. PATIENT-LVL III: CPT | Mod: PBBFAC,HCNC,, | Performed by: STUDENT IN AN ORGANIZED HEALTH CARE EDUCATION/TRAINING PROGRAM

## 2024-04-23 PROCEDURE — 3288F FALL RISK ASSESSMENT DOCD: CPT | Mod: HCNC,CPTII,S$GLB, | Performed by: STUDENT IN AN ORGANIZED HEALTH CARE EDUCATION/TRAINING PROGRAM

## 2024-04-23 PROCEDURE — 1101F PT FALLS ASSESS-DOCD LE1/YR: CPT | Mod: HCNC,CPTII,S$GLB, | Performed by: STUDENT IN AN ORGANIZED HEALTH CARE EDUCATION/TRAINING PROGRAM

## 2024-04-23 NOTE — PROGRESS NOTES
"CC: left knee pain    68 y.o. Female presents today for follow up evaluation of her left knee pain following Orthovisc series. Pt reports pain has improved. Pt reports 1/10 "nagging" pain. Pt denies mechanical symptoms, but reports knee gives out still intermittently. Pt denies numbness/tingling.     Attempted treatments: Orthovisc  Pain score: 1/10  History of trauma/injury: none since last visit  Affecting ADLs: no      REVIEW OF SYSTEMS:   Constitution: Patient denies fever or chills.  Eyes: Patient denies eye pain or vision changes.  HEENT: Patient denies ear pain, sore throat, or nasal discharge.  CVS: Patient denies chest pain.  Lungs: Patient denies shortness of breath or cough.  Skin: Patient denies skin rash or itching.    Musculoskeletal: Patient denies recent falls. See HPI.  Psych: Patient denies any current anxiety or nervousness.    PAST MEDICAL HISTORY:   Past Medical History:   Diagnosis Date    Diabetes mellitus type II     Hyperlipemia     Irritable bowel syndrome     Obesity     Osteopenia     Rhinitis     Sleep apnea     Vertigo        MEDICATIONS:     Current Outpatient Medications:     albuterol (PROVENTIL HFA) 90 mcg/actuation inhaler, Inhale 2 puffs into the lungs every 6 (six) hours as needed for Wheezing. Rescue, Disp: 18 g, Rfl: 3    alcohol swabs (BD ALCOHOL SWABS) PadM, Apply 1 each topically daily as needed (testing)., Disp: 100 each, Rfl: 4    blood sugar diagnostic (TRUE METRIX GLUCOSE TEST STRIP) Strp, USE FOR BLOOD SUGAR TESTING ONE TIME DAILY, Disp: 100 strip, Rfl: 5    empagliflozin (JARDIANCE) 25 mg tablet, TAKE 1 TABLET EVERY DAY, Disp: 90 tablet, Rfl: 2    ergocalciferol (ERGOCALCIFEROL) 50,000 unit Cap, Take 1 capsule (50,000 Units total) by mouth every 7 days., Disp: 12 capsule, Rfl: 2    fluticasone propionate (FLONASE) 50 mcg/actuation nasal spray, 1 spray (50 mcg total) by Each Nostril route once daily., Disp: 48 g, Rfl: 4    glipiZIDE (GLUCOTROL) 2.5 MG TR24, TAKE 1 " TABLET (2.5 MG TOTAL) BY MOUTH DAILY WITH BREAKFAST., Disp: 90 tablet, Rfl: 3    rosuvastatin (CRESTOR) 20 MG tablet, Take 1 tablet (20 mg total) by mouth once daily., Disp: 90 tablet, Rfl: 3    TRUEPLUS LANCETS 33 gauge Misc, TEST BLOOD SUGAR THREE TIMES DAILY, Disp: 300 each, Rfl: 3    valsartan (DIOVAN) 80 MG tablet, TAKE 1 TABLET EVERY DAY (NEED MD APPOINTMENT), Disp: 90 tablet, Rfl: 2    blood-glucose meter kit, To check BG 3 times daily, to use with insurance preferred meter, newest meter, e 11.65, Disp: 1 each, Rfl: 0    ALLERGIES:   Review of patient's allergies indicates:   Allergen Reactions    Phenergan  [promethazine]      Other reaction(s): Hives    Atorvastatin      Myalgia          PHYSICAL EXAMINATION:  BP (!) 145/79   Wt 79 kg (174 lb 2.6 oz)   LMP 01/01/1994 (Approximate) Comment: 1994  BMI 28.98 kg/m²   Vitals signs and nursing note have been reviewed.    General: In no acute distress, well developed, well nourished, no diaphoresis  Eyes: EOM full and smooth, no eye redness or discharge  HENT: normocephalic and atraumatic, neck supple, trachea midline, no nasal discharge  Cardiovascular: no LE edema  Lungs: respirations non-labored, no conversational dyspnea   Neuro: AAOx3, CN2-12 grossly intact  Skin: No rashes, warm and dry  Psychiatric: cooperative, pleasant, mood and affect appropriate for age    Left Knee:   Gait: wnl    Inspection/Palpation:   -Rubor   -Calor  -Effusion   -Patella ballotable   -Patellar apprehension  -Retinacular tenderness   -Patellar crepitus   Patellar tilt grossly normal     TTP at:  -Joint line   -MCL   -LCL   -Popliteal region   -Quad tendon   -Patella  -Pat tendon  -Pat border  -Med condyle   -Lat condyle   -Pes   -Prox fibula   -Tib tub  -Gerdy's tubercle  -Distal Hamstring tendons  -Proximal Hamstrings/Ischial tuberosity  -ITB    ROM:   Ext: 0°   Flex:145°   -Discomfort w/ full flex   -Bounce-home discomfort     Ligamentous:   -Ant drawer   -Post drawer    -Lachman's   Good endpoints & no pain w/ valgus & varus stress    Meniscal:  -Minda's   -Hedy   -Pain w/ squat     Other:  -Patellar apprehension  -Patellar grind  -López's   -J sign  -Easton's  Abductors 5/5    ASSESSMENT:      ICD-10-CM ICD-9-CM   1. Chronic pain of left knee  M25.562 719.46    G89.29 338.29   2. Primary osteoarthritis of left knee  M17.12 715.16         PLAN:  Patient with great results following Orthovisc injection to the left knee.  Plan will be to repeat injection on or after 08/27/2024.  Should pain return prior, we will move forward with Juan.    Future planning includes - continue exercise program    All questions were answered to the best of my ability and all concerns were addressed at this time.    Follow up for above, or sooner if needed.      This note is dictated using the M*Modal Fluency Direct word recognition program. There are word recognition mistakes that are occasionally missed on review.

## 2024-04-24 ENCOUNTER — HOSPITAL ENCOUNTER (OUTPATIENT)
Dept: RADIOLOGY | Facility: HOSPITAL | Age: 69
Discharge: HOME | End: 2024-04-24
Attending: INTERNAL MEDICINE
Payer: MEDICARE

## 2024-04-24 DIAGNOSIS — R07.9 CHEST PAIN, UNSPECIFIED TYPE: ICD-10-CM

## 2024-04-24 PROCEDURE — 75571 CT HRT W/O DYE W/CA TEST: CPT | Mod: TC,HCNC

## 2024-04-24 PROCEDURE — 75571 CT HRT W/O DYE W/CA TEST: CPT | Mod: 26,HCNC,, | Performed by: RADIOLOGY

## 2024-04-29 ENCOUNTER — PATIENT MESSAGE (OUTPATIENT)
Dept: CARDIOLOGY | Facility: CLINIC | Age: 69
End: 2024-04-29
Payer: MEDICARE

## 2024-05-01 ENCOUNTER — OFFICE VISIT (OUTPATIENT)
Dept: ALLERGY | Facility: CLINIC | Age: 69
End: 2024-05-01
Payer: MEDICARE

## 2024-05-01 VITALS — HEIGHT: 65 IN | WEIGHT: 172.81 LBS | BODY MASS INDEX: 28.79 KG/M2

## 2024-05-01 DIAGNOSIS — J31.0 CHRONIC RHINITIS: Primary | ICD-10-CM

## 2024-05-01 DIAGNOSIS — R05.9 COUGH, UNSPECIFIED TYPE: ICD-10-CM

## 2024-05-01 PROCEDURE — 99999 PR PBB SHADOW E&M-EST. PATIENT-LVL III: CPT | Mod: PBBFAC,HCNC,, | Performed by: ALLERGY & IMMUNOLOGY

## 2024-05-01 PROCEDURE — 3052F HG A1C>EQUAL 8.0%<EQUAL 9.0%: CPT | Mod: HCNC,CPTII,S$GLB, | Performed by: ALLERGY & IMMUNOLOGY

## 2024-05-01 PROCEDURE — 1159F MED LIST DOCD IN RCRD: CPT | Mod: HCNC,CPTII,S$GLB, | Performed by: ALLERGY & IMMUNOLOGY

## 2024-05-01 PROCEDURE — 4010F ACE/ARB THERAPY RXD/TAKEN: CPT | Mod: HCNC,CPTII,S$GLB, | Performed by: ALLERGY & IMMUNOLOGY

## 2024-05-01 PROCEDURE — 3008F BODY MASS INDEX DOCD: CPT | Mod: HCNC,CPTII,S$GLB, | Performed by: ALLERGY & IMMUNOLOGY

## 2024-05-01 PROCEDURE — 99204 OFFICE O/P NEW MOD 45 MIN: CPT | Mod: HCNC,S$GLB,, | Performed by: ALLERGY & IMMUNOLOGY

## 2024-05-01 PROCEDURE — 1126F AMNT PAIN NOTED NONE PRSNT: CPT | Mod: HCNC,CPTII,S$GLB, | Performed by: ALLERGY & IMMUNOLOGY

## 2024-05-01 RX ORDER — LORATADINE 10 MG/1
10 TABLET ORAL DAILY
Qty: 30 TABLET | Refills: 6 | Status: SHIPPED | OUTPATIENT
Start: 2024-05-01 | End: 2025-05-01

## 2024-05-01 NOTE — PROGRESS NOTES
Subjective:       Patient ID: Lizabeth Saha is a 68 y.o. female.    Chief Complaint:  Cough      HPI:   Patient's symptoms include cough and postnasal drip. Denies any other rhinitis sx's. Dry cough is most bothersome sx. These symptoms are perennial. Not every day. Current triggers include exposure to no known precipitant. Worse when supine. Maybe worse inside. The patient has been suffering from these symptoms for approximately 15 years. Not really worsening in recent years. Albuterol somewhat helpful for occ longer spells of cough, used about once every 1-2 months. Doesn't recall if antihistamines have been helpful. Hasn't had trial routine nasal steroid. In 2011 saw Dr. Young. immunoCAPs negative. Had been dx'd by ENT w LPR. Doesn't recall if PPI was helpful.  Immunotherapy has never been tried. The patient has never had nasal polyps. The patient has no history of asthma. The patient has no history of eczema. The patient does not suffer from frequent sinopulmonary infections. The patient has not had sinus surgery in the past.       Environmental History: Pets in the home: none.  [place]: hardwood floors  Tobacco Smoke in Home: no    Past Medical History:   Diagnosis Date    Asthma     Diabetes mellitus type II     Hyperlipemia     Irritable bowel syndrome     Obesity     Osteopenia     Rhinitis     Sleep apnea     Vertigo          Family History   Problem Relation Name Age of Onset    Hypertension Mother      Heart disease Mother      Kidney disease Mother      Hypertension Father      Cancer Sister          liver     Diabetes Sister      Diabetes Sister      Diabetes Brother      Hypertension Brother      Kidney disease Brother      No Known Problems Daughter 3     Cancer Maternal Aunt          breast cancer     Breast cancer Maternal Aunt           Review of Systems   Constitutional:  Negative for activity change, fatigue and fever.   HENT:  Positive for postnasal drip. Negative for congestion,  rhinorrhea, sinus pressure and sneezing.    Eyes:  Negative for discharge, redness and itching.   Respiratory:  Positive for cough. Negative for shortness of breath and wheezing.    Cardiovascular:  Negative for chest pain.   Gastrointestinal:  Negative for diarrhea, nausea and vomiting.   Genitourinary:  Negative for dysuria.   Musculoskeletal:  Negative for arthralgias and joint swelling.   Skin:  Negative for rash.   Neurological:  Negative for headaches.   Hematological:  Does not bruise/bleed easily.   Psychiatric/Behavioral:  Negative for behavioral problems and sleep disturbance.           Objective:   Physical Exam  Vitals and nursing note reviewed.   Constitutional:       General: She is not in acute distress.     Appearance: She is well-developed.   HENT:      Head: Normocephalic.      Right Ear: Tympanic membrane and external ear normal.      Left Ear: Tympanic membrane and external ear normal.      Nose: No septal deviation, mucosal edema or rhinorrhea.      Right Sinus: No maxillary sinus tenderness or frontal sinus tenderness.      Left Sinus: No maxillary sinus tenderness or frontal sinus tenderness.      Mouth/Throat:      Pharynx: Uvula midline. No uvula swelling.   Eyes:      General:         Right eye: No discharge.         Left eye: No discharge.      Conjunctiva/sclera: Conjunctivae normal.   Cardiovascular:      Rate and Rhythm: Normal rate and regular rhythm.   Pulmonary:      Effort: Pulmonary effort is normal. No respiratory distress.      Breath sounds: Normal breath sounds. No wheezing.   Abdominal:      General: Bowel sounds are normal.      Palpations: Abdomen is soft.      Tenderness: There is no abdominal tenderness.   Musculoskeletal:         General: No tenderness. Normal range of motion.      Cervical back: Normal range of motion and neck supple.   Lymphadenopathy:      Cervical: No cervical adenopathy.   Skin:     General: Skin is warm.      Findings: No erythema or rash.  "  Neurological:      Mental Status: She is alert and oriented to person, place, and time.   Psychiatric:         Behavior: Behavior normal.         Thought Content: Thought content normal.         Judgment: Judgment normal.             No results found for: "IGGSERUM", "IGM", "IGA"     Total IgE   Date Value Ref Range Status   08/12/2011 <35 (A) 35 - 250 IU/ml Final       Eos #   Date Value Ref Range Status   03/18/2024 0.1 0.0 - 0.5 K/uL Final   06/15/2021 0.1 0.0 - 0.5 K/uL Final   06/19/2020 0.0 0.0 - 0.5 K/uL Final     Eosinophil %   Date Value Ref Range Status   03/18/2024 1.1 0.0 - 8.0 % Final   06/15/2021 1.6 0.0 - 8.0 % Final   06/19/2020 0.5 0.0 - 8.0 % Final     Total IgE   Date Value Ref Range Status   08/12/2011 <35 (A) 35 - 250 IU/ml Final        Latest Reference Range & Units 08/12/11 11:10   Aspergillus Fumigatus IgE <0.35 kU/L <0.35   A. fumigatus Class  CLASS 0   A. tenius, IgE <0.35 kU/L <0.35   A. tenius Class  CLASS 0   Bahia Grass IgE <0.35 kU/L <0.35   Bahia Class  CLASS 0   D. farinae <0.35 kU/L <0.35   D. farinae Class  CLASS 0   D. pteronyssinus Dust Mite IgE <0.35 kU/L <0.35   D. pteronyssinus Class  CLASS 0   Dog Dander IgE <0.35 kU/L <0.35   Dog Dander Class  CLASS 0   Marshelder IgE <0.35 kU/L <0.35   Marshelder Class  CLASS 0   Oak, Class  CLASS 0   Pecan [place] Tree IgE <0.35 kU/L <0.35   Pecan, Class  CLASS 0   Ragweed, Western IgE <0.35 kU/L <0.35   Ragweed, Western, Class  CLASS 0   Donte Grass IgE <0.35 kU/L <0.35   Donte Grass Class  CLASS 0   Boerne Tree IgE <0.35 kU/L <0.35       Recent nl CXR and nl spirometry    Assessment:       1. Chronic rhinitis    2. Cough, unspecified type         Plan:       Lizabeth was seen today for cough.    Diagnoses and all orders for this visit:    Chronic rhinitis  -     Dermatophagoides [place]; Future  -     Dermatophagoides Pteronyssinus; Future  -     [place]; Future  -     Donte; Future  -     [place]; Future  -     English Plantain; " Future  -     Oak; Future  -     Pecan; Future  -     Marsh Elder; Future  -     Ragweed; Future  -     Alternaria; Future  -     Aspergillus; Future  -     Cat; Future  -     Cockroach; Future  -     Dog; Future  Will have drawn at later date    Cough, unspecified type    -     loratadine (CLARITIN) 10 mg tablet; Take 1 tablet (10 mg total) by mouth once daily.    Flonase 2 sen 1-2 times daily. Routine us at least one month    If no improvement consider trial PPI for suspected LPR

## 2024-05-10 DIAGNOSIS — G47.33 OSA (OBSTRUCTIVE SLEEP APNEA): Primary | ICD-10-CM

## 2024-05-13 ENCOUNTER — PATIENT MESSAGE (OUTPATIENT)
Dept: SLEEP MEDICINE | Facility: CLINIC | Age: 69
End: 2024-05-13
Payer: MEDICARE

## 2024-05-13 ENCOUNTER — PATIENT MESSAGE (OUTPATIENT)
Dept: ADMINISTRATIVE | Facility: OTHER | Age: 69
End: 2024-05-13
Payer: MEDICARE

## 2024-06-08 ENCOUNTER — HOSPITAL ENCOUNTER (EMERGENCY)
Facility: OTHER | Age: 69
Discharge: HOME | End: 2024-06-08
Attending: EMERGENCY MEDICINE
Payer: MEDICARE

## 2024-06-08 VITALS
RESPIRATION RATE: 16 BRPM | HEIGHT: 65 IN | OXYGEN SATURATION: 96 % | BODY MASS INDEX: 28.49 KG/M2 | TEMPERATURE: 98 F | SYSTOLIC BLOOD PRESSURE: 158 MMHG | HEART RATE: 76 BPM | DIASTOLIC BLOOD PRESSURE: 79 MMHG | WEIGHT: 171 LBS

## 2024-06-08 DIAGNOSIS — H43.392 FLOATERS IN VISUAL FIELD, LEFT: Primary | ICD-10-CM

## 2024-06-08 PROCEDURE — 99281 EMR DPT VST MAYX REQ PHY/QHP: CPT | Mod: 25,HCNC

## 2024-06-08 NOTE — ED PROVIDER NOTES
Encounter Date: 6/8/2024       History     Chief Complaint   Patient presents with    Blurred Vision     Blurry vision in L eye that started 3 days ago. Denies any other symptoms at this time.      68-year-old female with history of HTN, DM presents for left eye abnormal vision that started about 2 days ago.  She describes it as cloudy in her central vision only from her left eye, like there are silver floaters obscuring which she is trying to look at.  No right eye symptoms.  She denies any eye redness or drainage.  No history of similar previous episodes, she does follow with an optometrist with no history of eye issues related to her diabetes.  She does mention that she accidentally hit the top of her left forehead on something last week, but denies any pain or eye abnormalities at that time.  She did have brief episode of sharp left eye pain yesterday, but none currently.  Her sugars have been well-controlled, no other new complaints.      Review of patient's allergies indicates:   Allergen Reactions    Phenergan  [promethazine]      Other reaction(s): Hives    Atorvastatin      Myalgia       Past Medical History:   Diagnosis Date    Asthma     Diabetes mellitus type II     Hyperlipemia     Irritable bowel syndrome     Obesity     Osteopenia     Rhinitis     Sleep apnea     Vertigo      Past Surgical History:   Procedure Laterality Date    CHOLECYSTECTOMY      COLONOSCOPY N/A 5/31/2022    Procedure: COLONOSCOPY;  Surgeon: Todd Suárez MD;  Location: [address]);  Service: Endoscopy;  Laterality: N/A;  fully vaccinated-GT    COLONOSCOPY N/A 11/14/2023    Procedure: COLONOSCOPY;  Surgeon: Todd Suárez MD;  Location: [address]);  Service: Colon and Rectal;  Laterality: N/A;  Per Pt last flex-sig with Dr. Suárez on 11/7/22, Pt due for 1 year f/u colonoscopy in November 2023  Ref by Dr RIMMA Suárez, pt states not taking Semaglutide, PEG, portal/mail - PC  11/7-precall complete-MS    PARTIAL  HYSTERECTOMY       Family History   Problem Relation Name Age of Onset    Hypertension Mother      Heart disease Mother      Kidney disease Mother      Hypertension Father      Cancer Sister          liver     Diabetes Sister      Diabetes Sister      Diabetes Brother      Hypertension Brother      Kidney disease Brother      No Known Problems Daughter 3     Cancer Maternal Aunt          breast cancer     Breast cancer Maternal Aunt       Social History     Tobacco Use    Smoking status: Former     Current packs/day: 0.00     Types: Cigarettes     Quit date: 1989     Years since quittin.4     Passive exposure: Never    Smokeless tobacco: Never   Substance Use Topics    Alcohol use: Yes     Alcohol/week: 1.0 standard drink of alcohol     Types: 1 Glasses of wine per week     Comment: 4-5 glasses of wine monthly    Drug use: No     Review of Systems   Constitutional:  Negative for fever.   HENT:  Negative for congestion.    Eyes:  Positive for pain and visual disturbance. Negative for redness.   Respiratory:  Negative for shortness of breath.    Cardiovascular:  Negative for chest pain.   Gastrointestinal:  Negative for abdominal pain.   Genitourinary:  Negative for dysuria.   Skin:  Negative for rash.   Neurological:  Negative for headaches.   Psychiatric/Behavioral:  Negative for confusion.        Physical Exam     Initial Vitals [24 1008]   BP Pulse Resp Temp SpO2   (!) 154/74 78 18 97.8 °F (36.6 °C) 97 %      MAP       --         Physical Exam    Constitutional: She appears well-developed and well-nourished. She is not diaphoretic. No distress.   HENT:   Head: Normocephalic and atraumatic.   Eyes: Conjunctivae are normal.   Left eye with no conjunctival erythema, full extraocular motions intact and normal pupillary response.  IOP 14-15 left eye, 14 right eye   Neck: Neck supple.   Cardiovascular:  Normal rate, regular rhythm, S1 normal, S2 normal, normal heart sounds and intact distal pulses.            No murmur heard.  Pulmonary/Chest: Breath sounds normal. No respiratory distress. She has no wheezes. She has no rhonchi. She has no rales.   Musculoskeletal:         General: No edema.      Cervical back: Neck supple.     Neurological: She is alert and oriented to person, place, and time.   Skin: Skin is warm and dry.   Psychiatric: She has a normal mood and affect.         ED Course   Procedures  Labs Reviewed - No data to display       Imaging Results              CT Orbits Sella Post Fossa Without Cont (Final result)  Result time 06/08/24 11:47:10      Final result by Tonny Osuna DO (06/08/24 11:47:10)                   Impression:      Unremarkable noncontrast CT orbits as detailed above specifically without evidence for acute fracture.      Electronically signed by: Tonny Osuna DO  Date:    06/08/2024  Time:    11:47               Narrative:    EXAMINATION:  CT ORBITS SELLA POST FOSSA WITHOUT CONT    CLINICAL HISTORY:  Ocular pain;    TECHNIQUE:  1.25  mm low-dose axial images of the orbits without contrast with coronal and sagittal reformatted imaging from the axial acquisition    COMPARISON:  None    FINDINGS:  Bony orbits are intact.  There is no significant opacification visualized paranasal sinuses or mastoid air cells    Globes and extraocular muscles are relatively symmetric and within normal limits allowing for noncontrast CT technique.    Rightward deviation nasal septum with slight rightward directed bony nasal spur    No significant induration of the intra or extraconal fat.                                       Medications - No data to display  Medical Decision Making      68-year-old female with history of HTN, DM presents for left eye abnormal vision that started about 2 days ago.  She describes it as silver floaters causing her central vision of her left eye to be cloudy.  No right eye symptoms, no associated eye redness.  She accidentally hit the top of her head last week but denies  any significant headache or eye symptoms at that time.  She had sharp episode of left eye pain yesterday but none currently.  No history of previous similar eye issues, she does follow up with an optometrist with no history of diabetic complications.  On exam patient well-appearing in no distress, with no conjunctival erythema, normal pupillary response and extraocular motions.  IOP also normal.  Differential diagnosis includes retinal detachment, vitreous hemorrhage, vitreous detachment.  Bedside ultrasound done with no evidence for any of these possibilities.    She did have some mild tenderness with ultrasound pressure on superior left eye, so given recent minor trauma CT orbits done to rule out any retrobulbar hematoma associated and shows no sign of any injury or other acute process.      Discussed with Dr. Hilario, ophthalmology on-call via transfer center, who suspects posterior vitreous detachment based on my description and exam.  He does not think patient needs emergent dilated exam and transfer to [facility] at this time, but should follow-up in their clinic urgently in 2 days.  He also advises that patient should return to the ED immediately if she develops any black fields in her vision or vision loss instead of cloudiness.  I discussed this plan with patient and she was feels comfortable with it, will call Ophthalmology triage number on Monday morning for appointment that day and return to the ED if needed.      Amount and/or Complexity of Data Reviewed  Radiology: ordered.                                      Clinical Impression:  Final diagnoses:  [H43.392] Floaters in visual field, left (Primary)          ED Disposition Condition    Discharge Stable          ED Prescriptions    None       Follow-up Information       Follow up With Specialties Details Why Contact Info    Ophthalmology clinic  Call in 2 days  [phone]             Jerome Negrete MD  06/08/24 1958

## 2024-06-08 NOTE — ED TRIAGE NOTES
Pt reports to ED with blurry vision in L eye that started about 3 days ago. States she talked to her eye doctor who advised her to come to the ED. Denies any other symptoms. States she remembers bumping her head on something a couple of days ago but did not lose consciousness or think anything of it. Aaox4.

## 2024-06-10 ENCOUNTER — TELEPHONE (OUTPATIENT)
Dept: INTERNAL MEDICINE | Facility: CLINIC | Age: 69
End: 2024-06-10
Payer: MEDICARE

## 2024-06-10 ENCOUNTER — PATIENT MESSAGE (OUTPATIENT)
Dept: INTERNAL MEDICINE | Facility: CLINIC | Age: 69
End: 2024-06-10
Payer: MEDICARE

## 2024-06-10 ENCOUNTER — PATIENT OUTREACH (OUTPATIENT)
Dept: EMERGENCY MEDICINE | Facility: OTHER | Age: 69
End: 2024-06-10
Payer: MEDICARE

## 2024-06-10 DIAGNOSIS — H43.399 VITREOUS FLOATERS, UNSPECIFIED LATERALITY: ICD-10-CM

## 2024-06-10 DIAGNOSIS — H53.9 VISION DISTURBANCE: Primary | ICD-10-CM

## 2024-06-11 ENCOUNTER — OFFICE VISIT (OUTPATIENT)
Dept: OPHTHALMOLOGY | Facility: CLINIC | Age: 69
End: 2024-06-11
Payer: MEDICARE

## 2024-06-11 DIAGNOSIS — H25.13 AGE-RELATED NUCLEAR CATARACT OF BOTH EYES: ICD-10-CM

## 2024-06-11 DIAGNOSIS — H43.823 VITREOMACULAR ADHESION OF BOTH EYES: Primary | ICD-10-CM

## 2024-06-11 DIAGNOSIS — H35.033 HYPERTENSIVE RETINOPATHY OF BOTH EYES: ICD-10-CM

## 2024-06-11 DIAGNOSIS — E11.9 DIABETES MELLITUS TYPE 2 WITHOUT RETINOPATHY: ICD-10-CM

## 2024-06-11 PROCEDURE — 1160F RVW MEDS BY RX/DR IN RCRD: CPT | Mod: HCNC,CPTII,S$GLB, | Performed by: OPHTHALMOLOGY

## 2024-06-11 PROCEDURE — 1159F MED LIST DOCD IN RCRD: CPT | Mod: HCNC,CPTII,S$GLB, | Performed by: OPHTHALMOLOGY

## 2024-06-11 PROCEDURE — 92201 OPSCPY EXTND RTA DRAW UNI/BI: CPT | Mod: HCNC,S$GLB,, | Performed by: OPHTHALMOLOGY

## 2024-06-11 PROCEDURE — 4010F ACE/ARB THERAPY RXD/TAKEN: CPT | Mod: HCNC,CPTII,S$GLB, | Performed by: OPHTHALMOLOGY

## 2024-06-11 PROCEDURE — 92134 CPTRZ OPH DX IMG PST SGM RTA: CPT | Mod: HCNC,S$GLB,, | Performed by: OPHTHALMOLOGY

## 2024-06-11 PROCEDURE — 1125F AMNT PAIN NOTED PAIN PRSNT: CPT | Mod: HCNC,CPTII,S$GLB, | Performed by: OPHTHALMOLOGY

## 2024-06-11 PROCEDURE — 3052F HG A1C>EQUAL 8.0%<EQUAL 9.0%: CPT | Mod: HCNC,CPTII,S$GLB, | Performed by: OPHTHALMOLOGY

## 2024-06-11 PROCEDURE — 99999 PR PBB SHADOW E&M-EST. PATIENT-LVL II: CPT | Mod: PBBFAC,HCNC,, | Performed by: OPHTHALMOLOGY

## 2024-06-11 PROCEDURE — 99204 OFFICE O/P NEW MOD 45 MIN: CPT | Mod: HCNC,S$GLB,, | Performed by: OPHTHALMOLOGY

## 2024-06-11 NOTE — PROGRESS NOTES
"HPI    Dfe.Oct     Pt states on Saturday she went to the ER. She states her vision is   "camouflaged" in her Os. She states her vision is very cloudy. She reports   seeing a film over her OS. She reports pain in her OD. -She states if you   press the OD her pain is a 3 and her pain in her os is rated a 5. She also   reports floaters     She states she lost a significant amount of hearing in her left ear and   wonders if they could be connected.  She states her bs has been under control   Lbs was 124 this morning   Later it was 156 and she states she had not eaten or drinking anything     No Ocular surgeries reported       Hemoglobin A1C       Date                     Value               Ref Range             Status                03/06/2024               8.1 (H)             4.0 - 5.6 %           Final                 12/21/2023               8.7 (H)             4.0 - 5.6 %           Final                 11/03/2023               8.3 (H)             4.0 - 5.6 %           Final                Last edited by Trinity Herring on 6/11/2024 10:09 AM.         A/P    ICD-10-CM ICD-9-CM   1. Vitreomacular adhesion of both eyes  H43.823 379.27   2. Age-related nuclear cataract of both eyes  H25.13 366.16   3. Hypertensive retinopathy of both eyes  H35.033 362.11   4. Diabetes mellitus type 2 without retinopathy  E11.9 250.00       1. Vitreomacular adhesion of both eyes  Here for new floater eval   Exam notable for VMA OU, no RT/RD with   Plan: Observation , counseled on nature of vitreous adhesion and risk of RT/RD  Pathology of PVD, Retinal Tear, Retinal Detachment reviewed in great detail  RD precautions discussed in detail, patient expressed understanding  RTC immediately PRN (especially ANY change flashes, floaters, vision, visual field)     2. Age-related nuclear cataract of both eyes  Mild NS, starting to slowly impact vision  Plan: Observation Update Mrx     3. Hypertensive retinopathy of both eyes  Mild HTN " changes  Plan: Observation for now    4. Diabetes mellitus type 2 without retinopathy  Pcp Orquidea Lancaster MD - Recent notes reviewed  03/06/2024  8.1  A1C   No DR or DME OU  Plan: Observation  Recommend good blood pressure control, tight blood glucose control, and good cholesterol control     RTC optom annual, me prn         I saw and examined the patient and reviewed in detail the findings documented. The final examination findings, image interpretations which have been independently interpreted, and plan as documented in the record represent my personal judgment and conclusions.    Jose Padilla MD  Vitreoretinal Surgery   [facility]

## 2024-06-25 RX ORDER — VALSARTAN 80 MG/1
TABLET ORAL
Qty: 90 TABLET | Refills: 3 | Status: SHIPPED | OUTPATIENT
Start: 2024-06-25

## 2024-07-17 ENCOUNTER — PATIENT MESSAGE (OUTPATIENT)
Dept: INTERNAL MEDICINE | Facility: CLINIC | Age: 69
End: 2024-07-17
Payer: MEDICARE

## 2024-07-30 ENCOUNTER — HOSPITAL ENCOUNTER (OUTPATIENT)
Dept: RADIOLOGY | Facility: HOSPITAL | Age: 69
Discharge: HOME | End: 2024-07-30
Attending: INTERNAL MEDICINE
Payer: MEDICARE

## 2024-07-30 VITALS — WEIGHT: 171 LBS | HEIGHT: 65 IN | BODY MASS INDEX: 28.49 KG/M2

## 2024-07-30 DIAGNOSIS — Z12.31 ENCOUNTER FOR SCREENING MAMMOGRAM FOR BREAST CANCER: ICD-10-CM

## 2024-07-30 PROCEDURE — 77063 BREAST TOMOSYNTHESIS BI: CPT | Mod: TC,HCNC

## 2024-08-01 ENCOUNTER — TELEPHONE (OUTPATIENT)
Dept: INTERNAL MEDICINE | Facility: CLINIC | Age: 69
End: 2024-08-01
Payer: MEDICARE

## 2024-08-01 NOTE — TELEPHONE ENCOUNTER
Pt states she went to her ophthalmologist and was told she had a retinal detach. Pt states she was told she may have had a stroke behind the eye. Pt was informed to contact PCP to place referral for vascular.

## 2024-08-01 NOTE — TELEPHONE ENCOUNTER
----- Message from Monica Guerrier sent at 8/1/2024  3:07 PM CDT -----  Contact: Self/ [phone]  [email or web address]     Patient is calling for Medical Advice regarding:referral     How long has patient had these symptoms:SMOOTH    Pharmacy name and phone#:NA    Patient wants a call back or thru [facility]: call back     Comments: pt stated that she is calling in regards to needing to speak to the nurse about the referral to the Vascular doctor . Please advise     Please advise patient replies from provider may take up to 48 hours.

## 2024-08-06 ENCOUNTER — OFFICE VISIT (OUTPATIENT)
Dept: INTERNAL MEDICINE | Facility: CLINIC | Age: 69
End: 2024-08-06
Payer: MEDICARE

## 2024-08-06 VITALS
HEIGHT: 65 IN | OXYGEN SATURATION: 98 % | SYSTOLIC BLOOD PRESSURE: 130 MMHG | DIASTOLIC BLOOD PRESSURE: 68 MMHG | BODY MASS INDEX: 28.61 KG/M2 | WEIGHT: 171.75 LBS | HEART RATE: 73 BPM

## 2024-08-06 DIAGNOSIS — I10 ESSENTIAL HYPERTENSION: Primary | ICD-10-CM

## 2024-08-06 DIAGNOSIS — E78.5 HYPERLIPIDEMIA, UNSPECIFIED HYPERLIPIDEMIA TYPE: ICD-10-CM

## 2024-08-06 DIAGNOSIS — R09.89 OTHER SPECIFIED SYMPTOMS AND SIGNS INVOLVING THE CIRCULATORY AND RESPIRATORY SYSTEMS: ICD-10-CM

## 2024-08-06 DIAGNOSIS — E11.65 TYPE 2 DIABETES MELLITUS WITH HYPERGLYCEMIA, WITHOUT LONG-TERM CURRENT USE OF INSULIN: ICD-10-CM

## 2024-08-06 DIAGNOSIS — H53.9 VISION DISTURBANCE: ICD-10-CM

## 2024-08-06 PROCEDURE — 3052F HG A1C>EQUAL 8.0%<EQUAL 9.0%: CPT | Mod: HCNC,CPTII,S$GLB, | Performed by: INTERNAL MEDICINE

## 2024-08-06 PROCEDURE — 99999 PR PBB SHADOW E&M-EST. PATIENT-LVL IV: CPT | Mod: PBBFAC,HCNC,, | Performed by: INTERNAL MEDICINE

## 2024-08-06 PROCEDURE — 3075F SYST BP GE 130 - 139MM HG: CPT | Mod: HCNC,CPTII,S$GLB, | Performed by: INTERNAL MEDICINE

## 2024-08-06 PROCEDURE — 1126F AMNT PAIN NOTED NONE PRSNT: CPT | Mod: HCNC,CPTII,S$GLB, | Performed by: INTERNAL MEDICINE

## 2024-08-06 PROCEDURE — 1101F PT FALLS ASSESS-DOCD LE1/YR: CPT | Mod: HCNC,CPTII,S$GLB, | Performed by: INTERNAL MEDICINE

## 2024-08-06 PROCEDURE — 1159F MED LIST DOCD IN RCRD: CPT | Mod: HCNC,CPTII,S$GLB, | Performed by: INTERNAL MEDICINE

## 2024-08-06 PROCEDURE — 1160F RVW MEDS BY RX/DR IN RCRD: CPT | Mod: HCNC,CPTII,S$GLB, | Performed by: INTERNAL MEDICINE

## 2024-08-06 PROCEDURE — 3288F FALL RISK ASSESSMENT DOCD: CPT | Mod: HCNC,CPTII,S$GLB, | Performed by: INTERNAL MEDICINE

## 2024-08-06 PROCEDURE — 4010F ACE/ARB THERAPY RXD/TAKEN: CPT | Mod: HCNC,CPTII,S$GLB, | Performed by: INTERNAL MEDICINE

## 2024-08-06 PROCEDURE — 3008F BODY MASS INDEX DOCD: CPT | Mod: HCNC,CPTII,S$GLB, | Performed by: INTERNAL MEDICINE

## 2024-08-06 PROCEDURE — G2211 COMPLEX E/M VISIT ADD ON: HCPCS | Mod: HCNC,S$GLB,, | Performed by: INTERNAL MEDICINE

## 2024-08-06 PROCEDURE — 99214 OFFICE O/P EST MOD 30 MIN: CPT | Mod: HCNC,S$GLB,, | Performed by: INTERNAL MEDICINE

## 2024-08-06 PROCEDURE — 3078F DIAST BP <80 MM HG: CPT | Mod: HCNC,CPTII,S$GLB, | Performed by: INTERNAL MEDICINE

## 2024-08-07 ENCOUNTER — HOSPITAL ENCOUNTER (OUTPATIENT)
Dept: RADIOLOGY | Facility: HOSPITAL | Age: 69
Discharge: HOME | End: 2024-08-07
Attending: INTERNAL MEDICINE
Payer: MEDICARE

## 2024-08-07 DIAGNOSIS — R09.89 OTHER SPECIFIED SYMPTOMS AND SIGNS INVOLVING THE CIRCULATORY AND RESPIRATORY SYSTEMS: ICD-10-CM

## 2024-08-07 PROCEDURE — 70551 MRI BRAIN STEM W/O DYE: CPT | Mod: 26,HCNC,, | Performed by: RADIOLOGY

## 2024-08-07 PROCEDURE — 70551 MRI BRAIN STEM W/O DYE: CPT | Mod: TC,HCNC

## 2024-08-09 ENCOUNTER — HOSPITAL ENCOUNTER (OUTPATIENT)
Dept: CARDIOLOGY | Facility: HOSPITAL | Age: 69
Discharge: HOME | End: 2024-08-09
Attending: INTERNAL MEDICINE
Payer: MEDICARE

## 2024-08-09 DIAGNOSIS — R09.89 OTHER SPECIFIED SYMPTOMS AND SIGNS INVOLVING THE CIRCULATORY AND RESPIRATORY SYSTEMS: ICD-10-CM

## 2024-08-09 DIAGNOSIS — E78.5 HYPERLIPIDEMIA, UNSPECIFIED HYPERLIPIDEMIA TYPE: ICD-10-CM

## 2024-08-09 DIAGNOSIS — I10 ESSENTIAL HYPERTENSION: ICD-10-CM

## 2024-08-09 PROBLEM — I65.23 ATHEROSCLEROSIS OF BOTH CAROTID ARTERIES: Status: ACTIVE | Noted: 2024-08-09

## 2024-08-09 LAB
LEFT CBA DIAS: 25 CM/S
LEFT CBA SYS: 96 CM/S
LEFT CCA DIST DIAS: 36 CM/S
LEFT CCA DIST SYS: 92 CM/S
LEFT CCA MID DIAS: 31 CM/S
LEFT CCA MID SYS: 122 CM/S
LEFT CCA PROX DIAS: 22 CM/S
LEFT CCA PROX SYS: 124 CM/S
LEFT ECA DIAS: 21 CM/S
LEFT ECA SYS: 72 CM/S
LEFT ICA DIST DIAS: 33 CM/S
LEFT ICA DIST SYS: 78 CM/S
LEFT ICA MID DIAS: 27 CM/S
LEFT ICA MID SYS: 68 CM/S
LEFT ICA PROX DIAS: 24 CM/S
LEFT ICA PROX SYS: 75 CM/S
LEFT VERTEBRAL DIAS: 23 CM/S
LEFT VERTEBRAL SYS: 74 CM/S
OHS CV CAROTID RIGHT ICA EDV HIGHEST: 34
OHS CV CAROTID ULTRASOUND LEFT ICA/CCA RATIO: 0.85
OHS CV CAROTID ULTRASOUND RIGHT ICA/CCA RATIO: 1.43
OHS CV PV CAROTID LEFT HIGHEST CCA: 124
OHS CV PV CAROTID LEFT HIGHEST ICA: 78
OHS CV PV CAROTID RIGHT HIGHEST CCA: 93
OHS CV PV CAROTID RIGHT HIGHEST ICA: 86
OHS CV US CAROTID LEFT HIGHEST EDV: 33
RIGHT CBA DIAS: 11 CM/S
RIGHT CBA SYS: 33 CM/S
RIGHT CCA DIST DIAS: 13 CM/S
RIGHT CCA DIST SYS: 60 CM/S
RIGHT CCA MID DIAS: 15 CM/S
RIGHT CCA MID SYS: 74 CM/S
RIGHT CCA PROX DIAS: 20 CM/S
RIGHT CCA PROX SYS: 93 CM/S
RIGHT ECA DIAS: 5 CM/S
RIGHT ECA SYS: 46 CM/S
RIGHT ICA DIST DIAS: 27 CM/S
RIGHT ICA DIST SYS: 77 CM/S
RIGHT ICA MID DIAS: 34 CM/S
RIGHT ICA MID SYS: 86 CM/S
RIGHT ICA PROX DIAS: 24 CM/S
RIGHT ICA PROX SYS: 56 CM/S
RIGHT VERTEBRAL DIAS: 18 CM/S
RIGHT VERTEBRAL SYS: 56 CM/S

## 2024-08-09 PROCEDURE — 93880 EXTRACRANIAL BILAT STUDY: CPT | Mod: 26,HCNC,, | Performed by: INTERNAL MEDICINE

## 2024-08-09 PROCEDURE — 93880 EXTRACRANIAL BILAT STUDY: CPT | Mod: HCNC

## 2024-08-16 ENCOUNTER — PATIENT MESSAGE (OUTPATIENT)
Dept: INTERNAL MEDICINE | Facility: CLINIC | Age: 69
End: 2024-08-16
Payer: MEDICARE

## 2024-08-16 ENCOUNTER — LAB VISIT (OUTPATIENT)
Dept: LAB | Facility: HOSPITAL | Age: 69
End: 2024-08-16
Payer: MEDICARE

## 2024-08-16 DIAGNOSIS — E78.5 HYPERLIPIDEMIA, UNSPECIFIED HYPERLIPIDEMIA TYPE: ICD-10-CM

## 2024-08-16 DIAGNOSIS — I10 ESSENTIAL HYPERTENSION: ICD-10-CM

## 2024-08-16 DIAGNOSIS — E11.65 TYPE 2 DIABETES MELLITUS WITH HYPERGLYCEMIA, WITHOUT LONG-TERM CURRENT USE OF INSULIN: ICD-10-CM

## 2024-08-16 LAB
ALBUMIN SERPL BCP-MCNC: 4.2 G/DL (ref 3.5–5.2)
ALP SERPL-CCNC: 80 U/L (ref 55–135)
ALT SERPL W/O P-5'-P-CCNC: 22 U/L (ref 10–44)
AST SERPL-CCNC: 22 U/L (ref 10–40)
BILIRUB DIRECT SERPL-MCNC: 0.2 MG/DL (ref 0.1–0.3)
BILIRUB SERPL-MCNC: 0.6 MG/DL (ref 0.1–1)
CHOLEST SERPL-MCNC: 185 MG/DL (ref 120–199)
CHOLEST/HDLC SERPL: 3.6 {RATIO} (ref 2–5)
ESTIMATED AVG GLUCOSE: 197 MG/DL (ref 68–131)
HBA1C MFR BLD: 8.5 % (ref 4–5.6)
HDLC SERPL-MCNC: 52 MG/DL (ref 40–75)
HDLC SERPL: 28.1 % (ref 20–50)
LDLC SERPL CALC-MCNC: 109.8 MG/DL (ref 63–159)
NONHDLC SERPL-MCNC: 133 MG/DL
PROT SERPL-MCNC: 7.7 G/DL (ref 6–8.4)
TRIGL SERPL-MCNC: 116 MG/DL (ref 30–150)

## 2024-08-16 PROCEDURE — 80076 HEPATIC FUNCTION PANEL: CPT | Mod: HCNC | Performed by: INTERNAL MEDICINE

## 2024-08-16 PROCEDURE — 80061 LIPID PANEL: CPT | Mod: HCNC | Performed by: INTERNAL MEDICINE

## 2024-08-16 PROCEDURE — 83036 HEMOGLOBIN GLYCOSYLATED A1C: CPT | Mod: HCNC | Performed by: NURSE PRACTITIONER

## 2024-08-16 PROCEDURE — 36415 COLL VENOUS BLD VENIPUNCTURE: CPT | Mod: HCNC | Performed by: INTERNAL MEDICINE

## 2024-08-19 ENCOUNTER — PATIENT OUTREACH (OUTPATIENT)
Dept: EMERGENCY MEDICINE | Facility: OTHER | Age: 69
End: 2024-08-19
Payer: MEDICARE

## 2024-08-19 ENCOUNTER — OFFICE VISIT (OUTPATIENT)
Dept: INTERNAL MEDICINE | Facility: CLINIC | Age: 69
End: 2024-08-19
Payer: MEDICARE

## 2024-08-19 ENCOUNTER — PATIENT MESSAGE (OUTPATIENT)
Dept: INTERNAL MEDICINE | Facility: CLINIC | Age: 69
End: 2024-08-19

## 2024-08-19 VITALS
HEIGHT: 65 IN | HEART RATE: 76 BPM | DIASTOLIC BLOOD PRESSURE: 70 MMHG | OXYGEN SATURATION: 96 % | SYSTOLIC BLOOD PRESSURE: 118 MMHG | WEIGHT: 172.81 LBS | BODY MASS INDEX: 28.79 KG/M2

## 2024-08-19 DIAGNOSIS — I65.23 ATHEROSCLEROSIS OF BOTH CAROTID ARTERIES: ICD-10-CM

## 2024-08-19 DIAGNOSIS — G47.30 SLEEP APNEA, UNSPECIFIED TYPE: ICD-10-CM

## 2024-08-19 DIAGNOSIS — F43.29 STRESS AND ADJUSTMENT REACTION: ICD-10-CM

## 2024-08-19 DIAGNOSIS — I10 ESSENTIAL HYPERTENSION: ICD-10-CM

## 2024-08-19 DIAGNOSIS — E11.65 TYPE 2 DIABETES MELLITUS WITH HYPERGLYCEMIA, WITHOUT LONG-TERM CURRENT USE OF INSULIN: Primary | ICD-10-CM

## 2024-08-19 DIAGNOSIS — E78.2 MIXED HYPERLIPIDEMIA: ICD-10-CM

## 2024-08-19 DIAGNOSIS — E11.3299 NON-PROLIFERATIVE DIABETIC RETINOPATHY: ICD-10-CM

## 2024-08-19 DIAGNOSIS — E55.9 VITAMIN D DEFICIENCY: ICD-10-CM

## 2024-08-19 PROCEDURE — 1126F AMNT PAIN NOTED NONE PRSNT: CPT | Mod: HCNC,CPTII,S$GLB, | Performed by: NURSE PRACTITIONER

## 2024-08-19 PROCEDURE — 1160F RVW MEDS BY RX/DR IN RCRD: CPT | Mod: HCNC,CPTII,S$GLB, | Performed by: NURSE PRACTITIONER

## 2024-08-19 PROCEDURE — 99214 OFFICE O/P EST MOD 30 MIN: CPT | Mod: HCNC,S$GLB,, | Performed by: NURSE PRACTITIONER

## 2024-08-19 PROCEDURE — 1101F PT FALLS ASSESS-DOCD LE1/YR: CPT | Mod: HCNC,CPTII,S$GLB, | Performed by: NURSE PRACTITIONER

## 2024-08-19 PROCEDURE — 3074F SYST BP LT 130 MM HG: CPT | Mod: HCNC,CPTII,S$GLB, | Performed by: NURSE PRACTITIONER

## 2024-08-19 PROCEDURE — 3078F DIAST BP <80 MM HG: CPT | Mod: HCNC,CPTII,S$GLB, | Performed by: NURSE PRACTITIONER

## 2024-08-19 PROCEDURE — 3288F FALL RISK ASSESSMENT DOCD: CPT | Mod: HCNC,CPTII,S$GLB, | Performed by: NURSE PRACTITIONER

## 2024-08-19 PROCEDURE — 99999 PR PBB SHADOW E&M-EST. PATIENT-LVL III: CPT | Mod: PBBFAC,HCNC,, | Performed by: NURSE PRACTITIONER

## 2024-08-19 PROCEDURE — 3052F HG A1C>EQUAL 8.0%<EQUAL 9.0%: CPT | Mod: HCNC,CPTII,S$GLB, | Performed by: NURSE PRACTITIONER

## 2024-08-19 PROCEDURE — 3008F BODY MASS INDEX DOCD: CPT | Mod: HCNC,CPTII,S$GLB, | Performed by: NURSE PRACTITIONER

## 2024-08-19 PROCEDURE — 4010F ACE/ARB THERAPY RXD/TAKEN: CPT | Mod: HCNC,CPTII,S$GLB, | Performed by: NURSE PRACTITIONER

## 2024-08-19 PROCEDURE — 1159F MED LIST DOCD IN RCRD: CPT | Mod: HCNC,CPTII,S$GLB, | Performed by: NURSE PRACTITIONER

## 2024-08-19 RX ORDER — REPAGLINIDE 1 MG/1
1 TABLET ORAL
Qty: 180 TABLET | Refills: 3 | Status: SHIPPED | OUTPATIENT
Start: 2024-08-19 | End: 2025-08-19

## 2024-08-19 NOTE — PROGRESS NOTES
CHIEF COMPLAINT: Type 2 Diabetes     HPI: Mrs. Lizabeth Saha is a 68 y.o. female who was diagnosed with Type 2 DM x 11 years ago.   Social smoker- younger ages, worried about copd dx, h/o sleep apnea    Retired. 3 daughters.  Mother- , on HD  - radiation, ca dx  Stressors: nephew recently  last week- rare blood ca, brother- colon ca stage 4    Dealing with sleep changes,  arrangements, traveling.  Did not take mounjaro, will like to wait til .    No pepsi  Drinking fresca   Eating 2 meals a day    Hip , knee steroid injections in the past month   Last seen by me in spring 2024.     Has worked on dietary habits, cutting back pastas, cho   Concerns with A1c not coming down fast enough with changes in the past 6 weeks.   Will like to be off medications.   BG 140s- 160s   Bg in am 100s- 110s mg/dl      Walking 3x a week  30 mins per session    A1c trending down 8.7% to 8.1% to 8.5%   Off rybelsus   Lab Results   Component Value Date    HGBA1C 8.5 (H) 2024     [facility]   Stopped trulicity - costly   Stopped metformin -gi issues     No h/o pancreatitis or medullary thyroid ca    PREVIOUS DIABETES MEDICATIONS TRIED  Metformin xr   Metformin   januvia 100 mg daily   trulicity- too costly   jardiance 10, 25 mg daily     CURRENT DIABETIC MEDS: jardiance 25 mg daily , glipizide xr 2.5 mg daily w/ breakfast    Testing 3 x a day  See above   Patient is willing and able to use the device  Demonstrated an understanding of the technology and is motivated to use CGM  Patient expected to adhere to a comprehensive diabetes treatment plan and patient has adequate medical supervision  Has multiple impaired awareness of hypoglycemia (hypoglycemia unawareness)    Diabetes Management Status    Statin: Taking  ACE/ARB: Taking    Screening or Prevention Patient's value Goal Complete/Controlled?   HgA1C Testing and Control   Lab Results   Component Value Date    HGBA1C 8.5 (H) 2024       "Annually/Less than 8% No   Lipid profile : 08/16/2024 Annually Yes   LDL control Lab Results   Component Value Date    LDLCALC 109.8 08/16/2024    Annually/Less than 100 mg/dl  Yes   Nephropathy screening Lab Results   Component Value Date    LABMICR 6.0 12/21/2023     Lab Results   Component Value Date    PROTEINUA NEG 04/18/2008    Annually Yes   Blood pressure BP Readings from Last 1 Encounters:   08/19/24 118/70    Less than 140/90 No   Dilated retinal exam : 07/06/2023 Annually Yes   Foot exam   : 08/06/2024 Annually Yes     REVIEW OF SYSTEMS  General: no weakness, fatigue, + weight stable 1-2#   Eyes/Ears: no double or blurred vision, eye pain, or redness, +hard of hearing, hearing aids  Cardiovascular: no chest pain, palpitations, edema, or murmurs.   Respiratory: no cough or dyspnea.   GI: no heartburn, nausea, or changes in bowel patterns; good appetite.   Skin: no rashes, dryness, itching, or reactions at insulin injection sites. +hyperpigmentation peripheral   Neuro: no numbness, tingling, tremors, or vertigo. (L) knee pain- gel treatment  Endocrine: no polyuria, polydipsia, polyphagia, heat or cold intolerance.     Vital Signs  /70 (BP Location: Left arm, Patient Position: Sitting, BP Method: Medium (Manual))   Pulse 76   Ht 5' 5" (1.651 m)   Wt 78.4 kg (172 lb 13.5 oz)   LMP 01/01/1994 (Approximate) Comment: 1994  SpO2 96%   BMI 28.76 kg/m²     Hemoglobin A1C   Date Value Ref Range Status   08/16/2024 8.5 (H) 4.0 - 5.6 % Final     Comment:     ADA Screening Guidelines:  5.7-6.4%  Consistent with prediabetes  >or=6.5%  Consistent with diabetes    High levels of fetal hemoglobin interfere with the HbA1C  assay. Heterozygous hemoglobin variants (HbS, HgC, etc)do  not significantly interfere with this assay.   However, presence of multiple variants may affect accuracy.     03/06/2024 8.1 (H) 4.0 - 5.6 % Final     Comment:     ADA Screening Guidelines:  5.7-6.4%  Consistent with " prediabetes  >or=6.5%  Consistent with diabetes    High levels of fetal hemoglobin interfere with the HbA1C  assay. Heterozygous hemoglobin variants (HbS, HgC, etc)do  not significantly interfere with this assay.   However, presence of multiple variants may affect accuracy.     12/21/2023 8.7 (H) 4.0 - 5.6 % Final     Comment:     ADA Screening Guidelines:  5.7-6.4%  Consistent with prediabetes  >or=6.5%  Consistent with diabetes    High levels of fetal hemoglobin interfere with the HbA1C  assay. Heterozygous hemoglobin variants (HbS, HgC, etc)do  not significantly interfere with this assay.   However, presence of multiple variants may affect accuracy.          Chemistry        Component Value Date/Time     03/18/2024 0903    K 4.5 03/18/2024 0903     03/18/2024 0903    CO2 26 03/18/2024 0903    BUN 15 03/18/2024 0903    CREATININE 1.1 03/18/2024 0903     (H) 03/18/2024 0903        Component Value Date/Time    CALCIUM 10.4 03/18/2024 0903    ALKPHOS 80 08/16/2024 0918    AST 22 08/16/2024 0918    ALT 22 08/16/2024 0918    BILITOT 0.6 08/16/2024 0918    ESTGFRAFRICA >60.0 07/29/2022 1008    EGFRNONAA 58.8 (A) 07/29/2022 1008           Lab Results   Component Value Date    TSH 1.222 03/18/2024      Lab Results   Component Value Date    CHOL 185 08/16/2024    CHOL 154 12/21/2023    CHOL 288 (H) 06/28/2023     Lab Results   Component Value Date    HDL 52 08/16/2024    HDL 55 12/21/2023    HDL 57 06/28/2023     Lab Results   Component Value Date    LDLCALC 109.8 08/16/2024    LDLCALC 78.8 12/21/2023    LDLCALC 199.8 (H) 06/28/2023     Lab Results   Component Value Date    TRIG 116 08/16/2024    TRIG 101 12/21/2023    TRIG 156 (H) 06/28/2023     Lab Results   Component Value Date    CHOLHDL 28.1 08/16/2024    CHOLHDL 35.7 12/21/2023    CHOLHDL 19.8 (L) 06/28/2023         PHYSICAL EXAMINATION  Constitutional: Appears well, no distress Reviewed vitals above.  Eyes: conjunctivae & lids intact; PERRLA, EOMs  intact; optic discs   Neck: Supple, trachea midline.   Respiratory: No wheezes, even and unlabored   Cardiovascular: RRR; no edema or varicosities  Lymph: deferred   Skin: warm and dry; no injection site reactions, no acanthosis nigracans observed. Hyperpigmentation to legs  Neuro:patient alert and cooperative, normal affect; steady gait.  Psychiatric: judgement & insight intact, orientation of time, place & person intact, memory; mood & affect wnl     Diabetes Foot Exam:   deferred     Assessment/Plan  1. Type 2 diabetes mellitus with hyperglycemia, without long-term current use of insulin  Hemoglobin A1C    Hemoglobin A1C  D/c glipizide   Start prandin 1 mg bid w/ meals  Continue jardiance 25 mg daily   A1c goal less than 7.5%  Above goal   Discussed affects of stress /sleep cycle       2. Non-proliferative diabetic retinopathy  F/u with retinal specialist   Stable       3. Sleep apnea, unspecified type  Not consistent w/ cpap   Will affect metabolism, heart      4. Mixed hyperlipidemia  Lab Results   Component Value Date    LDLCALC 109.8 08/16/2024     Above goal   On statin        5. Essential hypertension  Bp controlled, on sglt2i, on arb       6. Body mass index (BMI) 28.0-28.9, adult  Body mass index is 28.76 kg/m². May increase insulin resistance  Encourage exercise 3-5 x a week       7. Atherosclerosis of both carotid arteries  Had vascular/imaging , on statin, stable       8. Stress and adjustment reaction  May increase insulin resistance  Discuss coping mechanisms

## 2024-08-19 NOTE — PATIENT INSTRUCTIONS
Stop glipizide  Start prandin 1 mg before 2 meals  Continue jardiance 25 mg daily    Lab Results   Component Value Date    HGBA1C 8.5 (H) 08/16/2024     Goal less than 7.5%    Goal  no higher than 180     [email or web address]  Eat fit luann  [employer] lu[employer]  [email or web address]    my[employer]gr luann     Follow up in 3-4 months w/ Irielle  A1c in 6 weeks  A1c in 3 months    Weight improving she lost 40 lbs intentionally with using trulicity.    Moderate-High (Score 7-10)

## 2024-08-21 ENCOUNTER — TELEPHONE (OUTPATIENT)
Dept: ORTHOPEDICS | Facility: CLINIC | Age: 69
End: 2024-08-21
Payer: MEDICARE

## 2024-09-02 NOTE — PROGRESS NOTES
Subjective:       Patient ID: Lizabeth Saha is a 65 yo female.    Chief Complaint: rectal polyp    HPI  65 yo F here for follow-up after recent colonoscopy.    She underwent colonoscopy 5/31/22 for +FIT:  - Diverticulosis in the sigmoid colon and in the descending colon.   - One 3 mm polyp in the sigmoid colon, removed with a cold snare. Resected and retrieved -  Path - TA  - One 20 mm polyp in the sigmoid colon, removed with a hot snare. Resected and retrieved. Clip was placed. Tattooed. Path - TVA without HGD.  - One 15 mm polyp in the rectum, removed with a hot snare. Resected and retrieved. Tattooed. Path - TVA with focal surface HGD, no invasive cancer    No family hx of CRC or IBD.    She comes in today for flex sig in office to assess rectal and sigmoid polypectomy sites.  No complaints.  No rectal bleeding.  No abdominal pain, unexplained weight loss, anorexia, recent change in bowel habits, or other constitutional symptoms.     Review of patient's allergies indicates:   Allergen Reactions    Phenergan  [promethazine]      Other reaction(s): Hives    Atorvastatin      Myalgia         Past Medical History:   Diagnosis Date    Asthma     Diabetes mellitus type II     Hyperlipemia     Irritable bowel syndrome     Obesity     Osteopenia     Rhinitis     Sleep apnea     Vertigo        Past Surgical History:   Procedure Laterality Date    CHOLECYSTECTOMY      COLONOSCOPY N/A 5/31/2022    Procedure: COLONOSCOPY;  Surgeon: Todd Suárez MD;  Location: [facility] ([address] [place] [address]);  Service: Endoscopy;  Laterality: N/A;  fully vaccinated-GT    PARTIAL HYSTERECTOMY         Family History   Problem Relation Name Age of Onset    Hypertension Mother      Heart disease Mother      Kidney disease Mother      Hypertension Father      Cancer Sister          liver     Diabetes Sister      Diabetes Sister      Diabetes Brother      Hypertension Brother      Kidney disease Brother      No Known Problems Daughter 3     Cancer  Maternal Aunt          breast cancer     Breast cancer Maternal Aunt         Social History     Socioeconomic History    Marital status:    Tobacco Use    Smoking status: Former     Current packs/day: 0.00     Types: Cigarettes     Quit date: 1989     Years since quittin.6     Passive exposure: Never    Smokeless tobacco: Never   Substance and Sexual Activity    Alcohol use: Yes     Alcohol/week: 1.0 standard drink of alcohol     Types: 1 Glasses of wine per week     Comment: 4-5 glasses of wine monthly    Drug use: No     Social Determinants of Health     Financial Resource Strain: Low Risk  (6/10/2024)    Overall Financial Resource Strain (CARDIA)     Difficulty of Paying Living Expenses: Not hard at all   Food Insecurity: No Food Insecurity (6/10/2024)    Hunger Vital Sign     Worried About Running Out of Food in the Last Year: Never true     Ran Out of Food in the Last Year: Never true   Transportation Needs: No Transportation Needs (6/10/2024)    PRAPARE - Transportation     Lack of Transportation (Medical): No     Lack of Transportation (Non-Medical): No   Physical Activity: Insufficiently Active (2020)    Exercise Vital Sign     Days of Exercise per Week: 2 days     Minutes of Exercise per Session: 20 min   Stress: No Stress Concern Present (2020)    [employer] [place] [employer] - Occupational Stress Questionnaire     Feeling of Stress : Not at all   Housing Stability: Low Risk  (6/10/2024)    Housing Stability Vital Sign     Unable to Pay for Housing in the Last Year: No     Homeless in the Last Year: No       Review of Systems    Objective:      Physical Exam  Vitals reviewed. Exam conducted with a chaperone present.   Constitutional:       Appearance: She is well-developed.   HENT:      Head: Normocephalic.   Pulmonary:      Effort: Pulmonary effort is normal. No respiratory distress.   Abdominal:      General: Bowel sounds are normal. There is no distension.       Palpations: Abdomen is soft. There is no mass.      Tenderness: There is no abdominal tenderness. There is no guarding or rebound.   Musculoskeletal:         General: Normal range of motion.      Cervical back: Normal range of motion and neck supple.   Skin:     General: Skin is warm and dry.   Neurological:      Mental Status: She is alert and oriented to person, place, and time.         Flex sig done in office:  See Provation for full report.  - Diverticulosis in the sigmoid colon and in the descending colon.   - A tattoo was seen in the sigmoid colon. A post-polypectomy scar was found at the tattoo site. There was no evidence of residual polyp tissue.   - A tattoo was seen in the rectum. A post-polypectomy scar was found at the tattoo site. There was no evidence of residual polyp tissue.   - The examination was otherwise normal.     Assessment:       1. History of colon polyps    Last colonoscopy with 2 large rectosigmoid TVA's, 1 with focal HGD  Flex sig today - no recurrent/residual polyps    Plan:   Schedule for full colonoscopy for F/U in 1 year.  Otherwise, RTO prn    Todd Suárez MD, FACS, FASCRS  Department of Colon & Rectal Surgery     This note was created using voice recognition software, and may contain some unrecognized transcriptional errors.

## 2024-09-09 ENCOUNTER — PATIENT MESSAGE (OUTPATIENT)
Dept: SPORTS MEDICINE | Facility: CLINIC | Age: 69
End: 2024-09-09
Payer: MEDICARE

## 2024-10-09 ENCOUNTER — OFFICE VISIT (OUTPATIENT)
Dept: INTERNAL MEDICINE | Facility: CLINIC | Age: 69
End: 2024-10-09
Payer: MEDICARE

## 2024-10-09 VITALS
DIASTOLIC BLOOD PRESSURE: 70 MMHG | HEART RATE: 75 BPM | HEIGHT: 65 IN | SYSTOLIC BLOOD PRESSURE: 128 MMHG | OXYGEN SATURATION: 99 % | WEIGHT: 173.5 LBS | BODY MASS INDEX: 28.91 KG/M2

## 2024-10-09 DIAGNOSIS — E78.2 MIXED HYPERLIPIDEMIA: ICD-10-CM

## 2024-10-09 DIAGNOSIS — R19.4 BOWEL HABIT CHANGES: ICD-10-CM

## 2024-10-09 DIAGNOSIS — I10 ESSENTIAL HYPERTENSION: ICD-10-CM

## 2024-10-09 DIAGNOSIS — J44.9 CHRONIC OBSTRUCTIVE PULMONARY DISEASE, UNSPECIFIED COPD TYPE: ICD-10-CM

## 2024-10-09 DIAGNOSIS — M25.559 HIP PAIN, UNSPECIFIED LATERALITY: ICD-10-CM

## 2024-10-09 DIAGNOSIS — Z86.0100 HX OF COLONIC POLYPS: ICD-10-CM

## 2024-10-09 DIAGNOSIS — H92.02 LEFT EAR PAIN: Primary | ICD-10-CM

## 2024-10-09 DIAGNOSIS — H61.20 IMPACTED CERUMEN, UNSPECIFIED LATERALITY: ICD-10-CM

## 2024-10-09 DIAGNOSIS — E11.65 TYPE 2 DIABETES MELLITUS WITH HYPERGLYCEMIA, WITHOUT LONG-TERM CURRENT USE OF INSULIN: ICD-10-CM

## 2024-10-09 PROCEDURE — 3052F HG A1C>EQUAL 8.0%<EQUAL 9.0%: CPT | Mod: HCNC,CPTII,S$GLB, | Performed by: INTERNAL MEDICINE

## 2024-10-09 PROCEDURE — 3008F BODY MASS INDEX DOCD: CPT | Mod: HCNC,CPTII,S$GLB, | Performed by: INTERNAL MEDICINE

## 2024-10-09 PROCEDURE — 3288F FALL RISK ASSESSMENT DOCD: CPT | Mod: HCNC,CPTII,S$GLB, | Performed by: INTERNAL MEDICINE

## 2024-10-09 PROCEDURE — 1101F PT FALLS ASSESS-DOCD LE1/YR: CPT | Mod: HCNC,CPTII,S$GLB, | Performed by: INTERNAL MEDICINE

## 2024-10-09 PROCEDURE — G2211 COMPLEX E/M VISIT ADD ON: HCPCS | Mod: HCNC,S$GLB,, | Performed by: INTERNAL MEDICINE

## 2024-10-09 PROCEDURE — 99214 OFFICE O/P EST MOD 30 MIN: CPT | Mod: HCNC,S$GLB,, | Performed by: INTERNAL MEDICINE

## 2024-10-09 PROCEDURE — 99999 PR PBB SHADOW E&M-EST. PATIENT-LVL V: CPT | Mod: PBBFAC,HCNC,, | Performed by: INTERNAL MEDICINE

## 2024-10-09 PROCEDURE — 4010F ACE/ARB THERAPY RXD/TAKEN: CPT | Mod: HCNC,CPTII,S$GLB, | Performed by: INTERNAL MEDICINE

## 2024-10-09 PROCEDURE — 3074F SYST BP LT 130 MM HG: CPT | Mod: HCNC,CPTII,S$GLB, | Performed by: INTERNAL MEDICINE

## 2024-10-09 PROCEDURE — 1159F MED LIST DOCD IN RCRD: CPT | Mod: HCNC,CPTII,S$GLB, | Performed by: INTERNAL MEDICINE

## 2024-10-09 PROCEDURE — 3078F DIAST BP <80 MM HG: CPT | Mod: HCNC,CPTII,S$GLB, | Performed by: INTERNAL MEDICINE

## 2024-10-09 PROCEDURE — 1160F RVW MEDS BY RX/DR IN RCRD: CPT | Mod: HCNC,CPTII,S$GLB, | Performed by: INTERNAL MEDICINE

## 2024-10-09 RX ORDER — GLIPIZIDE 2.5 MG/1
5 TABLET, EXTENDED RELEASE ORAL
COMMUNITY
End: 2024-10-09

## 2024-10-09 RX ORDER — FLUTICASONE FUROATE, UMECLIDINIUM BROMIDE AND VILANTEROL TRIFENATATE 100; 62.5; 25 UG/1; UG/1; UG/1
1 POWDER RESPIRATORY (INHALATION) DAILY
Qty: 3 EACH | Refills: 4 | Status: SHIPPED | OUTPATIENT
Start: 2024-10-09

## 2024-10-09 RX ORDER — GLIPIZIDE 2.5 MG/1
2.5 TABLET, EXTENDED RELEASE ORAL
COMMUNITY

## 2024-10-09 NOTE — PROGRESS NOTES
"Subjective:       Patient ID: Lizabeth Saha is a 69 y.o. female.    Chief Complaint: Follow-up  This is a 69-year-old who presents today for follow-up she continues to follow with endocrinology trying to get her diabetes under control reports more recently she was placed on Jardiance and is tolerating that and they added Prandin but she reports she developed itching and rash so she stopped that and went back to the glipizide that she was on previously she does continue to follow with Endocrine and had a recent A1c remains elevated although improved over time she has been having some trouble with her left groin at times when she walks no falls or injuries she worries about her colon polyps because she has occasionally issues with difficulty with bowel emptying she would like to see a specialist.  She does also have a cough is around smokers but does not smoke herself she had previous COPD on testing and would like to consider preventative inhaler at this point      Follow-up  Associated symptoms include coughing.     Review of Systems   HENT:          Left ear pain  Hearing loss uses hearing aids   Respiratory:  Positive for cough. Negative for shortness of breath.    Gastrointestinal:         Bowel changes  Diffucitly evacuating at times     Musculoskeletal:         Left groin/hip pain with walking        Objective:    Blood pressure 128/70, pulse 75, height 5' 5" (1.651 m), weight 78.7 kg (173 lb 8 oz), last menstrual period 01/01/1994, SpO2 99%.   Physical Exam  Constitutional:       General: She is not in acute distress.  HENT:      Head: Normocephalic.      Comments: Cerumen   Hearing aid left      Mouth/Throat:      Pharynx: Oropharynx is clear.   Eyes:      General: No scleral icterus.  Cardiovascular:      Rate and Rhythm: Normal rate and regular rhythm.      Heart sounds: Normal heart sounds. No murmur heard.     No friction rub. No gallop.   Pulmonary:      Effort: Pulmonary effort is normal. No " respiratory distress.      Breath sounds: Normal breath sounds.   Abdominal:      General: Bowel sounds are normal.      Palpations: Abdomen is soft. There is no mass.      Tenderness: There is no abdominal tenderness.   Musculoskeletal:      Cervical back: Neck supple.      Comments: Normal rom hip   Skin:     Findings: No erythema.   Neurological:      Mental Status: She is alert.         Assessment:       1. Left ear pain    2. Impacted cerumen, unspecified laterality    3. Essential hypertension    4. Hx of colonic polyps    5. Bowel habit changes    6. Hip pain, unspecified laterality    7. Chronic obstructive pulmonary disease, unspecified COPD type    8. Mixed hyperlipidemia    9. Type 2 diabetes mellitus with hyperglycemia, without long-term current use of insulin        Plan:       Lizabeth was seen today for follow-up.    Diagnoses and all orders for this visit:    Left ear pain  Eustachian dysfunction discussed Flonase also cerumen impaction  -     Ambulatory referral/consult to ENT; Future    Essential hypertension  Blood pressure acceptable continue home monitoring and current regimen  -     Comprehensive Metabolic Panel; Future  -     CBC Auto Differential; Future    Hx of colonic polyps  -     Ambulatory referral/consult to Colorectal Surgery; Future    Bowel habit changes  -     Ambulatory referral/consult to Colorectal Surgery; Future    Hip pain, unspecified laterality  Discussed groin pain maybe arthritis in the hip and will start with an x-ray and review  -     X-Ray Hips Bilateral 2 View Incl AP Pelvis; Future    Chronic obstructive pulmonary disease, unspecified COPD type  With symptoms she would be agreeable to trial of an inhaler  Trial of trelegy  Avoid second hand smoke  Declined repeat spoirmoetry will see how she does with rx     Mixed hyperlipidemia  Remains on rosuvastatin    Type 2 diabetes mellitus with hyperglycemia, without long-term current use of insulin  Still remains elevated she  is working on dietary measures and had some issues with her recent medicines had a itching with Prandin so she went back to glipizide and remains on Januvia  Endorine as planned    Other orders  -     fluticasone-umeclidin-vilanter (TRELEGY ELLIPTA) 100-62.5-25 mcg DsDv; Inhale 1 puff into the lungs once daily.    Visit today included increased complexity associated with the care of the episodic problem hypertension, type 2 diabetes copd, bowel changes, ear pain  addressed and managing the longitudinal care of the patient due to the serious and/or complex managed problem(s.     Follow-up 6 months

## 2024-10-10 ENCOUNTER — HOSPITAL ENCOUNTER (OUTPATIENT)
Dept: RADIOLOGY | Facility: HOSPITAL | Age: 69
Discharge: HOME | End: 2024-10-10
Attending: INTERNAL MEDICINE
Payer: MEDICARE

## 2024-10-10 DIAGNOSIS — M25.559 HIP PAIN, UNSPECIFIED LATERALITY: ICD-10-CM

## 2024-10-10 PROCEDURE — 73521 X-RAY EXAM HIPS BI 2 VIEWS: CPT | Mod: TC,HCNC,PN

## 2024-10-14 ENCOUNTER — TELEPHONE (OUTPATIENT)
Dept: INTERNAL MEDICINE | Facility: CLINIC | Age: 69
End: 2024-10-14
Payer: MEDICARE

## 2024-11-05 ENCOUNTER — OFFICE VISIT (OUTPATIENT)
Dept: SPORTS MEDICINE | Facility: CLINIC | Age: 69
End: 2024-11-05
Payer: MEDICARE

## 2024-11-05 VITALS
WEIGHT: 178.56 LBS | DIASTOLIC BLOOD PRESSURE: 72 MMHG | SYSTOLIC BLOOD PRESSURE: 121 MMHG | BODY MASS INDEX: 29.72 KG/M2 | HEART RATE: 83 BPM

## 2024-11-05 DIAGNOSIS — M17.12 PRIMARY OSTEOARTHRITIS OF LEFT KNEE: Primary | ICD-10-CM

## 2024-11-05 DIAGNOSIS — M25.562 CHRONIC PAIN OF LEFT KNEE: ICD-10-CM

## 2024-11-05 DIAGNOSIS — G89.29 CHRONIC PAIN OF LEFT KNEE: ICD-10-CM

## 2024-11-05 PROCEDURE — 99499 UNLISTED E&M SERVICE: CPT | Mod: HCNC,S$GLB,, | Performed by: STUDENT IN AN ORGANIZED HEALTH CARE EDUCATION/TRAINING PROGRAM

## 2024-11-05 PROCEDURE — 20611 DRAIN/INJ JOINT/BURSA W/US: CPT | Mod: HCNC,LT,S$GLB, | Performed by: STUDENT IN AN ORGANIZED HEALTH CARE EDUCATION/TRAINING PROGRAM

## 2024-11-05 PROCEDURE — 99999 PR PBB SHADOW E&M-EST. PATIENT-LVL III: CPT | Mod: PBBFAC,HCNC,, | Performed by: STUDENT IN AN ORGANIZED HEALTH CARE EDUCATION/TRAINING PROGRAM

## 2024-11-05 NOTE — PROGRESS NOTES
CC: left knee pain    69 y.o. Female presents today for her 1st Orthovisc injection of her left knee. Pt reports knee started giving out about 2 days ago, but reports no significant pain has returned.    Attempted treatments: none since last visit  Pain score: 2/10  History of trauma/injury: none since last visit  Affecting ADLs: no      REVIEW OF SYSTEMS:   Constitution: Patient denies fever or chills.  Eyes: Patient denies eye pain or vision changes.  HEENT: Patient denies ear pain, sore throat, or nasal discharge.  CVS: Patient denies chest pain.  Lungs: Patient denies shortness of breath or cough.  Skin: Patient denies skin rash or itching.    Musculoskeletal: Patient denies recent falls. See HPI.  Psych: Patient denies any current anxiety or nervousness.    PAST MEDICAL HISTORY:   Past Medical History:   Diagnosis Date    Asthma     Diabetes mellitus type II     Hyperlipemia     Irritable bowel syndrome     Obesity     Osteopenia     Rhinitis     Sleep apnea     Vertigo        MEDICATIONS:     Current Outpatient Medications:     alcohol swabs (BD ALCOHOL SWABS) PadM, Apply 1 each topically daily as needed (testing)., Disp: 100 each, Rfl: 4    blood sugar diagnostic (TRUE METRIX GLUCOSE TEST STRIP) Strp, USE FOR BLOOD SUGAR TESTING ONE TIME DAILY, Disp: 100 strip, Rfl: 5    empagliflozin (JARDIANCE) 25 mg tablet, TAKE 1 TABLET EVERY DAY, Disp: 90 tablet, Rfl: 2    ergocalciferol (ERGOCALCIFEROL) 50,000 unit Cap, Take 1 capsule (50,000 Units total) by mouth every 7 days., Disp: 12 capsule, Rfl: 2    fluticasone propionate (FLONASE) 50 mcg/actuation nasal spray, 1 spray (50 mcg total) by Each Nostril route once daily., Disp: 48 g, Rfl: 4    fluticasone-umeclidin-vilanter (TRELEGY ELLIPTA) 100-62.5-25 mcg DsDv, Inhale 1 puff into the lungs once daily., Disp: 3 each, Rfl: 4    glipiZIDE (GLUCOTROL) 2.5 MG TR24, Take 2.5 mg by mouth daily with breakfast., Disp: , Rfl:     rosuvastatin (CRESTOR) 20 MG tablet, TAKE 1  TABLET ONE TIME DAILY, Disp: 90 tablet, Rfl: 3    TRUEPLUS LANCETS 33 gauge Misc, TEST BLOOD SUGAR THREE TIMES DAILY, Disp: 300 each, Rfl: 3    valsartan (DIOVAN) 80 MG tablet, TAKE 1 TABLET EVERY DAY (NEED MD APPOINTMENT), Disp: 90 tablet, Rfl: 3    blood-glucose meter kit, To check BG 3 times daily, to use with insurance preferred meter, newest meter, e 11.65, Disp: 1 each, Rfl: 0    ALLERGIES:   Review of patient's allergies indicates:   Allergen Reactions    Phenergan  [promethazine]      Other reaction(s): Hives    Atorvastatin      Myalgia      Prandin [repaglinide]      Rash         PHYSICAL EXAMINATION:  /72 (Patient Position: Sitting)   Pulse 83   Wt 81 kg (178 lb 9.2 oz)   LMP 01/01/1994 (Approximate) Comment: 1994  BMI 29.72 kg/m²   There are no signs of infection at the injection site, including no rubor, calor, or skin lesions.  Gen: NAD.  Psych: Affect & judgment nl.  Neuro: Grossly CNI. MOMIN.  HEENT: -Trach dev. -Eye d/c. -Rhinorrhea.  CV: Color nl. -E/C/C. WWPx4.  Pulm: -Dyspnea. -Cough.  Lymph: -Edema.  Int: -Rash/lesion noted. Skin is warm and dry    ASSESSMENT:      ICD-10-CM ICD-9-CM   1. Primary osteoarthritis of left knee  M17.12 715.16   2. Chronic pain of left knee  M25.562 719.46    G89.29 338.29         PLAN:  Ultrasound-guided injection of the left knee with Orthovisc performed at visit today.    Future planning includes - Continue exercise program    Risks and benefits were discussed with patient prior to receiving injection.  Depending on injection type, risks include the possibility of infection, pain, disruptions in blood pressure and blood sugar, and cosmetic deformity at site of injection.    All questions were answered to the best of my ability and all concerns were addressed at this time.    Follow up in-person in 1 weeks, or sooner if need be.    This note is dictated using the M*Modal Fluency Direct word recognition program. There are word recognition mistakes that are  occasionally missed on review.

## 2024-11-07 ENCOUNTER — CLINICAL SUPPORT (OUTPATIENT)
Dept: OTOLARYNGOLOGY | Facility: CLINIC | Age: 69
End: 2024-11-07
Payer: MEDICARE

## 2024-11-07 ENCOUNTER — OFFICE VISIT (OUTPATIENT)
Dept: OTOLARYNGOLOGY | Facility: CLINIC | Age: 69
End: 2024-11-07
Payer: MEDICARE

## 2024-11-07 VITALS
BODY MASS INDEX: 29.75 KG/M2 | HEART RATE: 86 BPM | SYSTOLIC BLOOD PRESSURE: 124 MMHG | HEIGHT: 65 IN | DIASTOLIC BLOOD PRESSURE: 60 MMHG | WEIGHT: 178.56 LBS

## 2024-11-07 DIAGNOSIS — H61.20 IMPACTED CERUMEN, UNSPECIFIED LATERALITY: ICD-10-CM

## 2024-11-07 DIAGNOSIS — H90.A32 MIXED CONDUCTIVE AND SENSORINEURAL HEARING LOSS OF LEFT EAR WITH RESTRICTED HEARING OF RIGHT EAR: Primary | ICD-10-CM

## 2024-11-07 DIAGNOSIS — Z97.4 WEARS HEARING AID IN BOTH EARS: ICD-10-CM

## 2024-11-07 DIAGNOSIS — H93.299 REDUCED SPEECH DISCRIMINATION: ICD-10-CM

## 2024-11-07 DIAGNOSIS — H90.3 SENSORINEURAL HEARING LOSS (SNHL) OF BOTH EARS: ICD-10-CM

## 2024-11-07 DIAGNOSIS — Z82.2 FAMILY HISTORY OF HEARING LOSS: ICD-10-CM

## 2024-11-07 DIAGNOSIS — R42 DIZZINESS: ICD-10-CM

## 2024-11-07 DIAGNOSIS — M62.838 MUSCLE SPASM: Primary | ICD-10-CM

## 2024-11-07 DIAGNOSIS — H90.A21 SENSORINEURAL HEARING LOSS (SNHL) OF RIGHT EAR WITH RESTRICTED HEARING OF LEFT EAR: ICD-10-CM

## 2024-11-07 DIAGNOSIS — H91.90 HEARING LOSS, UNSPECIFIED HEARING LOSS TYPE, UNSPECIFIED LATERALITY: Primary | ICD-10-CM

## 2024-11-07 DIAGNOSIS — H93.12 TINNITUS, LEFT EAR: ICD-10-CM

## 2024-11-07 PROCEDURE — 92557 COMPREHENSIVE HEARING TEST: CPT | Mod: S$GLB,,, | Performed by: AUDIOLOGIST-HEARING AID FITTER

## 2024-11-07 NOTE — PROGRESS NOTES
Ear, Nose, & Throat  Otolaryngology - Head & Neck Surgery    Summary of Visit:  Lizabeth Saha is a kind patient who was referred to me by Dr. Orquidea Herrera* in consultation for Ear Problem  As I discussed with the her, her audiogram shows evidence of stable asymmetric left worse than right sensorineural hearing loss.  She was doing well with Bi[employer] hearing aids.  I will refer her to physical therapy for dry needling of her left SCM spasm.  We also discussed repeating her audiogram in 1 year. Thank you for this consult. Please feel free to reach out to me or my office with any concerns if needed.     Salvador Varma MD  [address] [facility], [address]  [place] [address]  P:   F:   Subjective:     Chief Complaint:   Chief Complaint   Patient presents with    Ear Problem       Lizabeth Saha is a 69 y.o. female who returns to follow up on hearing loss and left-sided otalgia.    Patient formerly saw Dr. Sood.  He was treating her for asymmetric hearing loss and likely TMJ arthralgia.  She currently wears BiCROS hearing aids.  She feels that these are somewhat effective.  Last audiogram December 2023.  Feels that her hearing has continued to decline over the past year.     She has a longstanding left-sided otalgia.  She notes no pattern to this neck pain.  She localizes the pain to her left mastoid tip, which is tender to palpation.      Past Medical History  Active Ambulatory Problems     Diagnosis Date Noted    Sleep apnea     Type 2 diabetes mellitus with hyperglycemia, without long-term current use of insulin     Hyperlipemia     Non-proliferative diabetic retinopathy 02/08/2019    Pulmonary nodule 08/14/2019    BMI 29.0-29.9,adult 11/05/2019    Essential hypertension 11/05/2019    Mild nonproliferative diabetic retinopathy associated with type 2 diabetes mellitus 11/22/2019    Osteopenia 11/20/2020    Aortic atherosclerosis 07/13/2023    Cough 03/19/2024    Vitreomacular  adhesion of both eyes 06/11/2024    Age-related nuclear cataract of both eyes 06/11/2024    Hypertensive retinopathy of both eyes 06/11/2024    Atherosclerosis of both carotid arteries 08/09/2024    Stress and adjustment reaction 08/19/2024    Vitamin D deficiency 08/19/2024     Resolved Ambulatory Problems     Diagnosis Date Noted    Mild chronic obstructive pulmonary disease 01/08/2016    Osteoporosis 01/11/2016    Statin intolerance 11/05/2019     Past Medical History:   Diagnosis Date    Asthma     Diabetes mellitus type II     Irritable bowel syndrome     Obesity     Rhinitis     Vertigo        Past Surgical History  She has a past surgical history that includes Partial hysterectomy; Cholecystectomy; Colonoscopy (N/A, 5/31/2022); and Colonoscopy (N/A, 11/14/2023).    Past Surgical History:   Procedure Laterality Date    CHOLECYSTECTOMY      COLONOSCOPY N/A 5/31/2022    Procedure: COLONOSCOPY;  Surgeon: Todd Suárez MD;  Location: [facility] (4TH FLR);  Service: Endoscopy;  Laterality: N/A;  fully vaccinated-GT    COLONOSCOPY N/A 11/14/2023    Procedure: COLONOSCOPY;  Surgeon: Todd Suárez MD;  [facility]  Service: Colon and Rectal;  Laterality: N/A;  Per Pt last flex-sig with Dr. Suárez on 11/7/22, Pt due for 1 year f/u colonoscopy in November 2023  Ref by Dr RIMMA Suárez, pt states not taking Semaglutide, PEG, portal/mail - PC  11/7-precall complete-MS    PARTIAL HYSTERECTOMY          Family History  Her family history includes Breast cancer in her maternal aunt; Cancer in her maternal aunt and sister; Diabetes in her brother, sister, and sister; Heart disease in her mother; Hypertension in her brother, father, and mother; Kidney disease in her brother and mother; No Known Problems in her daughter.    Social History  She reports that she quit smoking about 35 years ago. Her smoking use included cigarettes. She has never been exposed to tobacco smoke. She has never used smokeless tobacco. She  "reports current alcohol use of about 1.0 standard drink of alcohol per week. She reports that she does not use drugs.    Allergies  She is allergic to phenergan  [promethazine], atorvastatin, and prandin [repaglinide].    Medications  She has a current medication list which includes the following prescription(s): alcohol swabs, true metrix glucose test strip, jardiance, ergocalciferol, fluticasone propionate, trelegy ellipta, glipizide, rosuvastatin, trueplus lancets, valsartan, and blood-glucose meter.    ROS:  Pertinent positive and negative review of systems as noted in HPI.      Objective:     /60 (BP Location: Left arm, Patient Position: Sitting)   Pulse 86   Ht 5' 5" (1.651 m)   Wt 81 kg (178 lb 9.2 oz)   LMP 01/01/1994 (Approximate) Comment: 1994  BMI 29.72 kg/m²    Constitutional: Well appearing, communicating. No acute distress  Voice: Euphonic  Eyes: Conjunctiva WNL, Pupils reactive  Head/Face: House Brackmann I Bilaterally.  Right Ear:    Auricle normally developed   EAC: normal and cerumen-filled   Tympanic membrane: intact   Middle Ear: No effusion present and Ossicles in normal position  Left Ear:    Auricle normally developed   EAC: normal   Tympanic membrane: intact   Middle Ear: No effusion present and Ossicles in normal position  Vestibular:    Spontaneous Nystagmus: none  Neck   Tenderness to palpation of her left mastoid tip and SCM   No masses or lesions palpable  Neuro/Psychiatric:     Affect: Appropriate   Eyes: EOMI intact  Respiratory: No increased WOB, no stridor       Data Review:   LABS    I have independently reviewed the lab results shown above. Findings significant for the conditions being treated include: none    IMAGING    No pertinent imaging available    AUDIO  2023 2024    I have independently reviewed the following audiogram and reviewed the report. Findings as follows:     Pure Tone Audiometry: Asymmetric  Right - Mild (26-40 dB) to Moderate-severe (56-70 dB) " sensorineural hearing loss  Left - Profound (>90 dB) to Moderate-severe (56-70 dB) sensorineural hearing loss    Tympanometry  Right: DNT  Left: DNT    Word Discrimination Score  Right:  76 %  Left 40%    Acoustic Reflexes  Right Stimulation - Right reflex: DNT   Left Reflex: DNT  Left Stimulation - Right reflex: DNT   Left Reflex: DNT    Procedures:   Procedure: Cerumen removal 27929     Pre procedure Diagnosis: bilateral Cerumen Impaction     Post procedure Diagnosis: same     Instrument: Binocular Microscope     Anesthesia: None     Procedure: After verbal consent was obtained, the patient was laid supine in the small procedure room. A microscope was used to examine the ear. Instrumentation and suction were used to remove any cerumen from the EAC. If indicated, the procedure was repeated on the contralateral side. The patient tolerated the procedure well and without any acute complication. Findings below.      Findings:               Right Ear:             See above  Left Ear:               CPAP     The cerumen was removed completely from both ears.       Assessment:     1. Muscle spasm    2. Sensorineural hearing loss (SNHL) of both ears    3. Impacted cerumen, unspecified laterality        Plan:     I had a long discussion with the patient regarding her condition and the further workup and management options.      I had a long discussion with the patient regarding her condition. I believe that she is an appropriate candidate for hearing aids. I will give her the contact information of Parul Henriquez () our hearing aid coordinator. I also discussed with her the need to repeat her audiogram in 1 year. She was afforded an opportunity to ask questions. She showed good understanding and agreed with this plan.    I will also refer her to physical therapy for dry needling of her left SCM.    Orders Placed This Encounter   Procedures    Ambulatory referral/consult to Physical/Occupational Therapy           Problem List Items Addressed This Visit    None  Visit Diagnoses       Muscle spasm    -  Primary    Relevant Orders    Ambulatory referral/consult to Physical/Occupational Therapy    Sensorineural hearing loss (SNHL) of both ears        Impacted cerumen, unspecified laterality

## 2024-11-07 NOTE — Clinical Note
Your patient, Lizabeth Saha, was recently seen for an audiogram.  My assessment and recommendations are enclosed.  If you should have any questions or concerns, please contact me at [phone].   Sincerely, Angi Neri, CCC-A Audiologist [facility]

## 2024-11-11 RX ORDER — ERGOCALCIFEROL 1.25 MG/1
CAPSULE ORAL
Qty: 12 CAPSULE | Refills: 3 | Status: SHIPPED | OUTPATIENT
Start: 2024-11-11

## 2024-11-12 ENCOUNTER — OFFICE VISIT (OUTPATIENT)
Dept: SPORTS MEDICINE | Facility: CLINIC | Age: 69
End: 2024-11-12
Payer: MEDICARE

## 2024-11-12 VITALS — SYSTOLIC BLOOD PRESSURE: 109 MMHG | DIASTOLIC BLOOD PRESSURE: 73 MMHG

## 2024-11-12 DIAGNOSIS — M17.12 PRIMARY OSTEOARTHRITIS OF LEFT KNEE: Primary | ICD-10-CM

## 2024-11-12 PROCEDURE — 99499 UNLISTED E&M SERVICE: CPT | Mod: HCNC,S$GLB,, | Performed by: STUDENT IN AN ORGANIZED HEALTH CARE EDUCATION/TRAINING PROGRAM

## 2024-11-12 PROCEDURE — 20611 DRAIN/INJ JOINT/BURSA W/US: CPT | Mod: HCNC,LT,S$GLB, | Performed by: STUDENT IN AN ORGANIZED HEALTH CARE EDUCATION/TRAINING PROGRAM

## 2024-11-12 PROCEDURE — 99999 PR PBB SHADOW E&M-EST. PATIENT-LVL II: CPT | Mod: PBBFAC,HCNC,, | Performed by: STUDENT IN AN ORGANIZED HEALTH CARE EDUCATION/TRAINING PROGRAM

## 2024-11-12 PROCEDURE — 1125F AMNT PAIN NOTED PAIN PRSNT: CPT | Mod: HCNC,CPTII,S$GLB, | Performed by: STUDENT IN AN ORGANIZED HEALTH CARE EDUCATION/TRAINING PROGRAM

## 2024-11-12 NOTE — PROCEDURES
Large Joint Aspiration/Injection: L knee    Date/Time: 11/12/2024 1:45 PM    Performed by: Carol Valdes MD  Authorized by: Carol Valdes MD    Consent Done?:  Yes (Verbal)  Indications:  Arthritis and pain  Site marked: the procedure site was marked    Timeout: prior to procedure the correct patient, procedure, and site was verified      Local anesthesia used?: Yes    Anesthesia:  Local infiltration  Local anesthetic:  Co-phenylcaine spray    Details:  Needle Size:  22 G  Ultrasonic Guidance for needle placement?: Yes (Ultrasound guidance used to avoid neurovascular injury and/or to improve accuracy given body habitus.)    Images are saved and documented.  Approach: Superolateral.  Location:  Knee  Site:  L knee  Medications:  30 mg sodium hyaluronate (orthovisc) 30 mg/2 mL  Medications comment:  Ropivacaine 0.2% 2mL  Patient tolerance:  Patient tolerated the procedure well with no immediate complications     TECHNIQUE: Real time ultrasound examination of the left knee was performed with SonKaazingte Edge 2, 9-L MHz linear probe(s). Ultrasound guidance was used for needle localization. Images were saved and stored for documentation. Dynamic visualization of the needle was continuous throughout the procedures and maintained in good position.

## 2024-11-12 NOTE — PROGRESS NOTES
"CC: left knee pain    69 y.o. Female presents today for her 2nd Orthovisc injection of her left knee.     Attempted treatments: none since last visit  Pain score: 3/10  History of trauma/injury: none since last visit  Affecting ADLs: "not much"     REVIEW OF SYSTEMS:   Constitution: Patient denies fever or chills.  Eyes: Patient denies eye pain or vision changes.  HEENT: Patient denies ear pain, sore throat, or nasal discharge.  CVS: Patient denies chest pain.  Lungs: Patient denies shortness of breath or cough.  Skin: Patient denies skin rash or itching.    Musculoskeletal: Patient denies recent falls. See HPI.  Psych: Patient denies any current anxiety or nervousness.    PAST MEDICAL HISTORY:   Past Medical History:   Diagnosis Date    Asthma     Diabetes mellitus type II     Hyperlipemia     Irritable bowel syndrome     Obesity     Osteopenia     Rhinitis     Sleep apnea     Vertigo        MEDICATIONS:     Current Outpatient Medications:     alcohol swabs (BD ALCOHOL SWABS) PadM, Apply 1 each topically daily as needed (testing)., Disp: 100 each, Rfl: 4    blood sugar diagnostic (TRUE METRIX GLUCOSE TEST STRIP) Strp, USE FOR BLOOD SUGAR TESTING ONE TIME DAILY, Disp: 100 strip, Rfl: 5    empagliflozin (JARDIANCE) 25 mg tablet, TAKE 1 TABLET EVERY DAY, Disp: 90 tablet, Rfl: 2    ergocalciferol (ERGOCALCIFEROL) 50,000 unit Cap, TAKE 1 CAPSULE EVERY 7 DAYS, Disp: 12 capsule, Rfl: 3    fluticasone propionate (FLONASE) 50 mcg/actuation nasal spray, 1 spray (50 mcg total) by Each Nostril route once daily., Disp: 48 g, Rfl: 4    fluticasone-umeclidin-vilanter (TRELEGY ELLIPTA) 100-62.5-25 mcg DsDv, Inhale 1 puff into the lungs once daily., Disp: 3 each, Rfl: 4    glipiZIDE (GLUCOTROL) 2.5 MG TR24, Take 2.5 mg by mouth daily with breakfast., Disp: , Rfl:     rosuvastatin (CRESTOR) 20 MG tablet, TAKE 1 TABLET ONE TIME DAILY, Disp: 90 tablet, Rfl: 3    TRUEPLUS LANCETS 33 gauge Misc, TEST BLOOD SUGAR THREE TIMES DAILY, Disp: " 300 each, Rfl: 3    valsartan (DIOVAN) 80 MG tablet, TAKE 1 TABLET EVERY DAY (NEED MD APPOINTMENT), Disp: 90 tablet, Rfl: 3    blood-glucose meter kit, To check BG 3 times daily, to use with insurance preferred meter, newest meter, e 11.65, Disp: 1 each, Rfl: 0    ALLERGIES:   Review of patient's allergies indicates:   Allergen Reactions    Phenergan  [promethazine]      Other reaction(s): Hives    Atorvastatin      Myalgia      Prandin [repaglinide]      Rash         PHYSICAL EXAMINATION:  /73 (Patient Position: Sitting)   LMP 01/01/1994 (Approximate) Comment: 1994  There are no signs of infection at the injection site, including no rubor, calor, or skin lesions.  Gen: NAD.  Psych: Affect & judgment nl.  Neuro: Grossly CNI. MOMIN.  HEENT: -Trach dev. -Eye d/c. -Rhinorrhea.  CV: Color nl. -E/C/C. WWPx4.  Pulm: -Dyspnea. -Cough.  Lymph: -Edema.  Int: -Rash/lesion noted. Skin is warm and dry    ASSESSMENT:      ICD-10-CM ICD-9-CM   1. Primary osteoarthritis of left knee  M17.12 715.16         PLAN:  Ultrasound-guided injection of the left knee with Orthovisc performed at visit today.    Future planning includes - Continue exercise program    Risks and benefits were discussed with patient prior to receiving injection.  Depending on injection type, risks include the possibility of infection, pain, disruptions in blood pressure and blood sugar, and cosmetic deformity at site of injection.    All questions were answered to the best of my ability and all concerns were addressed at this time.    Follow up in-person in 1 weeks, or sooner if need be.    This note is dictated using the M*Modal Fluency Direct word recognition program. There are word recognition mistakes that are occasionally missed on review.

## 2024-11-15 NOTE — PROGRESS NOTES
Angi Neri, CCC-A  Audiologist - [facility] [address] [place] [address]  [email or web address]  [phone]    Patient: Lizabeth Saha   MRN: [id number]  [id number] [facility]   Home Phone [phone]   Work Phone Not on file.   Mobile [phone]   : 1955  PERRY: 2024      AUDIOLOGICAL EVALUATION    Mrs. Lizabeth Saha was seen in the clinic today for an audiological evaluation.  Mrs. Saha reported decreased hearing, tinnitus, dizziness, and family history of hearing loss.  Mrs. Saha denied history of noise exposure.    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  A speech reception threshold was obtained at 30 dB HL for the Right ear and at 75 dB HL for the Left ear.  Speech discrimination was 76% for the Right ear and 40% (masked) for the Left ear.  There was an asymmetry noted between ears from 250-1500 Hz, with the Left ear poorer than the Right ear.      RECOMMENDATIONS:   It is recommended that Lizabeth Saha:  Follow up medically with Dr. Varma.  Continue wearing her binaural hearing aids to improve speech understanding.  Continue to receive audiological monitoring annually.  Use precaution and/or hearing protection in noisy environments.    If you should have any questions or concerns regarding the above information, please do not hesitate to contact me at [phone].      _______________________________  Angi Neri, FRIDA-A  Audiologist

## 2024-11-19 ENCOUNTER — OFFICE VISIT (OUTPATIENT)
Dept: SPORTS MEDICINE | Facility: CLINIC | Age: 69
End: 2024-11-19
Payer: MEDICARE

## 2024-11-19 VITALS — SYSTOLIC BLOOD PRESSURE: 118 MMHG | DIASTOLIC BLOOD PRESSURE: 79 MMHG

## 2024-11-19 DIAGNOSIS — M17.12 PRIMARY OSTEOARTHRITIS OF LEFT KNEE: Primary | ICD-10-CM

## 2024-11-19 PROCEDURE — 99999 PR PBB SHADOW E&M-EST. PATIENT-LVL III: CPT | Mod: PBBFAC,HCNC,, | Performed by: STUDENT IN AN ORGANIZED HEALTH CARE EDUCATION/TRAINING PROGRAM

## 2024-11-19 PROCEDURE — 99499 UNLISTED E&M SERVICE: CPT | Mod: HCNC,S$GLB,, | Performed by: STUDENT IN AN ORGANIZED HEALTH CARE EDUCATION/TRAINING PROGRAM

## 2024-11-19 PROCEDURE — 20611 DRAIN/INJ JOINT/BURSA W/US: CPT | Mod: HCNC,LT,S$GLB, | Performed by: STUDENT IN AN ORGANIZED HEALTH CARE EDUCATION/TRAINING PROGRAM

## 2024-11-19 PROCEDURE — 1125F AMNT PAIN NOTED PAIN PRSNT: CPT | Mod: HCNC,CPTII,S$GLB, | Performed by: STUDENT IN AN ORGANIZED HEALTH CARE EDUCATION/TRAINING PROGRAM

## 2024-11-19 NOTE — PROGRESS NOTES
"CC: left knee pain    69 y.o. Female presents today for her 3rd Orthovisc injection of her left knee. Pt reports knee has given out on her a few times this past week.     Attempted treatments: none since last visit  Pain score: 4/10  History of trauma/injury: none since last visit  Affecting ADLs: "a little bit"     REVIEW OF SYSTEMS:   Constitution: Patient denies fever or chills.  Eyes: Patient denies eye pain or vision changes.  HEENT: Patient denies ear pain, sore throat, or nasal discharge.  CVS: Patient denies chest pain.  Lungs: Patient denies shortness of breath or cough.  Skin: Patient denies skin rash or itching.    Musculoskeletal: Patient denies recent falls. See HPI.  Psych: Patient denies any current anxiety or nervousness.    PAST MEDICAL HISTORY:   Past Medical History:   Diagnosis Date    Asthma     Diabetes mellitus type II     Hyperlipemia     Irritable bowel syndrome     Obesity     Osteopenia     Rhinitis     Sleep apnea     Vertigo        MEDICATIONS:     Current Outpatient Medications:     alcohol swabs (BD ALCOHOL SWABS) PadM, Apply 1 each topically daily as needed (testing)., Disp: 100 each, Rfl: 4    blood sugar diagnostic (TRUE METRIX GLUCOSE TEST STRIP) Strp, USE FOR BLOOD SUGAR TESTING ONE TIME DAILY, Disp: 100 strip, Rfl: 5    empagliflozin (JARDIANCE) 25 mg tablet, TAKE 1 TABLET EVERY DAY, Disp: 90 tablet, Rfl: 2    ergocalciferol (ERGOCALCIFEROL) 50,000 unit Cap, TAKE 1 CAPSULE EVERY 7 DAYS, Disp: 12 capsule, Rfl: 3    fluticasone propionate (FLONASE) 50 mcg/actuation nasal spray, 1 spray (50 mcg total) by Each Nostril route once daily., Disp: 48 g, Rfl: 4    fluticasone-umeclidin-vilanter (TRELEGY ELLIPTA) 100-62.5-25 mcg DsDv, Inhale 1 puff into the lungs once daily., Disp: 3 each, Rfl: 4    glipiZIDE (GLUCOTROL) 2.5 MG TR24, Take 2.5 mg by mouth daily with breakfast., Disp: , Rfl:     rosuvastatin (CRESTOR) 20 MG tablet, TAKE 1 TABLET ONE TIME DAILY, Disp: 90 tablet, Rfl: 3    " TRUEPLUS LANCETS 33 gauge Misc, TEST BLOOD SUGAR THREE TIMES DAILY, Disp: 300 each, Rfl: 3    valsartan (DIOVAN) 80 MG tablet, TAKE 1 TABLET EVERY DAY (NEED MD APPOINTMENT), Disp: 90 tablet, Rfl: 3    blood-glucose meter kit, To check BG 3 times daily, to use with insurance preferred meter, newest meter, e 11.65, Disp: 1 each, Rfl: 0    ALLERGIES:   Review of patient's allergies indicates:   Allergen Reactions    Phenergan  [promethazine]      Other reaction(s): Hives    Atorvastatin      Myalgia      Prandin [repaglinide]      Rash         PHYSICAL EXAMINATION:  /79 (Patient Position: Sitting)   LMP 01/01/1994 (Approximate) Comment: 1994  There are no signs of infection at the injection site, including no rubor, calor, or skin lesions.  Gen: NAD.  Psych: Affect & judgment nl.  Neuro: Grossly CNI. MOMIN.  HEENT: -Trach dev. -Eye d/c. -Rhinorrhea.  CV: Color nl. -E/C/C. WWPx4.  Pulm: -Dyspnea. -Cough.  Lymph: -Edema.  Int: -Rash/lesion noted. Skin is warm and dry    ASSESSMENT:      ICD-10-CM ICD-9-CM   1. Primary osteoarthritis of left knee  M17.12 715.16         PLAN:  Ultrasound-guided injection of the left knee with Orthovisc performed at visit today.    Future planning includes - Continue exercise program    Risks and benefits were discussed with patient prior to receiving injection.  Depending on injection type, risks include the possibility of infection, pain, disruptions in blood pressure and blood sugar, and cosmetic deformity at site of injection.    All questions were answered to the best of my ability and all concerns were addressed at this time.    Follow up virtually if and when pain returns.     This note is dictated using the M*Modal Fluency Direct word recognition program. There are word recognition mistakes that are occasionally missed on review.

## 2024-11-19 NOTE — PROCEDURES
Large Joint Aspiration/Injection: L knee    Date/Time: 11/19/2024 1:45 PM    Performed by: Carol Valdes MD  Authorized by: Carol Valdes MD    Consent Done?:  Yes (Verbal)  Indications:  Arthritis and pain  Site marked: the procedure site was marked    Timeout: prior to procedure the correct patient, procedure, and site was verified      Local anesthesia used?: Yes    Anesthesia:  Local infiltration  Local anesthetic:  Co-phenylcaine spray    Details:  Needle Size:  22 G  Ultrasonic Guidance for needle placement?: Yes (Ultrasound guidance used to avoid neurovascular injury and/or to improve accuracy given body habitus.)    Images are saved and documented.  Approach: Superolateral.  Location:  Knee  Site:  L knee  Medications:  30 mg sodium hyaluronate (orthovisc) 30 mg/2 mL  Medications comment:  2mL Ropivacaine 0.2%    Patient tolerance:  Patient tolerated the procedure well with no immediate complications     TECHNIQUE: Real time ultrasound examinations of the bilateral knees were performed with SonoSite Edge 2, 9-L MHz linear probe(s). Ultrasound guidance was used for needle localization. Images were saved and stored for documentation. Dynamic visualization of the needle was continuous throughout the procedures and maintained in good position.

## 2024-12-12 ENCOUNTER — LAB VISIT (OUTPATIENT)
Dept: LAB | Facility: HOSPITAL | Age: 69
End: 2024-12-12
Attending: NURSE PRACTITIONER
Payer: MEDICARE

## 2024-12-12 ENCOUNTER — PATIENT MESSAGE (OUTPATIENT)
Dept: INTERNAL MEDICINE | Facility: CLINIC | Age: 69
End: 2024-12-12
Payer: MEDICARE

## 2024-12-12 DIAGNOSIS — E11.65 TYPE 2 DIABETES MELLITUS WITH HYPERGLYCEMIA, WITHOUT LONG-TERM CURRENT USE OF INSULIN: Primary | ICD-10-CM

## 2024-12-12 DIAGNOSIS — E11.65 TYPE 2 DIABETES MELLITUS WITH HYPERGLYCEMIA, WITHOUT LONG-TERM CURRENT USE OF INSULIN: ICD-10-CM

## 2024-12-12 DIAGNOSIS — I10 ESSENTIAL HYPERTENSION: ICD-10-CM

## 2024-12-12 LAB
ALBUMIN SERPL BCP-MCNC: 4.2 G/DL (ref 3.5–5.2)
ALP SERPL-CCNC: 72 U/L (ref 40–150)
ALT SERPL W/O P-5'-P-CCNC: 28 U/L (ref 10–44)
ANION GAP SERPL CALC-SCNC: 8 MMOL/L (ref 8–16)
AST SERPL-CCNC: 26 U/L (ref 10–40)
BASOPHILS # BLD AUTO: 0.02 K/UL (ref 0–0.2)
BASOPHILS NFR BLD: 0.4 % (ref 0–1.9)
BILIRUB SERPL-MCNC: 0.5 MG/DL (ref 0.1–1)
BUN SERPL-MCNC: 15 MG/DL (ref 8–23)
CALCIUM SERPL-MCNC: 9.9 MG/DL (ref 8.7–10.5)
CHLORIDE SERPL-SCNC: 105 MMOL/L (ref 95–110)
CO2 SERPL-SCNC: 23 MMOL/L (ref 23–29)
CREAT SERPL-MCNC: 1.1 MG/DL (ref 0.5–1.4)
DIFFERENTIAL METHOD BLD: ABNORMAL
EOSINOPHIL # BLD AUTO: 0.1 K/UL (ref 0–0.5)
EOSINOPHIL NFR BLD: 1.5 % (ref 0–8)
ERYTHROCYTE [DISTWIDTH] IN BLOOD BY AUTOMATED COUNT: 14.3 % (ref 11.5–14.5)
EST. GFR  (NO RACE VARIABLE): 54.4 ML/MIN/1.73 M^2
ESTIMATED AVG GLUCOSE: 186 MG/DL (ref 68–131)
GLUCOSE SERPL-MCNC: 148 MG/DL (ref 70–110)
HBA1C MFR BLD: 8.1 % (ref 4–5.6)
HCT VFR BLD AUTO: 46.1 % (ref 37–48.5)
HGB BLD-MCNC: 13.7 G/DL (ref 12–16)
IMM GRANULOCYTES # BLD AUTO: 0.05 K/UL (ref 0–0.04)
IMM GRANULOCYTES NFR BLD AUTO: 0.9 % (ref 0–0.5)
LYMPHOCYTES # BLD AUTO: 2.1 K/UL (ref 1–4.8)
LYMPHOCYTES NFR BLD: 38.1 % (ref 18–48)
MCH RBC QN AUTO: 26 PG (ref 27–31)
MCHC RBC AUTO-ENTMCNC: 29.7 G/DL (ref 32–36)
MCV RBC AUTO: 88 FL (ref 82–98)
MONOCYTES # BLD AUTO: 0.6 K/UL (ref 0.3–1)
MONOCYTES NFR BLD: 10.2 % (ref 4–15)
NEUTROPHILS # BLD AUTO: 2.7 K/UL (ref 1.8–7.7)
NEUTROPHILS NFR BLD: 48.9 % (ref 38–73)
NRBC BLD-RTO: 0 /100 WBC
PLATELET # BLD AUTO: 220 K/UL (ref 150–450)
PMV BLD AUTO: 11 FL (ref 9.2–12.9)
POTASSIUM SERPL-SCNC: 4.4 MMOL/L (ref 3.5–5.1)
PROT SERPL-MCNC: 7.3 G/DL (ref 6–8.4)
RBC # BLD AUTO: 5.26 M/UL (ref 4–5.4)
SODIUM SERPL-SCNC: 136 MMOL/L (ref 136–145)
WBC # BLD AUTO: 5.51 K/UL (ref 3.9–12.7)

## 2024-12-12 PROCEDURE — 85025 COMPLETE CBC W/AUTO DIFF WBC: CPT | Mod: HCNC | Performed by: INTERNAL MEDICINE

## 2024-12-12 PROCEDURE — 36415 COLL VENOUS BLD VENIPUNCTURE: CPT | Mod: HCNC,PN | Performed by: NURSE PRACTITIONER

## 2024-12-12 PROCEDURE — 83036 HEMOGLOBIN GLYCOSYLATED A1C: CPT | Mod: HCNC | Performed by: NURSE PRACTITIONER

## 2024-12-12 PROCEDURE — 80053 COMPREHEN METABOLIC PANEL: CPT | Mod: HCNC | Performed by: INTERNAL MEDICINE

## 2024-12-13 ENCOUNTER — LAB VISIT (OUTPATIENT)
Dept: LAB | Facility: HOSPITAL | Age: 69
End: 2024-12-13
Attending: INTERNAL MEDICINE
Payer: MEDICARE

## 2024-12-13 DIAGNOSIS — E11.65 TYPE 2 DIABETES MELLITUS WITH HYPERGLYCEMIA, WITHOUT LONG-TERM CURRENT USE OF INSULIN: ICD-10-CM

## 2024-12-13 LAB
ALBUMIN/CREAT UR: NORMAL UG/MG (ref 0–30)
CREAT UR-MCNC: 69 MG/DL (ref 15–325)
MICROALBUMIN UR DL<=1MG/L-MCNC: <5 UG/ML

## 2024-12-13 PROCEDURE — 82043 UR ALBUMIN QUANTITATIVE: CPT | Mod: HCNC | Performed by: INTERNAL MEDICINE

## 2024-12-18 PROBLEM — N18.31 CHRONIC KIDNEY DISEASE, STAGE 3A: Status: ACTIVE | Noted: 2024-12-18

## 2024-12-19 ENCOUNTER — PATIENT MESSAGE (OUTPATIENT)
Dept: INTERNAL MEDICINE | Facility: CLINIC | Age: 69
End: 2024-12-19

## 2024-12-19 ENCOUNTER — OFFICE VISIT (OUTPATIENT)
Dept: INTERNAL MEDICINE | Facility: CLINIC | Age: 69
End: 2024-12-19
Payer: MEDICARE

## 2024-12-19 VITALS
DIASTOLIC BLOOD PRESSURE: 68 MMHG | HEIGHT: 65 IN | HEART RATE: 90 BPM | WEIGHT: 174.81 LBS | BODY MASS INDEX: 29.12 KG/M2 | SYSTOLIC BLOOD PRESSURE: 124 MMHG | OXYGEN SATURATION: 98 %

## 2024-12-19 DIAGNOSIS — E11.65 TYPE 2 DIABETES MELLITUS WITH HYPERGLYCEMIA, WITHOUT LONG-TERM CURRENT USE OF INSULIN: Primary | ICD-10-CM

## 2024-12-19 DIAGNOSIS — I70.0 AORTIC ATHEROSCLEROSIS: ICD-10-CM

## 2024-12-19 DIAGNOSIS — I65.23 ATHEROSCLEROSIS OF BOTH CAROTID ARTERIES: ICD-10-CM

## 2024-12-19 DIAGNOSIS — E78.2 MIXED HYPERLIPIDEMIA: ICD-10-CM

## 2024-12-19 DIAGNOSIS — H35.033 HYPERTENSIVE RETINOPATHY OF BOTH EYES: ICD-10-CM

## 2024-12-19 DIAGNOSIS — N18.31 CHRONIC KIDNEY DISEASE, STAGE 3A: ICD-10-CM

## 2024-12-19 DIAGNOSIS — I10 ESSENTIAL HYPERTENSION: ICD-10-CM

## 2024-12-19 DIAGNOSIS — E11.3299 NON-PROLIFERATIVE DIABETIC RETINOPATHY: ICD-10-CM

## 2024-12-19 DIAGNOSIS — G47.30 SLEEP APNEA, UNSPECIFIED TYPE: ICD-10-CM

## 2024-12-19 PROCEDURE — 4010F ACE/ARB THERAPY RXD/TAKEN: CPT | Mod: HCNC,CPTII,S$GLB, | Performed by: NURSE PRACTITIONER

## 2024-12-19 PROCEDURE — 3008F BODY MASS INDEX DOCD: CPT | Mod: HCNC,CPTII,S$GLB, | Performed by: NURSE PRACTITIONER

## 2024-12-19 PROCEDURE — 3061F NEG MICROALBUMINURIA REV: CPT | Mod: HCNC,CPTII,S$GLB, | Performed by: NURSE PRACTITIONER

## 2024-12-19 PROCEDURE — 3074F SYST BP LT 130 MM HG: CPT | Mod: HCNC,CPTII,S$GLB, | Performed by: NURSE PRACTITIONER

## 2024-12-19 PROCEDURE — 1126F AMNT PAIN NOTED NONE PRSNT: CPT | Mod: HCNC,CPTII,S$GLB, | Performed by: NURSE PRACTITIONER

## 2024-12-19 PROCEDURE — 99214 OFFICE O/P EST MOD 30 MIN: CPT | Mod: HCNC,S$GLB,, | Performed by: NURSE PRACTITIONER

## 2024-12-19 PROCEDURE — 1159F MED LIST DOCD IN RCRD: CPT | Mod: HCNC,CPTII,S$GLB, | Performed by: NURSE PRACTITIONER

## 2024-12-19 PROCEDURE — 99999 PR PBB SHADOW E&M-EST. PATIENT-LVL III: CPT | Mod: PBBFAC,HCNC,, | Performed by: NURSE PRACTITIONER

## 2024-12-19 PROCEDURE — 1160F RVW MEDS BY RX/DR IN RCRD: CPT | Mod: HCNC,CPTII,S$GLB, | Performed by: NURSE PRACTITIONER

## 2024-12-19 PROCEDURE — 3066F NEPHROPATHY DOC TX: CPT | Mod: HCNC,CPTII,S$GLB, | Performed by: NURSE PRACTITIONER

## 2024-12-19 PROCEDURE — 3078F DIAST BP <80 MM HG: CPT | Mod: HCNC,CPTII,S$GLB, | Performed by: NURSE PRACTITIONER

## 2024-12-19 PROCEDURE — 3052F HG A1C>EQUAL 8.0%<EQUAL 9.0%: CPT | Mod: HCNC,CPTII,S$GLB, | Performed by: NURSE PRACTITIONER

## 2024-12-19 PROCEDURE — 3288F FALL RISK ASSESSMENT DOCD: CPT | Mod: HCNC,CPTII,S$GLB, | Performed by: NURSE PRACTITIONER

## 2024-12-19 PROCEDURE — 1101F PT FALLS ASSESS-DOCD LE1/YR: CPT | Mod: HCNC,CPTII,S$GLB, | Performed by: NURSE PRACTITIONER

## 2024-12-19 RX ORDER — TIRZEPATIDE 2.5 MG/.5ML
2.5 INJECTION, SOLUTION SUBCUTANEOUS
Qty: 12 PEN | Refills: 1 | Status: SHIPPED | OUTPATIENT
Start: 2024-12-19

## 2024-12-19 RX ORDER — TIRZEPATIDE 2.5 MG/.5ML
2.5 INJECTION, SOLUTION SUBCUTANEOUS
Qty: 12 PEN | Refills: 1 | Status: SHIPPED | OUTPATIENT
Start: 2024-12-19 | End: 2024-12-19

## 2024-12-19 NOTE — PROGRESS NOTES
CHIEF COMPLAINT: Type 2 Diabetes     HPI: Mrs. Lizabeth Saha is a 69 y.o. female who was diagnosed with Type 2 DM x 11 years ago.   Social smoker- younger ages, worried about copd dx, h/o sleep apnea    Retired. 3 daughters.  Mother- , on HD  - radiation, ca dx  Stressors: nephew recently  last week- rare blood ca, brother- colon ca stage 4    Dealing with sleep changes,  arrangements, traveling.  Did not take mounjaro, will like to wait til .    No pepsi  Drinking fresca   Eating 2 meals a day  Exercising: walking     Hip , knee steroid injections in the past month   Last seen by me in summer 2024.   Being seen by me again today.     Has worked on dietary habits, cutting back pastas, cho   Concerns with A1c not coming down fast enough with changes in the past 6 weeks.   Will like to be off medications.     Walking 3x a week  30 mins per session    A1c trending down 8.7% to 8.1% to 8.5% to 8.1%     Lab Results   Component Value Date    HGBA1C 8.1 (H) 2024     [facility]   Stopped trulicity - costly   Stopped metformin -gi issues     No h/o pancreatitis or medullary thyroid ca    PREVIOUS DIABETES MEDICATIONS TRIED  Metformin xr   Metformin   januvia 100 mg daily   trulicity- too costly   jardiance 10, 25 mg daily   Rybelsus     CURRENT DIABETIC MEDS: jardiance 25 mg daily , glipizide xr 2.5 mg daily w/ breakfast    Testing 3 x a day  See above   Patient is willing and able to use the device  Demonstrated an understanding of the technology and is motivated to use CGM  Patient expected to adhere to a comprehensive diabetes treatment plan and patient has adequate medical supervision  Has multiple impaired awareness of hypoglycemia (hypoglycemia unawareness)    Diabetes Management Status    Statin: Taking  ACE/ARB: Taking    Screening or Prevention Patient's value Goal Complete/Controlled?   HgA1C Testing and Control   Lab Results   Component Value Date    HGBA1C 8.1 (H)  "12/12/2024      Annually/Less than 8% No   Lipid profile : 08/16/2024 Annually Yes   LDL control Lab Results   Component Value Date    LDLCALC 109.8 08/16/2024    Annually/Less than 100 mg/dl  Yes   Nephropathy screening Lab Results   Component Value Date    LABMICR <5.0 12/13/2024     Lab Results   Component Value Date    PROTEINUA NEG 04/18/2008    Annually Yes   Blood pressure BP Readings from Last 1 Encounters:   11/19/24 118/79    Less than 140/90 No   Dilated retinal exam : 08/29/2024 Annually Yes   Foot exam   : 08/06/2024 Annually Yes     REVIEW OF SYSTEMS  General: no weakness, fatigue, + weight loss 4#   Eyes/Ears: no double or blurred vision, eye pain, or redness, +hard of hearing, hearing aids  Cardiovascular: no chest pain, palpitations, edema, or murmurs.   Respiratory: no cough or dyspnea.   GI: no heartburn, nausea, or changes in bowel patterns; good appetite.   Skin: no rashes, dryness, itching, or reactions at insulin injection sites. +hyperpigmentation peripheral   Neuro: no numbness, tingling, tremors, or vertigo. (L) knee pain- gel treatment  Endocrine: no polyuria, polydipsia, polyphagia, heat or cold intolerance.     Vital Signs  /68 (BP Location: Right arm, Patient Position: Sitting)   Pulse 90   Ht 5' 5" (1.651 m)   Wt 79.3 kg (174 lb 13.2 oz)   LMP 01/01/1994 (Approximate) Comment: 1994  SpO2 98%   BMI 29.09 kg/m²     Hemoglobin A1C   Date Value Ref Range Status   12/12/2024 8.1 (H) 4.0 - 5.6 % Final     Comment:     ADA Screening Guidelines:  5.7-6.4%  Consistent with prediabetes  >or=6.5%  Consistent with diabetes    High levels of fetal hemoglobin interfere with the HbA1C  assay. Heterozygous hemoglobin variants (HbS, HgC, etc)do  not significantly interfere with this assay.   However, presence of multiple variants may affect accuracy.     08/16/2024 8.5 (H) 4.0 - 5.6 % Final     Comment:     ADA Screening Guidelines:  5.7-6.4%  Consistent with prediabetes  >or=6.5%  " Consistent with diabetes    High levels of fetal hemoglobin interfere with the HbA1C  assay. Heterozygous hemoglobin variants (HbS, HgC, etc)do  not significantly interfere with this assay.   However, presence of multiple variants may affect accuracy.     03/06/2024 8.1 (H) 4.0 - 5.6 % Final     Comment:     ADA Screening Guidelines:  5.7-6.4%  Consistent with prediabetes  >or=6.5%  Consistent with diabetes    High levels of fetal hemoglobin interfere with the HbA1C  assay. Heterozygous hemoglobin variants (HbS, HgC, etc)do  not significantly interfere with this assay.   However, presence of multiple variants may affect accuracy.          Chemistry        Component Value Date/Time     12/12/2024 0840    K 4.4 12/12/2024 0840     12/12/2024 0840    CO2 23 12/12/2024 0840    BUN 15 12/12/2024 0840    CREATININE 1.1 12/12/2024 0840     (H) 12/12/2024 0840        Component Value Date/Time    CALCIUM 9.9 12/12/2024 0840    ALKPHOS 72 12/12/2024 0840    AST 26 12/12/2024 0840    ALT 28 12/12/2024 0840    BILITOT 0.5 12/12/2024 0840    ESTGFRAFRICA >60.0 07/29/2022 1008    EGFRNONAA 58.8 (A) 07/29/2022 1008           Lab Results   Component Value Date    TSH 1.222 03/18/2024      Lab Results   Component Value Date    CHOL 185 08/16/2024    CHOL 154 12/21/2023    CHOL 288 (H) 06/28/2023     Lab Results   Component Value Date    HDL 52 08/16/2024    HDL 55 12/21/2023    HDL 57 06/28/2023     Lab Results   Component Value Date    LDLCALC 109.8 08/16/2024    LDLCALC 78.8 12/21/2023    LDLCALC 199.8 (H) 06/28/2023     Lab Results   Component Value Date    TRIG 116 08/16/2024    TRIG 101 12/21/2023    TRIG 156 (H) 06/28/2023     Lab Results   Component Value Date    CHOLHDL 28.1 08/16/2024    CHOLHDL 35.7 12/21/2023    CHOLHDL 19.8 (L) 06/28/2023         PHYSICAL EXAMINATION  Constitutional: Appears well, no distress Reviewed vitals above.  Eyes: conjunctivae & lids intact; PERRLA, EOMs intact; optic discs    Neck: Supple, trachea midline.   Respiratory: No wheezes, even and unlabored   Cardiovascular: RRR; no edema or varicosities  Lymph: deferred   Skin: warm and dry; no injection site reactions, no acanthosis nigracans observed. Hyperpigmentation to legs  Neuro:patient alert and cooperative, normal affect; steady gait.  Psychiatric: judgement & insight intact, orientation of time, place & person intact, memory; mood & affect wnl     Diabetes Foot Exam:   deferred     Assessment/Plan    1. Type 2 diabetes mellitus with hyperglycemia, without long-term current use of insulin  Hemoglobin A1C bmp next time   F/u with 4 months   Adding mounjaro 2.5 mg weekly  Wean off jardiance in 2 weeks  Stop glipizide  A1c goal less than 7.5%       2. Sleep apnea, unspecified type  May affect cardiovascular system, metabolism if not consistent with cpap       3. Mixed hyperlipidemia  Lab Results   Component Value Date    LDLCALC 109.8 08/16/2024     Above goal   On statin       4. Non-proliferative diabetic retinopathy  F/u with retinal specialist   Stable       5. Essential hypertension  Bp controlled  Stable       6. Aortic atherosclerosis  On statin  Lab Results   Component Value Date    LDLCALC 109.8 08/16/2024     F/u with cards       7. BMI 29.0-29.9,adult  Body mass index is 29.09 kg/m².  May increase insulin resistance        8. Hypertensive retinopathy of both eyes  See above       9. Atherosclerosis of both carotid arteries  See above       10. Chronic kidney disease, stage 3a  Bmp next visit   On arb/acei

## 2024-12-19 NOTE — ADDENDUM NOTE
Addended by: YOHANA ANGULO on: 12/19/2024 11:36 AM     Modules accepted: Orders    
Anemia, unspecified type
Refer to the Assessment tab to view/cancel completed assessment.

## 2024-12-20 ENCOUNTER — TELEPHONE (OUTPATIENT)
Dept: INTERNAL MEDICINE | Facility: CLINIC | Age: 69
End: 2024-12-20
Payer: MEDICARE

## 2024-12-20 NOTE — TELEPHONE ENCOUNTER
Prior authorization approved Mounjaro 2.5 mg  Payer: [facility] - Medicare Case ID: [id number]    [phone]  Note from payer: [place] Case: [id number], Status: Approved, Coverage Starts on: 12/20/2024 12:32:47 AM, Coverage Ends on: 12/20/2024 12:32:47 AM. Questions? Contact [phone].  Approval Details    Authorized from December 20, 2024 to December 20, 2024

## 2024-12-22 ENCOUNTER — PATIENT MESSAGE (OUTPATIENT)
Dept: INTERNAL MEDICINE | Facility: CLINIC | Age: 69
End: 2024-12-22
Payer: MEDICARE

## 2025-01-22 ENCOUNTER — OFFICE VISIT (OUTPATIENT)
Dept: INTERNAL MEDICINE | Facility: CLINIC | Age: 70
End: 2025-01-22
Payer: MEDICARE

## 2025-01-22 ENCOUNTER — TELEPHONE (OUTPATIENT)
Dept: INTERNAL MEDICINE | Facility: CLINIC | Age: 70
End: 2025-01-22
Payer: MEDICARE

## 2025-01-22 DIAGNOSIS — J06.9 UPPER RESPIRATORY TRACT INFECTION, UNSPECIFIED TYPE: Primary | ICD-10-CM

## 2025-01-22 DIAGNOSIS — J40 BRONCHITIS: ICD-10-CM

## 2025-01-22 RX ORDER — DOXYCYCLINE 100 MG/1
100 CAPSULE ORAL 2 TIMES DAILY
Qty: 20 CAPSULE | Refills: 0 | Status: SHIPPED | OUTPATIENT
Start: 2025-01-22 | End: 2025-01-31 | Stop reason: ALTCHOICE

## 2025-01-22 RX ORDER — BENZONATATE 100 MG/1
100 CAPSULE ORAL 3 TIMES DAILY PRN
Qty: 30 CAPSULE | Refills: 1 | Status: SHIPPED | OUTPATIENT
Start: 2025-01-22 | End: 2025-02-01

## 2025-01-22 NOTE — TELEPHONE ENCOUNTER
----- Message from Mayarojelio sent at 1/22/2025  8:13 AM CST -----  Contact: [phone]  .[email or web address]     Patient is calling for Medical Advice regarding:Symptoms: Cough, Fever  Outcome: Talk to a nurse or provider within 15 minutes.  Reason: Caller denied all higher acuity questions    The caller accepted this outcome.    How long has patient had these symptoms:Monday     Pharmacy name and phone#:    [facility] #[id number] - [address] AT [address] [address]  Phone: [phone] Fax: [phone]    Patient wants a call back or thru [facility]:call back     Comments:    Please advise patient replies from provider may take up to 48 hours.

## 2025-01-22 NOTE — PROGRESS NOTES
The patient location is: home   The chief complaint leading to consultation is: cough/bronchitis    Visit type: audiovisual    Face to Face time with patient: 8  12 minutes of total time spent on the encounter, which includes face to face time and non-face to face time preparing to see the patient (eg, review of tests), Obtaining and/or reviewing separately obtained history, Documenting clinical information in the electronic or other health record, Independently interpreting results (not separately reported) and communicating results to the patient/family/caregiver, or Care coordination (not separately reported).         Each patient to whom he or she provides medical services by telemedicine is:  (1) informed of the relationship between the physician and patient and the respective role of any other health care provider with respect to management of the patient; and (2) notified that he or she may decline to receive medical services by telemedicine and may withdraw from such care at any time.    Notes:      .  Subjective:       Patient ID: Lizabeth Saha is a 69 y.o. female.    Chief Complaint: Cough    Fever   This is a new problem. The current episode started yesterday. The problem occurs rarely. The problem has been unchanged. The maximum temperature noted was 100 to 100.9 F. The temperature was taken using an oral thermometer. Associated symptoms include congestion, coughing and a sore throat. Pertinent negatives include no abdominal pain, chest pain, diarrhea, ear pain, headaches, muscle aches, nausea, rash, sleepiness, vomiting or wheezing. She has tried acetaminophen and fluids for the symptoms. The treatment provided mild relief.     Review of Systems   Constitutional:  Positive for fever.   HENT:  Positive for congestion and sore throat. Negative for ear pain.    Respiratory:  Positive for cough. Negative for wheezing.    Cardiovascular:  Negative for chest pain.   Gastrointestinal:  Negative for  abdominal pain, diarrhea, nausea and vomiting.   Genitourinary:  Negative for dysuria.   Skin:  Negative for rash.   Neurological:  Negative for headaches.       Objective:      Pt alert appears fatigued  Coughing on exam  Physical Exam    Assessment:       1. Upper respiratory tract infection, unspecified type    2. Bronchitis        Plan:       Lizabeth was seen today for cough.    Diagnoses and all orders for this visit:    Upper respiratory tract infection, unspecified type    Bronchitis    Other orders  -     doxycycline (VIBRAMYCIN) 100 MG Cap; Take 1 capsule (100 mg total) by mouth 2 (two) times daily. for 10 days  -     benzonatate (TESSALON) 100 MG capsule; Take 1 capsule (100 mg total) by mouth 3 (three) times daily as needed for Cough.        Discussed call if no improvement increased concern

## 2025-01-31 ENCOUNTER — HOSPITAL ENCOUNTER (OUTPATIENT)
Dept: RADIOLOGY | Facility: HOSPITAL | Age: 70
Discharge: HOME | End: 2025-01-31
Attending: INTERNAL MEDICINE
Payer: MEDICARE

## 2025-01-31 ENCOUNTER — OFFICE VISIT (OUTPATIENT)
Dept: INTERNAL MEDICINE | Facility: CLINIC | Age: 70
End: 2025-01-31
Payer: MEDICARE

## 2025-01-31 VITALS
DIASTOLIC BLOOD PRESSURE: 62 MMHG | HEIGHT: 65 IN | SYSTOLIC BLOOD PRESSURE: 124 MMHG | WEIGHT: 175.06 LBS | OXYGEN SATURATION: 99 % | BODY MASS INDEX: 29.17 KG/M2 | HEART RATE: 81 BPM

## 2025-01-31 DIAGNOSIS — E78.2 MIXED HYPERLIPIDEMIA: ICD-10-CM

## 2025-01-31 DIAGNOSIS — I10 ESSENTIAL HYPERTENSION: ICD-10-CM

## 2025-01-31 DIAGNOSIS — J44.9 CHRONIC OBSTRUCTIVE PULMONARY DISEASE, UNSPECIFIED COPD TYPE: ICD-10-CM

## 2025-01-31 DIAGNOSIS — R05.9 COUGH, UNSPECIFIED TYPE: Primary | ICD-10-CM

## 2025-01-31 DIAGNOSIS — N18.31 CHRONIC KIDNEY DISEASE, STAGE 3A: ICD-10-CM

## 2025-01-31 DIAGNOSIS — R05.9 COUGH, UNSPECIFIED TYPE: ICD-10-CM

## 2025-01-31 DIAGNOSIS — Z12.31 ENCOUNTER FOR SCREENING MAMMOGRAM FOR BREAST CANCER: ICD-10-CM

## 2025-01-31 DIAGNOSIS — Z78.0 POSTMENOPAUSAL: ICD-10-CM

## 2025-01-31 DIAGNOSIS — E11.65 TYPE 2 DIABETES MELLITUS WITH HYPERGLYCEMIA, WITHOUT LONG-TERM CURRENT USE OF INSULIN: ICD-10-CM

## 2025-01-31 PROCEDURE — 99999 PR PBB SHADOW E&M-EST. PATIENT-LVL V: CPT | Mod: PBBFAC,,, | Performed by: INTERNAL MEDICINE

## 2025-01-31 PROCEDURE — 3074F SYST BP LT 130 MM HG: CPT | Mod: CPTII,S$GLB,, | Performed by: INTERNAL MEDICINE

## 2025-01-31 PROCEDURE — 71046 X-RAY EXAM CHEST 2 VIEWS: CPT | Mod: TC

## 2025-01-31 PROCEDURE — 99214 OFFICE O/P EST MOD 30 MIN: CPT | Mod: S$GLB,,, | Performed by: INTERNAL MEDICINE

## 2025-01-31 PROCEDURE — 1126F AMNT PAIN NOTED NONE PRSNT: CPT | Mod: CPTII,S$GLB,, | Performed by: INTERNAL MEDICINE

## 2025-01-31 PROCEDURE — 3008F BODY MASS INDEX DOCD: CPT | Mod: CPTII,S$GLB,, | Performed by: INTERNAL MEDICINE

## 2025-01-31 PROCEDURE — 3288F FALL RISK ASSESSMENT DOCD: CPT | Mod: CPTII,S$GLB,, | Performed by: INTERNAL MEDICINE

## 2025-01-31 PROCEDURE — 3078F DIAST BP <80 MM HG: CPT | Mod: CPTII,S$GLB,, | Performed by: INTERNAL MEDICINE

## 2025-01-31 PROCEDURE — 1101F PT FALLS ASSESS-DOCD LE1/YR: CPT | Mod: CPTII,S$GLB,, | Performed by: INTERNAL MEDICINE

## 2025-01-31 PROCEDURE — G2211 COMPLEX E/M VISIT ADD ON: HCPCS | Mod: S$GLB,,, | Performed by: INTERNAL MEDICINE

## 2025-01-31 PROCEDURE — 1160F RVW MEDS BY RX/DR IN RCRD: CPT | Mod: CPTII,S$GLB,, | Performed by: INTERNAL MEDICINE

## 2025-01-31 PROCEDURE — 1159F MED LIST DOCD IN RCRD: CPT | Mod: CPTII,S$GLB,, | Performed by: INTERNAL MEDICINE

## 2025-01-31 PROCEDURE — 71046 X-RAY EXAM CHEST 2 VIEWS: CPT | Mod: 26,,, | Performed by: RADIOLOGY

## 2025-01-31 RX ORDER — HYDROCODONE BITARTRATE AND HOMATROPINE METHYLBROMIDE ORAL SOLUTION 5; 1.5 MG/5ML; MG/5ML
5 LIQUID ORAL NIGHTLY PRN
Qty: 473 ML | Refills: 0 | Status: SHIPPED | OUTPATIENT
Start: 2025-01-31

## 2025-01-31 RX ORDER — ALBUTEROL SULFATE 90 UG/1
2 INHALANT RESPIRATORY (INHALATION) EVERY 6 HOURS PRN
Qty: 8.7 G | Refills: 1 | Status: SHIPPED | OUTPATIENT
Start: 2025-01-31

## 2025-01-31 NOTE — PROGRESS NOTES
"Subjective:       Patient ID: Lziabeth Saha is a 69 y.o. female.    Chief Complaint: Follow-up  This is a 69-year-old who presents today for follow-up patient reports that she has been having upper respiratory infection she completed course of doxycycline with some improvement although the cough lingers it is worse at night she was wondering if there was something else she could take at bedtime to suppress the cough so she can sleep better she is working with endocrinology on her diabetes trying to get that better controlled and just started Mounjaro at low-dose so far she is tolerating without any difficulty she reports taking her medicines regularly blood pressure has been controlled tolerating without difficulty    Follow-up  Associated symptoms include coughing.     Review of Systems   Respiratory:  Positive for cough and wheezing. Negative for shortness of breath.    Cardiovascular:  Negative for leg swelling.       Objective:      Blood pressure 124/62, pulse 81, height 5' 5" (1.651 m), weight 79.4 kg (175 lb 0.7 oz), last menstrual period 01/01/1994, SpO2 99%.   Physical Exam  Constitutional:       General: She is not in acute distress.     Comments: Occasionasl cough    HENT:      Head: Normocephalic.      Comments: Mild cerumen right      Mouth/Throat:      Pharynx: Oropharynx is clear.   Eyes:      General: No scleral icterus.  Cardiovascular:      Rate and Rhythm: Normal rate and regular rhythm.      Heart sounds: Normal heart sounds. No murmur heard.     No friction rub. No gallop.      Comments: Rare wheeze     Pulmonary:      Effort: Pulmonary effort is normal. No respiratory distress.      Breath sounds: Normal breath sounds.   Abdominal:      General: Bowel sounds are normal.      Palpations: Abdomen is soft. There is no mass.      Tenderness: There is no abdominal tenderness.   Musculoskeletal:      Cervical back: Neck supple.   Skin:     Findings: No erythema.   Neurological:      Mental " Status: She is alert.   Psychiatric:         Mood and Affect: Mood normal.         Assessment:       1. Cough, unspecified type    2. Essential hypertension    3. Postmenopausal    4. Encounter for screening mammogram for breast cancer    5. Type 2 diabetes mellitus with hyperglycemia, without long-term current use of insulin    6. Chronic kidney disease, stage 3a    7. Chronic obstructive pulmonary disease, unspecified COPD type        Plan:       Lizabeth was seen today for follow-up.    Diagnoses and all orders for this visit:    Cough, unspecified type  Testing will update chest x-ray she completed course of doxycycline and may continue Tessalon Perles will prescribe bedtime medicine for sleep risks benefits reviewed she is allergic to intolerant of Phenergan  Call if no resolution  -     hydrocodone-homatropine 5-1.5 mg/5 ml (HYCODAN) 5-1.5 mg/5 mL Syrp; Take 5 mLs by mouth nightly as needed (cough).  -     X-Ray Chest PA And Lateral; Future    Essential hypertension  Blood pressure controlled remains on valsartan  -     Lipid Panel; Future    Postmenopausal  Update when due   -     DXA Bone Density Axial Skeleton 1 or more sites; Future    Encounter for screening mammogram for breast cancer  Order placed for scheudlign when due   -     Mammo Digital Screening Bilat w/ Martir; Future    Type 2 diabetes mellitus with hyperglycemia, without long-term current use of insulin  Have following with endocrinology currently on Jardiance with the recent addition of Mounjaro  -     Lipid Panel; [facility]    Chronic kidney disease, stage 3a  We discussed    Chronic obstructive pulmonary disease, unspecified COPD type she was encouraged to resume her Trelegy  Avoid steroids for now due to her diabetes    Other orders  -     albuterol (PROVENTIL HFA) 90 mcg/actuation inhaler; Inhale 2 puffs into the lungs every 6 (six) hours as needed for Wheezing. Rescue    Visit today included increased complexity associated with the care of the  episodic problem cough, hypertension, type 2 diabetes, ckd, copd, retinopathy  addressed and managing the longitudinal care of the patient due to the serious and/or complex managed problem(s) .     Recent labs reviewd    Follow-up 6 months sooner if concern

## 2025-02-03 ENCOUNTER — PATIENT MESSAGE (OUTPATIENT)
Dept: INTERNAL MEDICINE | Facility: CLINIC | Age: 70
End: 2025-02-03
Payer: MEDICARE

## 2025-02-03 DIAGNOSIS — R05.9 COUGH, UNSPECIFIED TYPE: ICD-10-CM

## 2025-02-04 ENCOUNTER — PATIENT MESSAGE (OUTPATIENT)
Dept: INTERNAL MEDICINE | Facility: CLINIC | Age: 70
End: 2025-02-04
Payer: MEDICARE

## 2025-02-04 DIAGNOSIS — R05.9 COUGH, UNSPECIFIED TYPE: ICD-10-CM

## 2025-02-04 RX ORDER — ALBUTEROL SULFATE 90 UG/1
2 INHALANT RESPIRATORY (INHALATION) EVERY 6 HOURS PRN
Qty: 8.7 G | Refills: 1 | Status: SHIPPED | OUTPATIENT
Start: 2025-02-04

## 2025-02-04 NOTE — TELEPHONE ENCOUNTER
No care due was identified.  Health [facility] Embedded Care Due Messages. Reference number: [id number].   2/04/2025 8:19:04 AM CST

## 2025-02-05 RX ORDER — HYDROCODONE BITARTRATE AND HOMATROPINE METHYLBROMIDE ORAL SOLUTION 5; 1.5 MG/5ML; MG/5ML
5 LIQUID ORAL NIGHTLY PRN
Qty: 473 ML | Refills: 0 | Status: SHIPPED | OUTPATIENT
Start: 2025-02-05

## 2025-02-12 ENCOUNTER — TELEPHONE (OUTPATIENT)
Dept: INTERNAL MEDICINE | Facility: CLINIC | Age: 70
End: 2025-02-12
Payer: MEDICARE

## 2025-02-12 DIAGNOSIS — H61.20 IMPACTED CERUMEN, UNSPECIFIED LATERALITY: Primary | ICD-10-CM

## 2025-02-12 NOTE — TELEPHONE ENCOUNTER
----- Message from Anamaria sent at 2/12/2025 11:13 AM CST -----  Name of Who is Calling:DOMONIQUE WONG [[id number]]        What is the request in detail: pt needs an referral sent over to Dr.Lander Capone to have her ears clean she is at her appointment  right now and they said they just need a referral         Can the clinic reply by [facility]:call back         What Number to Call Back if not in [facility]: fax:[phone]

## 2025-03-18 ENCOUNTER — TELEPHONE (OUTPATIENT)
Dept: INTERNAL MEDICINE | Facility: CLINIC | Age: 70
End: 2025-03-18
Payer: MEDICARE

## 2025-03-18 ENCOUNTER — PATIENT MESSAGE (OUTPATIENT)
Dept: INTERNAL MEDICINE | Facility: CLINIC | Age: 70
End: 2025-03-18
Payer: MEDICARE

## 2025-03-18 NOTE — TELEPHONE ENCOUNTER
Pt referral was placed pt states ent states referral was never received. Pt was denied by insurance. Should I resend or a new referral has to be placed.

## 2025-03-24 ENCOUNTER — PATIENT MESSAGE (OUTPATIENT)
Dept: INTERNAL MEDICINE | Facility: CLINIC | Age: 70
End: 2025-03-24
Payer: MEDICARE

## 2025-03-24 DIAGNOSIS — Z00.00 ENCOUNTER FOR MEDICARE ANNUAL WELLNESS EXAM: ICD-10-CM

## 2025-03-27 ENCOUNTER — OFFICE VISIT (OUTPATIENT)
Dept: GASTROENTEROLOGY | Facility: CLINIC | Age: 70
End: 2025-03-27
Payer: MEDICARE

## 2025-03-27 VITALS
WEIGHT: 169.31 LBS | HEART RATE: 87 BPM | DIASTOLIC BLOOD PRESSURE: 73 MMHG | BODY MASS INDEX: 28.21 KG/M2 | SYSTOLIC BLOOD PRESSURE: 114 MMHG | HEIGHT: 65 IN

## 2025-03-27 DIAGNOSIS — R19.4 BOWEL HABIT CHANGES: Primary | ICD-10-CM

## 2025-03-27 DIAGNOSIS — Z86.0100 HX OF COLONIC POLYPS: ICD-10-CM

## 2025-03-27 PROCEDURE — 99999 PR PBB SHADOW E&M-EST. PATIENT-LVL IV: CPT | Mod: PBBFAC,,,

## 2025-03-27 NOTE — PROGRESS NOTES
Gastroenterology Clinic Consultation Note    Reason for Visit:  The primary encounter diagnosis was Bowel habit changes. A diagnosis of Hx of colonic polyps was also pertinent to this visit.    PCP:   Orquidea Lancaster   [address] / [place] [address]    Initial HPI   This is a 69 y.o. female presenting for bowel habit changes. Referred by her PCP in October for concerns of trouble using the bathroom. Says over the last year she has noticed more difficulty in passing a bowel movement. Finds herself having to strain more than usual. She is however able to produce a daily bowel movement sometimes twice daily dependant on what she eats. Says stools are most often soft once she is able to go. Denies hard, pellet like stools. She does also endorse lower abdominal soreness that most often improves following a bowel movement. She does find her stool is sometimes darker than usual however denies BRBPR or melena. Denies nausea/vomiting.     Type 2 DM previously managed on mounjaro but due to unpleasant GI related side effects she has since stopped. She was recently seen by endocrinology to discuss alternative approaches to help better manage her diabetes. Most recent hgba1c was 8.1.     She does report concern for her GI health given last colonoscopy found multiple polyps. Says she was going to cancel her appt today but figured she should be seen in the event further diagnostic was recommended. No other GI related issues or concerns mentioned at today's visit.     Denies any dysphagia, odynophagia, nausea, vomiting, heartburn or acid regurgitation. No blood/ mucus in stool or unintentional weight loss. Nocturnal symptoms. No melena or maroon stools. No recent changes in diet. No family history of IBD, Celiac disease or GI malignancy. No regular NSAIDs. No alcohol or tobacco use. No recent antibiotic use, travels or sick contacts. No prior history of C.diff.    Abdominal Surgeries: partial hysterectomy,  "cholecystectomy    ROS:  Review of Systems   Constitutional:  Negative for chills, fever, malaise/fatigue and weight loss.   Respiratory:  Negative for cough, hemoptysis, sputum production, shortness of breath and wheezing.    Cardiovascular:  Negative for chest pain, palpitations, orthopnea, claudication, leg swelling and PND.   Gastrointestinal:  Positive for constipation. Negative for abdominal pain, blood in stool, diarrhea, heartburn, melena, nausea and vomiting.   Genitourinary:  Negative for dysuria, flank pain, frequency, hematuria and urgency.   Musculoskeletal:  Negative for back pain, falls, joint pain, myalgias and neck pain.   Skin:  Negative for itching and rash.   Neurological:  Negative for dizziness, seizures, loss of consciousness, weakness and headaches.   Psychiatric/Behavioral:  The patient is not nervous/anxious and does not have insomnia.       Medical History:  has a past medical history of Asthma, Diabetes mellitus type II, Hyperlipemia, Irritable bowel syndrome, Obesity, Osteopenia, Rhinitis, Sleep apnea, and Vertigo.    Surgical History:  has a past surgical history that includes Partial hysterectomy; Cholecystectomy; Colonoscopy (N/A, 5/31/2022); and Colonoscopy (N/A, 11/14/2023).    Family History: family history includes Breast cancer in her maternal aunt; Cancer in her maternal aunt and sister; Diabetes in her brother, sister, and sister; Heart disease in her mother; Hypertension in her brother, father, and mother; Kidney disease in her brother and mother; No Known Problems in her daughter..     Review of patient's allergies indicates:   Allergen Reactions    Phenergan  [promethazine]      Other reaction(s): Hives    Atorvastatin      Myalgia      Prandin [repaglinide]      Rash      Medications Ordered Prior to Encounter[1]    Objective Findings:    Vital Signs:  /73 (BP Location: Left arm, Patient Position: Sitting)   Pulse 87   Ht 5' 5" (1.651 m)   Wt 76.8 kg (169 lb 5 oz)  "  LMP 01/01/1994 (Approximate) Comment: 1994  BMI 28.18 kg/m²   Body mass index is 28.18 kg/m².    Physical Exam  Constitutional:       Appearance: Normal appearance. She is normal weight.   HENT:      Head: Normocephalic and atraumatic.      Nose: Nose normal.      Mouth/Throat:      Mouth: Mucous membranes are moist.      Pharynx: Oropharynx is clear.   Cardiovascular:      Rate and Rhythm: Normal rate and regular rhythm.      Pulses: Normal pulses.      Heart sounds: Normal heart sounds.   Pulmonary:      Effort: Pulmonary effort is normal.      Breath sounds: Normal breath sounds.   Abdominal:      General: Bowel sounds are normal.      Palpations: Abdomen is soft.   Musculoskeletal:         General: Normal range of motion.      Cervical back: Normal range of motion and neck supple.   Skin:     General: Skin is warm and dry.   Neurological:      General: No focal deficit present.      Mental Status: She is alert and oriented to person, place, and time.   Psychiatric:         Mood and Affect: Mood normal.         Behavior: Behavior normal.         Thought Content: Thought content normal.         Judgment: Judgment normal.       Labs:  Lab Results   Component Value Date    WBC 5.51 12/12/2024    HGB 13.7 12/12/2024    HCT 46.1 12/12/2024     12/12/2024    CHOL 185 08/16/2024    TRIG 116 08/16/2024    HDL 52 08/16/2024    ALKPHOS 72 12/12/2024    ALT 28 12/12/2024    AST 26 12/12/2024     12/12/2024    K 4.4 12/12/2024     12/12/2024    CREATININE 1.1 12/12/2024    BUN 15 12/12/2024    CO2 23 12/12/2024    TSH 1.222 03/18/2024    HGBA1C 8.1 (H) 12/12/2024     Imaging reviewed:   No recent/relevant GI imaging available for review.     Endoscopy reviewed:   Procedure:             Colonoscopy 11/14/2023  Indications:           High risk colon cancer surveillance: Personal                          history of colonic polyps   Impression:            - Diverticulosis in the sigmoid colon and in the                           descending colon.                          - One 2 mm polyp in the ascending colon, removed                          with a jumbo cold forceps. Resected and retrieved.                          - One 6 mm polyp in the transverse colon, removed                          with a hot snare. Resected and retrieved.                          - One 3 mm polyp in the transverse colon, removed                          with a jumbo cold forceps. Resected and retrieved.                          - A tattoo was seen in the sigmoid colon. A                          post-polypectomy scar was found at the tattoo                          site. There was no evidence of residual polyp                          tissue.                          - A tattoo was seen in the rectum. A scar was                          found at the tattoo site.                          - One 2 mm polyp in the rectum, removed with a                          jumbo cold forceps. Resected and retrieved.                          - One 2 mm polyp in the rectum, removed with a                          jumbo cold forceps. Resected and retrieved.     1. Ascending colon, polyp, polypectomy:Tubular adenoma.    2. Transverse colon, polyp, polypectomy:  Fragments of tubular adenoma.  Fragments of sessile serrated lesion, negative for cytologic dysplasia.    3. Rectum, polyps x2, polypectomies:Tubular adenomas.     Repeat recommended in 3 years for surveillance    Assessment:  1. Bowel habit changes    2. Hx of colonic polyps         I do believe her complaints today to be more consistent with mild constipation in the setting of uncontrolled diabetes. No alarm features were presented during patient interview, therefore I do not find it necessary to proceed with colonoscopy at this time. Given her history of colon polyps, I do advise she return for surveillance colonoscopy as recommended in 2026 unless there are new GI concerns that arise in the meantime.  Discussed alternative options to help her with using the bathroom including use of squatty potty, increased fluid intake, as well as incorporating more fiber in her diet. I do not believe she requires pharmacological management of her symptoms as she states she does not prefer to take medicine if she can avoid it.     Plan:  Discussed non pharmacological measures to ease process of having bowel movement including utilization of squatty potty as well as increase fluid intake and incorporating more fiber in her diet.    2. RTC prn if changes in bowel habits occur including blood in stool, unintentional weight loss, or changes in stool consistency. Patient verbalizes understanding and agrees to plan of care discussed at today's visit.   3. Repeat surveillance colonoscopy in 2026       Thank you for allowing me to participate in this patient's care.    Sincerely,     FOSTER SIM  Gastroenterology Department  [facility]          [1]   Current Outpatient Medications on File Prior to Visit   Medication Sig Dispense Refill    albuterol (PROVENTIL HFA) 90 mcg/actuation inhaler Inhale 2 puffs into the lungs every 6 (six) hours as needed for Wheezing. Rescue 8.7 g 1    alcohol swabs (BD ALCOHOL SWABS) PadM Apply 1 each topically daily as needed (testing). 100 each 4    blood sugar diagnostic (TRUE METRIX GLUCOSE TEST STRIP) Strp Use 3 x a day 300 strip 3    empagliflozin (JARDIANCE) 25 mg tablet Take 1 tablet (25 mg total) by mouth once daily. 90 tablet 2    ergocalciferol (ERGOCALCIFEROL) 50,000 unit Cap TAKE 1 CAPSULE EVERY 7 DAYS 12 capsule 3    fluticasone propionate (FLONASE) 50 mcg/actuation nasal spray 1 spray (50 mcg total) by Each Nostril route once daily. 48 g 4    fluticasone-umeclidin-vilanter (TRELEGY ELLIPTA) 100-62.5-25 mcg DsDv Inhale 1 puff into the lungs once daily. 3 each 4    glipiZIDE (GLUCOTROL) 2.5 MG TR24 Take 2.5 mg by mouth daily with breakfast.      hydrocodone-homatropine  5-1.5 mg/5 ml (HYCODAN) 5-1.5 mg/5 mL Syrp Take 5 mLs by mouth nightly as needed (cough). 473 mL 0    rosuvastatin (CRESTOR) 20 MG tablet TAKE 1 TABLET ONE TIME DAILY 90 tablet 3    TRUEPLUS LANCETS 33 gauge Misc TEST BLOOD SUGAR THREE TIMES DAILY 300 each 3    valsartan (DIOVAN) 80 MG tablet TAKE 1 TABLET EVERY DAY (NEED MD APPOINTMENT) 90 tablet 3    blood-glucose meter kit To check BG 3 times daily, to use with insurance preferred meter, newest meter, e 11.65 1 each 0    tirzepatide (MOUNJARO) 2.5 mg/0.5 mL PnIj Inject 2.5 mg into the skin every 7 days. Type 2 diabetes, e 11.65 (Patient not taking: Reported on 3/27/2025) 12 Pen 1     No current facility-administered medications on file prior to visit.

## 2025-04-23 ENCOUNTER — OFFICE VISIT (OUTPATIENT)
Dept: INTERNAL MEDICINE | Facility: CLINIC | Age: 70
End: 2025-04-23
Payer: MEDICARE

## 2025-04-23 VITALS
HEART RATE: 92 BPM | SYSTOLIC BLOOD PRESSURE: 130 MMHG | OXYGEN SATURATION: 98 % | BODY MASS INDEX: 27.88 KG/M2 | WEIGHT: 167.31 LBS | DIASTOLIC BLOOD PRESSURE: 76 MMHG | HEIGHT: 65 IN

## 2025-04-23 DIAGNOSIS — G47.30 SLEEP APNEA, UNSPECIFIED TYPE: ICD-10-CM

## 2025-04-23 DIAGNOSIS — Z00.00 ENCOUNTER FOR MEDICARE ANNUAL WELLNESS EXAM: ICD-10-CM

## 2025-04-23 DIAGNOSIS — I10 PRIMARY HYPERTENSION: Primary | ICD-10-CM

## 2025-04-23 DIAGNOSIS — F43.29 STRESS AND ADJUSTMENT REACTION: ICD-10-CM

## 2025-04-23 DIAGNOSIS — M85.80 OSTEOPENIA, UNSPECIFIED LOCATION: ICD-10-CM

## 2025-04-23 DIAGNOSIS — E11.3299 MILD NONPROLIFERATIVE DIABETIC RETINOPATHY ASSOCIATED WITH TYPE 2 DIABETES MELLITUS, MACULAR EDEMA PRESENCE UNSPECIFIED, UNSPECIFIED LATERALITY: ICD-10-CM

## 2025-04-23 DIAGNOSIS — I65.23 ATHEROSCLEROSIS OF BOTH CAROTID ARTERIES: ICD-10-CM

## 2025-04-23 DIAGNOSIS — N18.31 CHRONIC KIDNEY DISEASE, STAGE 3A: ICD-10-CM

## 2025-04-23 DIAGNOSIS — E11.65 TYPE 2 DIABETES MELLITUS WITH HYPERGLYCEMIA, WITHOUT LONG-TERM CURRENT USE OF INSULIN: ICD-10-CM

## 2025-04-23 PROBLEM — R05.9 COUGH: Status: RESOLVED | Noted: 2024-03-19 | Resolved: 2025-04-23

## 2025-04-23 PROCEDURE — 99999 PR PBB SHADOW E&M-EST. PATIENT-LVL V: CPT | Mod: PBBFAC,HCNC,, | Performed by: NURSE PRACTITIONER

## 2025-04-23 NOTE — PATIENT INSTRUCTIONS
Counseling and Referral of Other Preventative  (Italic type indicates deductible and co-insurance are waived)    Patient Name: Lizabeth Saha  Today's Date: 4/23/2025    Health Maintenance       Date Due Completion Date    Aspirin/Antiplatelet Therapy Never done ---    RSV Vaccine (Age 60+ and Pregnant patients) (1 - Risk 60-74 years 1-dose series) Never done ---    Influenza Vaccine (1) 09/01/2024 11/6/2019 (Declined)    Override on 11/6/2019: Declined    Override on 1/18/2017: Declined    COVID-19 Vaccine (7 - 2024-25 season) 09/01/2024 11/5/2021    TETANUS VACCINE 09/27/2024 9/27/2014    DEXA Scan 02/08/2025 2/8/2023    Hemoglobin A1c 03/12/2025 12/12/2024    Mammogram 07/30/2025 7/30/2024    Foot Exam 08/06/2025 8/6/2024 (Done)    Override on 8/6/2024: Done    Override on 9/18/2023: Done    Override on 6/26/2020: Done    Override on 7/1/2019: Done    Override on 5/1/2018: Done    Override on 1/18/2017: Done    Lipid Panel 08/16/2025 8/16/2024    Diabetic Eye Exam 08/29/2025 8/29/2024    Override on 7/23/2024: Done (external)    Override on 9/20/2017: Done (Per pt at Dr. Bobby Lancaster - will call for records.)    Pneumococcal Vaccines (Age 50+) (3 of 3 - PCV20 or PCV21) 10/27/2025 10/27/2020    Diabetes Urine Screening 12/13/2025 12/13/2024    High Dose Statin 03/27/2026 3/27/2025    Colorectal Cancer Screening 11/14/2033 11/14/2023        No orders of the defined types were placed in this encounter.    The following information is provided to all patients.  This information is to help you find resources for any of the problems found today that may be affecting your health:                  Living healthy guide: www.[facility].[place].gov      Understanding Diabetes: www.diabetes.org      Eating healthy: www.cdc.gov/healthyweight      CDC home safety checklist: www.cdc.gov/steadi/patient.html      Agency on Aging: www.goea.[place].[facility]      Alcoholics anonymous (AA): www.aa.org      Physical Activity:  www.gabriela.nih.gov/oo3mpqv      Tobacco use: www.quitwithusla.org

## 2025-04-23 NOTE — PROGRESS NOTES
"  Lizabeth Saha presented for a follow-up Medicare AWV today. The following components were reviewed and updated:    Medical history  Family History  Social history  Allergies and Current Medications  Health Risk Assessment  Health Maintenance  Care Team    **See Completed Assessments for Annual Wellness visit with in the encounter summary    The following assessments were completed:  Depression Screening  Cognitive function Screening    Timed Get Up Test  Whisper Test      Opioid documentation:      Patient does not have a current opioid prescription.          Vitals:    04/23/25 0934   BP: 130/76   BP Location: Right arm   Pulse: 92   SpO2: 98%   Weight: 75.9 kg (167 lb 5.3 oz)   Height: 5' 5" (1.651 m)     Body mass index is 27.85 kg/m².       Physical Exam  Constitutional:       Appearance: Normal appearance.   Cardiovascular:      Rate and Rhythm: Normal rate and regular rhythm.   Pulmonary:      Effort: Pulmonary effort is normal.      Breath sounds: Normal breath sounds.   Skin:     General: Skin is warm and dry.   Neurological:      General: No focal deficit present.      Mental Status: She is alert.   Psychiatric:         Mood and Affect: Mood normal.           Diagnoses and health risks identified today and associated recommendations/orders:    1. Encounter for Medicare annual wellness exam  Here for Health Risk Assessment/Annual Wellness Visit.  Health maintenance reviewed and updated. Follow up in one year.   - Referral to Enhanced Annual Wellness Visit (eAWV) W+1    2. Primary hypertension  Chronic, at goal with valsartan. Followed by PCP, Cardiology.    3. Atherosclerosis of both carotid arteries  Chronic, stable on current medications. Followed by PCP, Cardiology.    4. Chronic kidney disease, stage 3a  Chronic, stable on current medications. Followed by PCP.     5. Type 2 diabetes mellitus with hyperglycemia, without long-term current use of insulin  Chronic, not at goal, A1c 8.1. Medications " adjusted.. Followed by PCP, IM Diabetes PN.    6. Osteopenia, unspecified location  Chronic, stable on current medications. Followed by PCP.     7. Stress and adjustment reaction  Chronic, stable. PHQ-2 score 0. Followed by PCP.    8. Mild nonproliferative diabetic retinopathy associated with type 2 diabetes mellitus, macular edema presence unspecified, unspecified laterality  Chronic, stable. Followed by outside Ophthalmology.    9. Sleep apnea, unspecified type  Chronic, not consistent with CPAP. Followed by PCP, Pulmonology.      Provided Lizabeth with a 5-10 year written screening schedule and personal prevention plan. Recommendations were developed using the USPSTF age appropriate recommendations. Education, counseling, and referrals were provided as needed.  After Visit Summary printed and given to patient which includes a list of additional screenings\tests needed.    Follow up in 3 months (on 7/31/2025).with PCP      Luanne Fishman NP

## 2025-04-24 ENCOUNTER — HOSPITAL ENCOUNTER (OUTPATIENT)
Dept: RADIOLOGY | Facility: CLINIC | Age: 70
Discharge: HOME | End: 2025-04-24
Attending: INTERNAL MEDICINE
Payer: MEDICARE

## 2025-04-24 DIAGNOSIS — Z78.0 POSTMENOPAUSAL: ICD-10-CM

## 2025-04-24 PROCEDURE — 77080 DXA BONE DENSITY AXIAL: CPT | Mod: TC,HCNC

## 2025-04-28 ENCOUNTER — PATIENT MESSAGE (OUTPATIENT)
Dept: INTERNAL MEDICINE | Facility: CLINIC | Age: 70
End: 2025-04-28
Payer: MEDICARE

## 2025-05-02 ENCOUNTER — RESULTS FOLLOW-UP (OUTPATIENT)
Dept: INTERNAL MEDICINE | Facility: CLINIC | Age: 70
End: 2025-05-02

## 2025-05-08 ENCOUNTER — PATIENT MESSAGE (OUTPATIENT)
Dept: OTHER | Facility: OTHER | Age: 70
End: 2025-05-08
Payer: MEDICARE

## 2025-05-08 RX ORDER — VALSARTAN 80 MG/1
80 TABLET ORAL
Qty: 90 TABLET | Refills: 2 | Status: SHIPPED | OUTPATIENT
Start: 2025-05-08

## 2025-05-08 NOTE — TELEPHONE ENCOUNTER
Refill Decision Note   Lizabeth Saha  is requesting a refill authorization.  Brief Assessment and Rationale for Refill:  Approve     Medication Therapy Plan:         Comments:     Note composed:2:48 PM 05/08/2025

## 2025-05-08 NOTE — TELEPHONE ENCOUNTER
No care due was identified.  Health [facility] Embedded Care Due Messages. Reference number: [id number].   5/08/2025 1:09:31 PM CDT

## 2025-05-26 ENCOUNTER — PATIENT MESSAGE (OUTPATIENT)
Dept: INTERNAL MEDICINE | Facility: CLINIC | Age: 70
End: 2025-05-26
Payer: MEDICARE

## 2025-05-27 DIAGNOSIS — E78.5 HYPERLIPIDEMIA, UNSPECIFIED HYPERLIPIDEMIA TYPE: Primary | ICD-10-CM

## 2025-05-27 NOTE — TELEPHONE ENCOUNTER
Scheduled pt's appt on 7/17/25. (Override, will show in appts once scheduled)   Pt requesting lipid be attached to lab for her appt with Dr Lancaster. Please have Dr Lancaster place lab order and link order to lab appt scheduled on 7/11/25.

## 2025-06-03 ENCOUNTER — PATIENT MESSAGE (OUTPATIENT)
Dept: INTERNAL MEDICINE | Facility: CLINIC | Age: 70
End: 2025-06-03
Payer: MEDICARE

## 2025-07-07 ENCOUNTER — PATIENT MESSAGE (OUTPATIENT)
Dept: INTERNAL MEDICINE | Facility: CLINIC | Age: 70
End: 2025-07-07
Payer: MEDICARE

## 2025-07-09 ENCOUNTER — PATIENT MESSAGE (OUTPATIENT)
Dept: INTERNAL MEDICINE | Facility: CLINIC | Age: 70
End: 2025-07-09
Payer: MEDICARE

## 2025-07-31 ENCOUNTER — OFFICE VISIT (OUTPATIENT)
Dept: INTERNAL MEDICINE | Facility: CLINIC | Age: 70
End: 2025-07-31
Payer: MEDICARE

## 2025-07-31 ENCOUNTER — HOSPITAL ENCOUNTER (OUTPATIENT)
Dept: RADIOLOGY | Facility: HOSPITAL | Age: 70
Discharge: HOME | End: 2025-07-31
Attending: INTERNAL MEDICINE
Payer: MEDICARE

## 2025-07-31 VITALS
WEIGHT: 164.88 LBS | HEIGHT: 65 IN | OXYGEN SATURATION: 98 % | SYSTOLIC BLOOD PRESSURE: 124 MMHG | DIASTOLIC BLOOD PRESSURE: 62 MMHG | HEART RATE: 88 BPM | BODY MASS INDEX: 27.47 KG/M2

## 2025-07-31 DIAGNOSIS — I70.0 AORTIC ATHEROSCLEROSIS: ICD-10-CM

## 2025-07-31 DIAGNOSIS — M54.2 CERVICALGIA: ICD-10-CM

## 2025-07-31 DIAGNOSIS — E55.9 VITAMIN D DEFICIENCY: ICD-10-CM

## 2025-07-31 DIAGNOSIS — E11.8 TYPE 2 DIABETES MELLITUS WITH UNSPECIFIED COMPLICATIONS: ICD-10-CM

## 2025-07-31 DIAGNOSIS — I10 PRIMARY HYPERTENSION: Primary | ICD-10-CM

## 2025-07-31 DIAGNOSIS — R13.10 DYSPHAGIA, UNSPECIFIED TYPE: ICD-10-CM

## 2025-07-31 DIAGNOSIS — J44.9 CHRONIC OBSTRUCTIVE PULMONARY DISEASE, UNSPECIFIED COPD TYPE: ICD-10-CM

## 2025-07-31 DIAGNOSIS — N18.31 CHRONIC KIDNEY DISEASE, STAGE 3A: ICD-10-CM

## 2025-07-31 DIAGNOSIS — M79.605 LEFT LEG PAIN: ICD-10-CM

## 2025-07-31 PROCEDURE — 3078F DIAST BP <80 MM HG: CPT | Mod: CPTII,HCNC,S$GLB, | Performed by: INTERNAL MEDICINE

## 2025-07-31 PROCEDURE — 3074F SYST BP LT 130 MM HG: CPT | Mod: CPTII,HCNC,S$GLB, | Performed by: INTERNAL MEDICINE

## 2025-07-31 PROCEDURE — 1125F AMNT PAIN NOTED PAIN PRSNT: CPT | Mod: CPTII,HCNC,S$GLB, | Performed by: INTERNAL MEDICINE

## 2025-07-31 PROCEDURE — 99999 PR PBB SHADOW E&M-EST. PATIENT-LVL V: CPT | Mod: PBBFAC,HCNC,, | Performed by: INTERNAL MEDICINE

## 2025-07-31 PROCEDURE — 1101F PT FALLS ASSESS-DOCD LE1/YR: CPT | Mod: CPTII,HCNC,S$GLB, | Performed by: INTERNAL MEDICINE

## 2025-07-31 PROCEDURE — 1159F MED LIST DOCD IN RCRD: CPT | Mod: CPTII,HCNC,S$GLB, | Performed by: INTERNAL MEDICINE

## 2025-07-31 PROCEDURE — 99214 OFFICE O/P EST MOD 30 MIN: CPT | Mod: HCNC,S$GLB,, | Performed by: INTERNAL MEDICINE

## 2025-07-31 PROCEDURE — G2211 COMPLEX E/M VISIT ADD ON: HCPCS | Mod: HCNC,S$GLB,, | Performed by: INTERNAL MEDICINE

## 2025-07-31 PROCEDURE — 72040 X-RAY EXAM NECK SPINE 2-3 VW: CPT | Mod: 26,HCNC,, | Performed by: RADIOLOGY

## 2025-07-31 PROCEDURE — 1160F RVW MEDS BY RX/DR IN RCRD: CPT | Mod: CPTII,HCNC,S$GLB, | Performed by: INTERNAL MEDICINE

## 2025-07-31 PROCEDURE — 3008F BODY MASS INDEX DOCD: CPT | Mod: CPTII,HCNC,S$GLB, | Performed by: INTERNAL MEDICINE

## 2025-07-31 PROCEDURE — 3288F FALL RISK ASSESSMENT DOCD: CPT | Mod: CPTII,HCNC,S$GLB, | Performed by: INTERNAL MEDICINE

## 2025-07-31 PROCEDURE — 72040 X-RAY EXAM NECK SPINE 2-3 VW: CPT | Mod: TC,HCNC

## 2025-07-31 PROCEDURE — 4010F ACE/ARB THERAPY RXD/TAKEN: CPT | Mod: CPTII,HCNC,S$GLB, | Performed by: INTERNAL MEDICINE

## 2025-07-31 NOTE — PROGRESS NOTES
"Subjective:       Patient ID: Lizabeth Saha is a 69 y.o. female.    Chief Complaint: Follow-up  This is a 69-year-old who presents today for follow-up patient reports that she has been trying to work on her diabetes control in general she was taken off of Mounjaro because she did not tolerate it well and currently on Jardiance which she feels has helped with her blood sugars but she had some family stressors and has been traveling a fair amount so knows that she will likely have to do something different she does have a follow-up with endocrinology planned and hopes to start watching her diet a bit more closely now that she is back home she has been having some issues with her jaw feels like she has some discomfort sometimes when she opens and closes at a lot pain up into the years she does have an appointment with the dentist planned for further evaluation she is not aware that she clenches her teeth she does have hearing aids and has had no recent issues.  She has been having some issues with her left thigh external occasionally back pain but minimal.  She gets pain and catch his sometimes when she does certain activities recently she feels that she is having difficulty swallowing on occasion she sometimes gets some discomfort in her neck and arms.  She denies shortness or breath or exertional symptoms    Follow-up  Associated symptoms include arthralgias. Pertinent negatives include no coughing.     Review of Systems   Respiratory:  Negative for cough and shortness of breath.    Cardiovascular:  Negative for leg swelling.   Gastrointestinal:         Difficulty swalloing    Musculoskeletal:  Positive for arthralgias.        Left hip/ leg pain        Objective:      Blood pressure 124/62, pulse 88, height 5' 5" (1.651 m), weight 74.8 kg (164 lb 14.5 oz), last menstrual period 01/01/1994, SpO2 98%.   Physical Exam  Constitutional:       General: She is not in acute distress.  HENT:      Head: Normocephalic.    "   Comments: Pain jaw with opening    Hearing  aids      Mouth/Throat:      Pharynx: Oropharynx is clear.   Eyes:      General: No scleral icterus.  Cardiovascular:      Rate and Rhythm: Normal rate and regular rhythm.      Heart sounds: Normal heart sounds. No murmur heard.     No friction rub. No gallop.   Pulmonary:      Effort: Pulmonary effort is normal. No respiratory distress.      Breath sounds: Normal breath sounds.   Abdominal:      General: Bowel sounds are normal.      Palpations: Abdomen is soft. There is no mass.      Tenderness: There is no abdominal tenderness.   Musculoskeletal:      Cervical back: Neck supple.      Comments: Left hip leg pain with movement    Skin:     Findings: No erythema.   Neurological:      Mental Status: She is alert.   Psychiatric:         Mood and Affect: Mood normal.         Assessment:       1. Primary hypertension    2. Vitamin D deficiency    3. Cervicalgia    4. Chronic obstructive pulmonary disease, unspecified COPD type    5. Left leg pain    6. Dysphagia, unspecified type    7. Chronic kidney disease, stage 3a    8. Aortic atherosclerosis    9. Type 2 diabetes mellitus with unspecified complications        Plan:       Lizabeth was seen today for follow-up.    Diagnoses and all orders for this visit:    Primary hypertension  Blood pressure acceptable she remains on valsartan without difficulty    Vitamin D deficiency  Continue will check with upcoming labs  -     Vitamin D; Future    Cervicalgia  With some radicular pain on occasion will update blood work  -     X-Ray Cervical Spine AP And Lateral; Future    Chronic obstructive pulmonary disease, unspecified COPD type  Encouraged inhaler use    Left leg pain  Maybe some component of bursitis ortho appointment  -     Ambulatory referral/consult to Orthopedics; Future    Dysphagia, unspecified type  Intermittent GI follow-up discussed order placed for scheduling if needed  -     Ambulatory referral/consult to  Gastroenterology; Future    Chronic kidney disease, stage 3a  Discussed recent labs reviewed    Type 2 diabetes continue current regimen she remains on Jardiance was unable to tolerate Mounjaro it was stopped by endocrinology and she has a follow-up planned she plans to resume dietary measures and exercise    Aortic atherosclerosis  Hyperlipidemia she remains on statin    She has her annual mammogram planned     Follow up 6 months

## 2025-08-01 DIAGNOSIS — M54.2 CERVICALGIA: Primary | ICD-10-CM

## 2025-08-04 ENCOUNTER — HOSPITAL ENCOUNTER (OUTPATIENT)
Dept: RADIOLOGY | Facility: HOSPITAL | Age: 70
Discharge: HOME | End: 2025-08-04
Attending: NURSE PRACTITIONER
Payer: MEDICARE

## 2025-08-04 ENCOUNTER — OFFICE VISIT (OUTPATIENT)
Dept: ORTHOPEDICS | Facility: CLINIC | Age: 70
End: 2025-08-04
Payer: MEDICARE

## 2025-08-04 ENCOUNTER — TELEPHONE (OUTPATIENT)
Dept: INTERNAL MEDICINE | Facility: CLINIC | Age: 70
End: 2025-08-04
Payer: MEDICARE

## 2025-08-04 VITALS — HEIGHT: 65 IN | BODY MASS INDEX: 27.99 KG/M2 | WEIGHT: 168 LBS

## 2025-08-04 DIAGNOSIS — M79.605 LEFT LEG PAIN: Primary | ICD-10-CM

## 2025-08-04 DIAGNOSIS — M79.605 LEFT LEG PAIN: ICD-10-CM

## 2025-08-04 DIAGNOSIS — M54.50 LOW BACK PAIN, UNSPECIFIED BACK PAIN LATERALITY, UNSPECIFIED CHRONICITY, UNSPECIFIED WHETHER SCIATICA PRESENT: ICD-10-CM

## 2025-08-04 DIAGNOSIS — M25.552 LEFT HIP PAIN: ICD-10-CM

## 2025-08-04 DIAGNOSIS — M47.26 OSTEOARTHRITIS OF SPINE WITH RADICULOPATHY, LUMBAR REGION: Primary | ICD-10-CM

## 2025-08-04 PROCEDURE — 73502 X-RAY EXAM HIP UNI 2-3 VIEWS: CPT | Mod: 26,HCNC,LT, | Performed by: RADIOLOGY

## 2025-08-04 PROCEDURE — 99999 PR PBB SHADOW E&M-EST. PATIENT-LVL IV: CPT | Mod: PBBFAC,HCNC,, | Performed by: NURSE PRACTITIONER

## 2025-08-04 PROCEDURE — 73502 X-RAY EXAM HIP UNI 2-3 VIEWS: CPT | Mod: TC,HCNC,LT

## 2025-08-04 PROCEDURE — 72110 X-RAY EXAM L-2 SPINE 4/>VWS: CPT | Mod: 26,HCNC,, | Performed by: RADIOLOGY

## 2025-08-04 PROCEDURE — 72110 X-RAY EXAM L-2 SPINE 4/>VWS: CPT | Mod: TC,HCNC

## 2025-08-04 RX ORDER — GABAPENTIN 300 MG/1
300 CAPSULE ORAL 3 TIMES DAILY
Qty: 90 CAPSULE | Refills: 11 | Status: SHIPPED | OUTPATIENT
Start: 2025-08-04 | End: 2026-08-04

## 2025-08-04 NOTE — TELEPHONE ENCOUNTER
----- Message from Orquidea Lancaster MD sent at 8/1/2025  3:53 PM CDT -----  Called rviewd with pt discussed djd cervical spine  She would like spine clnic appt for options referral placed    Please assist in scheulding spine clninc appt  ----- Message -----  From: Genny, Rad Results In  Sent: 7/31/2025  12:42 PM CDT  To: Orquidea Lancaster MD

## 2025-08-04 NOTE — TELEPHONE ENCOUNTER
Copied from CRM #[id number]. Topic: General Inquiry - Return Call  >> Aug 4, 2025 12:05 PM Samantha wrote:  Type:  Patient Returning Call    Who Called:Lizabeth  Who Left Message for Patient:Zaida Turk MA  Does the patient know what this is regarding?:referral?  Would the patient rather a call back or a response via [facility]? call  Best Call Back Number:  Additional Information:

## 2025-08-04 NOTE — PROGRESS NOTES
"  CHIEF COMPLAINT:  - Left-sided thigh and lower back pain with associated groin pain.    HPI:  Lizabeth presents with left-sided pain primarily affecting the thigh, lower back, groin, and buttock area. Pain has been ongoing for more than a year and worsens with movement. She reports increased pain after sitting for 15 to 20 minutes and upon standing up. She also experiences episodes where her leg "gives out," particularly while walking in the mall.    For pain relief, she has been taking Tylenol at night for severe pain, which provides some relief while sleeping.      She reports neck pain as well, particularly on the left side, which is partially managed by sleeping with specialized pillows.    Her diabetes management has been suboptimal recently due to family issues, with the most recent A1C reported as 8.1, down from 8.5 seven months ago. She also has a history of mildly decreased renal function and her mother  from kidney failure, so she hasn't taken any nsaids.    PREVIOUS TREATMENTS:  - Lizabeth has been attending PT  - Lizabeth uses a small pillow for neck support while sleeping  - Tylenol: Provides some relief while sleeping    MEDICATIONS:  - Tylenol: At night for pain relief  - Topical cream or roll-on, Biofreeze: For pain relief    ROS  General: denies fever and chills  Resp: no c/o sob  CVS: no c/o cp  Musculoskeletal: Neurological: +weakness   MSK: +back pain, +limb pain, +difficulty standing up, +neck pain, +pain with movement    PE  General: AAOx3, pleasant and cooperative  Resp: respirations even and unlabored  MSK: left hip exam  - [facility]  - straight leg raise  - pain with internal rotation  - pain with external rotation  + tenderness over the greater trochanter      Xray:  Personally interpreted by me and reviewed with the patient: there are mild degenerative changes noted in the hip and moderate degenerative changes noted in the lumbar and cervical spine    Assessment:  Lumbar spine djd with " radiculopathy  Cervical spine djd    Plan:  Assessment & Plan    MEDICATIONS:  - Started Gabapentin 300 mg PO TID.  - Take Tylenol as needed for pain.  - Apply Biofreeze as needed for pain.    REFERRALS:  - Referred to PT for strengthening exercises.    FOLLOW UP:  - Follow up if Gabapentin is not tolerated or if no improvement.    PATIENT INSTRUCTIONS:  - Apply OTC pain relief creams or roll-ons to affected areas for pain.       This note was generated with the assistance of ambient listening technology. I attest to having reviewed and edited the generated note for accuracy, though some syntax or spelling errors may persist. Please contact the author of this note for any clarification.

## 2025-08-07 ENCOUNTER — PATIENT MESSAGE (OUTPATIENT)
Dept: INTERNAL MEDICINE | Facility: CLINIC | Age: 70
End: 2025-08-07
Payer: MEDICARE

## 2025-08-08 ENCOUNTER — PATIENT MESSAGE (OUTPATIENT)
Dept: INTERNAL MEDICINE | Facility: CLINIC | Age: 70
End: 2025-08-08
Payer: MEDICARE

## 2025-08-08 ENCOUNTER — LAB VISIT (OUTPATIENT)
Dept: LAB | Facility: HOSPITAL | Age: 70
End: 2025-08-08
Attending: NURSE PRACTITIONER
Payer: MEDICARE

## 2025-08-08 DIAGNOSIS — E11.65 TYPE 2 DIABETES MELLITUS WITH HYPERGLYCEMIA, WITHOUT LONG-TERM CURRENT USE OF INSULIN: Primary | ICD-10-CM

## 2025-08-08 DIAGNOSIS — E78.5 HYPERLIPIDEMIA, UNSPECIFIED HYPERLIPIDEMIA TYPE: ICD-10-CM

## 2025-08-08 DIAGNOSIS — E55.9 VITAMIN D DEFICIENCY: ICD-10-CM

## 2025-08-08 DIAGNOSIS — E11.65 TYPE 2 DIABETES MELLITUS WITH HYPERGLYCEMIA, WITHOUT LONG-TERM CURRENT USE OF INSULIN: ICD-10-CM

## 2025-08-08 LAB
25(OH)D3+25(OH)D2 SERPL-MCNC: 36 NG/ML (ref 30–96)
ANION GAP (OHS): 13 MMOL/L (ref 8–16)
BUN SERPL-MCNC: 21 MG/DL (ref 8–23)
CALCIUM SERPL-MCNC: 9.6 MG/DL (ref 8.7–10.5)
CHLORIDE SERPL-SCNC: 105 MMOL/L (ref 95–110)
CHOLEST SERPL-MCNC: 181 MG/DL (ref 120–199)
CHOLEST/HDLC SERPL: 3.4 {RATIO} (ref 2–5)
CO2 SERPL-SCNC: 19 MMOL/L (ref 23–29)
CREAT SERPL-MCNC: 1.1 MG/DL (ref 0.5–1.4)
EAG (OHS): 237 MG/DL (ref 68–131)
GFR SERPLBLD CREATININE-BSD FMLA CKD-EPI: 55 ML/MIN/1.73/M2
GLUCOSE SERPL-MCNC: 165 MG/DL (ref 70–110)
HBA1C MFR BLD: 9.9 % (ref 4–5.6)
HDLC SERPL-MCNC: 53 MG/DL (ref 40–75)
HDLC SERPL: 29.3 % (ref 20–50)
LDLC SERPL CALC-MCNC: 108.6 MG/DL (ref 63–159)
NONHDLC SERPL-MCNC: 128 MG/DL
POTASSIUM SERPL-SCNC: 4.6 MMOL/L (ref 3.5–5.1)
SODIUM SERPL-SCNC: 137 MMOL/L (ref 136–145)
TRIGL SERPL-MCNC: 97 MG/DL (ref 30–150)

## 2025-08-08 PROCEDURE — 36415 COLL VENOUS BLD VENIPUNCTURE: CPT | Mod: HCNC,PN

## 2025-08-08 PROCEDURE — 80061 LIPID PANEL: CPT | Mod: HCNC

## 2025-08-08 PROCEDURE — 83036 HEMOGLOBIN GLYCOSYLATED A1C: CPT | Mod: HCNC

## 2025-08-08 PROCEDURE — 82306 VITAMIN D 25 HYDROXY: CPT | Mod: HCNC

## 2025-08-08 PROCEDURE — 80048 BASIC METABOLIC PNL TOTAL CA: CPT | Mod: HCNC

## 2025-08-15 ENCOUNTER — OFFICE VISIT (OUTPATIENT)
Dept: INTERNAL MEDICINE | Facility: CLINIC | Age: 70
End: 2025-08-15
Payer: MEDICARE

## 2025-08-15 VITALS
HEIGHT: 65 IN | SYSTOLIC BLOOD PRESSURE: 120 MMHG | OXYGEN SATURATION: 96 % | BODY MASS INDEX: 27.63 KG/M2 | DIASTOLIC BLOOD PRESSURE: 66 MMHG | HEART RATE: 82 BPM | WEIGHT: 165.81 LBS

## 2025-08-15 DIAGNOSIS — G47.30 SLEEP APNEA, UNSPECIFIED TYPE: ICD-10-CM

## 2025-08-15 DIAGNOSIS — F43.29 STRESS AND ADJUSTMENT REACTION: ICD-10-CM

## 2025-08-15 DIAGNOSIS — E78.2 MIXED HYPERLIPIDEMIA: ICD-10-CM

## 2025-08-15 DIAGNOSIS — N18.31 CHRONIC KIDNEY DISEASE, STAGE 3A: ICD-10-CM

## 2025-08-15 DIAGNOSIS — E11.65 TYPE 2 DIABETES MELLITUS WITH HYPERGLYCEMIA, WITHOUT LONG-TERM CURRENT USE OF INSULIN: Primary | ICD-10-CM

## 2025-08-15 DIAGNOSIS — I10 PRIMARY HYPERTENSION: ICD-10-CM

## 2025-08-15 DIAGNOSIS — E11.3299 NON-PROLIFERATIVE DIABETIC RETINOPATHY: ICD-10-CM

## 2025-08-15 PROCEDURE — 99999 PR PBB SHADOW E&M-EST. PATIENT-LVL IV: CPT | Mod: PBBFAC,,, | Performed by: NURSE PRACTITIONER

## 2025-08-15 RX ORDER — DULAGLUTIDE 1.5 MG/.5ML
1.5 INJECTION, SOLUTION SUBCUTANEOUS
Qty: 12 PEN | Refills: 2 | Status: SHIPPED | OUTPATIENT
Start: 2025-08-15

## 2025-08-18 ENCOUNTER — CLINICAL SUPPORT (OUTPATIENT)
Dept: REHABILITATION | Facility: HOSPITAL | Age: 70
End: 2025-08-18
Payer: MEDICARE

## 2025-08-18 DIAGNOSIS — M25.661 DECREASED RANGE OF MOTION OF BOTH LOWER EXTREMITIES: ICD-10-CM

## 2025-08-18 DIAGNOSIS — R29.898 DECREASED STRENGTH OF LOWER EXTREMITY: Primary | ICD-10-CM

## 2025-08-18 DIAGNOSIS — M25.662 DECREASED RANGE OF MOTION OF BOTH LOWER EXTREMITIES: ICD-10-CM

## 2025-08-18 PROCEDURE — 97161 PT EVAL LOW COMPLEX 20 MIN: CPT | Mod: PO

## 2025-08-18 PROCEDURE — 97530 THERAPEUTIC ACTIVITIES: CPT | Mod: PO

## 2025-08-18 PROCEDURE — 97110 THERAPEUTIC EXERCISES: CPT | Mod: PO

## 2025-08-20 ENCOUNTER — OFFICE VISIT (OUTPATIENT)
Dept: GASTROENTEROLOGY | Facility: CLINIC | Age: 70
End: 2025-08-20
Payer: MEDICARE

## 2025-08-20 ENCOUNTER — TELEPHONE (OUTPATIENT)
Dept: ENDOSCOPY | Facility: HOSPITAL | Age: 70
End: 2025-08-20
Payer: MEDICARE

## 2025-08-20 VITALS
HEART RATE: 92 BPM | SYSTOLIC BLOOD PRESSURE: 120 MMHG | HEIGHT: 65 IN | DIASTOLIC BLOOD PRESSURE: 67 MMHG | WEIGHT: 165.13 LBS | BODY MASS INDEX: 27.51 KG/M2

## 2025-08-20 DIAGNOSIS — R13.14 PHARYNGOESOPHAGEAL DYSPHAGIA: Primary | ICD-10-CM

## 2025-08-20 DIAGNOSIS — K59.00 CONSTIPATION, UNSPECIFIED CONSTIPATION TYPE: Primary | ICD-10-CM

## 2025-08-20 DIAGNOSIS — K59.01 SLOW TRANSIT CONSTIPATION: ICD-10-CM

## 2025-08-20 DIAGNOSIS — R19.8 STRAINING DURING BOWEL MOVEMENTS: ICD-10-CM

## 2025-08-20 PROCEDURE — 1101F PT FALLS ASSESS-DOCD LE1/YR: CPT | Mod: CPTII,S$GLB,,

## 2025-08-20 PROCEDURE — 4010F ACE/ARB THERAPY RXD/TAKEN: CPT | Mod: CPTII,S$GLB,,

## 2025-08-20 PROCEDURE — 1125F AMNT PAIN NOTED PAIN PRSNT: CPT | Mod: CPTII,S$GLB,,

## 2025-08-20 PROCEDURE — 3008F BODY MASS INDEX DOCD: CPT | Mod: CPTII,S$GLB,,

## 2025-08-20 PROCEDURE — 3288F FALL RISK ASSESSMENT DOCD: CPT | Mod: CPTII,S$GLB,,

## 2025-08-20 PROCEDURE — 99999 PR PBB SHADOW E&M-EST. PATIENT-LVL IV: CPT | Mod: PBBFAC,,,

## 2025-08-20 PROCEDURE — 99214 OFFICE O/P EST MOD 30 MIN: CPT | Mod: S$GLB,,,

## 2025-08-20 PROCEDURE — 3078F DIAST BP <80 MM HG: CPT | Mod: CPTII,S$GLB,,

## 2025-08-20 PROCEDURE — 3074F SYST BP LT 130 MM HG: CPT | Mod: CPTII,S$GLB,,

## 2025-08-20 PROCEDURE — 3046F HEMOGLOBIN A1C LEVEL >9.0%: CPT | Mod: CPTII,S$GLB,,

## 2025-08-26 ENCOUNTER — CLINICAL SUPPORT (OUTPATIENT)
Dept: REHABILITATION | Facility: HOSPITAL | Age: 70
End: 2025-08-26
Payer: MEDICARE

## 2025-08-26 DIAGNOSIS — R29.898 DECREASED STRENGTH OF LOWER EXTREMITY: Primary | ICD-10-CM

## 2025-08-26 DIAGNOSIS — M25.662 DECREASED RANGE OF MOTION OF BOTH LOWER EXTREMITIES: ICD-10-CM

## 2025-08-26 DIAGNOSIS — M25.661 DECREASED RANGE OF MOTION OF BOTH LOWER EXTREMITIES: ICD-10-CM

## 2025-08-26 PROCEDURE — 97530 THERAPEUTIC ACTIVITIES: CPT | Mod: PO

## 2025-08-26 PROCEDURE — 97110 THERAPEUTIC EXERCISES: CPT | Mod: PO

## 2025-08-27 ENCOUNTER — PATIENT MESSAGE (OUTPATIENT)
Dept: INTERNAL MEDICINE | Facility: CLINIC | Age: 70
End: 2025-08-27
Payer: MEDICARE

## 2025-08-27 DIAGNOSIS — E11.65 TYPE 2 DIABETES MELLITUS WITH HYPERGLYCEMIA, WITHOUT LONG-TERM CURRENT USE OF INSULIN: Primary | ICD-10-CM

## 2025-08-27 RX ORDER — INSULIN PUMP SYRINGE, 3 ML
EACH MISCELLANEOUS
Qty: 1 EACH | Refills: 0 | Status: SHIPPED | OUTPATIENT
Start: 2025-08-27 | End: 2025-08-27 | Stop reason: SDUPTHER

## 2025-08-27 RX ORDER — LANCETS
EACH MISCELLANEOUS
Qty: 200 EACH | Refills: 3 | Status: SHIPPED | OUTPATIENT
Start: 2025-08-27

## 2025-08-27 RX ORDER — INSULIN PUMP SYRINGE, 3 ML
EACH MISCELLANEOUS
Qty: 1 EACH | Refills: 0 | Status: SHIPPED | OUTPATIENT
Start: 2025-08-27 | End: 2026-08-27

## 2025-08-27 RX ORDER — LANCETS
EACH MISCELLANEOUS
Qty: 200 EACH | Refills: 3 | Status: SHIPPED | OUTPATIENT
Start: 2025-08-27 | End: 2025-08-27 | Stop reason: SDUPTHER

## 2025-09-05 ENCOUNTER — OFFICE VISIT (OUTPATIENT)
Dept: NEUROSURGERY | Facility: CLINIC | Age: 70
End: 2025-09-05
Payer: MEDICARE

## 2025-09-05 VITALS
DIASTOLIC BLOOD PRESSURE: 72 MMHG | TEMPERATURE: 98 F | BODY MASS INDEX: 27.48 KG/M2 | SYSTOLIC BLOOD PRESSURE: 105 MMHG | HEART RATE: 84 BPM | WEIGHT: 165.13 LBS

## 2025-09-05 DIAGNOSIS — M47.816 LUMBAR SPONDYLOSIS: ICD-10-CM

## 2025-09-05 DIAGNOSIS — M54.9 DORSALGIA, UNSPECIFIED: Primary | ICD-10-CM

## 2025-09-05 PROCEDURE — 99999 PR PBB SHADOW E&M-EST. PATIENT-LVL IV: CPT | Mod: PBBFAC,HCNC,, | Performed by: NURSE PRACTITIONER
